# Patient Record
Sex: FEMALE | Race: ASIAN | NOT HISPANIC OR LATINO | Employment: UNEMPLOYED | URBAN - METROPOLITAN AREA
[De-identification: names, ages, dates, MRNs, and addresses within clinical notes are randomized per-mention and may not be internally consistent; named-entity substitution may affect disease eponyms.]

---

## 2017-08-01 ENCOUNTER — COMMUNICATION - HEALTHEAST (OUTPATIENT)
Dept: FAMILY MEDICINE | Facility: CLINIC | Age: 22
End: 2017-08-01

## 2017-08-03 ENCOUNTER — OFFICE VISIT - HEALTHEAST (OUTPATIENT)
Dept: FAMILY MEDICINE | Facility: CLINIC | Age: 22
End: 2017-08-03

## 2017-08-03 ENCOUNTER — COMMUNICATION - HEALTHEAST (OUTPATIENT)
Dept: FAMILY MEDICINE | Facility: CLINIC | Age: 22
End: 2017-08-03

## 2017-08-03 ENCOUNTER — HOSPITAL ENCOUNTER (OUTPATIENT)
Dept: LAB | Age: 22
Setting detail: SPECIMEN
Discharge: HOME | End: 2017-08-03

## 2017-08-03 ENCOUNTER — OFFICE VISIT - HEALTHEAST (OUTPATIENT)
Dept: NURSING | Facility: CLINIC | Age: 22
End: 2017-08-03

## 2017-08-03 DIAGNOSIS — Z79.4 TYPE 2 DIABETES MELLITUS WITHOUT COMPLICATION, WITH LONG-TERM CURRENT USE OF INSULIN (H): ICD-10-CM

## 2017-08-03 DIAGNOSIS — E11.9 TYPE 2 DIABETES MELLITUS WITHOUT COMPLICATION, WITH LONG-TERM CURRENT USE OF INSULIN (H): ICD-10-CM

## 2017-08-03 DIAGNOSIS — Z34.90 PREGNANCY, UNSPECIFIED GESTATIONAL AGE: ICD-10-CM

## 2017-08-03 DIAGNOSIS — N91.2 ABSENCE OF MENSTRUATION: ICD-10-CM

## 2017-08-03 DIAGNOSIS — O09.90 HIGH-RISK PREGNANCY: ICD-10-CM

## 2017-08-03 LAB — HBA1C MFR BLD: >14 % (ref 3.5–6)

## 2017-08-03 ASSESSMENT — MIFFLIN-ST. JEOR: SCORE: 1110

## 2017-08-08 ENCOUNTER — COMMUNICATION - HEALTHEAST (OUTPATIENT)
Dept: NURSING | Facility: CLINIC | Age: 22
End: 2017-08-08

## 2017-08-08 ENCOUNTER — COMMUNICATION - HEALTHEAST (OUTPATIENT)
Dept: CARE COORDINATION | Facility: CLINIC | Age: 22
End: 2017-08-08

## 2017-08-10 ENCOUNTER — OFFICE VISIT - HEALTHEAST (OUTPATIENT)
Dept: NURSING | Facility: CLINIC | Age: 22
End: 2017-08-10

## 2017-08-10 DIAGNOSIS — O24.414 GESTATIONAL DIABETES REQUIRING INSULIN: ICD-10-CM

## 2017-08-17 ENCOUNTER — AMBULATORY - HEALTHEAST (OUTPATIENT)
Dept: CARE COORDINATION | Facility: CLINIC | Age: 22
End: 2017-08-17

## 2017-08-17 ENCOUNTER — OFFICE VISIT - HEALTHEAST (OUTPATIENT)
Dept: NURSING | Facility: CLINIC | Age: 22
End: 2017-08-17

## 2017-08-17 DIAGNOSIS — O24.419 GESTATIONAL DIABETES MELLITUS (GDM) IN FIRST TRIMESTER, GESTATIONAL DIABETES METHOD OF CONTROL UNSPECIFIED: ICD-10-CM

## 2017-08-22 ENCOUNTER — COMMUNICATION - HEALTHEAST (OUTPATIENT)
Dept: ADMINISTRATIVE | Facility: CLINIC | Age: 22
End: 2017-08-22

## 2017-08-24 ENCOUNTER — OFFICE VISIT - HEALTHEAST (OUTPATIENT)
Dept: NURSING | Facility: CLINIC | Age: 22
End: 2017-08-24

## 2017-08-24 DIAGNOSIS — E11.9 TYPE 2 DIABETES MELLITUS WITHOUT COMPLICATION, WITH LONG-TERM CURRENT USE OF INSULIN (H): ICD-10-CM

## 2017-08-24 DIAGNOSIS — Z79.4 TYPE 2 DIABETES MELLITUS WITHOUT COMPLICATION, WITH LONG-TERM CURRENT USE OF INSULIN (H): ICD-10-CM

## 2017-08-31 ENCOUNTER — COMMUNICATION - HEALTHEAST (OUTPATIENT)
Dept: NURSING | Facility: CLINIC | Age: 22
End: 2017-08-31

## 2017-08-31 DIAGNOSIS — N18.9 CHRONIC KIDNEY DISEASE, UNSPECIFIED: ICD-10-CM

## 2017-09-07 ENCOUNTER — COMMUNICATION - HEALTHEAST (OUTPATIENT)
Dept: FAMILY MEDICINE | Facility: CLINIC | Age: 22
End: 2017-09-07

## 2017-09-07 ENCOUNTER — COMMUNICATION - HEALTHEAST (OUTPATIENT)
Dept: NURSING | Facility: CLINIC | Age: 22
End: 2017-09-07

## 2017-09-07 ENCOUNTER — OFFICE VISIT - HEALTHEAST (OUTPATIENT)
Dept: FAMILY MEDICINE | Facility: CLINIC | Age: 22
End: 2017-09-07

## 2017-09-07 DIAGNOSIS — E11.9 TYPE 2 DIABETES MELLITUS WITH INSULIN THERAPY (H): ICD-10-CM

## 2017-09-07 DIAGNOSIS — E11.9 DIABETES (H): ICD-10-CM

## 2017-09-07 DIAGNOSIS — R52 BODY ACHES: ICD-10-CM

## 2017-09-07 DIAGNOSIS — Z79.4 TYPE 2 DIABETES MELLITUS WITH INSULIN THERAPY (H): ICD-10-CM

## 2017-09-07 DIAGNOSIS — O03.9 MISCARRIAGE: ICD-10-CM

## 2017-09-07 DIAGNOSIS — E55.9 VITAMIN D DEFICIENCY: ICD-10-CM

## 2017-09-07 DIAGNOSIS — N18.9 CHRONIC KIDNEY DISEASE, UNSPECIFIED: ICD-10-CM

## 2017-09-07 ASSESSMENT — MIFFLIN-ST. JEOR: SCORE: 1094.69

## 2017-09-08 ENCOUNTER — COMMUNICATION - HEALTHEAST (OUTPATIENT)
Dept: FAMILY MEDICINE | Facility: CLINIC | Age: 22
End: 2017-09-08

## 2017-09-13 ENCOUNTER — OFFICE VISIT - HEALTHEAST (OUTPATIENT)
Dept: FAMILY MEDICINE | Facility: CLINIC | Age: 22
End: 2017-09-13

## 2017-09-13 DIAGNOSIS — E11.9 TYPE 2 DIABETES MELLITUS WITH INSULIN THERAPY (H): ICD-10-CM

## 2017-09-13 DIAGNOSIS — O03.9 MISCARRIAGE: ICD-10-CM

## 2017-09-13 DIAGNOSIS — Z79.4 TYPE 2 DIABETES MELLITUS WITH INSULIN THERAPY (H): ICD-10-CM

## 2017-09-13 ASSESSMENT — MIFFLIN-ST. JEOR: SCORE: 1082.5

## 2017-09-19 ENCOUNTER — AMBULATORY - HEALTHEAST (OUTPATIENT)
Dept: ENDOCRINOLOGY | Facility: CLINIC | Age: 22
End: 2017-09-19

## 2017-09-19 DIAGNOSIS — E11.9 TYPE 2 DIABETES MELLITUS WITH INSULIN THERAPY (H): ICD-10-CM

## 2017-09-19 DIAGNOSIS — Z79.4 TYPE 2 DIABETES MELLITUS WITH INSULIN THERAPY (H): ICD-10-CM

## 2017-09-26 ENCOUNTER — COMMUNICATION - HEALTHEAST (OUTPATIENT)
Dept: FAMILY MEDICINE | Facility: CLINIC | Age: 22
End: 2017-09-26

## 2017-10-04 ENCOUNTER — OFFICE VISIT - HEALTHEAST (OUTPATIENT)
Dept: NURSING | Facility: CLINIC | Age: 22
End: 2017-10-04

## 2017-10-04 DIAGNOSIS — Z79.4 TYPE 2 DIABETES MELLITUS WITHOUT COMPLICATION, WITH LONG-TERM CURRENT USE OF INSULIN (H): ICD-10-CM

## 2017-10-04 DIAGNOSIS — G47.9 DIFFICULTY SLEEPING: ICD-10-CM

## 2017-10-04 DIAGNOSIS — E11.9 TYPE 2 DIABETES MELLITUS WITH INSULIN THERAPY (H): ICD-10-CM

## 2017-10-04 DIAGNOSIS — E11.9 TYPE 2 DIABETES MELLITUS WITHOUT COMPLICATION, WITH LONG-TERM CURRENT USE OF INSULIN (H): ICD-10-CM

## 2017-10-04 DIAGNOSIS — R80.9 MICROALBUMINURIA: ICD-10-CM

## 2017-10-04 DIAGNOSIS — E78.5 DYSLIPIDEMIA: ICD-10-CM

## 2017-10-04 DIAGNOSIS — E11.42 DIABETIC POLYNEUROPATHY ASSOCIATED WITH TYPE 2 DIABETES MELLITUS (H): ICD-10-CM

## 2017-10-04 DIAGNOSIS — Z79.4 TYPE 2 DIABETES MELLITUS WITH INSULIN THERAPY (H): ICD-10-CM

## 2017-10-04 DIAGNOSIS — R63.0 LOSS OF APPETITE: ICD-10-CM

## 2017-10-04 LAB — LDLC SERPL CALC-MCNC: 136 MG/DL

## 2017-10-05 ENCOUNTER — COMMUNICATION - HEALTHEAST (OUTPATIENT)
Dept: NURSING | Facility: CLINIC | Age: 22
End: 2017-10-05

## 2017-10-05 DIAGNOSIS — E11.29 MICROALBUMINURIA DUE TO TYPE 2 DIABETES MELLITUS (H): ICD-10-CM

## 2017-10-05 DIAGNOSIS — E78.5 DYSLIPIDEMIA: ICD-10-CM

## 2017-10-05 DIAGNOSIS — R80.9 MICROALBUMINURIA DUE TO TYPE 2 DIABETES MELLITUS (H): ICD-10-CM

## 2017-10-09 ENCOUNTER — COMMUNICATION - HEALTHEAST (OUTPATIENT)
Dept: NURSING | Facility: CLINIC | Age: 22
End: 2017-10-09

## 2017-10-11 ENCOUNTER — AMBULATORY - HEALTHEAST (OUTPATIENT)
Dept: ENDOCRINOLOGY | Facility: CLINIC | Age: 22
End: 2017-10-11

## 2017-10-11 ENCOUNTER — COMMUNICATION - HEALTHEAST (OUTPATIENT)
Dept: ENDOCRINOLOGY | Facility: CLINIC | Age: 22
End: 2017-10-11

## 2017-10-11 DIAGNOSIS — Z79.4 TYPE 2 DIABETES MELLITUS WITH INSULIN THERAPY (H): ICD-10-CM

## 2017-10-11 DIAGNOSIS — E11.9 TYPE 2 DIABETES MELLITUS WITH INSULIN THERAPY (H): ICD-10-CM

## 2017-11-01 ENCOUNTER — RECORDS - HEALTHEAST (OUTPATIENT)
Dept: ADMINISTRATIVE | Facility: OTHER | Age: 22
End: 2017-11-01

## 2017-11-10 ENCOUNTER — AMBULATORY - HEALTHEAST (OUTPATIENT)
Dept: LAB | Facility: CLINIC | Age: 22
End: 2017-11-10

## 2017-11-10 DIAGNOSIS — Z79.4 TYPE 2 DIABETES MELLITUS WITH INSULIN THERAPY (H): ICD-10-CM

## 2017-11-10 DIAGNOSIS — E11.9 TYPE 2 DIABETES MELLITUS WITH INSULIN THERAPY (H): ICD-10-CM

## 2017-11-12 LAB — HBA1C MFR BLD: 9 % (ref 4.2–6.1)

## 2017-11-13 ENCOUNTER — COMMUNICATION - HEALTHEAST (OUTPATIENT)
Dept: FAMILY MEDICINE | Facility: CLINIC | Age: 22
End: 2017-11-13

## 2017-11-14 ENCOUNTER — OFFICE VISIT - HEALTHEAST (OUTPATIENT)
Dept: ENDOCRINOLOGY | Facility: CLINIC | Age: 22
End: 2017-11-14

## 2017-11-14 DIAGNOSIS — E11.9 TYPE 2 DIABETES MELLITUS WITH INSULIN THERAPY (H): ICD-10-CM

## 2017-11-14 DIAGNOSIS — Z79.4 TYPE 2 DIABETES MELLITUS WITH INSULIN THERAPY (H): ICD-10-CM

## 2017-11-14 ASSESSMENT — MIFFLIN-ST. JEOR: SCORE: 1092.42

## 2017-11-15 ENCOUNTER — COMMUNICATION - HEALTHEAST (OUTPATIENT)
Dept: ENDOCRINOLOGY | Facility: CLINIC | Age: 22
End: 2017-11-15

## 2017-12-07 ENCOUNTER — OFFICE VISIT - HEALTHEAST (OUTPATIENT)
Dept: FAMILY MEDICINE | Facility: CLINIC | Age: 22
End: 2017-12-07

## 2017-12-07 ENCOUNTER — RECORDS - HEALTHEAST (OUTPATIENT)
Dept: ADMINISTRATIVE | Facility: OTHER | Age: 22
End: 2017-12-07

## 2017-12-07 DIAGNOSIS — Z30.09 CONTRACEPTIVE EDUCATION: ICD-10-CM

## 2017-12-07 DIAGNOSIS — E11.9 TYPE 2 DIABETES MELLITUS WITH INSULIN THERAPY (H): ICD-10-CM

## 2017-12-07 DIAGNOSIS — Z23 NEED FOR INFLUENZA VACCINATION: ICD-10-CM

## 2017-12-07 DIAGNOSIS — E78.5 HYPERLIPIDEMIA: ICD-10-CM

## 2017-12-07 DIAGNOSIS — Z79.4 TYPE 2 DIABETES MELLITUS WITH INSULIN THERAPY (H): ICD-10-CM

## 2017-12-07 ASSESSMENT — MIFFLIN-ST. JEOR: SCORE: 1083.35

## 2017-12-13 ENCOUNTER — COMMUNICATION - HEALTHEAST (OUTPATIENT)
Dept: NURSING | Facility: CLINIC | Age: 22
End: 2017-12-13

## 2017-12-13 ENCOUNTER — RECORDS - HEALTHEAST (OUTPATIENT)
Dept: ADMINISTRATIVE | Facility: OTHER | Age: 22
End: 2017-12-13

## 2018-01-15 ENCOUNTER — COMMUNICATION - HEALTHEAST (OUTPATIENT)
Dept: NURSING | Facility: CLINIC | Age: 23
End: 2018-01-15

## 2018-02-26 ENCOUNTER — COMMUNICATION - HEALTHEAST (OUTPATIENT)
Dept: NURSING | Facility: CLINIC | Age: 23
End: 2018-02-26

## 2018-02-28 ENCOUNTER — AMBULATORY - HEALTHEAST (OUTPATIENT)
Dept: FAMILY MEDICINE | Facility: CLINIC | Age: 23
End: 2018-02-28

## 2018-02-28 ENCOUNTER — OFFICE VISIT - HEALTHEAST (OUTPATIENT)
Dept: NURSING | Facility: CLINIC | Age: 23
End: 2018-02-28

## 2018-02-28 ENCOUNTER — RECORDS - HEALTHEAST (OUTPATIENT)
Dept: ADMINISTRATIVE | Facility: OTHER | Age: 23
End: 2018-02-28

## 2018-02-28 DIAGNOSIS — E11.9 TYPE 2 DIABETES MELLITUS WITH INSULIN THERAPY (H): ICD-10-CM

## 2018-02-28 DIAGNOSIS — Z79.4 TYPE 2 DIABETES MELLITUS WITH INSULIN THERAPY (H): ICD-10-CM

## 2018-02-28 DIAGNOSIS — Z79.4 TYPE 2 DIABETES MELLITUS WITHOUT COMPLICATION, WITH LONG-TERM CURRENT USE OF INSULIN (H): ICD-10-CM

## 2018-02-28 DIAGNOSIS — Z34.90 PREGNANCY, UNSPECIFIED GESTATIONAL AGE: ICD-10-CM

## 2018-02-28 DIAGNOSIS — R80.9 MICROALBUMINURIA: ICD-10-CM

## 2018-02-28 DIAGNOSIS — E11.9 TYPE 2 DIABETES MELLITUS WITHOUT COMPLICATION, WITH LONG-TERM CURRENT USE OF INSULIN (H): ICD-10-CM

## 2018-03-09 ENCOUNTER — OFFICE VISIT - HEALTHEAST (OUTPATIENT)
Dept: FAMILY MEDICINE | Facility: CLINIC | Age: 23
End: 2018-03-09

## 2018-03-09 DIAGNOSIS — E11.9 TYPE 2 DIABETES MELLITUS WITH INSULIN THERAPY (H): ICD-10-CM

## 2018-03-09 DIAGNOSIS — E78.5 HYPERLIPIDEMIA: ICD-10-CM

## 2018-03-09 DIAGNOSIS — N91.2 ABSENCE OF MENSTRUATION: ICD-10-CM

## 2018-03-09 DIAGNOSIS — Z79.4 TYPE 2 DIABETES MELLITUS WITH INSULIN THERAPY (H): ICD-10-CM

## 2018-03-09 DIAGNOSIS — O09.91 HIGH-RISK PREGNANCY IN FIRST TRIMESTER: ICD-10-CM

## 2018-03-09 DIAGNOSIS — E55.9 VITAMIN D DEFICIENCY: ICD-10-CM

## 2018-03-09 LAB
ALBUMIN SERPL-MCNC: 3.2 G/DL (ref 3.5–5)
ALP SERPL-CCNC: 49 U/L (ref 45–120)
ALT SERPL W P-5'-P-CCNC: <9 U/L (ref 0–45)
ANION GAP SERPL CALCULATED.3IONS-SCNC: 12 MMOL/L (ref 5–18)
AST SERPL W P-5'-P-CCNC: 15 U/L (ref 0–40)
BILIRUB SERPL-MCNC: 0.3 MG/DL (ref 0–1)
BUN SERPL-MCNC: 11 MG/DL (ref 8–22)
CALCIUM SERPL-MCNC: 8.8 MG/DL (ref 8.5–10.5)
CHLORIDE BLD-SCNC: 105 MMOL/L (ref 98–107)
CHOLEST SERPL-MCNC: 140 MG/DL
CO2 SERPL-SCNC: 22 MMOL/L (ref 22–31)
CREAT SERPL-MCNC: 0.53 MG/DL (ref 0.6–1.1)
CREAT UR-MCNC: 119.6 MG/DL
FASTING STATUS PATIENT QL REPORTED: NO
GFR SERPL CREATININE-BSD FRML MDRD: >60 ML/MIN/1.73M2
GLUCOSE BLD-MCNC: 96 MG/DL (ref 70–125)
HBA1C MFR BLD: 8.7 % (ref 3.5–6)
HCG UR QL: POSITIVE
HDLC SERPL-MCNC: 41 MG/DL
LDLC SERPL CALC-MCNC: 83 MG/DL
MICROALBUMIN UR-MCNC: 11.39 MG/DL (ref 0–1.99)
MICROALBUMIN/CREAT UR: 95.2 MG/G
POTASSIUM BLD-SCNC: 3.7 MMOL/L (ref 3.5–5)
PROT SERPL-MCNC: 6.7 G/DL (ref 6–8)
SODIUM SERPL-SCNC: 139 MMOL/L (ref 136–145)
TRIGL SERPL-MCNC: 80 MG/DL

## 2018-03-09 ASSESSMENT — MIFFLIN-ST. JEOR: SCORE: 1104.33

## 2018-03-12 LAB — 25(OH)D3 SERPL-MCNC: 19.4 NG/ML (ref 30–80)

## 2018-03-14 ENCOUNTER — AMBULATORY - HEALTHEAST (OUTPATIENT)
Dept: FAMILY MEDICINE | Facility: CLINIC | Age: 23
End: 2018-03-14

## 2018-03-14 DIAGNOSIS — E55.9 VITAMIN D DEFICIENCY: ICD-10-CM

## 2018-03-20 ENCOUNTER — RECORDS - HEALTHEAST (OUTPATIENT)
Dept: ADMINISTRATIVE | Facility: OTHER | Age: 23
End: 2018-03-20

## 2018-03-27 ENCOUNTER — OFFICE VISIT - HEALTHEAST (OUTPATIENT)
Dept: ENDOCRINOLOGY | Facility: CLINIC | Age: 23
End: 2018-03-27

## 2018-03-27 ENCOUNTER — RECORDS - HEALTHEAST (OUTPATIENT)
Dept: ADMINISTRATIVE | Facility: OTHER | Age: 23
End: 2018-03-27

## 2018-03-27 DIAGNOSIS — O24.111 PRE-EXISTING TYPE 2 DIABETES MELLITUS DURING PREGNANCY IN FIRST TRIMESTER: ICD-10-CM

## 2018-03-27 ASSESSMENT — MIFFLIN-ST. JEOR: SCORE: 1115.1

## 2018-04-05 ENCOUNTER — COMMUNICATION - HEALTHEAST (OUTPATIENT)
Dept: NURSING | Facility: CLINIC | Age: 23
End: 2018-04-05

## 2018-04-05 ENCOUNTER — OFFICE VISIT - HEALTHEAST (OUTPATIENT)
Dept: EDUCATION SERVICES | Facility: CLINIC | Age: 23
End: 2018-04-05

## 2018-04-05 ENCOUNTER — OFFICE VISIT - HEALTHEAST (OUTPATIENT)
Dept: ENDOCRINOLOGY | Facility: CLINIC | Age: 23
End: 2018-04-05

## 2018-04-05 DIAGNOSIS — O24.111 PRE-EXISTING TYPE 2 DIABETES MELLITUS DURING PREGNANCY IN FIRST TRIMESTER: ICD-10-CM

## 2018-04-05 DIAGNOSIS — O24.111 PREGNANCY WITH TYPE 2 DIABETES MELLITUS IN FIRST TRIMESTER: ICD-10-CM

## 2018-04-05 ASSESSMENT — MIFFLIN-ST. JEOR: SCORE: 1135.06

## 2018-04-11 ENCOUNTER — OFFICE VISIT - HEALTHEAST (OUTPATIENT)
Dept: NURSING | Facility: CLINIC | Age: 23
End: 2018-04-11

## 2018-04-11 DIAGNOSIS — Z79.4 TYPE 2 DIABETES MELLITUS WITHOUT COMPLICATION, WITH LONG-TERM CURRENT USE OF INSULIN (H): ICD-10-CM

## 2018-04-11 DIAGNOSIS — E11.9 TYPE 2 DIABETES MELLITUS WITHOUT COMPLICATION, WITH LONG-TERM CURRENT USE OF INSULIN (H): ICD-10-CM

## 2018-04-12 ENCOUNTER — COMMUNICATION - HEALTHEAST (OUTPATIENT)
Dept: NURSING | Facility: CLINIC | Age: 23
End: 2018-04-12

## 2018-04-12 ENCOUNTER — AMBULATORY - HEALTHEAST (OUTPATIENT)
Dept: EDUCATION SERVICES | Facility: CLINIC | Age: 23
End: 2018-04-12

## 2018-04-12 DIAGNOSIS — O24.111 PRE-EXISTING TYPE 2 DIABETES MELLITUS DURING PREGNANCY IN FIRST TRIMESTER: ICD-10-CM

## 2018-04-26 ENCOUNTER — OFFICE VISIT - HEALTHEAST (OUTPATIENT)
Dept: ENDOCRINOLOGY | Facility: CLINIC | Age: 23
End: 2018-04-26

## 2018-04-26 ENCOUNTER — OFFICE VISIT - HEALTHEAST (OUTPATIENT)
Dept: EDUCATION SERVICES | Facility: CLINIC | Age: 23
End: 2018-04-26

## 2018-04-26 DIAGNOSIS — O24.111 PRE-EXISTING TYPE 2 DIABETES MELLITUS DURING PREGNANCY IN FIRST TRIMESTER: ICD-10-CM

## 2018-04-26 DIAGNOSIS — O24.112 PRE-EXISTING TYPE 2 DIABETES MELLITUS DURING PREGNANCY IN SECOND TRIMESTER: ICD-10-CM

## 2018-04-26 ASSESSMENT — MIFFLIN-ST. JEOR: SCORE: 1116.92

## 2018-05-02 ENCOUNTER — AMBULATORY - HEALTHEAST (OUTPATIENT)
Dept: PHARMACY | Facility: CLINIC | Age: 23
End: 2018-05-02

## 2018-05-02 DIAGNOSIS — O24.414 INSULIN CONTROLLED GESTATIONAL DIABETES MELLITUS (GDM) IN FIRST TRIMESTER: ICD-10-CM

## 2018-05-03 ENCOUNTER — OFFICE VISIT - HEALTHEAST (OUTPATIENT)
Dept: ENDOCRINOLOGY | Facility: CLINIC | Age: 23
End: 2018-05-03

## 2018-05-03 ENCOUNTER — OFFICE VISIT - HEALTHEAST (OUTPATIENT)
Dept: EDUCATION SERVICES | Facility: CLINIC | Age: 23
End: 2018-05-03

## 2018-05-03 DIAGNOSIS — O24.111 PRE-EXISTING TYPE 2 DIABETES MELLITUS DURING PREGNANCY IN FIRST TRIMESTER: ICD-10-CM

## 2018-05-03 DIAGNOSIS — O24.112 PRE-EXISTING TYPE 2 DIABETES MELLITUS DURING PREGNANCY IN SECOND TRIMESTER: ICD-10-CM

## 2018-05-03 ASSESSMENT — MIFFLIN-ST. JEOR: SCORE: 1117.37

## 2018-05-10 ENCOUNTER — OFFICE VISIT - HEALTHEAST (OUTPATIENT)
Dept: ENDOCRINOLOGY | Facility: CLINIC | Age: 23
End: 2018-05-10

## 2018-05-10 ENCOUNTER — OFFICE VISIT - HEALTHEAST (OUTPATIENT)
Dept: EDUCATION SERVICES | Facility: CLINIC | Age: 23
End: 2018-05-10

## 2018-05-10 DIAGNOSIS — O24.112 PRE-EXISTING TYPE 2 DIABETES MELLITUS DURING PREGNANCY IN SECOND TRIMESTER: ICD-10-CM

## 2018-05-10 ASSESSMENT — MIFFLIN-ST. JEOR: SCORE: 1138.24

## 2018-05-15 ENCOUNTER — AMBULATORY - HEALTHEAST (OUTPATIENT)
Dept: CARDIOLOGY | Facility: HOSPITAL | Age: 23
End: 2018-05-15

## 2018-05-15 ENCOUNTER — RECORDS - HEALTHEAST (OUTPATIENT)
Dept: ADMINISTRATIVE | Facility: OTHER | Age: 23
End: 2018-05-15

## 2018-05-15 DIAGNOSIS — E11.9 TYPE 2 DIABETES MELLITUS (H): ICD-10-CM

## 2018-05-16 ENCOUNTER — COMMUNICATION - HEALTHEAST (OUTPATIENT)
Dept: NURSING | Facility: CLINIC | Age: 23
End: 2018-05-16

## 2018-05-16 ENCOUNTER — HOSPITAL ENCOUNTER (OUTPATIENT)
Dept: CARDIOLOGY | Facility: HOSPITAL | Age: 23
Discharge: HOME | End: 2018-05-16
Attending: OBSTETRICS & GYNECOLOGY

## 2018-05-16 DIAGNOSIS — E11.9 TYPE 2 DIABETES MELLITUS (H): ICD-10-CM

## 2018-05-16 LAB
ATRIAL RATE - MUSE: 88 BPM
DIASTOLIC BLOOD PRESSURE - MUSE: NORMAL MMHG
INTERPRETATION ECG - MUSE: NORMAL
P AXIS - MUSE: 46 DEGREES
PR INTERVAL - MUSE: 136 MS
QRS DURATION - MUSE: 64 MS
QT - MUSE: 366 MS
QTC - MUSE: 442 MS
R AXIS - MUSE: 52 DEGREES
SYSTOLIC BLOOD PRESSURE - MUSE: NORMAL MMHG
T AXIS - MUSE: 27 DEGREES
VENTRICULAR RATE- MUSE: 88 BPM

## 2018-05-17 ENCOUNTER — COMMUNICATION - HEALTHEAST (OUTPATIENT)
Dept: EDUCATION SERVICES | Facility: CLINIC | Age: 23
End: 2018-05-17

## 2018-05-30 ENCOUNTER — COMMUNICATION - HEALTHEAST (OUTPATIENT)
Dept: NURSING | Facility: CLINIC | Age: 23
End: 2018-05-30

## 2018-05-31 ENCOUNTER — OFFICE VISIT - HEALTHEAST (OUTPATIENT)
Dept: EDUCATION SERVICES | Facility: CLINIC | Age: 23
End: 2018-05-31

## 2018-06-04 ENCOUNTER — RECORDS - HEALTHEAST (OUTPATIENT)
Dept: ADMINISTRATIVE | Facility: OTHER | Age: 23
End: 2018-06-04

## 2018-06-04 ENCOUNTER — COMMUNICATION - HEALTHEAST (OUTPATIENT)
Dept: NURSING | Facility: CLINIC | Age: 23
End: 2018-06-04

## 2018-06-11 ENCOUNTER — OFFICE VISIT - HEALTHEAST (OUTPATIENT)
Dept: PODIATRY | Facility: CLINIC | Age: 23
End: 2018-06-11

## 2018-06-11 DIAGNOSIS — M21.6X1 PRONATION DEFORMITY OF BOTH FEET: ICD-10-CM

## 2018-06-11 DIAGNOSIS — M21.6X2 PRONATION DEFORMITY OF BOTH FEET: ICD-10-CM

## 2018-06-27 ENCOUNTER — COMMUNICATION - HEALTHEAST (OUTPATIENT)
Dept: NURSING | Facility: CLINIC | Age: 23
End: 2018-06-27

## 2018-06-28 ENCOUNTER — COMMUNICATION - HEALTHEAST (OUTPATIENT)
Dept: NURSING | Facility: CLINIC | Age: 23
End: 2018-06-28

## 2018-07-12 LAB — HBA1C MFR BLD: 7 % (ref 0–5.6)

## 2018-08-14 ENCOUNTER — COMMUNICATION - HEALTHEAST (OUTPATIENT)
Dept: NURSING | Facility: CLINIC | Age: 23
End: 2018-08-14

## 2018-09-17 ENCOUNTER — COMMUNICATION - HEALTHEAST (OUTPATIENT)
Dept: NURSING | Facility: CLINIC | Age: 23
End: 2018-09-17

## 2018-09-25 ENCOUNTER — COMMUNICATION - HEALTHEAST (OUTPATIENT)
Dept: CARE COORDINATION | Facility: CLINIC | Age: 23
End: 2018-09-25

## 2018-10-03 ENCOUNTER — AMBULATORY - HEALTHEAST (OUTPATIENT)
Dept: MULTI SPECIALTY CLINIC | Facility: CLINIC | Age: 23
End: 2018-10-03

## 2018-10-03 LAB — HBA1C MFR BLD: 9.9 % (ref 0–5.6)

## 2018-10-30 ENCOUNTER — COMMUNICATION - HEALTHEAST (OUTPATIENT)
Dept: NURSING | Facility: CLINIC | Age: 23
End: 2018-10-30

## 2018-12-07 ENCOUNTER — COMMUNICATION - HEALTHEAST (OUTPATIENT)
Dept: NURSING | Facility: CLINIC | Age: 23
End: 2018-12-07

## 2018-12-07 DIAGNOSIS — E11.9 TYPE 2 DIABETES MELLITUS WITHOUT COMPLICATION, WITH LONG-TERM CURRENT USE OF INSULIN (H): ICD-10-CM

## 2018-12-07 DIAGNOSIS — Z79.4 TYPE 2 DIABETES MELLITUS WITHOUT COMPLICATION, WITH LONG-TERM CURRENT USE OF INSULIN (H): ICD-10-CM

## 2018-12-11 ENCOUNTER — COMMUNICATION - HEALTHEAST (OUTPATIENT)
Dept: NURSING | Facility: CLINIC | Age: 23
End: 2018-12-11

## 2018-12-12 ENCOUNTER — OFFICE VISIT - HEALTHEAST (OUTPATIENT)
Dept: FAMILY MEDICINE | Facility: CLINIC | Age: 23
End: 2018-12-12

## 2018-12-12 DIAGNOSIS — Z30.9 CONTRACEPTIVE MANAGEMENT: ICD-10-CM

## 2018-12-12 DIAGNOSIS — E11.9 TYPE 2 DIABETES MELLITUS WITH INSULIN THERAPY (H): ICD-10-CM

## 2018-12-12 DIAGNOSIS — Z79.4 TYPE 2 DIABETES MELLITUS WITH INSULIN THERAPY (H): ICD-10-CM

## 2018-12-12 LAB
HCG UR QL: NEGATIVE
SP GR UR STRIP: 1.01 (ref 1–1.03)

## 2018-12-12 ASSESSMENT — MIFFLIN-ST. JEOR: SCORE: 1249.48

## 2019-01-10 ENCOUNTER — COMMUNICATION - HEALTHEAST (OUTPATIENT)
Dept: CARE COORDINATION | Facility: CLINIC | Age: 24
End: 2019-01-10

## 2019-01-22 ENCOUNTER — COMMUNICATION - HEALTHEAST (OUTPATIENT)
Dept: NURSING | Facility: CLINIC | Age: 24
End: 2019-01-22

## 2019-01-30 ENCOUNTER — AMBULATORY - HEALTHEAST (OUTPATIENT)
Dept: MULTI SPECIALTY CLINIC | Facility: CLINIC | Age: 24
End: 2019-01-30

## 2019-01-30 ENCOUNTER — RECORDS - HEALTHEAST (OUTPATIENT)
Dept: ADMINISTRATIVE | Facility: OTHER | Age: 24
End: 2019-01-30

## 2019-03-06 ENCOUNTER — AMBULATORY - HEALTHEAST (OUTPATIENT)
Dept: NURSING | Facility: CLINIC | Age: 24
End: 2019-03-06

## 2019-03-11 ENCOUNTER — COMMUNICATION - HEALTHEAST (OUTPATIENT)
Dept: NURSING | Facility: CLINIC | Age: 24
End: 2019-03-11

## 2019-03-18 ENCOUNTER — COMMUNICATION - HEALTHEAST (OUTPATIENT)
Dept: FAMILY MEDICINE | Facility: CLINIC | Age: 24
End: 2019-03-18

## 2019-04-23 ENCOUNTER — AMBULATORY - HEALTHEAST (OUTPATIENT)
Dept: FAMILY MEDICINE | Facility: CLINIC | Age: 24
End: 2019-04-23

## 2019-04-23 DIAGNOSIS — Z79.4 TYPE 2 DIABETES MELLITUS WITH INSULIN THERAPY (H): ICD-10-CM

## 2019-04-23 DIAGNOSIS — E11.9 TYPE 2 DIABETES MELLITUS WITH INSULIN THERAPY (H): ICD-10-CM

## 2019-04-29 ENCOUNTER — COMMUNICATION - HEALTHEAST (OUTPATIENT)
Dept: NURSING | Facility: CLINIC | Age: 24
End: 2019-04-29

## 2019-04-29 ENCOUNTER — TELEPHONE (OUTPATIENT)
Dept: ENDOCRINOLOGY | Facility: CLINIC | Age: 24
End: 2019-04-29

## 2019-04-29 NOTE — TELEPHONE ENCOUNTER
4/29 [place] referral Type 2 diabetes mellitus with insulin therapy, ref by NANDA Rodriguez [facility]    LVM with patient, intakes can schedule

## 2019-05-07 ENCOUNTER — OFFICE VISIT - HEALTHEAST (OUTPATIENT)
Dept: FAMILY MEDICINE | Facility: CLINIC | Age: 24
End: 2019-05-07

## 2019-05-07 DIAGNOSIS — Z11.3 SCREENING FOR STD (SEXUALLY TRANSMITTED DISEASE): ICD-10-CM

## 2019-05-07 DIAGNOSIS — Z00.00 ROUTINE GENERAL MEDICAL EXAMINATION AT A HEALTH CARE FACILITY: ICD-10-CM

## 2019-05-07 DIAGNOSIS — D64.9 ANEMIA, UNSPECIFIED TYPE: ICD-10-CM

## 2019-05-07 DIAGNOSIS — Z91.148 NONCOMPLIANCE WITH MEDICATION REGIMEN: ICD-10-CM

## 2019-05-07 DIAGNOSIS — Z12.4 SCREENING FOR CERVICAL CANCER: ICD-10-CM

## 2019-05-07 DIAGNOSIS — Z79.4 TYPE 2 DIABETES MELLITUS WITH INSULIN THERAPY (H): ICD-10-CM

## 2019-05-07 DIAGNOSIS — E11.9 TYPE 2 DIABETES MELLITUS WITH INSULIN THERAPY (H): ICD-10-CM

## 2019-05-07 LAB
ALBUMIN SERPL-MCNC: 3.8 G/DL (ref 3.5–5)
ALP SERPL-CCNC: 77 U/L (ref 45–120)
ALT SERPL W P-5'-P-CCNC: <9 U/L (ref 0–45)
ANION GAP SERPL CALCULATED.3IONS-SCNC: 10 MMOL/L (ref 5–18)
AST SERPL W P-5'-P-CCNC: 10 U/L (ref 0–40)
BILIRUB SERPL-MCNC: 0.3 MG/DL (ref 0–1)
BUN SERPL-MCNC: 11 MG/DL (ref 8–22)
CALCIUM SERPL-MCNC: 9.8 MG/DL (ref 8.5–10.5)
CHLORIDE BLD-SCNC: 101 MMOL/L (ref 98–107)
CHOLEST SERPL-MCNC: 312 MG/DL
CO2 SERPL-SCNC: 23 MMOL/L (ref 22–31)
CREAT SERPL-MCNC: 0.85 MG/DL (ref 0.6–1.1)
FASTING STATUS PATIENT QL REPORTED: YES
GFR SERPL CREATININE-BSD FRML MDRD: >60 ML/MIN/1.73M2
GLUCOSE BLD-MCNC: 405 MG/DL (ref 70–125)
HBA1C MFR BLD: >14 % (ref 3.5–6)
HDLC SERPL-MCNC: 53 MG/DL
HGB BLD-MCNC: 13.6 G/DL (ref 12–16)
LDLC SERPL CALC-MCNC: 219 MG/DL
POTASSIUM BLD-SCNC: 4.5 MMOL/L (ref 3.5–5)
PROT SERPL-MCNC: 7.9 G/DL (ref 6–8)
SODIUM SERPL-SCNC: 134 MMOL/L (ref 136–145)
TRIGL SERPL-MCNC: 202 MG/DL

## 2019-05-07 ASSESSMENT — MIFFLIN-ST. JEOR: SCORE: 1235.59

## 2019-05-08 LAB
C TRACH DNA SPEC QL PROBE+SIG AMP: NEGATIVE
HPV SOURCE: NORMAL
HUMAN PAPILLOMA VIRUS 16 DNA: NEGATIVE
HUMAN PAPILLOMA VIRUS 18 DNA: NEGATIVE
HUMAN PAPILLOMA VIRUS FINAL DIAGNOSIS: NORMAL
HUMAN PAPILLOMA VIRUS OTHER HR: NEGATIVE
N GONORRHOEA DNA SPEC QL NAA+PROBE: NEGATIVE
SPECIMEN DESCRIPTION: NORMAL

## 2019-05-22 ENCOUNTER — AMBULATORY - HEALTHEAST (OUTPATIENT)
Dept: NURSING | Facility: CLINIC | Age: 24
End: 2019-05-22

## 2019-06-04 ENCOUNTER — COMMUNICATION - HEALTHEAST (OUTPATIENT)
Dept: NURSING | Facility: CLINIC | Age: 24
End: 2019-06-04

## 2019-06-07 ENCOUNTER — OFFICE VISIT - HEALTHEAST (OUTPATIENT)
Dept: PHARMACY | Facility: CLINIC | Age: 24
End: 2019-06-07

## 2019-06-26 ENCOUNTER — OFFICE VISIT - HEALTHEAST (OUTPATIENT)
Dept: PHARMACY | Facility: CLINIC | Age: 24
End: 2019-06-26

## 2019-06-26 DIAGNOSIS — E11.9 TYPE 2 DIABETES MELLITUS WITH INSULIN THERAPY (H): ICD-10-CM

## 2019-06-26 DIAGNOSIS — Z79.4 TYPE 2 DIABETES MELLITUS WITH INSULIN THERAPY (H): ICD-10-CM

## 2019-07-09 NOTE — TELEPHONE ENCOUNTER
RECORDS RECEIVED FROM: [facility]   DATE RECEIVED: 7/15/19   NOTES (FOR ALL VISITS) STATUS DETAILS   OFFICE NOTES from referring provider Care Everywhere 5/7/19 12/12/18  (additional encounters in Care Everywhere)   OFFICE NOTES from other specialist Care Everywhere Dr Jackson at [facility] End[facility]:  10/3/18  9/20/18  7/12/18  6/21/18  6/7/18    [facility] End[facility]:  5/10/18  5/3/18  4/26/18  4/5/18  3/27/18  11/14/17   ED NOTES N/A    OPERATIVE REPORT  (thyroid, pituitary, adrenal, parathyroid) N/A    MEDICATION LIST Care Everywhere    IMAGING      DEXASCAN N/A    MRI (BRAIN/ABDOMEN) N/A    XR (Chest) N/A    CT (HEAD/NECK/CHEST/ABDOMEN) N/A    NUCLEAR  N/A    ULTRASOUND (HEAD/NECK) N/A    LABS     DIABETES: HBGA1C, CREATININE, FASTING LIPIDS, MICROALBUMIN URINE, POTASSIUM, TSH, T4    THYROID: TSH, T4, CBC, THYRODLONULIN, TOTAL T3, FREE T4, CALCITONIN, CEA Care Everywhere   5/7/19

## 2019-07-11 ENCOUNTER — OFFICE VISIT - HEALTHEAST (OUTPATIENT)
Dept: PHARMACY | Facility: CLINIC | Age: 24
End: 2019-07-11

## 2019-07-11 ENCOUNTER — COMMUNICATION - HEALTHEAST (OUTPATIENT)
Dept: NURSING | Facility: CLINIC | Age: 24
End: 2019-07-11

## 2019-07-11 DIAGNOSIS — R80.9 PROTEINURIA, UNSPECIFIED TYPE: ICD-10-CM

## 2019-07-11 DIAGNOSIS — E11.9 TYPE 2 DIABETES MELLITUS WITH INSULIN THERAPY (H): ICD-10-CM

## 2019-07-11 DIAGNOSIS — Z79.4 TYPE 2 DIABETES MELLITUS WITH INSULIN THERAPY (H): ICD-10-CM

## 2019-07-11 LAB
CREAT UR-MCNC: 52.6 MG/DL
MICROALBUMIN UR-MCNC: 7.62 MG/DL (ref 0–1.99)
MICROALBUMIN/CREAT UR: 144.9 MG/G

## 2019-07-12 ENCOUNTER — COMMUNICATION - HEALTHEAST (OUTPATIENT)
Dept: FAMILY MEDICINE | Facility: CLINIC | Age: 24
End: 2019-07-12

## 2019-07-15 ENCOUNTER — RECORDS - HEALTHEAST (OUTPATIENT)
Dept: ADMINISTRATIVE | Facility: OTHER | Age: 24
End: 2019-07-15

## 2019-07-15 ENCOUNTER — OFFICE VISIT (OUTPATIENT)
Dept: ENDOCRINOLOGY | Facility: CLINIC | Age: 24
End: 2019-07-15
Payer: COMMERCIAL

## 2019-07-15 ENCOUNTER — PRE VISIT (OUTPATIENT)
Dept: ENDOCRINOLOGY | Facility: CLINIC | Age: 24
End: 2019-07-15

## 2019-07-15 VITALS
HEART RATE: 93 BPM | WEIGHT: 140.5 LBS | DIASTOLIC BLOOD PRESSURE: 82 MMHG | HEIGHT: 60 IN | SYSTOLIC BLOOD PRESSURE: 119 MMHG | BODY MASS INDEX: 27.58 KG/M2

## 2019-07-15 DIAGNOSIS — E11.65 TYPE 2 DIABETES MELLITUS WITH HYPERGLYCEMIA, UNSPECIFIED WHETHER LONG TERM INSULIN USE (H): Primary | ICD-10-CM

## 2019-07-15 RX ORDER — FLASH GLUCOSE SCANNING READER
1 EACH MISCELLANEOUS
COMMUNITY
Start: 2019-06-26 | End: 2020-04-10

## 2019-07-15 RX ORDER — ACETAMINOPHEN 500 MG
1000 TABLET ORAL
COMMUNITY
Start: 2018-10-08 | End: 2022-10-05

## 2019-07-15 RX ORDER — LISINOPRIL 2.5 MG/1
2.5 TABLET ORAL DAILY
COMMUNITY
End: 2022-04-20

## 2019-07-15 RX ORDER — METFORMIN HCL 500 MG
TABLET, EXTENDED RELEASE 24 HR ORAL
Qty: 120 TABLET | Refills: 11 | Status: SHIPPED | OUTPATIENT
Start: 2019-07-15 | End: 2019-10-16

## 2019-07-15 RX ORDER — FLASH GLUCOSE SCANNING READER
EACH MISCELLANEOUS
Refills: 0 | COMMUNITY
Start: 2019-06-27 | End: 2020-04-10

## 2019-07-15 ASSESSMENT — PAIN SCALES - GENERAL: PAINLEVEL: NO PAIN (0)

## 2019-07-15 ASSESSMENT — MIFFLIN-ST. JEOR: SCORE: 1300.86

## 2019-07-15 NOTE — LETTER
Date:July 16, 2019      Patient was self referred, no letter generated. Do not send.        [facility] Health Information

## 2019-07-15 NOTE — LETTER
"7/15/2019       RE: Tyson Skelton  [address]  [place] [address]     Dear Colleague,    Thank you for referring your patient, Tyson Skelton, to the [facility] ENDOCRINOLOGY at [facility]. Please see a copy of my visit note below.    Reason for visit/consult: Type 2 DM    Primary care provider: No Ref-Primary, Physician    HPI:  23 yo female seen for evaluation and management of T2DM. She has had DM since . This is the 1st time I am seeing her. She was seen on 17 by Linda Mensah and it was noted, \"Her C-peptide and BRYN-antibodies tests would indicate that she is indeed a Type 2 with some pancreatic function. Her current A1c is 9, which is a great improvement from 14>, which, from our records, seems to be where she was residing over the last 2 years.\" She has since been seeing Dr. Jackson ([facility]) during pregnancy, last visit was 10/6/18.    She is taking 50 units of novolin mix 70/30 via FlexPen twice daily - in am & pm before meals at about 8am & 7 or 8pm. She has has been having excessive thirst and urination.    She has seen eye MD within the past year and she was told everything was fine.    Meter download data is as follows:  Average: 291, 88% above target, .9    Past Medical/Surgical History:    Vitamin D Deficiency     Type 2 diabetes mellitus with insulin therapy     Hyperlipidemia     Proteinuria     Chronic Kidney Disease (NKF Classification)     S/p  x 1 10/6/18    Allergies:  NKDA    Current Medications   Insulin, see HPI  Lisinopril 2.5 mg daily    Family History:  DM in mother    Social History:  Non-smoker. . 1 child. 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 Occupation: PCA.    ROS:  negative except as mentioned in HPI    Exam  /82   Pulse 93   Ht 1.511 m (4' 11.5\")   Wt 63.7 kg (140 lb 8 oz)   BMI 27.90 kg/m     Gen: well appearing, nad, pleasant and conversant  HEENT: anicteric, EOMI, no proptosis or lid lag, no goiter  Neuro: A&Ox3, no " tremor    Labs/Imaging    See HE 5/7/19: creatinine 0.85, LDL calculated 219, A1c >14%    Assessment and Plan  Type 2 DM, poorly controlled and hyperlipidemia. She has not tried metformin and could benefit greatly from this. Her renal function is ok for this. Needs diabetic foot exam at f/u visit and may also need a statin added for high LDL, after retesting fasting lipids when diabetes is controlled.    Patient Instructions:    Start metformin 500 mg ER daily for 1 week, then increase by 1 tablet weekly until taking #4 tabs daily.    As the metformin takes effect on your high blood sugars, be sure to monitor your blood sugars. You may need to decrease your insulin doses and eventually you may not need any insulin if your blood sugars are in  range fasting and <180 about 2 hours after eating.    Please check your blood sugars in am when you are fasting and also 2 hours after meals. If your fasting sugars are lower or higher than the range, you will need to make an adjustment on your insulin.    Please see Diabetes Education within 2 weeks and f/u with me in about 3 months.    For questions about blood sugars, please call [phone] anytime & ask the  to page diabetes/endocrine on-call.    RTC: 2 weeks w/ DE, 3 months w/ me    Katherine Peralta MD, MPH  Attending Physician  Diabetes/Endocrinology/Metabolism    Time: 60 min spent on evaluation, management, counseling, education, & motivational interviewing with greater than 50% of the total time was spent on counseling and coordinating care. Patient was seen w/ help of .          Again, thank you for allowing me to participate in the care of your patient.      Sincerely,    Katherine Peralta MD

## 2019-07-15 NOTE — PROGRESS NOTES
"Reason for visit/consult: Type 2 DM    Primary care provider: No Ref-Primary, Physician    HPI:  23 yo female seen for evaluation and management of T2DM. She has had DM since . This is the 1st time I am seeing her. She was seen on 17 by Linda Mensah and it was noted, \"Her C-peptide and BRYN-antibodies tests would indicate that she is indeed a Type 2 with some pancreatic function. Her current A1c is 9, which is a great improvement from 14>, which, from our records, seems to be where she was residing over the last 2 years.\" She has since been seeing Dr. Jackson ([facility]) during pregnancy, last visit was 10/6/18.    She is taking 50 units of novolin mix 70/30 via FlexPen twice daily - in am & pm before meals at about 8am & 7 or 8pm. She has has been having excessive thirst and urination.    She has seen eye MD within the past year and she was told everything was fine.    Meter download data is as follows:  Average: 291, 88% above target, .9    Past Medical/Surgical History:    Vitamin D Deficiency     Type 2 diabetes mellitus with insulin therapy     Hyperlipidemia     Proteinuria     Chronic Kidney Disease (NKF Classification)     S/p  x 1 10/6/18    Allergies:  NKDA    Current Medications   Insulin, see HPI  Lisinopril 2.5 mg daily    Family History:  DM in mother    Social History:  Non-smoker. . 1 child. 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 Occupation: PCA.    ROS:  negative except as mentioned in HPI    Exam  /82   Pulse 93   Ht 1.511 m (4' 11.5\")   Wt 63.7 kg (140 lb 8 oz)   BMI 27.90 kg/m    Gen: well appearing, nad, pleasant and conversant  HEENT: anicteric, EOMI, no proptosis or lid lag, no goiter  Neuro: A&Ox3, no tremor    Labs/Imaging    See HE 19: creatinine 0.85, LDL calculated 219, A1c >14%    Assessment and Plan  Type 2 DM, poorly controlled and hyperlipidemia. She has not tried metformin and could benefit greatly from this. Her renal function is ok for this. Needs diabetic " foot exam at f/u visit and may also need a statin added for high LDL, after retesting fasting lipids when diabetes is controlled.    Patient Instructions:    Start metformin 500 mg ER daily for 1 week, then increase by 1 tablet weekly until taking #4 tabs daily.    As the metformin takes effect on your high blood sugars, be sure to monitor your blood sugars. You may need to decrease your insulin doses and eventually you may not need any insulin if your blood sugars are in  range fasting and <180 about 2 hours after eating.    Please check your blood sugars in am when you are fasting and also 2 hours after meals. If your fasting sugars are lower or higher than the range, you will need to make an adjustment on your insulin.    Please see Diabetes Education within 2 weeks and f/u with me in about 3 months.    For questions about blood sugars, please call [phone] anytime & ask the  to page diabetes/endocrine on-call.    RTC: 2 weeks w/ DE, 3 months w/ me    Katherine Peralta MD, MPH  Attending Physician  Diabetes/Endocrinology/Metabolism    Time: 60 min spent on evaluation, management, counseling, education, & motivational interviewing with greater than 50% of the total time was spent on counseling and coordinating care. Patient was seen w/ help of .

## 2019-07-15 NOTE — PATIENT INSTRUCTIONS
Start metformin 500 mg ER daily for 1 week, then increase by 1 tablet weekly until taking #4 tabs daily.    As the metformin takes effect on your high blood sugars, be sure to monitor your blood sugars. You may need to decrease your insulin doses and eventually you may not need any insulin if your blood sugars are in  range fasting and <180 about 2 hours after eating.    Please check your blood sugars in am when you are fasting and also 2 hours after meals. If your fasting sugars are lower or higher than the range, you will need to make an adjustment on your insulin.    Please see Diabetes Education within 2 weeks and f/u with me in about 3 months.    For questions about blood sugars, please call [phone] anytime & ask the  to page diabetes/endocrine on-call.

## 2019-07-16 ENCOUNTER — AMBULATORY - HEALTHEAST (OUTPATIENT)
Dept: FAMILY MEDICINE | Facility: CLINIC | Age: 24
End: 2019-07-16

## 2019-07-16 DIAGNOSIS — Z79.4 TYPE 2 DIABETES MELLITUS WITH INSULIN THERAPY (H): ICD-10-CM

## 2019-07-16 DIAGNOSIS — E11.9 TYPE 2 DIABETES MELLITUS WITH INSULIN THERAPY (H): ICD-10-CM

## 2019-07-18 ENCOUNTER — ALLIED HEALTH/NURSE VISIT (OUTPATIENT)
Dept: EDUCATION SERVICES | Facility: CLINIC | Age: 24
End: 2019-07-18
Payer: COMMERCIAL

## 2019-07-18 DIAGNOSIS — E11.9 TYPE 2 DIABETES MELLITUS (H): Primary | ICD-10-CM

## 2019-07-18 NOTE — PATIENT INSTRUCTIONS
Diabetes Instructions:    Try taking your insulin injection about 30-60 minutes before you eat twice a day.     Make sure you are carrying glucose tablets with you for treating low blood sugar.     No changes in your insulin doses today.     Next Appointment is August 12, Monday, at 10:30am.     If you need to get a replacement sensor (because it falls off before the 2 weeks is over), call the [employer] and ask them to send you a replacement.      Call if any questions or problems:    AJAY RomeoN, RN, CDE  Diabetes   [facility]  [facility] Room [address] [place] [address]  Email:  [email or web address]  Phone:  [phone]

## 2019-07-19 NOTE — PROGRESS NOTES
"Diabetes Self-Management Education & Support    Diabetes Education Self Management & Training    SUBJECTIVE/OBJECTIVE:     Cultural Influences/Ethnic Background:  Choose not to answer  Paw is originally from [place], and speaks [place].  She understands and reads quite a bit of English.  Attends today with a Reanna .     Diabetes Symptoms & Complications     Patient Problem List and Family Medical History reviewed for relevant medical history, current medical status, and diabetes risk factors.    Vitals:  There were no vitals taken for this visit.  Estimated body mass index is 27.9 kg/m  as calculated from the following:    Height as of 7/15/19: 1.511 m (4' 11.5\").    Weight as of 7/15/19: 63.7 kg (140 lb 8 oz).   Last 3 BP:   BP Readings from Last 3 Encounters:   07/15/19 119/82       History   Smoking Status     Not on file   Smokeless Tobacco     Not on file       Labs:  No results found for: A1C  No results found for: GLC  No results found for: LDL  No results found for: HDL]  No results found for: GFRESTIMATED  No results found for: GFRESTBLACK  No results found for: CR  No results found for: MICROALBUMIN    Healthy Eating:  Eats two meals per day typically.  All meals consist of some variation of rice, vegetables and some meat.  Typical rice portions are about 2 cups, she states.       Being Active:  Not assessed.      Monitoring:  Has been using the Andrews 14 day sensor for the past 2 weeks.  Current sensor she put in by herself.  See Andrews report below:            Taking Medications  Diabetes Medication(s)     Biguanides       metFORMIN (GLUCOPHAGE-XR) 500 MG 24 hr tablet    Take 1 tab daily x 1 week, then increase by 1 tab weekly until taking 4 tabs daily.    Insulin       insulin isophane & regular (NOVOLIN 70/30 FLEXPEN) susp    Inject 50 Units Subcutaneous        States that she generally takes her insulin immediately before eating her breakfast and her evening meal.   She has been on MDI in the " past, however it sounds like this was discontinued because of frequent omissions and it was difficult for her to manage.  She has not been on 70/30 Novolog Mix.  She thinks that she forgets to take her insulin about once or twice a week.     Problem Solving:  She verbalizes understanding of treatment of hypoglycemia.  States that she carries glucose tablets with her and takes 3-4 tablets in the event of low blood glucose, or 1 cup of juice.  She states that hypoglycemia tends to happen sometimes frequently in a short period of time, but then she will go for a long time and not have any.  We did not discuss treatment of hyperglycemia today.     Reducing Risks:  Not discussed today.      Healthy Coping:  Diagnosed with Type 2 diabetes when she was about 14 or 15 years old.  Verified Type 2.  Previously seen at [facility] and also [facility].  She has a 9 1/2 month old son.  She is not planning another child at this point.      ASSESSMENT:    Reviewed reader download with Paw and she was able to identify that her blood glucose levels are over target.   She acknowledges that she is sometimes forgetting to take her insulin altogether.  She likes taking only two injections per day and didn't like MDI where she was taking four injections per day.  She frequently omitted doses when doing this, as it was too much to remember.    Patient's most recent No results found for: A1C is not meeting goal of <7.0    INTERVENTION:   Diabetes knowledge and skills assessment:     Patient is knowledgeable in diabetes management concepts related to: Needs support and reinforcement for all aspects of diabetes self management.     Based on learning assessment above, most appropriate setting for further diabetes education would be: Individual setting.    Education provided today on:  AADE Self-Care Behaviors:    Focused education on her insulin, and the timing of her insulin to meals.  Explained the action of her mixed Novolin insulin  "and explained that this insulin will work more effectively for her if she takes it about 30-60 minutes prior to eating a meal, as it will give the regular insulin in the injection some time to work and will coincide better with the absorption of her carbohydrates.  She states that the reason she has always taken it immediately before eating is because this is what she was taught with MDI using rapid acting insulin.  She states that she will try to adjust this.     Reviewed recognition and treatment of hypoglycemia.  She seems to have a good grasp of this and carries a carbohydrate source with her.      She states that she has received diabetes education from CDE's at her previous clinics and declines meeting with our dietitian for now.  I think it will be important to continue to follow Paw so we can work on  better control.  It's unclear to me how much she understands about the long term implications of uncontrolled diabetes.      She likes using the Andrews Sensor.  Today we reviewed what the arrows indicate, and what the \"check blood glucose\" icon means.      Opportunities for ongoing education and support in diabetes-self management were discussed.    Pt verbalized understanding of concepts discussed and recommendations provided today.         PLAN:  See Patient Instructions for co-developed, patient-stated behavior change goals.  AVS printed and provided to patient today. See Follow-Up section for recommended follow-up.  Follow up appointment in 2 weeks.        Time Spent: 60 minutes  Encounter Type: Individual    Any diabetes medication dose changes were made via the CDE Protocol and Collaborative Practice Agreement with the patient's referring provider. A copy of this encounter was shared with the provider.    "

## 2019-07-25 ENCOUNTER — COMMUNICATION - HEALTHEAST (OUTPATIENT)
Dept: NURSING | Facility: CLINIC | Age: 24
End: 2019-07-25

## 2019-07-25 ENCOUNTER — OFFICE VISIT - HEALTHEAST (OUTPATIENT)
Dept: PHARMACY | Facility: CLINIC | Age: 24
End: 2019-07-25

## 2019-07-25 DIAGNOSIS — E11.9 TYPE 2 DIABETES MELLITUS WITH INSULIN THERAPY (H): ICD-10-CM

## 2019-07-25 DIAGNOSIS — Z79.4 TYPE 2 DIABETES MELLITUS WITH INSULIN THERAPY (H): ICD-10-CM

## 2019-07-25 DIAGNOSIS — R80.9 PROTEINURIA, UNSPECIFIED TYPE: ICD-10-CM

## 2019-07-25 DIAGNOSIS — E78.2 MIXED HYPERLIPIDEMIA: ICD-10-CM

## 2019-08-08 ENCOUNTER — OFFICE VISIT - HEALTHEAST (OUTPATIENT)
Dept: FAMILY MEDICINE | Facility: CLINIC | Age: 24
End: 2019-08-08

## 2019-08-08 DIAGNOSIS — Z79.4 TYPE 2 DIABETES MELLITUS WITH INSULIN THERAPY (H): ICD-10-CM

## 2019-08-08 DIAGNOSIS — E11.9 TYPE 2 DIABETES MELLITUS WITH INSULIN THERAPY (H): ICD-10-CM

## 2019-08-08 DIAGNOSIS — E78.2 MIXED HYPERLIPIDEMIA: ICD-10-CM

## 2019-08-08 DIAGNOSIS — Z30.42 ENCOUNTER FOR SURVEILLANCE OF INJECTABLE CONTRACEPTIVE: ICD-10-CM

## 2019-08-08 LAB
ALBUMIN SERPL-MCNC: 3.4 G/DL (ref 3.5–5)
ALP SERPL-CCNC: 77 U/L (ref 45–120)
ALT SERPL W P-5'-P-CCNC: <9 U/L (ref 0–45)
ANION GAP SERPL CALCULATED.3IONS-SCNC: 7 MMOL/L (ref 5–18)
AST SERPL W P-5'-P-CCNC: 10 U/L (ref 0–40)
BILIRUB SERPL-MCNC: 0.2 MG/DL (ref 0–1)
BUN SERPL-MCNC: 17 MG/DL (ref 8–22)
CALCIUM SERPL-MCNC: 9.3 MG/DL (ref 8.5–10.5)
CHLORIDE BLD-SCNC: 106 MMOL/L (ref 98–107)
CHOLEST SERPL-MCNC: 166 MG/DL
CO2 SERPL-SCNC: 24 MMOL/L (ref 22–31)
CREAT SERPL-MCNC: 0.77 MG/DL (ref 0.6–1.1)
FASTING STATUS PATIENT QL REPORTED: NO
GFR SERPL CREATININE-BSD FRML MDRD: >60 ML/MIN/1.73M2
GLUCOSE BLD-MCNC: 259 MG/DL (ref 70–125)
HBA1C MFR BLD: 12.3 % (ref 3.5–6)
HDLC SERPL-MCNC: 43 MG/DL
LDLC SERPL CALC-MCNC: 82 MG/DL
POTASSIUM BLD-SCNC: 4.1 MMOL/L (ref 3.5–5)
PROT SERPL-MCNC: 7.3 G/DL (ref 6–8)
SODIUM SERPL-SCNC: 137 MMOL/L (ref 136–145)
TRIGL SERPL-MCNC: 205 MG/DL

## 2019-08-08 ASSESSMENT — MIFFLIN-ST. JEOR: SCORE: 1290.3

## 2019-08-12 ENCOUNTER — COMMUNICATION - HEALTHEAST (OUTPATIENT)
Dept: NURSING | Facility: CLINIC | Age: 24
End: 2019-08-12

## 2019-08-26 ENCOUNTER — COMMUNICATION - HEALTHEAST (OUTPATIENT)
Dept: NURSING | Facility: CLINIC | Age: 24
End: 2019-08-26

## 2019-09-03 ENCOUNTER — COMMUNICATION - HEALTHEAST (OUTPATIENT)
Dept: NURSING | Facility: CLINIC | Age: 24
End: 2019-09-03

## 2019-09-13 ENCOUNTER — ALLIED HEALTH/NURSE VISIT (OUTPATIENT)
Dept: EDUCATION SERVICES | Facility: CLINIC | Age: 24
End: 2019-09-13
Payer: COMMERCIAL

## 2019-09-13 DIAGNOSIS — E11.9 TYPE 2 DIABETES, HBA1C GOAL < 7% (H): Primary | ICD-10-CM

## 2019-09-13 NOTE — PROGRESS NOTES
"Diabetes Self-Management Education & Support    Diabetes Education Self Management & Training    SUBJECTIVE/OBJECTIVE:     Cultural Influences/Ethnic Background:  Choose not to answer  Tyson was diagnosed with Type 2 diabetes at age 14-15.    Confirmed Type 2 with negative BRYN and C-Peptide 2.56  Diabetes Symptoms & Complications     Patient Problem List and Family Medical History reviewed for relevant medical history, current medical status, and diabetes risk factors.    Vitals:  There were no vitals taken for this visit.  Estimated body mass index is 27.9 kg/m  as calculated from the following:    Height as of 7/15/19: 1.511 m (4' 11.5\").    Weight as of 7/15/19: 63.7 kg (140 lb 8 oz).   Last 3 BP:   BP Readings from Last 3 Encounters:   07/15/19 119/82       History   Smoking Status     Not on file   Smokeless Tobacco     Not on file       Labs:  No results found for: A1C  No results found for: GLC  No results found for: LDL  No results found for: HDL]  No results found for: GFRESTIMATED  No results found for: GFRESTBLACK  No results found for: CR  No results found for: MICROALBUMIN    Healthy Eating:  Reviewed today:  Eats three meals daily.  3-4 cups of white rice are eaten at each meal.  Mainly drinks water, but occasionally she has a diet soda.  Drinks juice \"once in a while.\"  She eats a traditional [place] diet which includes a lot of white rice, vegetables, some meat, little fruit.   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  Does housework, etc, however has no regular exercise routine.    Monitoring:  Using Andrews Freestyle.  Scanning two to four times daily.           Taking Medications  Diabetes Medication(s)     Biguanides       metFORMIN (GLUCOPHAGE-XR) 500 MG 24 hr tablet    Take 1 tab daily x 1 week, then increase by 1 tab weekly until taking 4 tabs daily.    Insulin       insulin isophane & regular (NOVOLIN 70/30 FLEXPEN) susp    Inject 50 Units Subcutaneous        Problem Solving:  Rare " hypoglycemia but has symptoms (relative hypoglycemia) more frequently.      Reducing Risks:  No complications as yet.  Last eye exam was < 1 year ago.  Last dental exam was >8 years ago.    No problems with feet.      Healthy Coping:  I think she is still in some denial about the chronicity and significance of her condition.  She is unable to tell me what her blood glucose targets are.       Patient Activation Measure Survey Score:  No flowsheet data found.  Patient's most recent No results found for: A1C is not meeting goal of <7.0     Education provided today on:  AADE Self-Care Behaviors:  Reducing Risks: major complications of diabetes and prevention, early diagnostic measures and treatment of complications.  Discussed some of the life style interventions she should be taking right now.  She is concerned about gaining weight, however she has not done anything to reduce her intake.  Discussed the large amounts of rice she is eating, which is typical for her culture, but is contributing to both weight gain and very high blood glucoses.  Discussed at length the complications of poorly managed diabetes, and impressed on her the need for long term life style changes if she is going to prevent problems.   Encouraged her to reduce the amount of rice she is eating at meals, use brown rice instead of white rice, and add more vegetables and fruit to her diet.    Discussed exercise and encouraged her to come up with a goal for regular activity.  She agreed to start walking daily with her one year old son, Valentín for 30 minutes.     She has a follow up appointment with Dr. Peralta in one month.  I think if things haven't improved with some life style modifications by then, she may benefit from adding a GLP-1 agonist or an SGLT-2 inhibitor.  I explained that before increasing the amount of insulin she is taking, I'd like her to work on her diet and increasing her activity.  She wants to avoid doing MDI, as she states that  doing four injections per day was too much to remember, and she missed a lot of doses when she was doing this before.  She verifies that she is taking Metformin XR 2000 mg once daily and that she is taking both her Lisinopril and statin as prescribed, daily.     Opportunities for ongoing education and support in diabetes-self management were discussed.    Pt verbalized understanding of concepts discussed and recommendations provided today.       PLAN:  See Patient Instructions for co-developed, patient-stated behavior change goals.  AVS printed and provided to patient today. See Follow-Up section for recommended follow-up.  Work on life style modifications and follow up with Dr. Peralta as previously scheduled.  Would suggest further follow up with diabetes education.  No appointment made today.  She has not wanted to meet with a dietitian, which I recommended.    Time Spent: 30 minutes  Encounter Type: Individual    Any diabetes medication dose changes were made via the CDE Protocol and Collaborative Practice Agreement with the patient's referring provider. A copy of this encounter was shared with the provider.

## 2019-09-13 NOTE — PATIENT INSTRUCTIONS
Diabetes Instructions:    Keep rice at meals to no more than 2 cups.   Try brown rice.  It's better for you.   Add more protein (meat and eggs) and vegetables.   Get a couple servings of fruit in every day as well.      Your activity goal is to walk for 30 minutes every day.      Avoid any drinks that contain sugar.     Swipe your sensor a minimum of three times per day.     Call if you start having any low blood sugars (less than 70).     AJAY RomeoN, RN, CDE  Diabetes   [facility]  [facility] Room [address] [place] [address]  Email:  [email or web address]  Phone:  [phone]

## 2019-09-18 ENCOUNTER — COMMUNICATION - HEALTHEAST (OUTPATIENT)
Dept: NURSING | Facility: CLINIC | Age: 24
End: 2019-09-18

## 2019-09-18 ENCOUNTER — OFFICE VISIT - HEALTHEAST (OUTPATIENT)
Dept: PHARMACY | Facility: CLINIC | Age: 24
End: 2019-09-18

## 2019-09-18 DIAGNOSIS — E78.2 MIXED HYPERLIPIDEMIA: ICD-10-CM

## 2019-09-18 DIAGNOSIS — E11.9 TYPE 2 DIABETES MELLITUS WITH INSULIN THERAPY (H): ICD-10-CM

## 2019-09-18 DIAGNOSIS — Z79.4 TYPE 2 DIABETES MELLITUS WITH INSULIN THERAPY (H): ICD-10-CM

## 2019-09-18 DIAGNOSIS — R80.9 PROTEINURIA, UNSPECIFIED TYPE: ICD-10-CM

## 2019-10-10 ENCOUNTER — COMMUNICATION - HEALTHEAST (OUTPATIENT)
Dept: NURSING | Facility: CLINIC | Age: 24
End: 2019-10-10

## 2019-10-15 ENCOUNTER — COMMUNICATION - HEALTHEAST (OUTPATIENT)
Dept: NURSING | Facility: CLINIC | Age: 24
End: 2019-10-15

## 2019-10-15 ENCOUNTER — OFFICE VISIT - HEALTHEAST (OUTPATIENT)
Dept: PHARMACY | Facility: CLINIC | Age: 24
End: 2019-10-15

## 2019-10-15 DIAGNOSIS — Z79.4 TYPE 2 DIABETES MELLITUS WITH INSULIN THERAPY (H): ICD-10-CM

## 2019-10-15 DIAGNOSIS — E78.2 MIXED HYPERLIPIDEMIA: ICD-10-CM

## 2019-10-15 DIAGNOSIS — E11.9 TYPE 2 DIABETES MELLITUS WITH INSULIN THERAPY (H): ICD-10-CM

## 2019-10-16 ENCOUNTER — OFFICE VISIT (OUTPATIENT)
Dept: ENDOCRINOLOGY | Facility: CLINIC | Age: 24
End: 2019-10-16
Payer: COMMERCIAL

## 2019-10-16 VITALS
HEART RATE: 114 BPM | BODY MASS INDEX: 29.13 KG/M2 | SYSTOLIC BLOOD PRESSURE: 113 MMHG | WEIGHT: 148.4 LBS | DIASTOLIC BLOOD PRESSURE: 81 MMHG | HEIGHT: 60 IN

## 2019-10-16 DIAGNOSIS — E11.65 TYPE 2 DIABETES MELLITUS WITH HYPERGLYCEMIA, UNSPECIFIED WHETHER LONG TERM INSULIN USE (H): Primary | ICD-10-CM

## 2019-10-16 LAB — HBA1C MFR BLD: 10.9 % (ref 4.3–6)

## 2019-10-16 RX ORDER — METFORMIN HCL 500 MG
TABLET, EXTENDED RELEASE 24 HR ORAL
Qty: 60 TABLET | Refills: 11 | Status: SHIPPED | OUTPATIENT
Start: 2019-10-16 | End: 2021-01-11

## 2019-10-16 RX ORDER — INSULIN ASPART 100 [IU]/ML
INJECTION, SOLUTION INTRAVENOUS; SUBCUTANEOUS
Qty: 45 ML | Refills: 11 | Status: ON HOLD | OUTPATIENT
Start: 2019-10-16 | End: 2022-10-08

## 2019-10-16 ASSESSMENT — MIFFLIN-ST. JEOR: SCORE: 1336.7

## 2019-10-16 NOTE — PROGRESS NOTES
Nothing in Epic, POC A1C today is 10.9%    Labs/Imaging     See HE 5/7/19: creatinine 0.85, LDL calculated 219, A1c >14%    Assessment and Plan  Type 2 DM, poorly controlled and hyperlipidemia (defer to PMD for the latter, may need a statin added for high LDL, after retesting fasting lipids when diabetes is controlled. ). She does not tolerate full dose metformin. Diabetic foot exam today was fine.     Patient Instructions:  In general:    Fasting sugar goals:     2 hours after eating <180    Please reduce your metformin from 4 tabs to 2 tabs daily since you are having diarrhea with the 4 tabs daily    Please stop taking your 55 units of 70/30 mixed insulin today    Please start taking lantus insulin (basal 24 hour insulin) 40 units at bedtime    Please start taking novolog insulin (short acting) meal time insulin: 1 unit per 10 grams of CHO (carbohydrates) eaten. It is ok to wait up to 10 minutes after eating to see how many CHOs you actually ate before taking this insulin    In addition to the above, please start taking novolog insulin for correction of high blood sugars:  1 unit per 25 mg/dL >140    That means the following:  HIGH Insulin Resistance or Correction Factor of 1 for 25 mg/dL  Correction Scale - HIGH INSULIN RESISTANCE DOSING     Do Not give Correction Insulin if Pre-Meal BG less than 120.   For Pre-Meal  - 144 give 1 unit.   For Pre-Meal  - 169 give 2 units.   For Pre-Meal  - 219 give 4 units.   For Pre-Meal  - 244 give 5 units.   For Pre-Meal  - 269 give 6 units.   For Pre-Meal  - 294 give 7 units.   For Pre-Meal  - 319 give 8 units.   For Pre-Meal  - 344 give 9 units.  For Pre-Meal BG greater than or equal to 345 give 10 units  To be given with prandial insulin, and based on pre-meal blood glucose.   Notify provider if glucose greater than or equal to 350 mg/dL after administration of correction dose.     HIGH Insulin Resistance or Correction  "Factor of 1 for 25 mg/dL  HIGH INSULIN RESISTANCE DOSING    Do Not give Bedtime Correction Insulin if BG less than 200.   For  - 224 give 1 units.   For  - 249 give 2 units.   For  - 274 give 3 units.   For  - 299 give 4 units.   For  - 324 give 5 units.   For  - 349 give 6 units.   For BG greater than or equal to 350 give 7 units.   Notify provider if glucose greater than or equal to 350 mg/dL after administration of correction dose.    Please see ANH Ding RN, NABILE in 1 week again    Please see Diabetes Team PA in 1 month    Please see me in 3 months    Please call [phone] & ask for Diabetes on -call with serious questions about high or low blood sugars if you do not know what to do     RTC: 1 weeks w/ RN, CDE as above, 1 month with Diabetes Team PA, 3 months w/ me     Katherine Peralta MD, MPH  Attending Physician  Diabetes/Endocrinology/Metabolism     Time: 40 min spent on evaluation, management, counseling, education, & motivational interviewing with greater than 50% of the total time was spent on counseling and coordinating care. Patient was seen w/ help of  of language of Kimber[place] (sp? Kimber[place] is 1 of 3 Dialects spoken in [facility]).     Reason for visit/consult: Type 2 DM     Primary care provider: No Ref-Primary, Physician     HPI:  25 yo female seen for evaluation and management of T2DM. She has had DM since 2010. This is the 2nd time I am seeing her (see my note of 7/15/19 for details of initial consult). She was seen on 11/14/17 by Linda Mensah and it was noted, \"Her C-peptide and BRYN-antibodies tests would indicate that she is indeed a Type 2 with some pancreatic function. Her current A1c is 9, which is a great improvement from 14>, which, from our records, seems to be where she was residing over the last 2 years.\" She has since been seeing Dr. Manuel Pemberton) during pregnancy, last visit was 10/6/18.     She is taking 55 units of novolin mix 70/30 via " "FlexPen once daily - with main meal, time varies.     She has seen eye MD within the past year and she was told everything was fine.     Meter download data ([employer]) is as follows:  Average: 304, 95% above target, SD 79.3, CoV 26.1%     Past Medical/Surgical History:    Vitamin D Deficiency     Type 2 diabetes mellitus with insulin therapy     Hyperlipidemia     Proteinuria     Chronic Kidney Disease (NKF Classification)      S/p  x 1 10/6/18      Current Outpatient Medications:      acetaminophen (TYLENOL) 500 MG tablet, Take 1,000 mg by mouth, Disp: , Rfl:      Continuous Blood Gluc  (FREESTYLE DUC 14 DAY READER) GIL, 1 Units, Disp: , Rfl:      Continuous Blood Gluc  (FREESTYLE DUC 14 DAY READER) GIL, U UTD FOR GLUCOSE MONITORING, Disp: , Rfl: 0     insulin isophane & regular (NOVOLIN 70/30 FLEXPEN) susp, Inject 50 Units Subcutaneous, Disp: , Rfl:      lisinopril (PRINIVIL/ZESTRIL) 2.5 MG tablet, Take 2.5 mg by mouth daily, Disp: , Rfl:      metFORMIN (GLUCOPHAGE-XR) 500 MG 24 hr tablet, Take 1 tab daily x 1 week, then increase by 1 tab weekly until taking 4 tabs daily., Disp: 120 tablet, Rfl: 11    /81   Pulse 114   Ht 1.511 m (4' 11.5\")   Wt 67.3 kg (148 lb 6.4 oz)   BMI 29.47 kg/m      Allergies:  NKDA     Current Medications   See above     Family History:  DM in mother     Social History:  Non-smoker. . 1 child. 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 Occupation: PCA.     "

## 2019-10-16 NOTE — LETTER
10/16/2019       RE: Tyson Skelton  [address] [place] [address]     Dear Colleague,    Thank you for referring your patient, Tyson Skelton, to the [facility] ENDOCRINOLOGY at [facility]. Please see a copy of my visit note below.    Nothing in Epic, POC A1C today is 10.9%    Labs/Imaging     See HE 5/7/19: creatinine 0.85, LDL calculated 219, A1c >14%    Assessment and Plan  Type 2 DM, poorly controlled and hyperlipidemia (defer to PMD for the latter, may need a statin added for high LDL, after retesting fasting lipids when diabetes is controlled. ). She  does not tolerate full dose metformin. Diabetic foot exam today was fine.     Patient Instructions:  In general:    Fasting sugar goals:     2 hours after eating <180    Please reduce your metformin from 4 tabs to 2 tabs daily since you are having diarrhea with the 4 tabs daily    Please stop taking your 55 units of 70/30 mixed insulin today    Please start taking lantus insulin (basal 24 hour insulin) 40 units at bedtime    Please start taking novolog insulin (short acting) meal time insulin: 1 unit per 10 grams of CHO (carbohydrates) eaten. It is ok to wait up to 10 minutes after eating to see how many CHOs you actually ate before taking this insulin    In addition to the above, please start taking novolog insulin for correction of high blood sugars:  1 unit per 25 mg/dL >140    That means the following:  HIGH Insulin Resistance or Correction Factor of 1 for 25 mg/dL  Correction Scale - HIGH INSULIN RESISTANCE DOSING     Do Not give Correction Insulin if Pre-Meal BG less than 120.   For Pre-Meal  - 144 give 1 unit.   For Pre-Meal  - 169 give 2 units.   For Pre-Meal  - 219 give 4 units.   For Pre-Meal  - 244 give 5 units.   For Pre-Meal  - 269 give 6 units.   For Pre-Meal  - 294 give 7 units.   For Pre-Meal  - 319 give 8 units.   For Pre-Meal  - 344 give 9 units.  For  "Pre-Meal BG greater than or equal to 345 give 10 units  To be given with prandial insulin, and based on pre-meal blood glucose.   Notify provider if glucose greater than or equal to 350 mg/dL after administration of correction dose.     HIGH Insulin Resistance or Correction Factor of 1 for 25 mg/dL  HIGH INSULIN RESISTANCE DOSING    Do Not give Bedtime Correction Insulin if BG less than 200.   For  - 224 give 1 units.   For  - 249 give 2 units.   For  - 274 give 3 units.   For  - 299 give 4 units.   For  - 324 give 5 units.   For  - 349 give 6 units.   For BG greater than or equal to 350 give 7 units.   Notify provider if glucose greater than or equal to 350 mg/dL after administration of correction dose.    Please see ANH Ding RN, CDE in 1 week again    Please see Diabetes Team PA in 1 month    Please see me in 3 months    Please call [phone] & ask for Diabetes on -call with serious questions about high or low blood sugars if you do not know what to do     RTC:  1 weeks w/ RN, NABILE as above, 1 month with Diabetes Team PA, 3 months w/ me     Katherine Peralta MD, MPH  Attending Physician  Diabetes/Endocrinology/Metabolism     Time: 40 min spent on evaluation, management, counseling, education, & motivational interviewing with greater than 50% of the total time was spent on counseling and coordinating care. Patient was seen w/ help of  of language of Kimber[place] (sp? Kimber[place] is 1 of 3 Dialects spoken in [facility]).     Reason for visit/consult: Type 2 DM     Primary care provider: No Ref-Primary, Physician     HPI:  25 yo female seen for evaluation and management of T2DM. She has had DM since 2010. This is the 2nd time I am seeing her (see my note of 7/15/19 for details of initial consult). She was seen on 11/14/17 by Linda Mensah and it was noted, \"Her C-peptide and BRYN-antibodies tests would indicate that she is indeed a Type 2 with some pancreatic function. Her " "current A1c is 9, which is a great improvement from 14>, which, from our records, seems to be where she was residing over the last 2 years.\" She has since been seeing Dr. Manuel Pemberton) during pregnancy, last visit was 10/6/18.     She is taking 55 units of novolin mix 70/30 via FlexPen once daily - with main meal, time varies.     She has seen eye MD within the past year and she was told everything was fine.     Meter download data ([employer]) is as follows:  Average: 304, 95% above target, SD 79.3, CoV 26.1%     Past Medical/Surgical History:    Vitamin D Deficiency     Type 2 diabetes mellitus with insulin therapy     Hyperlipidemia     Proteinuria     Chronic Kidney Disease (NKF Classification)      S/p  x 1 10/6/18      Current Outpatient Medications:      acetaminophen (TYLENOL) 500 MG tablet, Take 1,000 mg by mouth, Disp: , Rfl:      Continuous Blood Gluc  (FREESTYLE DUC 14 DAY READER) GIL, 1 Units, Disp: , Rfl:      Continuous Blood Gluc  (FREESTYLE DUC 14 DAY READER) GIL, U UTD FOR GLUCOSE MONITORING, Disp: , Rfl: 0     insulin isophane & regular (NOVOLIN 70/30 FLEXPEN) susp, Inject 50 Units Subcutaneous, Disp: , Rfl:      lisinopril (PRINIVIL/ZESTRIL) 2.5 MG tablet, Take 2.5 mg by mouth daily, Disp: , Rfl:      metFORMIN (GLUCOPHAGE-XR) 500 MG 24 hr tablet, Take 1 tab daily x 1 week, then increase by 1 tab weekly until taking 4 tabs daily., Disp: 120 tablet, Rfl: 11    /81   Pulse 114   Ht 1.511 m (4' 11.5\")   Wt 67.3 kg (148 lb 6.4 oz)   BMI 29.47 kg/m       Allergies:  NKDA     Current Medications   See above     Family History:  DM in mother     Social History:  Non-smoker. . 1 child. 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 Occupation: PCA.     Again, thank you for allowing me to participate in the care of your patient.      Sincerely,    Katherine Peralta MD      "

## 2019-10-16 NOTE — PATIENT INSTRUCTIONS
In general:    Fasting sugar goals:     2 hours after eating <180    Please reduce your metformin from 4 tabs to 2 tabs daily since you are having diarrhea with the 4 tabs daily    Please stop taking your 55 units of 70/30 mixed insulin today    Please start taking lantus insulin (basal 24 hour insulin) 40 units at bedtime    Please start taking novolog insulin (short acting) meal time insulin: 1 unit per 10 grams of CHO (carbohydrates) eaten. It is ok to wait up to 10 minutes after eating to see how many CHOs you actually ate before taking this insulin    In addition to the above, please start taking novolog insulin for correction of high blood sugars:  1 unit per 25 mg/dL >140    That means the following:  HIGH Insulin Resistance or Correction Factor of 1 for 25 mg/dL  Correction Scale - HIGH INSULIN RESISTANCE DOSING     Do Not give Correction Insulin if Pre-Meal BG less than 120.   For Pre-Meal  - 144 give 1 unit.   For Pre-Meal  - 169 give 2 units.   For Pre-Meal  - 219 give 4 units.   For Pre-Meal  - 244 give 5 units.   For Pre-Meal  - 269 give 6 units.   For Pre-Meal  - 294 give 7 units.   For Pre-Meal  - 319 give 8 units.   For Pre-Meal  - 344 give 9 units.  For Pre-Meal BG greater than or equal to 345 give 10 units  To be given with prandial insulin, and based on pre-meal blood glucose.   Notify provider if glucose greater than or equal to 350 mg/dL after administration of correction dose.     HIGH Insulin Resistance or Correction Factor of 1 for 25 mg/dL  HIGH INSULIN RESISTANCE DOSING    Do Not give Bedtime Correction Insulin if BG less than 200.   For  - 224 give 1 units.   For  - 249 give 2 units.   For  - 274 give 3 units.   For  - 299 give 4 units.   For  - 324 give 5 units.   For  - 349 give 6 units.   For BG greater than or equal to 350 give 7 units.   Notify provider if glucose greater than or equal to 350  mg/dL after administration of correction dose.      Please see ANH Ding RN, CDE in 1 week again    Please see Diabetes Team PA in 1 month    Please see me in 3 months    Please call [phone] & ask for Diabetes on -call with serious questions about high or low blood sugars if you do not know what to do

## 2019-10-20 ENCOUNTER — COMMUNICATION - HEALTHEAST (OUTPATIENT)
Dept: FAMILY MEDICINE | Facility: CLINIC | Age: 24
End: 2019-10-20

## 2019-10-25 ENCOUNTER — COMMUNICATION - HEALTHEAST (OUTPATIENT)
Dept: FAMILY MEDICINE | Facility: CLINIC | Age: 24
End: 2019-10-25

## 2019-10-25 DIAGNOSIS — Z30.9 ENCOUNTER FOR CONTRACEPTIVE MANAGEMENT, UNSPECIFIED TYPE: ICD-10-CM

## 2019-10-30 ENCOUNTER — AMBULATORY - HEALTHEAST (OUTPATIENT)
Dept: NURSING | Facility: CLINIC | Age: 24
End: 2019-10-30

## 2019-11-11 NOTE — PROGRESS NOTES
[facility]  Endocrinology  Rosangela Palacios PA-C  11/12/2019      Chief Complaint:   Diabetes    History of Present Illness:   Tyson Skelton is a 24 year old femalewho presents for follow up of type 2 diabetes mellitus with high insulin resistance. She was initially diagnosed with type 2 diabetes mellitus in 2010. In 2018 she underwent lab testing for BRYN-antibodies which were negative and C-peptide which was within normal limits at 2.56 (see Care Everywhere [facility] labs). October 2016 A1c was 10.9%. She saw Magali Ding in September to work on diet and limit rice to 2 cups /meal, and check BG more frequently.  Last month Dr. Peralta advised that she transition from twice daily 70/30 injections to Lantus with Novolog 1 unit per 10 grams of CHO and 1u:25 mg /dl >140.     She is accompanied by an .     She was previously treated with Metformin 2000mg and insulin but was experiencing nausea/vomiting so she reduced her dose of Metformin to 1000mg. This was still causing issues so she has been frequently skipping Metformin. She endorses watery stools, which were not an issue when she initially started Metformin, but has been ongoing for some months now despite holding Metformin. She has yet to see a primary care provider for this.     Tyson Skelton reports that she has had difficulty checking her blood sugars as her sn was ill though he is now well.   She reports taking the Novolog 70/30 55 unit(s) BID, each pen lasts her 2-3 days together, with Lantus 40 units nightly.  She does not think the 70/30 works well and would like to resume Novolog which she took previously.  In part. She thinks this is because she is supposed to take 70/30 1.5 ours prior to eating and she often gets hungry and cannot wait that long, so sometimes she just eats.  She was off Lantus for some time when she was initially started on 70/30 but resumed after meeting with Dr. Peralta. She recalls being on Lantus up to 70 unit(s) daily in 2014. Her  supply of Novolog ran out when she started 70/30 so she needs a refill. In the past she would take Novolog with meals but does not recall her dose, which worked better for her. She denies feeling shaking, sweating, and excessive hunger on the 70/30 mix, she does recall having hypoglycemia with Novolog in the past. She denies doing carb counting in the past. She was seen at [facility] during her pregnancy for management of pregnancy with diabetes, she does not recall a log book during that time, bu did at times have low blood sugars.  She has not ad any for several months at this time      Diabetes education discussed starting Trulicity with her, however she is hesitant to begin this though she can't recall why. She denies a history of pancreatitis, review of EMR found no history of lipase testing or abdominal imaging; she states that despite diarrhea, she has no problems with abdominal pain. She denies a family history of thyroid cancer and neuroendocrine tumor.     Blood Glucose Monitoring:  We reviewed glucometer and CGM data together.  She is chekcing less frequently because she knows her blood glucose will be high.   Average of 305 (blood glucose record incomplete, only 17% wear time for CGM), CV 29.7 and SD 90.5. checking once per day on most days,one day with 2, 4 days with 0 checks.    Time in range:   <54: 0%  54-70: 0%  : 13%  >180: 87%    Diabetes monitoring and complications:  CAD: No  Microalbuminuria: 144.9 mg/g Cr  Neuropathy: No  HTN: No  On Statin: No  On Aspirin: No    Review of Systems:   Pertinent items are noted in HPI.  All other systems are negative.    Active Medications:     Current Outpatient Medications:      acetaminophen (TYLENOL) 500 MG tablet, Take 1,000 mg by mouth, Disp: , Rfl:      Continuous Blood Gluc  (FREESTYLE DUC 14 DAY READER) GIL, 1 Units, Disp: , Rfl:      Continuous Blood Gluc  (FREESTYLE DUC 14 DAY READER) GIL, U UTD FOR GLUCOSE MONITORING, Disp: ,  "Rfl: 0     insulin glargine (LANTUS SOLOSTAR) 100 UNIT/ML pen, 40 units at bedtime, Disp: 45 mL, Rfl: 11     insulin pen needle (31G X 5 MM) 31G X 5 MM miscellaneous, Use 5 pen needles daily or as directed., Disp: 200 each, Rfl: 11     lisinopril (PRINIVIL/ZESTRIL) 2.5 MG tablet, Take 2.5 mg by mouth daily, Disp: , Rfl:      metFORMIN (GLUCOPHAGE-XR) 500 MG 24 hr tablet, Reduce to 2 tabs daily since you did not tolerate 4 tabs daily, Disp: 60 tablet, Rfl: 11     NOVOLOG FLEXPEN 100 UNIT/ML soln, 1 unit per 10 grams of carbs + 1u per 25>140, Disp: 45 mL, Rfl: 11      Allergies:   Patient has no known allergies.      Past Medical History:  Type 2 diabetes mellitus     Social History:   She has one 1 year old son.  She speaks Re[place], but is able to read and understand some verbal English.       Physical Exam:   /84   Pulse 82   Wt 67.1 kg (148 lb)   BMI 29.39 kg/m       Wt Readings from Last 10 Encounters:   11/12/19 67.1 kg (148 lb)   10/16/19 67.3 kg (148 lb 6.4 oz)   07/15/19 63.7 kg (140 lb 8 oz)        General: Pleasant, well nourished and hydrated female in NAD.   Psych:  Mood is \"good,\" affect is appropriate.  Thought form and content are fluid and coherent.    HEENT: Eyes and sclera are clear. Extraocular movements are grossly intact without proptosis.  Nares are patent, mucous membranes moist.  Neck: No masses or JVD are noted.    Resp: Easy and unlabored breathing.   Neuro: Alert and oriented, communicating clearly.   Ext: no swelling or edema       Data:  Lab Results   Component Value Date    HEMOGLOBINA1 10.9 (A) 10/16/2019     Assessment and Plan:  Type 2 diabetes mellitus with hyperglycemia, unspecified whether long term insulin use (H)    Tyson Skelton is a 24 year old with uncontrolled type 2 diabetes mellitus with apparent high insulin requirement, who reports currently taking  Lantus 40 unit(s) and 110 unit(s) of 70/30 with very little amount of Metformin. Her blood glucose checks are sporadic and " higher than previous, on average >300.    We had a long discussion about what she thinks might work best for her. We talked about starting Trulicity to help increase her insulin sensitivity. We reviewed the benefits including reduced insulin requirement with meals, cardiac protection and weight loss, as well as potential risks of taking Trulicity. After this discussion she was willing to try the a GLP-1 receptor agonists. I do not recommend anything more than low dose GLP-1 until her gastrointestinal issues are addressed. Encouraged her to monitor her blood glucose more frequently so we can have a more complete glucose record at her next appointment. Her daily dose of Lantus was increased to 70 unit(s) daily, Novolog 70/30 was discontinued, Novolog 10 unit(s) with meals plus a correction scale of 1 per 25 > 120. Education provided on how different insulin types active times. She agreed to record a log book with insulin dosing. We discussed restarting a small amount of Metformin which she has been off due to gastrointestinal upset though this does not appear to be related to Metformin as symptoms do not correlate to dose or whether or not she is taking Metformin as her watery stools are a more recent development, despite mostly holding this drug for the last several months.  Encouraged her to see PCP for workup of her diarrhea and restart Metformin at a low dose (1/2 tablet with larger meal)  if tolerated.     For now, she agrees to resume checking her blood sugar and giving insulin doses regularly.  We will increase Lantus to 70 units and resume Novolog, initially at just 10 units + sliding scale 10 minutes prior to each meal. She will record the doses that she is giving. We initiateBydureon weekly, though if causes increased GIdistress she will need hold it.      HIGH Insulin Resistance or Correction Factor of 1 for 25 mg/dL  Do Not give Correction Insulin if Pre-Meal BG less than 120.   For Pre-Meal  - 144  give 1 unit.   For Pre-Meal  - 169 give 2 units.   For Pre-Meal  - 219 give 4 units.   For Pre-Meal  - 244 give 5 units.   For Pre-Meal  - 269 give 6 units.   For Pre-Meal  - 294 give 7 units.   For Pre-Meal  - 319 give 8 units.   For Pre-Meal  - 344 give 9 units.  For Pre-Meal BG greater than or equal to 345 give 10 units    - dulaglutide (TRULICITY) 0.75 MG/0.5ML pen  Dispense: 2 mL; Refill: 1     Follow-up: Return in about 2 weeks (around 11/26/2019).       >50% of 45 minute visit spent in face to face counseling, education and coordination of care related to options for better glycemic control as well as preventing, detecting, and treating hypoglycemia.      It is my privilege to be involved in the care of the above patient.     Rosangela Palacios PA-C, [facility]  [facility]  Diabetes, Endocrinology, and Metabolism  [phone] Appointments/Nurse  [phone] Fax  [phone] pager  [phone] nurse line               Scribe Disclosure:  I, Jose Armando Marquez, am serving as a scribe to document services personally performed by Rosangela Palacios PA-C at this visit, based upon the provider's statements to me. All documentation has been reviewed by the aforementioned provider prior to being entered into the official medical record.     Portions of this medical record were completed by a scribe. UPON MY REVIEW AND AUTHENTICATION BY ELECTRONIC SIGNATURE, this confirms (a) I performed the applicable clinical services, and (b) the record is accurate.

## 2019-11-12 ENCOUNTER — OFFICE VISIT (OUTPATIENT)
Dept: ENDOCRINOLOGY | Facility: CLINIC | Age: 24
End: 2019-11-12
Payer: COMMERCIAL

## 2019-11-12 VITALS
HEART RATE: 82 BPM | DIASTOLIC BLOOD PRESSURE: 84 MMHG | BODY MASS INDEX: 29.39 KG/M2 | SYSTOLIC BLOOD PRESSURE: 129 MMHG | WEIGHT: 148 LBS

## 2019-11-12 DIAGNOSIS — E11.65 TYPE 2 DIABETES MELLITUS WITH HYPERGLYCEMIA, UNSPECIFIED WHETHER LONG TERM INSULIN USE (H): Primary | ICD-10-CM

## 2019-11-12 RX ORDER — INSULIN ASPART 100 [IU]/ML
10-20 INJECTION, SOLUTION INTRAVENOUS; SUBCUTANEOUS
Qty: 15 ML | Refills: 1 | Status: SHIPPED | OUTPATIENT
Start: 2019-11-12 | End: 2020-01-13

## 2019-11-12 ASSESSMENT — PAIN SCALES - GENERAL: PAINLEVEL: NO PAIN (0)

## 2019-11-12 NOTE — PATIENT INSTRUCTIONS
It is good to meet you!      We will start Trulicity injection once weekly.  You will give this every Tuesday.  Please do continue check your blood sugar at least once daily, but ideally twice daily.      Please give 10 units Novolog (orange) 5- 10 minutes before every meal, plus extra sliding scale if your blood sugar is high.    If this makes your blood sugar too low (<70)  Then just give the sliding scale added insulin before your meal.      Please give 70 units of Lantus every night.    It is my privilege to be involved in the care of the above patient.     Rosangela Palacios PA-C, MPAS  [facility]  Diabetes, Endocrinology, and Metabolism  [phone] Appointments/Nurse  [phone] Fax  [phone] pager  [phone] nurse line

## 2019-11-12 NOTE — LETTER
11/12/2019       RE: Tyson Skelton  [address]  [place] [address]     Dear Colleague,    Thank you for referring your patient, Tyson Skelton, to the [facility] ENDOCRINOLOGY at [facility]. Please see a copy of my visit note below.    [facility]  Endocrinology  Rosangela Palacios PA-C  11/12/2019      Chief Complaint:   Diabetes    History of Present Illness:   Tyson Skelton is a 24 year old femalewho presents for follow up of type 2 diabetes mellitus with high insulin resistance. She was initially diagnosed with type 2 diabetes mellitus in 2010. In 2018 she underwent lab testing for BRYN-antibodies which were negative and C-peptide which was within normal limits at 2.56 (see Care Everywhere [facility] labs). October 2016 A1c was 10.9%. She saw Magali Ding in September to work on diet and limit rice to 2 cups /meal, and check BG more frequently.  Last month Dr. Peralta advised that she transition from twice daily 70/30 injections to Lantus with Novolog 1 unit per 10 grams of CHO and 1u:25 mg /dl >140.     She is accompanied by an .     She was previously treated with Metformin 2000mg and insulin but was experiencing nausea/vomiting so she reduced her dose of Metformin to 1000mg. This was still causing issues so she has been frequently skipping Metformin. She endorses watery stools, which were not an issue when she initially started Metformin, but has been ongoing for some months now despite holding Metformin. She has yet to see a primary care provider for this.     Tyson Skelton reports that she has had difficulty checking her blood sugars as her sn was ill though he is now well.   She reports taking the Novolog 70/30 55 unit(s) BID, each pen lasts her 2-3 days together, with Lantus 40 units nightly.  She does not think the 70/30 works well and would like to resume Novolog which she took previously.  In part. She thinks this is because she is supposed to take 70/30 1.5 ours prior to  eating and she often gets hungry and cannot wait that long, so sometimes she just eats.  She was off Lantus for some time when she was initially started on 70/30 but resumed after meeting with Dr. Peralta. She recalls being on Lantus up to 70 unit(s) daily in 2014. Her supply of Novolog ran out when she started 70/30 so she needs a refill. In the past she would take Novolog with meals but does not recall her dose, which worked better for her. She denies feeling shaking, sweating, and excessive hunger on the 70/30 mix, she does recall having hypoglycemia with Novolog in the past. She denies doing carb counting in the past. She was [facility] during her pregnancy for management of pregnancy with diabetes, she does not recall a log book during that time, bu did at times have low blood sugars.  She has not ad any for several months at this time      Diabetes education discussed starting Trulicity with her, however she is hesitant to begin this though she can't recall why. She denies a history of pancreatitis, review of EMR found no history of lipase testing or abdominal imaging; she states that despite diarrhea, she has no problems with abdominal pain. She denies a family history of thyroid cancer and neuroendocrine tumor.     Blood Glucose Monitoring:  We reviewed glucometer and CGM data together.  She is chekcing less frequently because she knows her blood glucose will be high.   Average of 305 (blood glucose record incomplete, only 17% wear time for CGM), CV 29.7 and SD 90.5. checking once per day on most days,one day with 2, 4 days with 0 checks.    Time in range:   <54: 0%  54-70: 0%  : 13%  >180: 87%    Diabetes monitoring and complications:  CAD: No  Microalbuminuria: 144.9 mg/g Cr  Neuropathy: No  HTN: No  On Statin: No  On Aspirin: No    Review of Systems:   Pertinent items are noted in HPI.  All other systems are negative.    Active Medications:     Current Outpatient Medications:      acetaminophen  "(TYLENOL) 500 MG tablet, Take 1,000 mg by mouth, Disp: , Rfl:      Continuous Blood Gluc  (FREESTYLE DUC 14 DAY READER) GIL, 1 Units, Disp: , Rfl:      Continuous Blood Gluc  (FREESTYLE DUC 14 DAY READER) GIL, U UTD FOR GLUCOSE MONITORING, Disp: , Rfl: 0     insulin glargine (LANTUS SOLOSTAR) 100 UNIT/ML pen, 40 units at bedtime, Disp: 45 mL, Rfl: 11     insulin pen needle (31G X 5 MM) 31G X 5 MM miscellaneous, Use 5 pen needles daily or as directed., Disp: 200 each, Rfl: 11     lisinopril (PRINIVIL/ZESTRIL) 2.5 MG tablet, Take 2.5 mg by mouth daily, Disp: , Rfl:      metFORMIN (GLUCOPHAGE-XR) 500 MG 24 hr tablet, Reduce to 2 tabs daily since you did not tolerate 4 tabs daily, Disp: 60 tablet, Rfl: 11     NOVOLOG FLEXPEN 100 UNIT/ML soln, 1 unit per 10 grams of carbs + 1u per 25>140, Disp: 45 mL, Rfl: 11      Allergies:   Patient has no known allergies.      Past Medical History:  Type 2 diabetes mellitus     Social History:   She has one 1 year old son.  She speaks Reanna, but is able to read and understand some verbal English.       Physical Exam:   /84   Pulse 82   Wt 67.1 kg (148 lb)   BMI 29.39 kg/m        Wt Readings from Last 10 Encounters:   11/12/19 67.1 kg (148 lb)   10/16/19 67.3 kg (148 lb 6.4 oz)   07/15/19 63.7 kg (140 lb 8 oz)        General: Pleasant, well nourished and hydrated female in NAD.   Psych:  Mood is \"good,\" affect is appropriate.  Thought form and content are fluid and coherent.    HEENT: Eyes and sclera are clear. Extraocular movements are grossly intact without proptosis.  Nares are patent, mucous membranes moist.  Neck: No masses or JVD are noted.    Resp: Easy and unlabored breathing.   Neuro: Alert and oriented, communicating clearly.   Ext: no swelling or edema       Data:  Lab Results   Component Value Date    HEMOGLOBINA1 10.9 (A) 10/16/2019     Assessment and Plan:  Type 2 diabetes mellitus with hyperglycemia, unspecified whether long term insulin " use (H)    Tyson Skelton is a 24 year old with uncontrolled type 2 diabetes mellitus with apparent high insulin requirement, who reports currently taking  Lantus 40 unit(s) and 110 unit(s) of 70/30 with very little amount of Metformin. Her blood glucose checks are sporadic and higher than previous, on average >300.    We had a long discussion about what she thinks might work best for her. We talked about starting Trulicity to help increase her insulin sensitivity. We reviewed the benefits including reduced insulin requirement with meals, cardiac protection and weight loss, as well as potential risks of taking Trulicity. After this discussion she was willing to try the a GLP-1 receptor agonists. I do not recommend anything more than low dose GLP-1 until her gastrointestinal issues are addressed. Encouraged her to monitor her blood glucose more frequently so we can have a more complete glucose record at her next appointment. Her daily dose of Lantus was increased to 70 unit(s) daily, Novolog 70/30 was discontinued, Novolog 10 unit(s) with meals plus a correction scale of 1 per 25 > 120. Education provided on how different insulin types active times. She agreed to record a log book with insulin dosing. We discussed restarting a small amount of Metformin which she has been off due to gastrointestinal upset though this does not appear to be related to Metformin as symptoms do not correlate to dose or whether or not she is taking Metformin as her watery stools are a more recent development, despite mostly holding this drug for the last several months.  Encouraged her to see PCP for workup of her diarrhea and restart Metformin at a low dose (1/2 tablet with larger meal)  if tolerated.     For now, she agrees to resume checking her blood sugar and giving insulin doses regularly.  We will increase Lantus to 70 units and resume Novolog, initially at just 10 units + sliding scale 10 minutes prior to each meal. She will record the  doses that she is giving. We initiateBydureon weekly, though if causes increased GIdistress she will need hold it.      HIGH Insulin Resistance or Correction Factor of 1 for 25 mg/dL  Do Not give Correction Insulin if Pre-Meal BG less than 120.   For Pre-Meal  - 144 give 1 unit.   For Pre-Meal  - 169 give 2 units.   For Pre-Meal  - 219 give 4 units.   For Pre-Meal  - 244 give 5 units.   For Pre-Meal  - 269 give 6 units.   For Pre-Meal  - 294 give 7 units.   For Pre-Meal  - 319 give 8 units.   For Pre-Meal  - 344 give 9 units.  For Pre-Meal BG greater than or equal to 345 give 10 units    - dulaglutide (TRULICITY) 0.75 MG/0.5ML pen  Dispense: 2 mL; Refill: 1     Follow-up: Return in about 2 weeks (around 11/26/2019).       >50% of 30 minute visit spent in face to face counseling, education and coordination of care related to options for better glycemic control as well as preventing, detecting, and treating hypoglycemia.      It is my privilege to be involved in the care of the above patient.     Rosangela Palacios PA-C, [facility]  [facility]  Diabetes, Endocrinology, and Metabolism  [phone] Appointments/Nurse  [phone] Fax  [phone] pager  [phone] nurse line    Scribe Disclosure:  I, Jose Armando Marquez, am serving as a scribe to document services personally performed by Rosangela Palacios PA-C at this visit, based upon the provider's statements to me. All documentation has been reviewed by the aforementioned provider prior to being entered into the official medical record.     Portions of this medical record were completed by a scribe. UPON MY REVIEW AND AUTHENTICATION BY ELECTRONIC SIGNATURE, this confirms (a) I performed the applicable clinical services, and (b) the record is accurate.

## 2019-11-12 NOTE — NURSING NOTE
Tyson Skelton comes into clinic today at the request of rosangela Palacios  Ordering Provider for Diabetes  This  service provided today was under the supervising provider of the day Rosangela Culp, who was available if needed.    Lisa Bueno, RN   Nurse teaching given on Trulicity  with  in the room  and the patient expresses understanding and acceptance of instructions. Lisa Bueno RN 11/12/2019 12:28 PM

## 2019-11-15 ENCOUNTER — OFFICE VISIT - HEALTHEAST (OUTPATIENT)
Dept: PHARMACY | Facility: CLINIC | Age: 24
End: 2019-11-15

## 2019-11-15 ENCOUNTER — COMMUNICATION - HEALTHEAST (OUTPATIENT)
Dept: NURSING | Facility: CLINIC | Age: 24
End: 2019-11-15

## 2019-11-19 ENCOUNTER — OFFICE VISIT (OUTPATIENT)
Dept: ENDOCRINOLOGY | Facility: CLINIC | Age: 24
End: 2019-11-19
Payer: COMMERCIAL

## 2019-11-19 VITALS
HEART RATE: 80 BPM | DIASTOLIC BLOOD PRESSURE: 85 MMHG | WEIGHT: 147.8 LBS | SYSTOLIC BLOOD PRESSURE: 115 MMHG | BODY MASS INDEX: 29.02 KG/M2 | HEIGHT: 60 IN

## 2019-11-19 DIAGNOSIS — E11.65 TYPE 2 DIABETES MELLITUS WITH HYPERGLYCEMIA, WITH LONG-TERM CURRENT USE OF INSULIN (H): Primary | ICD-10-CM

## 2019-11-19 DIAGNOSIS — Z79.4 TYPE 2 DIABETES MELLITUS WITH HYPERGLYCEMIA, WITH LONG-TERM CURRENT USE OF INSULIN (H): Primary | ICD-10-CM

## 2019-11-19 ASSESSMENT — PAIN SCALES - GENERAL: PAINLEVEL: NO PAIN (0)

## 2019-11-19 ASSESSMENT — MIFFLIN-ST. JEOR: SCORE: 1333.98

## 2019-11-19 NOTE — PATIENT INSTRUCTIONS
It is good to see you again!    Please increase Lantus to 84 units daily.  Please give 42 units twice daily.  Please add one unit every other day until most fasting blood sugars are <150 as long as this does not lead to low blood sugar (<70) over night or early in the morning.    For Novolog please give:   Correction Factor of 1 for 20 mg/dL  Do Not give Correction Insulin if Pre-Meal BG less than 120.   For Pre-Meal  - 140 give 1 unit.   For Pre-Meal  - 160 give 2 units.   For Pre-Meal  - 180 give 4 units.   For Pre-Meal  - 200 give 5 units.   For Pre-Meal  - 220 give 6 units.   For Pre-Meal  - 240 give 7 units.   For Pre-Meal  - 260 give 8 units.   For Pre-Meal  - 280 give 9 units.  For Pre-Meal  - 300 give 10 units.   For Pre-Meal  - 320 give 11 units.   For Pre-Meal  - 340 give 12 units.  For Pre-Meal  - 360 give 13 units.   For Pre-Meal BG greater than or equal to 360 give 14 units and be sure to drink plenty of fluids and recheck blood sugar in 3 - 4 hours.  If not <300, please call clinic.      Please keep giving Bydureon each Saturday.     My best wishes,    Rosangela Palacios PA-C, MPAS  [facility]  Diabetes, Endocrinology, and Metabolism  [phone] Appointments/Nurse  [phone] Fax  [phone] nurse line  [phone] URGENTafter hours/weekend Endocrinologist on call

## 2019-11-19 NOTE — LETTER
11/19/2019       RE: Tyson Skelton  [address]  [place] [address]     Dear Colleague,    Thank you for referring your patient, Tyson Skelton, to the [facility] ENDOCRINOLOGY at [facility]. Please see a copy of my visit note below.        [facility]  Endocrinology  Rosangela Palacios PA-C  11/19/2019      Chief Complaint:   Diabetes    History of Present Illness:   Tyson Skelton is a 24 year old female who presents for follow up of type 2 diabetes mellitus with high insulin resistance. She was initially diagnosed with type 2 diabetes mellitus in 2010. In 2018 she underwent lab testing for BRYN-antibodies which were negative and C-peptide which was within normal limits at 2.56 (see Care Everywhere [facility] labs). October 2016 A1c was 10.9%. She saw Magali Ding in September to work on diet and limit rice to 2 cups /meal, and check BG more frequently.  Last month Dr. Peralta advised that she transition from twice daily 70/30 injections to Lantus with Novolog 1 unit per 10 grams of CHO and 1u:25 mg /dl >140.      She is accompanied by an .     Last week she it was recommended she resume checking her blood glucose more routinely, start a GLP-1 agonist, and restarting Metformin. She had been holding Metformin due to GI concerns, however they seemed to be unrelated. She was on Novolog 70/30 in the past, last week per discussion with her, we planned to increase Lantus 70 unit(s) daily and Novolog 10 unit(s) with meals in addition to a correction scale of 1 per 25 above 120 with meals.     She gave a Bydureon injection on Saturday. She denies side effects from the medication and has not noticed an improvement of her blood glucose. She is taking Lantus 70 unit(s) daily and Novolog with meals 10 unit(s) with meals in addition to her sliding scale. From her glucose/insulin log she had a BG of 395 before a lunch of rice/soup which she gave 20 unit(s) for. She is having highs with meals, her  diet is mostly the same. The max dose of her Lantus pen is 100 unit(s), she is open to injecting BID.     She has yet to see a primary care physician or GI to discuss chronic diarrhea, but plans to.     Blood Glucose Monitoring:  We reviewed glucometer and CGM data together. Typically checking 1-2 times per day. Due to her infrequent checking she is only capturing 27% of the sensor data.   Average 319    Fasting BG are steadily decreasing down from 395, 414 to 195 this morning.  BG increase and remain high post food.  All >250.  She is recording doses and BG in book given last week and she appreciates seeing numbers in this form.     Time in range:   <54: 0%  54-70: 0%  : 8%  180-250: 8%  >250: 84%     Diabetes monitoring and complications:  CAD: No  Microalbuminuria: 144.9 mg/g Cr  Neuropathy: No  HTN: No  On Statin: No  On Aspirin: No    Patient Supplied Answers To Diabetic Questionnaire    including 11/19/2019   1. Since your last visit to our clinic (or if this your first visit, since you last saw your primary care provider), have you experienced any of the following symptoms that may be related to low blood sugars? No, I have not experienced any of these symptoms   2. Since your last visit to our clinic (or if this your first visit, since you last saw your primary care provider), have you experienced any of the following symptoms that may be related to prolonged high blood sugars? No, I have not experienced any of these symptoms   4. Do you have any female family members who have had heart attacks or strokes before age 60 or male relatives who have had heart attacks or strokes before age 50? No   5. Do you have any family members who have had complications from diabetes such as kidney disease, heart disease or strokes, retinopathy (a vision problem), or amputations? No   10.  Are you considering a pregnancy or interested in discussing pregnancy prevention today? No          Review of Systems:   Pertinent  "items are noted in HPI.  All other systems are negative.    Active Medications:     Current Outpatient Medications:      acetaminophen (TYLENOL) 500 MG tablet, Take 1,000 mg by mouth, Disp: , Rfl:      Continuous Blood Gluc  (FREESTYLE DUC 14 DAY READER) GIL, 1 Units, Disp: , Rfl:      Continuous Blood Gluc  (FREESTYLE DUC 14 DAY READER) GIL, U UTD FOR GLUCOSE MONITORING, Disp: , Rfl: 0     exenatide ER (BYDUREON) 2 MG pen, Inject 2 mg Subcutaneous every 7 days, Disp: 3 mL, Rfl: 1     insulin glargine (LANTUS SOLOSTAR) 100 UNIT/ML pen, 70 units at bedtime, Disp: 45 mL, Rfl: 11     insulin pen needle (31G X 5 MM) 31G X 5 MM miscellaneous, Use 5 pen needles daily or as directed., Disp: 200 each, Rfl: 11     lisinopril (PRINIVIL/ZESTRIL) 2.5 MG tablet, Take 2.5 mg by mouth daily, Disp: , Rfl:      metFORMIN (GLUCOPHAGE-XR) 500 MG 24 hr tablet, Reduce to 2 tabs daily since you did not tolerate 4 tabs daily, Disp: 60 tablet, Rfl: 11     NOVOLOG FLEXPEN 100 UNIT/ML soln, Inject 10-20 Units Subcutaneous 4 times daily (with meals and nightly) 10 units plus sliding scale 10 minutes before each meal., Disp: 15 mL, Rfl: 1     NOVOLOG FLEXPEN 100 UNIT/ML soln, 1 unit per 10 grams of carbs + 1u per 25>140, Disp: 45 mL, Rfl: 11      Allergies:   Patient has no known allergies.         Past Medical History:  Type 2 diabetes mellitus     Social History:   She has one 1 year old son.  She speaks Reanna, but is able to read and understand some verbal English.       Physical Exam:   /85   Pulse 80   Ht 1.511 m (4' 11.5\")   Wt 67 kg (147 lb 12.8 oz)   BMI 29.35 kg/m        Wt Readings from Last 10 Encounters:   11/19/19 67 kg (147 lb 12.8 oz)   11/12/19 67.1 kg (148 lb)   10/16/19 67.3 kg (148 lb 6.4 oz)   07/15/19 63.7 kg (140 lb 8 oz)        General: Pleasant, well nourished and hydrated female in NAD.   Psych:  Mood is \"good,\" affect is appropriate.  Thought form and content are fluid and coherent.  "   HEENT: Eyes and sclera are clear. Extraocular movements are grossly intact without proptosis.  Nares are patent, mucous membranes moist.  Neck: No masses or JVD are noted.    Resp: Easy and unlabored breathing.   Neuro: Alert and oriented, communicating clearly.   Ext: no swelling or edema       Data:  Lab Results   Component Value Date    HEMOGLOBINA1 10.9 (A) 10/16/2019     Assessment and Plan:  Type 2 diabetes mellitus   Tyson Skelton is a 24 year old female with poorly controlled type 2 diabetes mellitus with blood glucose above goal.. She has had some improvement since her visit last week with a simplified regimen, however her insulin needs are still not being met. She will continue on Bydureon as she is tolerating this well so far and BG are coming down - I believe she needs some sensitizer on board. Her Lantus was increased to 84 unit(s) daily with instructions to increase by 1 unit(s) until most fasting sugars are less than 150. Her correction scale (below) was made more aggressive.      Correction Factor of 1 for 20 mg/dL  Do Not give Correction Insulin if Pre-Meal BG less than 120.   For Pre-Meal  - 140 give 1 unit.   For Pre-Meal  - 160 give 2 units.   For Pre-Meal  - 180 give 4 units.   For Pre-Meal  - 200 give 5 units.   For Pre-Meal  - 220 give 6 units.   For Pre-Meal  - 240 give 7 units.   For Pre-Meal  - 260 give 8 units.   For Pre-Meal  - 280 give 9 units.  For Pre-Meal  - 300 give 7 units.   For Pre-Meal  - 320 give 8 units.   For Pre-Meal  - 340 give 9 units.  For Pre-Meal BG greater than or equal to 341 give 10 units     Contact any BG <70 or recurrent >350.      Follow-up: Return in about 4 weeks (around 12/17/2019).     >50% of 40 minute visit spent in face to face counseling, education and coordination of care related to options for better glycemic control as well as preventing, detecting, and treating hypoglycemia.      It is my  privilege to be involved in the care of the above patient.     Rosangela Palacios PA-C, [facility]  [facility]  Diabetes, Endocrinology, and Metabolism  [phone] Appointments/Nurse  [phone] Fax  [phone] pager  [phone] nurse line               Scribe Disclosure:  I, Jose Armando Marquez, am serving as a scribe to document services personally performed by Rosangela Palacios PA-C at this visit, based upon the provider's statements to me. All documentation has been reviewed by the aforementioned provider prior to being entered into the official medical record.     Portions of this medical record were completed by a scribe. UPON MY REVIEW AND AUTHENTICATION BY ELECTRONIC SIGNATURE, this confirms (a) I performed the applicable clinical services, and (b) the record is accurate.

## 2019-11-19 NOTE — PROGRESS NOTES
[facility]  Endocrinology  Rosangela Palacios PA-C  11/19/2019      Chief Complaint:   Diabetes    History of Present Illness:   Tyson Skelton is a 24 year old female who presents for follow up of type 2 diabetes mellitus with high insulin resistance. She was initially diagnosed with type 2 diabetes mellitus in 2010. In 2018 she underwent lab testing for BRYN-antibodies which were negative and C-peptide which was within normal limits at 2.56 (see Care Everywhere [facility] labs). October 2016 A1c was 10.9%. She saw Magali Ding in September to work on diet and limit rice to 2 cups /meal, and check BG more frequently.  Last month Dr. Peralta advised that she transition from twice daily 70/30 injections to Lantus with Novolog 1 unit per 10 grams of CHO and 1u:25 mg /dl >140.      She is accompanied by an .     Last week she it was recommended she resume checking her blood glucose more routinely, start a GLP-1 agonist, and restarting Metformin. She had been holding Metformin due to GI concerns, however they seemed to be unrelated. She was on Novolog 70/30 in the past, last week per discussion with her, we planned to increase Lantus 70 unit(s) daily and Novolog 10 unit(s) with meals in addition to a correction scale of 1 per 25 above 120 with meals.     She gave a Bydureon injection on Saturday. She denies side effects from the medication and has not noticed an improvement of her blood glucose. She is taking Lantus 70 unit(s) daily and Novolog with meals 10 unit(s) with meals in addition to her sliding scale. From her glucose/insulin log she had a BG of 395 before a lunch of rice/soup which she gave 20 unit(s) for. She is having highs with meals, her diet is mostly the same. The max dose of her Lantus pen is 100 unit(s), she is open to injecting BID.     She has yet to see a primary care physician or GI to discuss chronic diarrhea, but plans to.     Blood Glucose Monitoring:  We reviewed glucometer and CGM data  together. Typically checking 1-2 times per day. Due to her infrequent checking she is only capturing 27% of the sensor data.   Average 319    Fasting BG are steadily decreasing down from 395, 414 to 195 this morning.  BG increase and remain high post food.  All >250.  She is recording doses and BG in book given last week and she appreciates seeing numbers in this form.     Time in range:   <54: 0%  54-70: 0%  : 8%  180-250: 8%  >250: 84%     Diabetes monitoring and complications:  CAD: No  Microalbuminuria: 144.9 mg/g Cr  Neuropathy: No  HTN: No  On Statin: No  On Aspirin: No    Patient Supplied Answers To Diabetic Questionnaire    including 11/19/2019   1. Since your last visit to our clinic (or if this your first visit, since you last saw your primary care provider), have you experienced any of the following symptoms that may be related to low blood sugars? No, I have not experienced any of these symptoms   2. Since your last visit to our clinic (or if this your first visit, since you last saw your primary care provider), have you experienced any of the following symptoms that may be related to prolonged high blood sugars? No, I have not experienced any of these symptoms   4. Do you have any female family members who have had heart attacks or strokes before age 60 or male relatives who have had heart attacks or strokes before age 50? No   5. Do you have any family members who have had complications from diabetes such as kidney disease, heart disease or strokes, retinopathy (a vision problem), or amputations? No   10.  Are you considering a pregnancy or interested in discussing pregnancy prevention today? No          Review of Systems:   Pertinent items are noted in HPI.  All other systems are negative.    Active Medications:     Current Outpatient Medications:      acetaminophen (TYLENOL) 500 MG tablet, Take 1,000 mg by mouth, Disp: , Rfl:      Continuous Blood Gluc  (FREESTYLE DUC 14 DAY READER)  "GIL, 1 Units, Disp: , Rfl:      Continuous Blood Gluc  (FREESTYLE DUC 14 DAY READER) JUSTINA CORDERO UTD FOR GLUCOSE MONITORING, Disp: , Rfl: 0     exenatide ER (BYDUREON) 2 MG pen, Inject 2 mg Subcutaneous every 7 days, Disp: 3 mL, Rfl: 1     insulin glargine (LANTUS SOLOSTAR) 100 UNIT/ML pen, 70 units at bedtime, Disp: 45 mL, Rfl: 11     insulin pen needle (31G X 5 MM) 31G X 5 MM miscellaneous, Use 5 pen needles daily or as directed., Disp: 200 each, Rfl: 11     lisinopril (PRINIVIL/ZESTRIL) 2.5 MG tablet, Take 2.5 mg by mouth daily, Disp: , Rfl:      metFORMIN (GLUCOPHAGE-XR) 500 MG 24 hr tablet, Reduce to 2 tabs daily since you did not tolerate 4 tabs daily, Disp: 60 tablet, Rfl: 11     NOVOLOG FLEXPEN 100 UNIT/ML soln, Inject 10-20 Units Subcutaneous 4 times daily (with meals and nightly) 10 units plus sliding scale 10 minutes before each meal., Disp: 15 mL, Rfl: 1     NOVOLOG FLEXPEN 100 UNIT/ML soln, 1 unit per 10 grams of carbs + 1u per 25>140, Disp: 45 mL, Rfl: 11      Allergies:   Patient has no known allergies.         Past Medical History:  Type 2 diabetes mellitus     Social History:   She has one 1 year old son.  She speaks Reanna, but is able to read and understand some verbal English.       Physical Exam:   /85   Pulse 80   Ht 1.511 m (4' 11.5\")   Wt 67 kg (147 lb 12.8 oz)   BMI 29.35 kg/m       Wt Readings from Last 10 Encounters:   11/19/19 67 kg (147 lb 12.8 oz)   11/12/19 67.1 kg (148 lb)   10/16/19 67.3 kg (148 lb 6.4 oz)   07/15/19 63.7 kg (140 lb 8 oz)        General: Pleasant, well nourished and hydrated female in NAD.   Psych:  Mood is \"good,\" affect is appropriate.  Thought form and content are fluid and coherent.    HEENT: Eyes and sclera are clear. Extraocular movements are grossly intact without proptosis.  Nares are patent, mucous membranes moist.  Neck: No masses or JVD are noted.    Resp: Easy and unlabored breathing.   Neuro: Alert and oriented, communicating clearly. "   Ext: no swelling or edema       Data:  Lab Results   Component Value Date    HEMOGLOBINA1 10.9 (A) 10/16/2019     Assessment and Plan:  Type 2 diabetes mellitus   Tyson Skelton is a 24 year old female with poorly controlled type 2 diabetes mellitus with blood glucose above goal.. She has had some improvement since her visit last week with a simplified regimen, however her insulin needs are still not being met. She will continue on Bydureon as she is tolerating this well so far and BG are coming down - I believe she needs some sensitizer on board. Her Lantus was increased to 84 unit(s) daily with instructions to increase by 1 unit(s) until most fasting sugars are less than 150. Her correction scale (below) was made more aggressive.      Correction Factor of 1 for 20 mg/dL  Do Not give Correction Insulin if Pre-Meal BG less than 120.   For Pre-Meal  - 140 give 1 unit.   For Pre-Meal  - 160 give 2 units.   For Pre-Meal  - 180 give 4 units.   For Pre-Meal  - 200 give 5 units.   For Pre-Meal  - 220 give 6 units.   For Pre-Meal  - 240 give 7 units.   For Pre-Meal  - 260 give 8 units.   For Pre-Meal  - 280 give 9 units.  For Pre-Meal  - 300 give 7 units.   For Pre-Meal  - 320 give 8 units.   For Pre-Meal  - 340 give 9 units.  For Pre-Meal BG greater than or equal to 341 give 10 units     Contact any BG <70 or recurrent >350.      Follow-up: Return in about 4 weeks (around 12/17/2019).     >50% of 40 minute visit spent in face to face counseling, education and coordination of care related to options for better glycemic control as well as preventing, detecting, and treating hypoglycemia.      It is my privilege to be involved in the care of the above patient.     Rosangela Palacios PA-C, MPAS  [facility]  Diabetes, Endocrinology, and Metabolism  [phone] Appointments/Nurse  [phone] Fax  [phone] pager  [phone] nurse line                Scribe Disclosure:  I, Jose Armando Marquez, am serving as a scribe to document services personally performed by Rosangela Palacios PA-C at this visit, based upon the provider's statements to me. All documentation has been reviewed by the aforementioned provider prior to being entered into the official medical record.     Portions of this medical record were completed by a scribe. UPON MY REVIEW AND AUTHENTICATION BY ELECTRONIC SIGNATURE, this confirms (a) I performed the applicable clinical services, and (b) the record is accurate.

## 2019-11-20 ENCOUNTER — COMMUNICATION - HEALTHEAST (OUTPATIENT)
Dept: NURSING | Facility: CLINIC | Age: 24
End: 2019-11-20

## 2019-11-21 ENCOUNTER — OFFICE VISIT - HEALTHEAST (OUTPATIENT)
Dept: FAMILY MEDICINE | Facility: CLINIC | Age: 24
End: 2019-11-21

## 2019-11-21 DIAGNOSIS — E78.2 MIXED HYPERLIPIDEMIA: ICD-10-CM

## 2019-11-21 DIAGNOSIS — Z23 IMMUNIZATION DUE: ICD-10-CM

## 2019-11-21 DIAGNOSIS — E11.9 TYPE 2 DIABETES MELLITUS TREATED WITH INSULIN (H): ICD-10-CM

## 2019-11-21 DIAGNOSIS — Z79.4 TYPE 2 DIABETES MELLITUS TREATED WITH INSULIN (H): ICD-10-CM

## 2019-11-21 DIAGNOSIS — Z23 NEED FOR IMMUNIZATION AGAINST INFLUENZA: ICD-10-CM

## 2019-11-21 ASSESSMENT — MIFFLIN-ST. JEOR: SCORE: 1330.11

## 2019-12-02 ENCOUNTER — DOCUMENTATION ONLY (OUTPATIENT)
Dept: CARE COORDINATION | Facility: CLINIC | Age: 24
End: 2019-12-02

## 2020-01-03 ENCOUNTER — COMMUNICATION - HEALTHEAST (OUTPATIENT)
Dept: NURSING | Facility: CLINIC | Age: 25
End: 2020-01-03

## 2020-01-13 ENCOUNTER — OFFICE VISIT (OUTPATIENT)
Dept: ENDOCRINOLOGY | Facility: CLINIC | Age: 25
End: 2020-01-13
Payer: COMMERCIAL

## 2020-01-13 VITALS
DIASTOLIC BLOOD PRESSURE: 79 MMHG | SYSTOLIC BLOOD PRESSURE: 110 MMHG | HEIGHT: 60 IN | WEIGHT: 141.6 LBS | HEART RATE: 85 BPM | BODY MASS INDEX: 27.8 KG/M2

## 2020-01-13 DIAGNOSIS — E11.65 TYPE 2 DIABETES MELLITUS WITH HYPERGLYCEMIA, UNSPECIFIED WHETHER LONG TERM INSULIN USE (H): ICD-10-CM

## 2020-01-13 DIAGNOSIS — E11.9 TYPE 2 DIABETES, HBA1C GOAL < 7% (H): Primary | Chronic | ICD-10-CM

## 2020-01-13 LAB — HBA1C MFR BLD: 13.1 % (ref 4.3–6)

## 2020-01-13 RX ORDER — INSULIN ASPART 100 [IU]/ML
INJECTION, SOLUTION INTRAVENOUS; SUBCUTANEOUS
Qty: 45 ML | Refills: 11 | Status: SHIPPED | OUTPATIENT
Start: 2020-01-13 | End: 2020-04-01

## 2020-01-13 ASSESSMENT — PAIN SCALES - GENERAL: PAINLEVEL: NO PAIN (0)

## 2020-01-13 ASSESSMENT — MIFFLIN-ST. JEOR: SCORE: 1305.85

## 2020-01-13 NOTE — PROGRESS NOTES
Assessment and Plan  -Type 2 DM poorly controlled and hyperlipidemia (defer to PMD for the latter, may need a statin added for high LDL, after retesting fasting lipids when diabetes is controlled). She does not tolerate full dose metformin. Diabetic foot exam done 10/16/19 was ok.    -Urine microalbumin elevated: this should improve with better glucose control. Recheck after A1c<8% and start ACE inhibitor then if persistent elevation.     Patient Instructions:  Fasting sugar goals:      2 hours after eating <180    Please start taking lantus insulin regularly (basal 24 hour insulin) 84 units at bedtime     Please continue taking Metformin 2 tabs daily, Exenatide weekly, Novolog 10 units w/ meals, Novolog 1 unit per 25>140 (see correction scale below):     In addition to the above, please start taking novolog insulin for correction of high blood sugars:  1 unit per 25 mg/dL >140. This means:    HIGH Insulin Resistance or Correction Factor of 1 for 25 mg/dL  Correction Scale - HIGH INSULIN RESISTANCE DOSING     Do Not give Correction Insulin if Pre-Meal BG less than 140.   For Pre-Meal  - 164 give 1 unit.   For Pre-Meal  - 189 give 2 units.   For Pre-Meal  - 214 give 3 units.   For Pre-Meal  - 239 give 4 units.   For Pre-Meal  - 264 give 5 units.   For Pre-Meal  - 289 give 6 units.   For Pre-Meal  - 314 give 7 units.   For Pre-Meal  - 339 give 8 units.   For Pre-Meal  - 364 give 9 units.  For Pre-Meal BG greater than or equal to 365 give 10 units  To be given with prandial insulin, and based on pre-meal blood glucose.   Notify provider if glucose greater than or equal to 350 mg/dL after administration of correction dose.     HIGH Insulin Resistance or Correction Factor of 1 for 25 mg/dL  HIGH INSULIN RESISTANCE DOSING    Do Not give Bedtime Correction Insulin if BG less than 200.   For  - 224 give 1 units.   For  - 249 give 2 units.   For BG  "250 - 274 give 3 units.   For  - 299 give 4 units.   For  - 324 give 5 units.   For  - 349 give 6 units.   For BG greater than or equal to 350 give 7 units.   Notify provider if glucose greater than or equal to 350 mg/dL after administration of co     Please see Diabetes Education in 1-2 weeks     Please see Diabetes Team PA in 1 month     Please see me in 3 months     Please call [phone] & ask for Diabetes on -call with serious questions about high or low blood sugars if you do not know what to do     RTC: 1-2 weeks w/ RN, CDE as above, 1 month with Diabetes Team PA, 3 months w/ me     Katherine Peralta MD, MPH  Attending Physician  Diabetes/Endocrinology/Metabolism        Reason for visit/consult: Type 2 DM     Primary care provider: No Ref-Primary, Physician     HPI:  A1c today is 13.1%  25 yo female seen for evaluation and management of T2DM. She has had DM since 2010. This is the 3rd time I am seeing her (see my note of 7/15/19 for details of initial consult). I last saw her on 10/16/19 & A1c was 10.9%. She saw Rosangela Palacios PA-C, on 11/19/19.    She was seen on 11/14/17 by Linda Mensah and it was noted, \"Her C-peptide and BRYN-antibodies tests would indicate that she is indeed a Type 2 with some pancreatic function. Her current A1c is 9, which is a great improvement from 14>, which, from our records, seems to be where she ws residing over the last 2 years.\" She has since been seeing Dr. Jackson ([facility]) during pregnancy, last visit was 10/6/18.     She is taking Metformin #2 tabs daily of 500 mg XR (she gets diarrhea if she takes more), 84 units of Lantus daily (she misses this 3-4 times weekly because she is busy with her kid and she does not always eat on time), Novolog 20 units w/ meals, Exenatide weekly, and the following Novolog correction scale:   Correction Factor of 1 for 20 mg/dL  Do Not give Correction Insulin if Pre-Meal BG less than 120.   For Pre-Meal  - 140 give " 1 unit.   For Pre-Meal  - 160 give 2 units.   For Pre-Meal  - 180 give 4 units.   For Pre-Meal  - 200 give 5 units.   For Pre-Meal  - 220 give 6 units.   For Pre-Meal  - 240 give 7 units.   For Pre-Meal  - 260 give 8 units.   For Pre-Meal  - 280 give 9 units.  For Pre-Meal  - 300 give 7 units.   For Pre-Meal  - 320 give 8 units.   For Pre-Meal  - 340 give 9 units.  For Pre-Meal BG greater than or equal to 341 give 10 units    She has seen eye MD within the past year and she was told everything was fine.     Meter download data ([employer]) is as follows:  Average: 286, 88% above target, SD 84.9, CoV 29.7%    Diasend meter:  Av, SD 56, # values 11, values>180 10, values within range 1, values <70 0, highest 342, lowest 173     Past Medical/Surgical History:    Vitamin D Deficiency     Type 2 diabetes mellitus with insulin therapy     Hyperlipidemia     Proteinuria     Chronic Kidney Disease (NKF Classification)      S/p  x 1 10/6/18    Allergies:  No Known Allergies    Current Medications     Current Outpatient Medications:      acetaminophen (TYLENOL) 500 MG tablet, Take 1,000 mg by mouth, Disp: , Rfl:      Continuous Blood Gluc  (FREESTYLE DUC 14 DAY READER) GIL, 1 Units, Disp: , Rfl:      Continuous Blood Gluc  (FREESTYLE DUC 14 DAY READER) GIL, U UTD FOR GLUCOSE MONITORING, Disp: , Rfl: 0     exenatide ER (BYDUREON) 2 MG pen, Inject 2 mg Subcutaneous every 7 days, Disp: 3 mL, Rfl: 1     insulin glargine (LANTUS SOLOSTAR) 100 UNIT/ML pen, 70 units at bedtime, Disp: 45 mL, Rfl: 11     insulin pen needle (31G X 5 MM) 31G X 5 MM miscellaneous, Use 5 pen needles daily or as directed., Disp: 200 each, Rfl: 11     lisinopril (PRINIVIL/ZESTRIL) 2.5 MG tablet, Take 2.5 mg by mouth daily, Disp: , Rfl:      metFORMIN (GLUCOPHAGE-XR) 500 MG 24 hr tablet, Reduce to 2 tabs daily since you did not tolerate 4 tabs daily, Disp: 60  "tablet, Rfl: 11     NOVOLOG FLEXPEN 100 UNIT/ML soln, Inject 10-20 Units Subcutaneous 4 times daily (with meals and nightly) 10 units plus sliding scale 10 minutes before each meal., Disp: 15 mL, Rfl: 1     NOVOLOG FLEXPEN 100 UNIT/ML soln, 1 unit per 10 grams of carbs + 1u per 25>140, Disp: 45 mL, Rfl: 11    Family History:  DM in mother     Social History:  Non-smoker. . 1 child. 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 Occupation: PCA.    ROS:  negative    Exam  /79   Pulse 85   Ht 1.511 m (4' 11.5\")   Wt 64.2 kg (141 lb 9.6 oz)   BMI 28.12 kg/m    Gen: well appearing, nad, pleasant and conversant  HEENT: anicteric, EOMI, no proptosis or lid lag  Neuro: A&Ox3, no tremor    Labs/Imaging  Nothing in Epic, A1c was 10.9% on 10/16/19  See [facility] HE 8/8/19: creatinine 0.77, A1c 12.3%  7/11/19 urine microalbumin: 144.9 (<19.9 mg/g)  10/4/17:       Time: 30 min spent on evaluation, management, counseling, education, & motivational interviewing with greater than 50% of the total time was spent on counseling and coordinating care. Patient was seen w/ help of  of language of [place] = 1 of 3 Dialects spoken in [facility].  "

## 2020-01-13 NOTE — LETTER
1/13/2020       RE: Tyson Skelton  [address] [place] [address]     Dear Colleague,    Thank you for referring your patient, Tyson Skelton, to the [facility] ENDOCRINOLOGY at [facility]. Please see a copy of my visit note below.       Assessment and Plan  -Type 2 DM poorly controlled and hyperlipidemia (defer to PMD for the latter, may need a statin added for high LDL, after retesting fasting lipids when diabetes is controlled). She does not tolerate full dose metformin. Diabetic foot exam done 10/16/19 was ok.    -Urine microalbumin elevated: this should improve with better glucose control. Recheck after A1c<8% and start ACE inhibitor then if persistent elevation.     Patient Instructions:  Fasting sugar goals:      2 hours after eating <180    Please start taking lantus insulin regularly (basal 24 hour insulin) 84 units at bedtime     Please continue taking Metformin 2 tabs daily, Exenatide weekly, Novolog 10 units w/ meals, Novolog 1 unit per 25>140 (see correction scale below):     In addition to the above, please start taking novolog insulin for correction of high blood sugars:  1 unit per 25 mg/dL >140. This means:    HIGH Insulin Resistance or Correction Factor of 1 for 25 mg/dL  Correction Scale - HIGH INSULIN RESISTANCE DOSING     Do Not give Correction Insulin if Pre-Meal BG less than 140.   For Pre-Meal  - 164 give 1 unit.   For Pre-Meal  - 189 give 2 units.   For Pre-Meal  - 214 give 3 units.   For Pre-Meal  - 239 give 4 units.   For Pre-Meal  - 264 give 5 units.   For Pre-Meal  - 289 give 6 units.   For Pre-Meal  - 314 give 7 units.   For Pre-Meal  - 339 give 8 units.   For Pre-Meal  - 364 give 9 units.  For Pre-Meal BG greater than or equal to 365 give 10 units  To be given with prandial insulin, and based on pre-meal blood glucose.   Notify provider if glucose greater than or equal to 350 mg/dL after  "administration of correction dose.     HIGH Insulin Resistance or Correction Factor of 1 for 25 mg/dL  HIGH INSULIN RESISTANCE DOSING    Do Not give Bedtime Correction Insulin if BG less than 200.   For  - 224 give 1 units.   For  - 249 give 2 units.   For  - 274 give 3 units.   For  - 299 give 4 units.   For  - 324 give 5 units.   For  - 349 give 6 units.   For BG greater than or equal to 350 give 7 units.   Notify provider if glucose greater than or equal to 350 mg/dL after administration of co     Please see Diabetes Education in 1-2 weeks     Please see Diabetes Team PA in 1 month     Please see me in 3 months     Please call [phone] & ask for Diabetes on -call with serious questions about high or low blood sugars if you do not know what to do     RTC: 1-2 weeks w/ RN, CDE as above, 1 month with Diabetes Team PA, 3 months w/ me     Katherine Peralta MD, MPH  Attending Physician  Diabetes/Endocrinology/Metabolism        Reason for visit/consult: Type 2 DM     Primary care provider: No Ref-Primary, Physician     HPI:  A1c today is 13.1%  23 yo female seen for evaluation and management of T2DM. She has had DM since 2010. This is the 3rd time I am seeing her (see my note of 7/15/19 for details of initial consult).  I last saw her on 10/16/19 & A1c was 10.9%. She saw Rosangela Palacios PA-C, on 11/19/19.    She was seen on 11/14/17 by Linda Mensah and it was noted, \"Her C-peptide and BRYN-antibodies tests would indicate that she is indeed a Type 2 with some pancreatic function. Her current A1c is 9, which is a great improvement from 14>, which, from our records, seems to be where she ws residing over the last 2 years.\" [place], last visit was 10/6/18.     She is taking Metformin #2 tabs daily of 500 mg XR (she gets diarrhea if she takes more), 84 units of Lantus daily (she misses this 3-4 times weekly because she is busy " with her kid and she does not always eat on time), Novolog 20 units w/ meals, Exenatide weekly, and the following Novolog correction scale:   Correction Factor of 1 for 20 mg/dL  Do Not give Correction Insulin if Pre-Meal BG less than 120.   For Pre-Meal  - 140 give 1 unit.   For Pre-Meal  - 160 give 2 units.   For Pre-Meal  - 180 give 4 units.   For Pre-Meal  - 200 give 5 units.   For Pre-Meal  - 220 give 6 units.   For Pre-Meal  - 240 give 7 units.   For Pre-Meal  - 260 give 8 units.   For Pre-Meal  - 280 give 9 units.  For Pre-Meal  - 300 give 7 units.   For Pre-Meal  - 320 give 8 units.   For Pre-Meal  - 340 give 9 units.  For Pre-Meal BG greater than or equal to 341 give 10 units    She has seen eye MD within the past year and she was told everything was fine.     Meter download data ([employer]) is as follows:  Average:  286,  88% above target, SD  84.9, CoV 29.7%    Diasend meter:  Av, SD 56, # values 11, values>180 10, values within range 1, values <70 0, highest 342, lowest 173     Past Medical/Surgical History:    Vitamin D Deficiency     Type 2 diabetes mellitus with insulin therapy     Hyperlipidemia     Proteinuria     Chronic Kidney Disease (NKF Classification)      S/p  x 1 10/6/18    Allergies:  No Known Allergies    Current Medications     Current Outpatient Medications:      acetaminophen (TYLENOL) 500 MG tablet, Take 1,000 mg by mouth, Disp: , Rfl:      Continuous Blood Gluc  (FREESTYLE DUC 14 DAY READER) GIL, 1 Units, Disp: , Rfl:      Continuous Blood Gluc  (FREESTYLE DUC 14 DAY READER) GIL, U UTD FOR GLUCOSE MONITORING, Disp: , Rfl: 0     exenatide ER (BYDUREON) 2 MG pen, Inject 2 mg Subcutaneous every 7 days, Disp: 3 mL, Rfl: 1     insulin glargine (LANTUS SOLOSTAR) 100 UNIT/ML pen, 70 units at bedtime, Disp: 45 mL, Rfl: 11     insulin pen needle (31G X 5 MM) 31G X 5 MM miscellaneous, Use 5  "pen needles daily or as directed., Disp: 200 each, Rfl: 11     lisinopril (PRINIVIL/ZESTRIL) 2.5 MG tablet, Take 2.5 mg by mouth daily, Disp: , Rfl:      metFORMIN (GLUCOPHAGE-XR) 500 MG 24 hr tablet, Reduce to 2 tabs daily since you did not tolerate 4 tabs daily, Disp: 60 tablet, Rfl: 11     NOVOLOG FLEXPEN 100 UNIT/ML soln, Inject 10-20 Units Subcutaneous 4 times daily (with meals and nightly) 10 units plus sliding scale 10 minutes before each meal., Disp: 15 mL, Rfl: 1     NOVOLOG FLEXPEN 100 UNIT/ML soln, 1 unit per 10 grams of carbs + 1u per 25>140, Disp: 45 mL, Rfl: 11    Family History:  DM in mother     Social History:  Non-smoker. . 1 child. 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 Occupation: PCA.    ROS:  negative    Exam  /79   Pulse 85   Ht 1.511 m (4' 11.5\")   Wt 64.2 kg (141 lb 9.6 oz)   BMI 28.12 kg/m     Gen: well appearing, nad, pleasant and conversant  HEENT: anicteric, EOMI, no proptosis or lid lag  Neuro: A&Ox3, no tremor    Labs/Imaging  Nothing in Epic, A1c was 10.9% on 10/16/19  See CE HE 8/8/19: creatinine 0.77, A1c 12.3%  7/11/19 urine microalbumin: 144.9 (<19.9 mg/g)  10/4/17:       Time:  30 min spent on evaluation, management, counseling, education, & motivational interviewing with greater than 50% of the total time was spent on counseling and coordinating care. Patient was seen w/ help of  of language of [place] = 1 of 3 Dialects spoken in [facility].    Again, thank you for allowing me to participate in the care of your patient.      Sincerely,    Katherine Peralta MD      "

## 2020-01-13 NOTE — PATIENT INSTRUCTIONS
Fasting sugar goals:      2 hours after eating <180    Please start taking lantus insulin regularly (basal 24 hour insulin) 84 units at bedtime     Please continue taking Metformin 2 tabs daily, Exenatide weekly, Novolog 10 units w/ meals, Novolog 1 unit per 25>140 (see correction scale below):     In addition to the above, please start taking novolog insulin for correction of high blood sugars:  1 unit per 25 mg/dL >140. This means:    HIGH Insulin Resistance or Correction Factor of 1 for 25 mg/dL  Correction Scale - HIGH INSULIN RESISTANCE DOSING     Do Not give Correction Insulin if Pre-Meal BG less than 140.   For Pre-Meal  - 164 give 1 unit.   For Pre-Meal  - 189 give 2 units.   For Pre-Meal  - 214 give 3 units.   For Pre-Meal  - 239 give 4 units.   For Pre-Meal  - 264 give 5 units.   For Pre-Meal  - 289 give 6 units.   For Pre-Meal  - 314 give 7 units.   For Pre-Meal  - 339 give 8 units.   For Pre-Meal  - 364 give 9 units.  For Pre-Meal BG greater than or equal to 365 give 10 units  To be given with prandial insulin, and based on pre-meal blood glucose.   Notify provider if glucose greater than or equal to 350 mg/dL after administration of correction dose.     HIGH Insulin Resistance or Correction Factor of 1 for 25 mg/dL  HIGH INSULIN RESISTANCE DOSING    Do Not give Bedtime Correction Insulin if BG less than 200.   For  - 224 give 1 units.   For  - 249 give 2 units.   For  - 274 give 3 units.   For  - 299 give 4 units.   For  - 324 give 5 units.   For  - 349 give 6 units.   For BG greater than or equal to 350 give 7 units.   Notify provider if glucose greater than or equal to 350 mg/dL after administration of co     Please see Diabetes Education in 1-2 weeks     Please see Diabetes Team PA in 1 month     Please see me in 3 months     Please call [phone] & ask for Diabetes on -call with serious questions  about high or low blood sugars if you do not know what to do

## 2020-01-16 ENCOUNTER — COMMUNICATION - HEALTHEAST (OUTPATIENT)
Dept: NURSING | Facility: CLINIC | Age: 25
End: 2020-01-16

## 2020-01-22 ENCOUNTER — AMBULATORY - HEALTHEAST (OUTPATIENT)
Dept: NURSING | Facility: CLINIC | Age: 25
End: 2020-01-22

## 2020-02-03 ENCOUNTER — COMMUNICATION - HEALTHEAST (OUTPATIENT)
Dept: NURSING | Facility: CLINIC | Age: 25
End: 2020-02-03

## 2020-02-27 ENCOUNTER — COMMUNICATION - HEALTHEAST (OUTPATIENT)
Dept: NURSING | Facility: CLINIC | Age: 25
End: 2020-02-27

## 2020-03-02 ENCOUNTER — COMMUNICATION - HEALTHEAST (OUTPATIENT)
Dept: NURSING | Facility: CLINIC | Age: 25
End: 2020-03-02

## 2020-03-16 ENCOUNTER — COMMUNICATION - HEALTHEAST (OUTPATIENT)
Dept: CARE COORDINATION | Facility: CLINIC | Age: 25
End: 2020-03-16

## 2020-04-01 ENCOUNTER — TELEPHONE (OUTPATIENT)
Dept: ENDOCRINOLOGY | Facility: CLINIC | Age: 25
End: 2020-04-01

## 2020-04-01 ENCOUNTER — COMMUNICATION - HEALTHEAST (OUTPATIENT)
Dept: NURSING | Facility: CLINIC | Age: 25
End: 2020-04-01

## 2020-04-01 DIAGNOSIS — E11.65 TYPE 2 DIABETES MELLITUS WITH HYPERGLYCEMIA, UNSPECIFIED WHETHER LONG TERM INSULIN USE (H): ICD-10-CM

## 2020-04-01 RX ORDER — INSULIN ASPART 100 [IU]/ML
INJECTION, SOLUTION INTRAVENOUS; SUBCUTANEOUS
Qty: 33 ML | Refills: 11 | Status: SHIPPED | OUTPATIENT
Start: 2020-04-01 | End: 2020-04-13

## 2020-04-08 ENCOUNTER — COMMUNICATION - HEALTHEAST (OUTPATIENT)
Dept: FAMILY MEDICINE | Facility: CLINIC | Age: 25
End: 2020-04-08

## 2020-04-08 ENCOUNTER — COMMUNICATION - HEALTHEAST (OUTPATIENT)
Dept: CARE COORDINATION | Facility: CLINIC | Age: 25
End: 2020-04-08

## 2020-04-08 DIAGNOSIS — O24.414 INSULIN CONTROLLED GESTATIONAL DIABETES MELLITUS (GDM) IN FIRST TRIMESTER: ICD-10-CM

## 2020-04-09 ENCOUNTER — AMBULATORY - HEALTHEAST (OUTPATIENT)
Dept: FAMILY MEDICINE | Facility: CLINIC | Age: 25
End: 2020-04-09

## 2020-04-09 ENCOUNTER — DOCUMENTATION ONLY (OUTPATIENT)
Dept: CARE COORDINATION | Facility: CLINIC | Age: 25
End: 2020-04-09

## 2020-04-09 DIAGNOSIS — O24.414 INSULIN CONTROLLED GESTATIONAL DIABETES MELLITUS (GDM) IN FIRST TRIMESTER: ICD-10-CM

## 2020-04-09 DIAGNOSIS — E11.9 DIABETES (H): ICD-10-CM

## 2020-04-10 NOTE — PROGRESS NOTES
[facility] Endocrinology- Outpatient Diabetes Follow up  DOS 20    Tyson is a 24 year old female with TIIDM and significant insulin resistance who presents for outpatient diabetes follow up today.     She is being evaluated via a billable telephone visit, with the assistance of a telephone Reanna .      Last follow up with Dr. Peralta 20; added Novolog sliding scale insulin before meals and at bedtime. She was also instructed to take her Lantus regularly (had been missing 3-4 doses per week). She was instructed to follow up with CDE, but has not yet.     Tyson reports that she has been taking Lantus 84 units daily more regularly, but still missing 1-2 doses per week. She did not implement the Novolog sliding scale (unable to determine why). She also stopped taking the Exenatide because it made her feel full and not eat as much, and this was undesirable to her (discussed this was generally considered a good side effect, as long as not causing N/V, due to weight loss, but she continued to decline resuming). She is still taking Metformin and Novolog 10 units with meals.     Data provided by patient:   FB>140>200>349>342  PM B>303>180.    Pt denies headaches, visual changes, n/v, SOB at rest, chest pain, abd pain, nausea/vomiting, diarrhea, dysuria, hematuria or foot ulcers.      Current Diabetes Regimen:   Metformin XR 1000mg daily (not tolerating increase due to diarrhea)  Lantus 84 units daily HS  Novolog 10 units TID with meals  Exenatide ER 2mg once weekly (self-discontinued with reduced appetite, not willing to resume)    Diabetes Care  Retinopathy: no last eye exam within the past year    Nephropathy: BP previously controlled. + Hx microalbuminuria. Taking ACEi/ARB: yes    Neuropathy: no    Lipids: Taking ASA: no, statin: no  No results found for: LDL       ROS: 10 point review of systems completed; pertinent positives and negatives documented in HPI      Allergies  No Known  Allergies    Medications  Current Outpatient Medications   Medication Sig Dispense Refill     acetaminophen (TYLENOL) 500 MG tablet Take 1,000 mg by mouth       insulin glargine (LANTUS SOLOSTAR) 100 UNIT/ML pen Inject 84 units at bedtime 30 mL 11     insulin pen needle (31G X 5 MM) 31G X 5 MM miscellaneous Use 5 pen needles daily or as directed. 200 each 11     lisinopril (PRINIVIL/ZESTRIL) 2.5 MG tablet Take 2.5 mg by mouth daily       metFORMIN (GLUCOPHAGE-XR) 500 MG 24 hr tablet Reduce to 2 tabs daily since you did not tolerate 4 tabs daily 60 tablet 11     NOVOLOG FLEXPEN 100 UNIT/ML soln Inject 20 unit(s) w/ meals + 1 unit per 25>140 Approx 100 unit dale max 66 mL 11     NOVOLOG FLEXPEN 100 UNIT/ML soln 1 unit per 10 grams of carbs + 1u per 25>140 45 mL 11       Family History  family history is not on file.    Social History   reports that she has never smoked. She has never used smokeless tobacco.     Past Medical History  No past medical history on file.    No past surgical history on file.    Physical Exam  GENERAL : In no apparent distress over the phone. Lots of background noise (child crying in the background)  RESP: normal respiratory effort. No cough  NEURO: awake, alert, responds appropriately to questions.        RESULTS    No results found for: A1C    No results found for: CR, GFRESTIMATED, [id number], MICROALB, A1C, POTASSIUM, ALT, AST, CKTOTAL, TSH, T4    No results found for: TSH, T4    No results found for: CR    No results for input(s): CHOL, HDL, LDL, TRIG, CHOLHDLRATIO in the last 44871 hours.    No results found for: [id number], [id number], [id number]      ASSESSMENT/PLAN:    1. Diabetes type II, uncontrolled, with microalbuminuria. Some of her numbers look excellent, thus I am concerned about titrating doses; seems as though when she is compliant, her BG are in target, and many of her high numbers are likely related to noncompliance with insulin regimen/diet modification.    -most recent A1c  "13.1% on 1/13/20.   -Metformin XR 1000mg daily (didn't tolerate increase 2/2 diarrhea)   -Lantus 84 units daily HS- reaffirmed she needs to take every day   -she unfortunately is unwilling to resume Exenatide ER, due to lessening of appetite   -increase Novolog from 10>20 units TID with meals   -she has not implemented Novolog sliding scale.     -She would benefit from continued CDE/RD follow up.   -She may benefit from simplification of her regimen to once daily combination degludec-liraglutide (Xultophy); admittedly the liraglutide may cause similar symptoms as her exenatide ER, however may be less significant, and she would greatly benefit from the continued use of a GLP1, if she can tolerate it. Will continue to assess medication regimen at follow up.       Follow up:  1. With [facility] endocrinology in 2-3 months.   2. Diabetes Educator follow up: recommend in 1-2 months.    Follow up was done today via phone encounter, due to high risk patients implementing social distancing due to COVID 19. No updated labs were drawn for the same reason on this date.    I spent a total of 20 minutes via telephone with patient, and  and 20 minutes chart review/coordination with bedside nurse and primary team managing glycemic care. Over 50% of my time was spent counseling the patient and/or coordinating care regarding hyperglycemia management.  See note for details.      LETICIA Greco, PA-C  [facility]   Pager [phone]    __________________________    The patient has been notified of following:     \"This telephone visit will be conducted via a call between you and your physician/provider. We have found that certain health care needs can be provided without the need for a physical exam.  This service lets us provide the care you need with a short phone conversation.  If a prescription is necessary we can send it directly to your pharmacy.  If lab work is needed we can place an order for that and you can " "then stop by our lab to have the test done at a later time.    Telephone visits are billed at different rates depending on your insurance coverage. During this emergency period, for some insurers they may be billed the same as an in-person visit.  Please reach out to your insurance provider with any questions.    If during the course of the call the physician/provider feels a telephone visit is not appropriate, you will not be charged for this service.\"    Patient has given verbal consent for Telephone visit?  Yes    How would you like to obtain your AVS? Mail a copy      Week of__/__/__ Date  4__/_10  Date  4__/9__ Date  _4_/_7_ Date  4__/6__ Date  _4_/_5_ Date  _4_/4__ Date  __/__    Time BG Time BG Time BG Time BG Time BG Time BG Time BG    7:49AM 243 7:36AM 140 7:51AM 200 8:35AM 349 8:40AM 342 7:47AM 210        7:31PM 213 4:09PM 303 8:08PM 180                                             Week of__/__/__ Date  __/__  Date  __/__ Date  __/__ Date  __/__ Date  __/__ Date  __/__ Date  __/__    Time BG Time BG Time BG Time BG Time BG Time BG Time BG                                                                             "

## 2020-04-13 ENCOUNTER — VIRTUAL VISIT (OUTPATIENT)
Dept: ENDOCRINOLOGY | Facility: CLINIC | Age: 25
End: 2020-04-13
Payer: COMMERCIAL

## 2020-04-13 DIAGNOSIS — E11.65 TYPE 2 DIABETES MELLITUS WITH HYPERGLYCEMIA, UNSPECIFIED WHETHER LONG TERM INSULIN USE (H): ICD-10-CM

## 2020-04-13 RX ORDER — INSULIN ASPART 100 [IU]/ML
INJECTION, SOLUTION INTRAVENOUS; SUBCUTANEOUS
Qty: 66 ML | Refills: 11 | Status: ON HOLD | OUTPATIENT
Start: 2020-04-13 | End: 2022-10-08

## 2020-04-14 ENCOUNTER — COMMUNICATION - HEALTHEAST (OUTPATIENT)
Dept: NURSING | Facility: CLINIC | Age: 25
End: 2020-04-14

## 2020-04-14 ENCOUNTER — COMMUNICATION - HEALTHEAST (OUTPATIENT)
Dept: FAMILY MEDICINE | Facility: CLINIC | Age: 25
End: 2020-04-14

## 2020-04-14 ENCOUNTER — TELEPHONE (OUTPATIENT)
Dept: ENDOCRINOLOGY | Facility: CLINIC | Age: 25
End: 2020-04-14

## 2020-04-14 NOTE — TELEPHONE ENCOUNTER
Please call Paw to schedule outpatient diabetes educator follow up in 1-2 months, and diabetes ZEUS/MD follow up in 2-3 months.     Thanks  Sommer Logan PA-C

## 2020-04-15 ENCOUNTER — AMBULATORY - HEALTHEAST (OUTPATIENT)
Dept: NURSING | Facility: CLINIC | Age: 25
End: 2020-04-15

## 2020-04-21 ENCOUNTER — COMMUNICATION - HEALTHEAST (OUTPATIENT)
Dept: NURSING | Facility: CLINIC | Age: 25
End: 2020-04-21

## 2020-05-18 ENCOUNTER — COMMUNICATION - HEALTHEAST (OUTPATIENT)
Dept: FAMILY MEDICINE | Facility: CLINIC | Age: 25
End: 2020-05-18

## 2020-05-21 ENCOUNTER — OFFICE VISIT - HEALTHEAST (OUTPATIENT)
Dept: FAMILY MEDICINE | Facility: CLINIC | Age: 25
End: 2020-05-21

## 2020-05-21 DIAGNOSIS — E78.2 MIXED HYPERLIPIDEMIA: ICD-10-CM

## 2020-05-21 DIAGNOSIS — Z79.4 TYPE 2 DIABETES MELLITUS TREATED WITH INSULIN (H): ICD-10-CM

## 2020-05-21 DIAGNOSIS — N18.9 CHRONIC KIDNEY DISEASE, UNSPECIFIED CKD STAGE: ICD-10-CM

## 2020-05-21 DIAGNOSIS — R80.9 PROTEINURIA, UNSPECIFIED TYPE: ICD-10-CM

## 2020-05-21 DIAGNOSIS — E11.9 TYPE 2 DIABETES MELLITUS WITH INSULIN THERAPY (H): ICD-10-CM

## 2020-05-21 DIAGNOSIS — E11.9 TYPE 2 DIABETES MELLITUS TREATED WITH INSULIN (H): ICD-10-CM

## 2020-05-21 DIAGNOSIS — Z79.4 TYPE 2 DIABETES MELLITUS WITH INSULIN THERAPY (H): ICD-10-CM

## 2020-05-26 ENCOUNTER — COMMUNICATION - HEALTHEAST (OUTPATIENT)
Dept: NURSING | Facility: CLINIC | Age: 25
End: 2020-05-26

## 2020-06-01 ENCOUNTER — VIRTUAL VISIT (OUTPATIENT)
Dept: EDUCATION SERVICES | Facility: CLINIC | Age: 25
End: 2020-06-01
Payer: COMMERCIAL

## 2020-06-01 DIAGNOSIS — E11.9 TYPE 2 DIABETES, HBA1C GOAL < 7% (H): ICD-10-CM

## 2020-06-02 NOTE — PROGRESS NOTES
"Diabetes Self-Management Education & Support  Tyson Skelton is a 25 year old female who is being evaluated via a billable telephone visit.      The patient has been notified of following:     \"This telephone visit will be conducted via a call between you and your physician/provider. We have found that certain health care needs can be provided without the need for a physical exam.  This service lets us provide the care you need with a short phone conversation.  If a prescription is necessary we can send it directly to your pharmacy.  If lab work is needed we can place an order for that and you can then stop by our lab to have the test done at a later time.    Telephone visits are billed at different rates depending on your insurance coverage. During this emergency period, for some insurers they may be billed the same as an in-person visit.  Please reach out to your insurance provider with any questions.    If during the course of the call the physician/provider feels a telephone visit is not appropriate, you will not be charged for this service.\"    Patient has given verbal consent for Telephone visit?  Yes    What phone number would you like to be contacted at?     How would you like to obtain your AVS? email    Phone call duration: 45  minutes    Magali Ding RN    Presents for:      SUBJECTIVE/OBJECTIVE:     Cultural Influences/Ethnic Background:  Choose not to answer      Diabetes Symptoms & Complications:          Patient Problem List and Family Medical History reviewed for relevant medical history, current medical status, and diabetes risk factors.    Vitals:  There were no vitals taken for this visit.  Estimated body mass index is 28.12 kg/m  as calculated from the following:    Height as of 1/13/20: 1.511 m (4' 11.5\").    Weight as of 1/13/20: 64.2 kg (141 lb 9.6 oz).   Last 3 BP:   BP Readings from Last 3 Encounters:   01/13/20 110/79   11/19/19 115/85   11/12/19 129/84       History   Smoking Status     " Never Smoker   Smokeless Tobacco     Never Used       Labs:  No results found for: A1C  No results found for: GLC  No results found for: LDL  No results found for: HDL]  No results found for: GFRESTIMATED  No results found for: GFRESTBLACK  No results found for: CR  No results found for: MICROALBUMIN    Healthy Eating:  Eats two meals daily and some snacks.  She does not count carbohydrates.  Most meals there is about a cup of rice, and she has snacks that she does not cover with insulin.     Being Active: Not discussed today.      Monitoring:  She monitors twice a day.  States that her fasting blood glucoses are between 122 and 360.  Her blood glucoses before dinner are between 242 and 300.    She states that she takes 20 units of novolog at the two meals that she eats daily, however she is not currently using her sliding scale as she states that she lost it.      Taking Medications:  Diabetes Medication(s)     Biguanides       metFORMIN (GLUCOPHAGE-XR) 500 MG 24 hr tablet    Reduce to 2 tabs daily since you did not tolerate 4 tabs daily    Insulin       insulin glargine (LANTUS SOLOSTAR) 100 UNIT/ML pen    Inject 84 units at bedtime     NOVOLOG FLEXPEN 100 UNIT/ML soln    Inject 20 unit(s) w/ meals + 1 unit per 25>140 Approx 100 unit dale max     NOVOLOG FLEXPEN 100 UNIT/ML soln    1 unit per 10 grams of carbs + 1u per 25>140      Patient Activation Measure Survey Score:  No flowsheet data found.    Diabetes knowledge and skills assessment:   Patient is knowledgeable in diabetes management concepts related to: Monitoring    Patient needs further education on the following diabetes management concepts: Healthy Eating and Taking Medication    Based on learning assessment above, most appropriate setting for further diabetes education would be: Individual setting.      INTERVENTIONS:    Education provided today on:  AADE Self-Care Behaviors:  Monitoring: Discussed how frequently to check her blood glucose.  She has  used the Andrews sensor in the past and would like to use it again.  Discussed that in order to get coverage she will need to check blood glucose four times daily and administering insulin at least three times daily.      Taking Medication: Taking Lantus insulin 84 units daily.  She states she is taking Novolog 20 units for each meal, and is not using a correction scale.     Opportunities for ongoing education and support in diabetes-self management were discussed.    Pt verbalized understanding of concepts discussed and recommendations provided today.       Patient's most recent A1C was 13.1% done on 1/13/20 is not meeting goal of <7.0    PLAN  See Patient Instructions for co-developed, patient-stated behavior change goals.  AVS printed and provided to patient today. See Follow-Up section for recommended follow-up.    Sent a copy of both of her correction scales by email.    Reminded her to check blood glucose four times daily.    Will plan a follow up visit in 2-3 weeks.     She needs follow up in a couple of weeks and frequently so we can continue to monitor her progress.      Encounter Type: Individual    Any diabetes medication dose changes were made via the CDE Protocol and Collaborative Practice Agreement with the patient's referring provider. A copy of this encounter was shared with the provider.

## 2020-06-29 ENCOUNTER — COMMUNICATION - HEALTHEAST (OUTPATIENT)
Dept: NURSING | Facility: CLINIC | Age: 25
End: 2020-06-29

## 2020-07-07 ENCOUNTER — COMMUNICATION - HEALTHEAST (OUTPATIENT)
Dept: NURSING | Facility: CLINIC | Age: 25
End: 2020-07-07

## 2020-07-07 ENCOUNTER — COMMUNICATION - HEALTHEAST (OUTPATIENT)
Dept: FAMILY MEDICINE | Facility: CLINIC | Age: 25
End: 2020-07-07

## 2020-07-08 ENCOUNTER — AMBULATORY - HEALTHEAST (OUTPATIENT)
Dept: NURSING | Facility: CLINIC | Age: 25
End: 2020-07-08

## 2020-07-30 NOTE — PROGRESS NOTES
"Week of__/__/__ Date  _7_/_30_  Date  _7_/_29_ Date  __/__ Date  __/__ Date  __/__ Date  __/__ Date  __/__    Time BG Time BG Time BG Time BG Time BG Time BG Time BG    FASTING 223 pm 164                                                                  Week of__/__/__ Date  __/__  Date  __/__ Date  __/__ Date  __/__ Date  __/__ Date  __/__ Date  __/__    Time BG Time BG Time BG Time BG Time BG Time BG Time BG                                                                       Spoke to pt with  7/30 10:34am pt can't turn meter on-jm    CHART WAS REVIEWED IN ANTICIPATION OF APPOINTMENT AND PATIENT DID NOT SHOW UP    Paw YFN Skelton is a 25 year old female who is being evaluated via a billable telephone visit.      The patient has been notified of following:     \"This telephone visit will be conducted via a call between you and your physician/provider. We have found that certain health care needs can be provided without the need for a physical exam.  This service lets us provide the care you need with a short phone conversation.  If a prescription is necessary we can send it directly to your pharmacy.  If lab work is needed we can place an order for that and you can then stop by our lab to have the test done at a later time.    Telephone visits are billed at different rates depending on your insurance coverage. During this emergency period, for some insurers they may be billed the same as an in-person visit.  Please reach out to your insurance provider with any questions.    If during the course of the call the physician/provider feels a telephone visit is not appropriate, you will not be charged for this service.\"    Patient has given verbal consent for Telephone visit?  Yes    What phone number would you like to be contacted at? [phone]    How would you like to obtain your AVS? Mail a copy    Phone call duration: ??? minutes    Reason for visit/consult: Type 2 DM    Primary care provider: No Ref-Primary, " "Physician    HPI:  ???  A1c was 13.1% when I last saw her on 1/13/20.  26 yo female seen for follow-up evaluation and management of T2DM. She has had DM since 2010. This is the 4th time I am seeing her (see my note of 7/15/19 for details of initial consult). A1c was 10.9% on 10/16/19. She last saw Rosangela Palacios PA-C, on 4/13/20.    She was seen on 11/14/17 by Linda Mensah and it was noted, \"Her C-peptide and BRYN-antibodies tests would indicate that she is indeed a Type 2 with some pancreatic function. Her current A1c is 9, which is a great improvement from 14>, which, from our records, seems to be where she ws residing over the last 2 years.\" She has since been seeing Dr. Jackson ([facility]) during pregnancy, last visit was 10/6/18.     Current Diabetes Regimen: ???  Metformin XR 1000mg daily (??? not tolerating increase due to diarrhea)  Lantus 84 units daily HS  Novolog 10 units TID with meals  Exenatide ER 2mg once weekly (self-discontinued with reduced appetite, not willing to resume)    She is taking ??? Metformin #2 tabs daily of 500 mg XR (she gets diarrhea if she takes more), ??? 84 units of Lantus daily (she misses this 3-4 times weekly because she is busy with her kid and she does not always eat on time), Novolog ??? 20 units w/ meals, Exenatide weekly, and the following Novolog correction scale:   Correction Factor of 1 for ??? 20 mg/dL  Do Not give Correction Insulin if Pre-Meal BG less than 120.   For Pre-Meal  - 140 give 1 unit.   For Pre-Meal  - 160 give 2 units.   For Pre-Meal  - 180 give 4 units.   For Pre-Meal  - 200 give 5 units.   For Pre-Meal  - 220 give 6 units.   For Pre-Meal  - 240 give 7 units.   For Pre-Meal  - 260 give 8 units.   For Pre-Meal  - 280 give 9 units.  For Pre-Meal  - 300 give 7 units.   For Pre-Meal  - 320 give 8 units.   For Pre-Meal  - 340 give 9 units.  For Pre-Meal BG greater than or equal to 341 give 10 " units     She has seen eye MD within the past year and she was told everything was fine.     Meter download data (Pairin) is as follows: ???  Average: 286, 88% above target, SD 84.9, CoV 29.7%     Diasend meter: ???  Av, SD 56, # values 11, values>180 10, values within range 1, values <70 0, highest 342, lowest 173     Past Medical/Surgical History:    Vitamin D Deficiency     Type 2 diabetes mellitus with insulin therapy     Hyperlipidemia     Proteinuria     Chronic Kidney Disease (NKF Classification)      S/p  x 1 10/6/18     Allergies:  No Known Allergies    Current Medications   Current Outpatient Medications   Medication     acetaminophen (TYLENOL) 500 MG tablet     insulin glargine (LANTUS SOLOSTAR) 100 UNIT/ML pen     insulin pen needle (31G X 5 MM) 31G X 5 MM miscellaneous     lisinopril (PRINIVIL/ZESTRIL) 2.5 MG tablet     metFORMIN (GLUCOPHAGE-XR) 500 MG 24 hr tablet     NOVOLOG FLEXPEN 100 UNIT/ML soln     NOVOLOG FLEXPEN 100 UNIT/ML soln     No current facility-administered medications for this visit.        Family History:  No family history on file.    Social History:  Social History     Tobacco Use     Smoking status: Never Smoker     Smokeless tobacco: Never Used   Substance Use Topics     Alcohol use: Not on file       ROS:      Exam      Labs/Imaging  ???  ENDO DIABETES Latest Ref Rng & Units 10/16/2019 2020   HEMOGLOBIN A1C, POC 4.3 - 6 % 10.9 (A) 13.1 (A)     Creatinine 0.77, LDL 82 on 19  Urine microalbumin/creatinine 144.9 mg/g cr (<19.9) on 19    Assessment and Plan  -Type 2 DM poorly controlled w/ albuminuria and hyperlipidemia (defer to PMD for the latter, may need a statin added for high LDL, after retesting fasting lipids when diabetes is controlled). She does not tolerate full dose metformin. Diabetic foot exam done 10/16/19 was ok.     -Urine microalbumin elevated: this should improve with better glucose control. Recheck after A1c<8% and start ACE  inhibitor then if persistent elevation.     Patient Instructions: ???  Fasting sugar goals:      2 hours after eating <180     Please start taking lantus insulin regularly (basal 24 hour insulin) 84 units at bedtime     Please continue taking Metformin 2 tabs daily, Exenatide weekly, Novolog 10 units w/ meals, Novolog 1 unit per 25>140 (see correction scale below):     In addition to the above, please start taking novolog insulin for correction of high blood sugars:  1 unit per 25 mg/dL >140. This means:     HIGH Insulin Resistance or Correction Factor of 1 for 25 mg/dL  Correction Scale - HIGH INSULIN RESISTANCE DOSING     Do Not give Correction Insulin if Pre-Meal BG less than 140.   For Pre-Meal  - 164 give 1 unit.   For Pre-Meal  - 189 give 2 units.   For Pre-Meal  - 214 give 3 units.   For Pre-Meal  - 239 give 4 units.   For Pre-Meal  - 264 give 5 units.   For Pre-Meal  - 289 give 6 units.   For Pre-Meal  - 314 give 7 units.   For Pre-Meal  - 339 give 8 units.   For Pre-Meal  - 364 give 9 units.  For Pre-Meal BG greater than or equal to 365 give 10 units  To be given with prandial insulin, and based on pre-meal blood glucose.   Notify provider if glucose greater than or equal to 350 mg/dL after administration of correction dose.     HIGH Insulin Resistance or Correction Factor of 1 for 25 mg/dL  HIGH INSULIN RESISTANCE DOSING    Do Not give Bedtime Correction Insulin if BG less than 200.   For  - 224 give 1 units.   For  - 249 give 2 units.   For  - 274 give 3 units.   For  - 299 give 4 units.   For  - 324 give 5 units.   For  - 349 give 6 units.   For BG greater than or equal to 350 give 7 units.   Notify provider if glucose greater than or equal to 350 mg/dL after administration of co     Please see Diabetes Education in 1-2 weeks     Please see Diabetes Team PA in 1 month     Please see me in 3 months     Please  call [phone] & ask for Diabetes on -call with serious questions about high or low blood sugars if you do not know what to do     RTC: 1-2 weeks w/ RN, CDE as above, 1 month with Diabetes Team PA, 3 months w/ me      Katherine Peralta MD, MPH  Attending Physician  Diabetes/Endocrinology/Metabolism    Time: ???  min spent on evaluation, management, counseling, education, & motivational interviewing with greater than 50% of the total time was spent on counseling and coordinating care. Patient seen w/ help of Reanna .    CHART WAS REVIEWED IN ANTICIPATION OF APPOINTMENT AND PATIENT DID NOT SHOW UP

## 2020-08-03 ENCOUNTER — COMMUNICATION - HEALTHEAST (OUTPATIENT)
Dept: SCHEDULING | Facility: CLINIC | Age: 25
End: 2020-08-03

## 2020-08-03 ENCOUNTER — VIRTUAL VISIT (OUTPATIENT)
Dept: ENDOCRINOLOGY | Facility: CLINIC | Age: 25
End: 2020-08-03
Payer: COMMERCIAL

## 2020-08-03 DIAGNOSIS — E11.65 TYPE 2 DIABETES MELLITUS WITH HYPERGLYCEMIA, UNSPECIFIED WHETHER LONG TERM INSULIN USE (H): Primary | ICD-10-CM

## 2020-08-03 NOTE — PATIENT INSTRUCTIONS
Sorry we missed you today. Please call [phone] to reschedule or do it via One[employer].    Best Wishes,  Dr. Katherine Peralta MD, MPH  Endocrinologist

## 2020-08-03 NOTE — LETTER
"8/3/2020       RE: Tyson Skelton  [address]  [place] [address]     Dear Colleague,    Thank you for referring your patient, Tyson Skelton, to the [facility] ENDOCRINOLOGY at [facility]. Please see a copy of my visit note below.    Week of__/__/__ Date  _7_/_30_  Date  _7_/_29_ Date  __/__ Date  __/__ Date  __/__ Date  __/__ Date  __/__    Time BG Time BG Time BG Time BG Time BG Time BG Time BG    FASTING 223 pm 164                                                                  Week of__/__/__ Date  __/__  Date  __/__ Date  __/__ Date  __/__ Date  __/__ Date  __/__ Date  __/__    Time BG Time BG Time BG Time BG Time BG Time BG Time BG                                                                       Spoke to pt with  7/30 10:34am pt can't turn meter on-jm    CHART WAS REVIEWED IN ANTICIPATION OF APPOINTMENT AND PATIENT DID NOT SHOW UP    Tyson Skelton is a 25 year old female who is being evaluated via a billable telephone visit.      The patient has been notified of following:     \"This telephone visit will be conducted via a call between you and your physician/provider. We have found that certain health care needs can be provided without the need for a physical exam.  This service lets us provide the care you need with a short phone conversation.  If a prescription is necessary we can send it directly to your pharmacy.  If lab work is needed we can place an order for that and you can then stop by our lab to have the test done at a later time.    Telephone visits are billed at different rates depending on your insurance coverage. During this emergency period, for some insurers they may be billed the same as an in-person visit.  Please reach out to your insurance provider with any questions.    If during the course of the call the physician/provider feels a telephone visit is not appropriate, you will not be charged for this service.\"    Patient has given verbal " "consent for Telephone visit?  Yes    What phone number would you like to be contacted at? [phone]    How would you like to obtain your AVS? Mail a copy    Phone call duration: ??? minutes    Reason for visit/consult: Type 2 DM    Primary care provider: No Ref-Primary, Physician    HPI:  ???  A1c was 13.1% when I last saw her on 1/13/20.  24 yo female seen for follow-up evaluation and management of T2DM. She has had DM since 2010. This is the 4th time I am seeing her (see my note of 7/15/19 for details of initial consult). A1c was 10.9% on 10/16/19. She last saw Rosangela Palacios PA-C, on 4/13/20.    She was seen on 11/14/17 by Linda Mensah and it was noted, \"Her C-peptide and BRYN-antibodies tests would indicate that she is indeed a Type 2 with some pancreatic function. Her current A1c is 9, which is a great improvement from 14>, which, from our records, seems to be where she ws residing over the last 2 years.\" She has since been seeing Dr. Jackson ([facility]) during pregnancy, last visit was 10/6/18.     Current Diabetes Regimen: ???  Metformin XR 1000mg daily (??? not tolerating increase due to diarrhea)  Lantus 84 units daily HS  Novolog 10 units TID with meals  Exenatide ER 2mg once weekly (self-discontinued with reduced appetite, not willing to resume)    She is taking ??? Metformin #2 tabs daily of 500 mg XR (she gets diarrhea if she takes more), ??? 84 units of Lantus daily (she misses this 3-4 times weekly because she is busy with her kid and she does not always eat on time), Novolog ??? 20 units w/ meals, Exenatide weekly, and the following Novolog correction scale:   Correction Factor of 1 for ??? 20 mg/dL  Do Not give Correction Insulin if Pre-Meal BG less than 120.   For Pre-Meal  - 140 give 1 unit.   For Pre-Meal  - 160 give 2 units.   For Pre-Meal  - 180 give 4 units.   For Pre-Meal  - 200 give 5 units.   For Pre-Meal  - 220 give 6 units.   For Pre-Meal  - 240 give 7 " units.   For Pre-Meal  - 260 give 8 units.   For Pre-Meal  - 280 give 9 units.  For Pre-Meal  - 300 give 7 units.   For Pre-Meal  - 320 give 8 units.   For Pre-Meal  - 340 give 9 units.  For Pre-Meal BG greater than or equal to 341 give 10 units     She has seen eye MD within the past year and she was told everything was fine.     Meter download data ([employer]) is as follows: ???  Average: 286, 88% above target, SD 84.9, CoV 29.7%     Diasend meter: ???  Av, SD 56, # values 11, values>180 10, values within range 1, values <70 0, highest 342, lowest 173     Past Medical/Surgical History:    Vitamin D Deficiency     Type 2 diabetes mellitus with insulin therapy     Hyperlipidemia     Proteinuria     Chronic Kidney Disease (NKF Classification)      S/p  x 1 10/6/18     Allergies:  No Known Allergies    Current Medications   Current Outpatient Medications   Medication     acetaminophen (TYLENOL) 500 MG tablet     insulin glargine (LANTUS SOLOSTAR) 100 UNIT/ML pen     insulin pen needle (31G X 5 MM) 31G X 5 MM miscellaneous     lisinopril (PRINIVIL/ZESTRIL) 2.5 MG tablet     metFORMIN (GLUCOPHAGE-XR) 500 MG 24 hr tablet     NOVOLOG FLEXPEN 100 UNIT/ML soln     NOVOLOG FLEXPEN 100 UNIT/ML soln     No current facility-administered medications for this visit.        Family History:  No family history on file.    Social History:  Social History     Tobacco Use     Smoking status: Never Smoker     Smokeless tobacco: Never Used   Substance Use Topics     Alcohol use: Not on file       ROS:      Exam      Labs/Imaging  ???  ENDO DIABETES Latest Ref Rng & Units 10/16/2019 2020   HEMOGLOBIN A1C, POC 4.3 - 6 % 10.9 (A) 13.1 (A)     Creatinine 0.77, LDL 82 on 19  Urine microalbumin/creatinine 144.9 mg/g cr (<19.9) on 19    Assessment and Plan  -Type 2 DM poorly controlled w/ albuminuria and hyperlipidemia (defer to PMD for the latter, may need a statin added for high LDL,  after retesting fasting lipids when diabetes is controlled). She does not tolerate full dose metformin. Diabetic foot exam done 10/16/19 was ok.     -Urine microalbumin elevated: this should improve with better glucose control. Recheck after A1c<8% and start ACE inhibitor then if persistent elevation.     Patient Instructions: ???  Fasting sugar goals:      2 hours after eating <180     Please start taking lantus insulin regularly (basal 24 hour insulin) 84 units at bedtime     Please continue taking Metformin 2 tabs daily, Exenatide weekly, Novolog 10 units w/ meals, Novolog 1 unit per 25>140 (see correction scale below):     In addition to the above, please start taking novolog insulin for correction of high blood sugars:  1 unit per 25 mg/dL >140. This means:     HIGH Insulin Resistance or Correction Factor of 1 for 25 mg/dL  Correction Scale - HIGH INSULIN RESISTANCE DOSING     Do Not give Correction Insulin if Pre-Meal BG less than 140.   For Pre-Meal  - 164 give 1 unit.   For Pre-Meal  - 189 give 2 units.   For Pre-Meal  - 214 give 3 units.   For Pre-Meal  - 239 give 4 units.   For Pre-Meal  - 264 give 5 units.   For Pre-Meal  - 289 give 6 units.   For Pre-Meal  - 314 give 7 units.   For Pre-Meal  - 339 give 8 units.   For Pre-Meal  - 364 give 9 units.  For Pre-Meal BG greater than or equal to 365 give 10 units  To be given with prandial insulin, and based on pre-meal blood glucose.   Notify provider if glucose greater than or equal to 350 mg/dL after administration of correction dose.     HIGH Insulin Resistance or Correction Factor of 1 for 25 mg/dL  HIGH INSULIN RESISTANCE DOSING    Do Not give Bedtime Correction Insulin if BG less than 200.   For  - 224 give 1 units.   For  - 249 give 2 units.   For  - 274 give 3 units.   For  - 299 give 4 units.   For  - 324 give 5 units.   For  - 349 give 6 units.   For BG  greater than or equal to 350 give 7 units.   Notify provider if glucose greater than or equal to 350 mg/dL after administration of co     Please see Diabetes Education in 1-2 weeks     Please see Diabetes Team PA in 1 month     Please see me in 3 months     Please call [phone] & ask for Diabetes on -call with serious questions about high or low blood sugars if you do not know what to do     RTC: 1-2 weeks w/ RN, CDE as above, 1 month with Diabetes Team PA, 3 months w/ me      Katherine Peralta MD, MPH  Attending Physician  Diabetes/Endocrinology/Metabolism    Time: ???  min spent on evaluation, management, counseling, education, & motivational interviewing with greater than 50% of the total time was spent on counseling and coordinating care. Patient seen w/ help of Reanna mccrary.    CHART WAS REVIEWED IN ANTICIPATION OF APPOINTMENT AND PATIENT DID NOT SHOW UP            Again, thank you for allowing me to participate in the care of your patient.      Sincerely,    Katherine Peralta MD

## 2020-08-12 ENCOUNTER — COMMUNICATION - HEALTHEAST (OUTPATIENT)
Dept: NURSING | Facility: CLINIC | Age: 25
End: 2020-08-12

## 2020-08-13 ENCOUNTER — OFFICE VISIT - HEALTHEAST (OUTPATIENT)
Dept: FAMILY MEDICINE | Facility: CLINIC | Age: 25
End: 2020-08-13

## 2020-08-13 DIAGNOSIS — E11.9 TYPE 2 DIABETES MELLITUS TREATED WITH INSULIN (H): ICD-10-CM

## 2020-08-13 DIAGNOSIS — Z09 HOSPITAL DISCHARGE FOLLOW-UP: ICD-10-CM

## 2020-08-13 DIAGNOSIS — Z79.4 TYPE 2 DIABETES MELLITUS TREATED WITH INSULIN (H): ICD-10-CM

## 2020-08-13 DIAGNOSIS — A41.51 SEPSIS DUE TO ESCHERICHIA COLI, UNSPECIFIED WHETHER ACUTE ORGAN DYSFUNCTION PRESENT (H): ICD-10-CM

## 2020-08-18 ENCOUNTER — COMMUNICATION - HEALTHEAST (OUTPATIENT)
Dept: NURSING | Facility: CLINIC | Age: 25
End: 2020-08-18

## 2020-08-18 ASSESSMENT — ACTIVITIES OF DAILY LIVING (ADL): DEPENDENT_IADLS:: INDEPENDENT

## 2020-08-30 ENCOUNTER — COMMUNICATION - HEALTHEAST (OUTPATIENT)
Dept: FAMILY MEDICINE | Facility: CLINIC | Age: 25
End: 2020-08-30

## 2020-08-30 DIAGNOSIS — E11.9 TYPE 2 DIABETES MELLITUS TREATED WITH INSULIN (H): ICD-10-CM

## 2020-08-30 DIAGNOSIS — Z79.4 TYPE 2 DIABETES MELLITUS TREATED WITH INSULIN (H): ICD-10-CM

## 2020-08-31 ENCOUNTER — VIRTUAL VISIT (OUTPATIENT)
Dept: ENDOCRINOLOGY | Facility: CLINIC | Age: 25
End: 2020-08-31
Payer: COMMERCIAL

## 2020-08-31 DIAGNOSIS — E11.65 TYPE 2 DIABETES MELLITUS WITH HYPERGLYCEMIA, UNSPECIFIED WHETHER LONG TERM INSULIN USE (H): Primary | ICD-10-CM

## 2020-08-31 NOTE — PROGRESS NOTES
CHART WAS REVIEWED IN ANTICIPATION OF APPOINTMENT AND PATIENT DID NOT SHOW UP    Message left by Reanna  on her phone to call [phone] to reschedule.

## 2020-08-31 NOTE — LETTER
Date:September 2, 2020      Provider requested that no letter be sent. Do not send.       [facility] Health Information

## 2020-08-31 NOTE — LETTER
8/31/2020       RE: Tyson Skelton  [address]  [place] [address]     Dear Colleague,    Thank you for referring your patient, Tyson Skelton, to the [facility] ENDOCRINOLOGY at [facility]. Please see a copy of my visit note below.    CHART WAS REVIEWED IN ANTICIPATION OF APPOINTMENT AND PATIENT DID NOT SHOW UP    Message left by Reanna  on her phone to call [phone] to reschedule.          Again, thank you for allowing me to participate in the care of your patient.      Sincerely,    Katherine Peralta MD

## 2020-09-21 ENCOUNTER — COMMUNICATION - HEALTHEAST (OUTPATIENT)
Dept: FAMILY MEDICINE | Facility: CLINIC | Age: 25
End: 2020-09-21

## 2020-09-22 ENCOUNTER — COMMUNICATION - HEALTHEAST (OUTPATIENT)
Dept: NURSING | Facility: CLINIC | Age: 25
End: 2020-09-22

## 2020-09-22 ENCOUNTER — COMMUNICATION - HEALTHEAST (OUTPATIENT)
Dept: FAMILY MEDICINE | Facility: CLINIC | Age: 25
End: 2020-09-22

## 2020-09-22 ENCOUNTER — AMBULATORY - HEALTHEAST (OUTPATIENT)
Dept: FAMILY MEDICINE | Facility: CLINIC | Age: 25
End: 2020-09-22

## 2020-09-22 DIAGNOSIS — E11.9 TYPE 2 DIABETES MELLITUS TREATED WITH INSULIN (H): ICD-10-CM

## 2020-09-22 DIAGNOSIS — Z79.4 TYPE 2 DIABETES MELLITUS TREATED WITH INSULIN (H): ICD-10-CM

## 2020-09-22 DIAGNOSIS — Z30.42 ENCOUNTER FOR SURVEILLANCE OF INJECTABLE CONTRACEPTIVE: ICD-10-CM

## 2020-09-30 ENCOUNTER — AMBULATORY - HEALTHEAST (OUTPATIENT)
Dept: NURSING | Facility: CLINIC | Age: 25
End: 2020-09-30

## 2020-09-30 DIAGNOSIS — Z23 NEED FOR IMMUNIZATION AGAINST INFLUENZA: ICD-10-CM

## 2020-10-01 ENCOUNTER — COMMUNICATION - HEALTHEAST (OUTPATIENT)
Dept: CARE COORDINATION | Facility: CLINIC | Age: 25
End: 2020-10-01

## 2020-10-13 ENCOUNTER — COMMUNICATION - HEALTHEAST (OUTPATIENT)
Dept: FAMILY MEDICINE | Facility: CLINIC | Age: 25
End: 2020-10-13

## 2020-10-13 DIAGNOSIS — E11.9 TYPE 2 DIABETES MELLITUS TREATED WITH INSULIN (H): ICD-10-CM

## 2020-10-13 DIAGNOSIS — Z79.4 TYPE 2 DIABETES MELLITUS TREATED WITH INSULIN (H): ICD-10-CM

## 2020-10-16 NOTE — PROGRESS NOTES
"CHART WAS REVIEWED IN ANTICIPATION OF APPOINTMENT AND PATIENT DID NOT SHOW UP      SPOKE TO PT 10/16@ 11:22AM PT HAS NOT BEEN CHECKING GLUCOSE IN THE PAST 2 WEEKS.-TATI    Tyson Skelton is a 25 year old female who is being evaluated via a billable telephone visit.      The patient has been notified of following:     \"This telephone visit will be conducted via a call between you and your physician/provider. We have found that certain health care needs can be provided without the need for a physical exam.  This service lets us provide the care you need with a short phone conversation.  If a prescription is necessary we can send it directly to your pharmacy.  If lab work is needed we can place an order for that and you can then stop by our lab to have the test done at a later time.    Telephone visits are billed at different rates depending on your insurance coverage. During this emergency period, for some insurers they may be billed the same as an in-person visit.  Please reach out to your insurance provider with any questions.    If during the course of the call the physician/provider feels a telephone visit is not appropriate, you will not be charged for this service.\"    Patient has given verbal consent for Telephone visit?  Yes    What phone number would you like to be contacted at? [phone]    How would you like to obtain your AVS? Mail a copy    Phone call duration: 5 minutes w/ . Reanna  said patient answered and loud music was playing so she and MD could not hear patient talking. Patient will need to reschedule and not have loud music playing in background during visit w/ MD and  in future.    CHART WAS REVIEWED IN ANTICIPATION OF APPOINTMENT AND PATIENT DID NOT SHOW UP    Dr. Katherine Peralta MD, MPH  Endocrinologist      "

## 2020-10-19 ENCOUNTER — VIRTUAL VISIT (OUTPATIENT)
Dept: ENDOCRINOLOGY | Facility: CLINIC | Age: 25
End: 2020-10-19
Payer: COMMERCIAL

## 2020-10-19 DIAGNOSIS — E11.65 TYPE 2 DIABETES MELLITUS WITH HYPERGLYCEMIA, UNSPECIFIED WHETHER LONG TERM INSULIN USE (H): Primary | ICD-10-CM

## 2020-10-19 NOTE — LETTER
"10/19/2020       RE: Tyson Skelton  [address]  [place] [address]     Dear Colleague,    Thank you for referring your patient, Tsyon Skelton, to the [facility] ENDOCRINOLOGY CLINIC [place] at [facility]. Please see a copy of my visit note below.    CHART WAS REVIEWED IN ANTICIPATION OF APPOINTMENT AND PATIENT DID NOT SHOW UP      SPOKE TO PT 10/16@ 11:22AM PT HAS NOT BEEN CHECKING GLUCOSE IN THE PAST 2 WEEKS.-    Tyson Skelton is a 25 year old female who is being evaluated via a billable telephone visit.      The patient has been notified of following:     \"This telephone visit will be conducted via a call between you and your physician/provider. We have found that certain health care needs can be provided without the need for a physical exam.  This service lets us provide the care you need with a short phone conversation.  If a prescription is necessary we can send it directly to your pharmacy.  If lab work is needed we can place an order for that and you can then stop by our lab to have the test done at a later time.    Telephone visits are billed at different rates depending on your insurance coverage. During this emergency period, for some insurers they may be billed the same as an in-person visit.  Please reach out to your insurance provider with any questions.    If during the course of the call the physician/provider feels a telephone visit is not appropriate, you will not be charged for this service.\"    Patient has given verbal consent for Telephone visit?  Yes    What phone number would you like to be contacted at? [phone]    How would you like to obtain your AVS? Mail a copy    Phone call duration: 5 minutes w/ . Reanna  said patient answered and loud music was playing so she and MD could not hear patient talking. Patient will need to reschedule and not have loud music playing in background during visit w/ MD and  " in future.    CHART WAS REVIEWED IN ANTICIPATION OF APPOINTMENT AND PATIENT DID NOT SHOW UP    Dr. Katherine Peralta MD, MPH  Endocrinologist

## 2020-10-19 NOTE — PATIENT INSTRUCTIONS
Sorry we missed you today. I left you a voicemail to please call [phone] to reschedule or do it via [employer].    Best Wishes,  Dr. Katherine Peralta MD, MPH  Endocrinologist

## 2020-10-20 ENCOUNTER — COMMUNICATION - HEALTHEAST (OUTPATIENT)
Dept: FAMILY MEDICINE | Facility: CLINIC | Age: 25
End: 2020-10-20

## 2020-10-20 DIAGNOSIS — Z79.4 TYPE 2 DIABETES MELLITUS TREATED WITH INSULIN (H): ICD-10-CM

## 2020-10-20 DIAGNOSIS — E11.9 TYPE 2 DIABETES MELLITUS TREATED WITH INSULIN (H): ICD-10-CM

## 2020-11-02 ENCOUNTER — AMBULATORY - HEALTHEAST (OUTPATIENT)
Dept: FAMILY MEDICINE | Facility: CLINIC | Age: 25
End: 2020-11-02

## 2020-11-02 ENCOUNTER — COMMUNICATION - HEALTHEAST (OUTPATIENT)
Dept: NURSING | Facility: CLINIC | Age: 25
End: 2020-11-02

## 2020-11-02 ENCOUNTER — COMMUNICATION - HEALTHEAST (OUTPATIENT)
Dept: FAMILY MEDICINE | Facility: CLINIC | Age: 25
End: 2020-11-02

## 2020-11-02 DIAGNOSIS — Z79.4 TYPE 2 DIABETES MELLITUS TREATED WITH INSULIN (H): ICD-10-CM

## 2020-11-02 DIAGNOSIS — E11.9 TYPE 2 DIABETES MELLITUS TREATED WITH INSULIN (H): ICD-10-CM

## 2020-11-12 ENCOUNTER — COMMUNICATION - HEALTHEAST (OUTPATIENT)
Dept: NURSING | Facility: CLINIC | Age: 25
End: 2020-11-12

## 2020-11-24 ENCOUNTER — COMMUNICATION - HEALTHEAST (OUTPATIENT)
Dept: SCHEDULING | Facility: CLINIC | Age: 25
End: 2020-11-24

## 2020-11-24 ENCOUNTER — OFFICE VISIT - HEALTHEAST (OUTPATIENT)
Dept: FAMILY MEDICINE | Facility: CLINIC | Age: 25
End: 2020-11-24

## 2020-11-24 DIAGNOSIS — E11.9 TYPE 2 DIABETES MELLITUS TREATED WITH INSULIN (H): ICD-10-CM

## 2020-11-24 DIAGNOSIS — E78.2 MIXED HYPERLIPIDEMIA: ICD-10-CM

## 2020-11-24 DIAGNOSIS — Z28.39 IMMUNIZATION DEFICIENCY: ICD-10-CM

## 2020-11-24 DIAGNOSIS — Z11.59 SCREENING FOR VIRAL DISEASE: ICD-10-CM

## 2020-11-24 DIAGNOSIS — N18.9 CHRONIC KIDNEY DISEASE, UNSPECIFIED CKD STAGE: ICD-10-CM

## 2020-11-24 DIAGNOSIS — Z79.4 TYPE 2 DIABETES MELLITUS TREATED WITH INSULIN (H): ICD-10-CM

## 2020-11-24 LAB
ALBUMIN SERPL-MCNC: 3.6 G/DL (ref 3.5–5)
ALP SERPL-CCNC: 71 U/L (ref 45–120)
ALT SERPL W P-5'-P-CCNC: <9 U/L (ref 0–45)
ANION GAP SERPL CALCULATED.3IONS-SCNC: 12 MMOL/L (ref 5–18)
AST SERPL W P-5'-P-CCNC: 12 U/L (ref 0–40)
BILIRUB SERPL-MCNC: 0.4 MG/DL (ref 0–1)
BUN SERPL-MCNC: 15 MG/DL (ref 8–22)
CALCIUM SERPL-MCNC: 9 MG/DL (ref 8.5–10.5)
CHLORIDE BLD-SCNC: 100 MMOL/L (ref 98–107)
CHOLEST SERPL-MCNC: 225 MG/DL
CO2 SERPL-SCNC: 22 MMOL/L (ref 22–31)
CREAT SERPL-MCNC: 1.07 MG/DL (ref 0.6–1.1)
CREAT UR-MCNC: 21.1 MG/DL
FASTING STATUS PATIENT QL REPORTED: ABNORMAL
GFR SERPL CREATININE-BSD FRML MDRD: >60 ML/MIN/1.73M2
GLUCOSE BLD-MCNC: 639 MG/DL (ref 70–125)
HBA1C MFR BLD: 12.9 %
HDLC SERPL-MCNC: 36 MG/DL
LDLC SERPL CALC-MCNC: 139 MG/DL
LDLC SERPL CALC-MCNC: ABNORMAL MG/DL
MICROALBUMIN UR-MCNC: 2.81 MG/DL (ref 0–1.99)
MICROALBUMIN/CREAT UR: 133.2 MG/G
POTASSIUM BLD-SCNC: 4.3 MMOL/L (ref 3.5–5)
PROT SERPL-MCNC: 7 G/DL (ref 6–8)
SODIUM SERPL-SCNC: 134 MMOL/L (ref 136–145)
TRIGL SERPL-MCNC: 402 MG/DL

## 2020-11-24 ASSESSMENT — MIFFLIN-ST. JEOR: SCORE: 1332.83

## 2020-11-24 NOTE — PROGRESS NOTES
Sorry we missed you today. I left you a voicemail to please call [phone] to reschedule or do it via [employer].    Best Wishes,  Dr. Katherine Peralta MD, MPH  Endocrinologist    We appreciate your assistance in coordinating your healthcare.     Please upload your insulin pump, blood sugar meter and/or continuous glucose monitor at home 1-2 days before your next diabetes-related appointment.   This will allow your provider to review your  data before your scheduled virtual visit.    To ask a question to your Endocrine care team, please send them a [employer] message, or reach them by phone at [phone]     To expedite your medication refill(s), please contact your pharmacy and have them   fax a refill request to: [phone].  *Please allow 3 business days for routine medication refills.  *Please allow 5 business days for controlled substance medication refills.    For after-hours urgent Endocrine issues, that do not require 911, please dial [phone], and ask to speak with the Endocrinologist On-Call

## 2020-11-25 LAB — HBV SURFACE AB SERPL IA-ACNC: NEGATIVE M[IU]/ML

## 2020-11-25 NOTE — PATIENT INSTRUCTIONS
Sorry we missed you today. I left you a voicemail to please call [phone] to reschedule or do it via [employer].    Best Wishes,  Dr. Katherine Peralta MD, MPH  Endocrinologist    We appreciate your assistance in coordinating your healthcare.     Please upload your insulin pump, blood sugar meter and/or continuous glucose monitor at home 1-2 days before your next diabetes-related appointment.   This will allow your provider to review your  data before your scheduled virtual visit.    To ask a question to your Endocrine care team, please send them a [employer] message, or reach them by phone at [phone]     To expedite your medication refill(s), please contact your pharmacy and have them   fax a refill request to: [phone].  *Please allow 3 business days for routine medication refills.  *Please allow 5 business days for controlled substance medication refills.    For after-hours urgent Endocrine issues, that do not require 911, please dial [phone], and ask to speak with the Endocrinologist On-Call

## 2020-11-30 ENCOUNTER — VIRTUAL VISIT (OUTPATIENT)
Dept: ENDOCRINOLOGY | Facility: CLINIC | Age: 25
End: 2020-11-30
Payer: COMMERCIAL

## 2020-11-30 DIAGNOSIS — E11.9 TYPE 2 DIABETES, HBA1C GOAL < 7% (H): Primary | ICD-10-CM

## 2020-11-30 PROCEDURE — 99207 PR NO SHOW FOR SCHEDULED APPT: CPT | Performed by: INTERNAL MEDICINE

## 2020-11-30 NOTE — LETTER
Date:December 6, 2020      Provider requested that no letter be sent. Do not send.       [facility] Health Information

## 2020-11-30 NOTE — LETTER
11/30/2020       RE: Tyson Skelton  [address]  [place] [address]     Dear Colleague,    Thank you for referring your patient, Tyson Skelton, to the [facility] ENDOCRINOLOGY CLINIC [place] at [facility]. Please see a copy of my visit note below.    Sorry we missed you today. I left you a voicemail to please call [phone] to reschedule or do it via [employer].    Best Wishes,  Dr. Katherine Peralta MD, MPH  Endocrinologist    We appreciate your assistance in coordinating your healthcare.     Please upload your insulin pump, blood sugar meter and/or continuous glucose monitor at home 1-2 days before your next diabetes-related appointment.   This will allow your provider to review your  data before your scheduled virtual visit.    To ask a question to your Endocrine care team, please send them a [employer] message, or reach them by phone at [phone]     To expedite your medication refill(s), please contact your pharmacy and have them   fax a refill request to: [phone].  *Please allow 3 business days for routine medication refills.  *Please allow 5 business days for controlled substance medication refills.    For after-hours urgent Endocrine issues, that do not require 641, please dial [phone], and ask to speak with the Endocrinologist On-Call              Again, thank you for allowing me to participate in the care of your patient.      Sincerely,    Katherine Peralta MD

## 2020-11-30 NOTE — LETTER
Patient:  Tyson Skelton  :   1995  MRN:     [id number]        Ms.Paw YFN Skelton  [address]  [place] [address]        2020    Dear MsCarlos SkeltonIke we missed you today. Please call [phone] to reschedule or do it via [employer].    Best Wishes,  Dr. Katherine Peralta MD, MPH  Endocrinologist    We appreciate your assistance in coordinating your healthcare.     Please upload your insulin pump, blood sugar meter and/or continuous glucose monitor at home 1-2 days before your next diabetes-related appointment.   This will allow your provider to review your  data before your scheduled virtual visit.    To ask a question to your Endocrine care team, please send them a [employer] message, or reach them by phone at [phone]     To expedite your medication refill(s), please contact your pharmacy and have them   fax a refill request to: [phone].  *Please allow 3 business days for routine medication refills.  *Please allow 5 business days for controlled substance medication refills.    For after-hours urgent Endocrine issues, that do not require 911, please dial [phone], and ask to speak with the Endocrinologist On-Call

## 2020-12-10 ENCOUNTER — COMMUNICATION - HEALTHEAST (OUTPATIENT)
Dept: NURSING | Facility: CLINIC | Age: 25
End: 2020-12-10

## 2020-12-22 ENCOUNTER — COMMUNICATION - HEALTHEAST (OUTPATIENT)
Dept: NURSING | Facility: CLINIC | Age: 25
End: 2020-12-22

## 2020-12-23 ENCOUNTER — AMBULATORY - HEALTHEAST (OUTPATIENT)
Dept: NURSING | Facility: CLINIC | Age: 25
End: 2020-12-23

## 2020-12-28 ENCOUNTER — RECORDS - HEALTHEAST (OUTPATIENT)
Dept: ADMINISTRATIVE | Facility: OTHER | Age: 25
End: 2020-12-28

## 2020-12-28 LAB — RETINOPATHY: NORMAL

## 2021-01-07 ENCOUNTER — RECORDS - HEALTHEAST (OUTPATIENT)
Dept: HEALTH INFORMATION MANAGEMENT | Facility: CLINIC | Age: 26
End: 2021-01-07

## 2021-01-07 NOTE — PROGRESS NOTES
"Via [phone]-   Outcome for 01/07/21 11:36 AM :Glucose data attachment located on [email or web address] message      12/24 am 280 pm 319  12/25 am 250 pm 355  12/27 am 228 lunch 107 pm 85  12/29 am 192 pm 268  12/31 am 236 lunch 132   1/1 am 168 pm 236  1/2 am 132 pm 229  1/3 am 205 pm 198  1/4 am 149 lunch 122 pm 171  1/5 am 123 pm 118   1/6 am 187 lunch  279 pm 148    Paw E Nafisa  is being evaluated via a billable video visit.  PATIENT WILL NEED A LI K SENT TO HER CELL PHONE AND WILL NEED AN INTERPERTER    How would you like to obtain your AVS? Mail a copy  If the video visit is dropped, the invitation should be resent by: Text to cell phone: [phone]  Will anyone else be joining your video visit? No    Reason for visit/consult: T2DM, uncontrolled    Primary care provider: Parish Daniels MD ([place])    HPI:  A1c was 13.1% when I last saw her on 1/13/20.  26 yo female seen for follow-up evaluation and management of T2DM. She has had DM since 2010. This is the 4th time I am seeing her (see my note of 7/15/19 for details of initial consult). A1c was 10.9% on 10/16/19. She last saw Rosangela Palacios PA-C, on 4/13/20.    She has had some blood sugars>250 a couple of times over past week. Denies any lows. Is willing to increase her metformin.    She was seen on 11/14/17 by Linda Mensah and it was noted, \"Her C-peptide and BRYN-antibodies tests would indicate that she is indeed a Type 2 with some pancreatic function. Her current A1c is 9, which is a great improvement from 14>, which, from our records, seems to be where she ws residing over the last 2 years.\" [facility], last visit was 10/6/18.     Current Diabetes Regimen:  Metformin  mg daily, #2 tabs daily  Lantus 84 units daily HS  Novolog 20 units TID with meals (not using correction)  Exenatide ER 2mg once weekly (self-discontinued with reduced appetite, not willing to resume)     Saw eye MD a few " weeks ago and reports everything is ok.     Past Medical/Surgical History:    Vitamin D Deficiency     Type 2 diabetes mellitus with insulin therapy, Hyperglycemia without ketosis 2010   hospitalized at [facility]     Hyperlipidemia     Proteinuria     Chronic Kidney Disease (NKF Classification)     Sepsis due to Escherichia coli (H)     S/p  x 1 10/6/18    Allergies:  No Known Allergies    Current Medications   Current Outpatient Medications   Medication     acetaminophen (TYLENOL) 500 MG tablet     insulin glargine (LANTUS SOLOSTAR) 100 UNIT/ML pen     insulin pen needle (31G X 5 MM) 31G X 5 MM miscellaneous     metFORMIN (GLUCOPHAGE-XR) 500 MG 24 hr tablet     lisinopril (PRINIVIL/ZESTRIL) 2.5 MG tablet     NOVOLOG FLEXPEN 100 UNIT/ML soln     NOVOLOG FLEXPEN 100 UNIT/ML soln     No current facility-administered medications for this visit.        Family History:  No family history on file.    Social History:  Social History     Tobacco Use     Smoking status: Never Smoker     Smokeless tobacco: Never Used   Substance Use Topics     Alcohol use: Not on file       ROS:  See HPI    Exam  There were no vitals taken for this virtual visit.  Gen: well appearing, nad, pleasant and conversant  HEENT: anicteric, EOMI, no proptosis or lid lag, no goiter  Neuro: A&O    Labs/Imaging    No results found for: A1C    ENDO DIABETES Latest Ref Rng & Units 10/16/2019 2020   HEMOGLOBIN A1C, POC 4.3 - 6 % 10.9 (A) 13.1 (A)      Creatinine 0.77, LDL 82 on 19, cr 1.07, LDL 139on 20 (see HE CE)  Urine microalbumin/creatinine 144.9 mg/g cr (<19.9) on 19, 133.2 mg/g cr (<19.9) on 20 (see HE CE)  Hemoglobin 11.1 on 20  Glucose 639 on 20    Assessment and Plan    -Type 2 DM poorly controlled w/ albuminuria and hyperlipidemia (defer to PMD for the latter, may need a statin added for high LDL, after retesting fasting lipids when diabetes is controlled). She does not tolerate full  dose metformin but is willing to increase to 3 tabs daily (of metformin XR). Diabetic foot exam done 10/16/19 was ok. Due annually as per PMD     -Urine microalbumin elevated: this should improve with better glucose control. Recheck after A1c<8%, is taking ACE inhibitor.     Patient Instructions:    Nice to talk with you today in virtual clinic.    Fasting sugar goals:      2 hours after eating <180    Please increase metformin from 2 to 3 tabs daily.    Please continue taking lantus 84 units at bedtime and novolog 20 units with meals.    Diabetes Education in 1-4 weeks, Diabetes Team PA in 1-3 months, me in 6 months    Please use [employer] to schedule your appointment(s) or call [phone] to schedule in Endocrinology and Diabetes Clinic    Please call [phone] & ask for Diabetes on -call with serious questions about high or low blood sugars if you do not know what to do    Best Wishes,  Dr. Katherine Peralta MD, MPH  Endocrinologist      We appreciate your assistance in coordinating your healthcare.     Please upload your insulin pump, blood sugar meter and/or continuous glucose monitor at home 1-2 days before your next diabetes-related appointment.   This will allow your provider to review your  data before your scheduled virtual visit.    To ask a question to your Endocrine care team, please send them a [employer] message, or reach them by phone at [phone]     To expedite your medication refill(s), please contact your pharmacy and have them   fax a refill request to: [phone].  *Please allow 3 business days for routine medication refills.  *Please allow 5 business days for controlled substance medication refills.    For after-hours urgent Endocrine issues, that do not require 431, please dial [phone], and ask to speak with the Endocrinologist On-Call      Time: 30 min spent on chart review, evaluation, management, counseling, education, & motivational interviewing with greater than 50%  of the total time was spent on counseling and coordinating care and documentation    VISIT HAD TO BE CONVERTED TO PHONE VISIT DUE TO TECHNICAL ISSUES ON PATIENT'S END. SHE DID NOT RESPOND TO VIDEO VISIT INVITE, SO PHYSICIAN CALLED PATIENT. PATIENT WAS EVALUATED WITH A JUSTYN .    Start: 01/11/2021 12:35 pm   Stop: 01/11/2021 12:51 pm

## 2021-01-07 NOTE — PATIENT INSTRUCTIONS
Nice to talk with you today in virtual clinic.    Fasting sugar goals:      2 hours after eating <180    Please increase metformin from 2 to 3 tabs daily.    Please continue taking lantus 84 units at bedtime and novolog 20 units with meals.    Diabetes Education in 1-4 weeks, Diabetes Team PA in 1-3 months, me in 6 months    Please use [employer] to schedule your appointment(s) or call [phone] to schedule in Endocrinology and Diabetes Clinic    Please call [phone] & ask for Diabetes on -call with serious questions about high or low blood sugars if you do not know what to do    Best Wishes,  Dr. Katherine Peralta MD, MPH  Endocrinologist      We appreciate your assistance in coordinating your healthcare.     Please upload your insulin pump, blood sugar meter and/or continuous glucose monitor at home 1-2 days before your next diabetes-related appointment.   This will allow your provider to review your  data before your scheduled virtual visit.    To ask a question to your Endocrine care team, please send them a [employer] message, or reach them by phone at [phone]     To expedite your medication refill(s), please contact your pharmacy and have them   fax a refill request to: [phone].  *Please allow 3 business days for routine medication refills.  *Please allow 5 business days for controlled substance medication refills.    For after-hours urgent Endocrine issues, that do not require 911, please dial [phone], and ask to speak with the Endocrinologist On-Call

## 2021-01-11 ENCOUNTER — VIRTUAL VISIT (OUTPATIENT)
Dept: ENDOCRINOLOGY | Facility: CLINIC | Age: 26
End: 2021-01-11
Payer: COMMERCIAL

## 2021-01-11 DIAGNOSIS — E11.65 TYPE 2 DIABETES MELLITUS WITH HYPERGLYCEMIA, UNSPECIFIED WHETHER LONG TERM INSULIN USE (H): ICD-10-CM

## 2021-01-11 PROCEDURE — 99214 OFFICE O/P EST MOD 30 MIN: CPT | Mod: 95 | Performed by: INTERNAL MEDICINE

## 2021-01-11 RX ORDER — METFORMIN HCL 500 MG
TABLET, EXTENDED RELEASE 24 HR ORAL
Qty: 90 TABLET | Refills: 11 | Status: SHIPPED | OUTPATIENT
Start: 2021-01-11 | End: 2021-12-14

## 2021-01-11 NOTE — LETTER
"1/11/2021       RE: Tyson Skelton  [address]  [place] [address]     Dear Colleague,    Thank you for referring your patient, Tyson Skelton, to the [facility] ENDOCRIN[facility] [place] [facility]. Please see a copy of my visit note below.    Via [phone]-   Outcome for 01/07/21 11:36 AM :Glucose data attachment located on [employer] message      12/24 am 280 pm 319  12/25 am 250 pm 355  12/27 am 228 lunch 107 pm 85  12/29 am 192 pm 268  12/31 am 236 lunch 132   1/1 am 168 pm 236  1/2 am 132 pm 229  1/3 am 205 pm 198  1/4 am 149 lunch 122 pm 171  1/5 am 123 pm 118   1/6 am 187 lunch  279 pm 148    Tyson Skelton  is being evaluated via a billable video visit.  PATIENT WILL NEED A LI K SENT TO HER CELL PHONE AND WILL NEED AN INTERPERTER    How would you like to obtain your AVS? Mail a copy  If the video visit is dropped, the invitation should be resent by: Text to cell phone: [phone]  Will anyone else be joining your video visit? No    Reason for visit/consult: T2DM, uncontrolled    Primary care provider: Parish Daniels MD ([facility])    HPI:  A1c was 13.1% when I last saw her on 1/13/20.  26 yo female seen for follow-up evaluation and management of T2DM. She has had DM since 2010. This is the 4th time I am seeing her (see my note of 7/15/19 for details of initial consult). A1c was 10.9% on 10/16/19. She last saw Rosangela Palacios PA-C, on 4/13/20.    She has had some blood sugars>250 a couple of times over past week. Denies any lows. Is willing to increase her metformin.    She was seen on 11/14/17 by Linda Mensah and it was noted, \"Her C-peptide and BRYN-antibodies tests would indicate that she is indeed a Type 2 with some pancreatic function. Her current A1c is 9, which is a great improvement from 14>, which, from our records, seems to be where she ws residing over the last 2 years.\" She has since been seeing Dr. Jackson ([facility]) during " pregnancy, last visit was 10/6/18.     Current Diabetes Regimen:  Metformin  mg daily, #2 tabs daily  Lantus 84 units daily HS  Novolog 20 units TID with meals (not using correction)  Exenatide ER 2mg once weekly (self-discontinued with reduced appetite, not willing to resume)     Saw eye MD a few weeks ago and reports everything is ok.     Past Medical/Surgical History:    Vitamin D Deficiency     Type 2 diabetes mellitus with insulin therapy, Hyperglycemia without ketosis 2010   hospitalized at [facility]     Hyperlipidemia     Proteinuria     Chronic Kidney Disease (NKF Classification)     Sepsis due to Escherichia coli (H)     S/p  x 1 10/6/18    Allergies:  No Known Allergies    Current Medications   Current Outpatient Medications   Medication     acetaminophen (TYLENOL) 500 MG tablet     insulin glargine (LANTUS SOLOSTAR) 100 UNIT/ML pen     insulin pen needle (31G X 5 MM) 31G X 5 MM miscellaneous     metFORMIN (GLUCOPHAGE-XR) 500 MG 24 hr tablet     lisinopril (PRINIVIL/ZESTRIL) 2.5 MG tablet     NOVOLOG FLEXPEN 100 UNIT/ML soln     NOVOLOG FLEXPEN 100 UNIT/ML soln     No current facility-administered medications for this visit.        Family History:  No family history on file.    Social History:  Social History     Tobacco Use     Smoking status: Never Smoker     Smokeless tobacco: Never Used   Substance Use Topics     Alcohol use: Not on file       ROS:  See HPI    Exam  There were no vitals taken for this virtual visit.  Gen: well appearing, nad, pleasant and conversant  HEENT: anicteric, EOMI, no proptosis or lid lag, no goiter  Neuro: A&O    Labs/Imaging    No results found for: A1C    ENDO DIABETES Latest Ref Rng & Units 10/16/2019 2020   HEMOGLOBIN A1C, POC 4.3 - 6 % 10.9 (A) 13.1 (A)      Creatinine 0.77, LDL 82 on 19, cr 1.07, LDL 139on 20 (see HE CE)  Urine microalbumin/creatinine 144.9 mg/g cr (<19.9) on 19, 133.2 mg/g cr (<19.9) on 20 (see HE  CE)  Hemoglobin 11.1 on 8/4/20  Glucose 639 on 11/24/20    Assessment and Plan    -Type 2 DM poorly controlled w/ albuminuria and hyperlipidemia (defer to PMD for the latter, may need a statin added for high LDL, after retesting fasting lipids when diabetes is controlled). She does not tolerate full dose metformin but is willing to increase to 3 tabs daily (of metformin XR). Diabetic foot exam done 10/16/19 was ok. Due annually as per PMD     -Urine microalbumin elevated: this should improve with better glucose control. Recheck after A1c<8%, is taking ACE inhibitor.     Patient Instructions:    Nice to talk with you today in virtual clinic.    Fasting sugar goals:      2 hours after eating <180    Please increase metformin from 2 to 3 tabs daily.    Please continue taking lantus 84 units at bedtime and novolog 20 units with meals.    Diabetes Education in 1-4 weeks, Diabetes Team PA in 1-3 months, me in 6 months    Please use [employer] to schedule your appointment(s) or call [phone] to schedule in Endocrinology and Diabetes Clinic    Please call [phone] & ask for Diabetes on -call with serious questions about high or low blood sugars if you do not know what to do    Best Wishes,  Dr. Katherine Peralta MD, MPH  Endocrinologist      We appreciate your assistance in coordinating your healthcare.     Please upload your insulin pump, blood sugar meter and/or continuous glucose monitor at home 1-2 days before your next diabetes-related appointment.   This will allow your provider to review your  data before your scheduled virtual visit.    To ask a question to your Endocrine care team, please send them a [employer] message, or reach them by phone at [phone]     To expedite your medication refill(s), please contact your pharmacy and have them   fax a refill request to: [phone].  *Please allow 3 business days for routine medication refills.  *Please allow 5 business days for controlled substance  medication refills.    For after-hours urgent Endocrine issues, that do not require 911, please dial [phone], and ask to speak with the Endocrinologist On-Call      Time: 30 min spent on chart review, evaluation, management, counseling, education, & motivational interviewing with greater than 50% of the total time was spent on counseling and coordinating care and documentation    VISIT HAD TO BE CONVERTED TO PHONE VISIT DUE TO TECHNICAL ISSUES ON PATIENT'S END. SHE DID NOT RESPOND TO VIDEO VISIT INVITE, SO PHYSICIAN CALLED PATIENT. PATIENT WAS EVALUATED WITH A JUSTYN .    Start: 01/11/2021 12:35 pm   Stop: 01/11/2021 12:51 pm

## 2021-01-11 NOTE — LETTER
Patient:  Tyson Skelton  :   1995  MRN:     [id number]        Ms.Paw YFN Skelton  [address]  [place] [address]        2021    Dear Ms. Skelton,    Nice to talk with you today in virtual clinic.    Fasting sugar goals:      2 hours after eating <180    Please increase metformin from 2 to 3 tabs daily.    Please continue taking lantus 84 units at bedtime and novolog 20 units with meals.    Diabetes Education in 1-4 weeks, Diabetes Team PA in 1-3 months, me in 6 months    Please use [employer] to schedule your appointment(s) or call [phone] to schedule in Endocrinology and Diabetes Clinic    Please call [phone] & ask for Diabetes on -call with serious questions about high or low blood sugars if you do not know what to do    Best Wishes,  Dr. Katherine Peralta MD, MPH  Endocrinologist      We appreciate your assistance in coordinating your healthcare.     Please upload your insulin pump, blood sugar meter and/or continuous glucose monitor at home 1-2 days before your next diabetes-related appointment.   This will allow your provider to review your  data before your scheduled virtual visit.    To ask a question to your Endocrine care team, please send them a [employer] message, or reach them by phone at [phone]     To expedite your medication refill(s), please contact your pharmacy and have them   fax a refill request to: [phone].  *Please allow 3 business days for routine medication refills.  *Please allow 5 business days for controlled substance medication refills.    For after-hours urgent Endocrine issues, that do not require 911, please dial [phone], and ask to speak with the Endocrinologist On-Call

## 2021-01-12 ENCOUNTER — COMMUNICATION - HEALTHEAST (OUTPATIENT)
Dept: FAMILY MEDICINE | Facility: CLINIC | Age: 26
End: 2021-01-12

## 2021-01-12 DIAGNOSIS — Z79.4 TYPE 2 DIABETES MELLITUS TREATED WITH INSULIN (H): ICD-10-CM

## 2021-01-12 DIAGNOSIS — E11.9 TYPE 2 DIABETES MELLITUS TREATED WITH INSULIN (H): ICD-10-CM

## 2021-01-21 ENCOUNTER — COMMUNICATION - HEALTHEAST (OUTPATIENT)
Dept: NURSING | Facility: CLINIC | Age: 26
End: 2021-01-21

## 2021-02-02 ENCOUNTER — COMMUNICATION - HEALTHEAST (OUTPATIENT)
Dept: NURSING | Facility: CLINIC | Age: 26
End: 2021-02-02

## 2021-03-02 ENCOUNTER — COMMUNICATION - HEALTHEAST (OUTPATIENT)
Dept: NURSING | Facility: CLINIC | Age: 26
End: 2021-03-02

## 2021-03-03 ENCOUNTER — COMMUNICATION - HEALTHEAST (OUTPATIENT)
Dept: NURSING | Facility: CLINIC | Age: 26
End: 2021-03-03

## 2021-03-16 ENCOUNTER — COMMUNICATION - HEALTHEAST (OUTPATIENT)
Dept: NURSING | Facility: CLINIC | Age: 26
End: 2021-03-16

## 2021-03-17 ENCOUNTER — AMBULATORY - HEALTHEAST (OUTPATIENT)
Dept: NURSING | Facility: CLINIC | Age: 26
End: 2021-03-17

## 2021-04-13 ENCOUNTER — COMMUNICATION - HEALTHEAST (OUTPATIENT)
Dept: NURSING | Facility: CLINIC | Age: 26
End: 2021-04-13

## 2021-04-26 DIAGNOSIS — E11.65 TYPE 2 DIABETES MELLITUS WITH HYPERGLYCEMIA, UNSPECIFIED WHETHER LONG TERM INSULIN USE (H): ICD-10-CM

## 2021-04-27 ENCOUNTER — COMMUNICATION - HEALTHEAST (OUTPATIENT)
Dept: NURSING | Facility: CLINIC | Age: 26
End: 2021-04-27

## 2021-04-27 NOTE — TELEPHONE ENCOUNTER
insulin glargine (LANTUS SOLOSTAR) 100 UNIT/ML pen  Last Written Prescription Date: 4/13/20  Last Fill Quantity: 30 ml,   # refills: 11  Last Office Visit : 1/11/21  Future Office visit:  None    Routing refill request to provider for review/approval because: endo triage to fill

## 2021-04-29 ENCOUNTER — OFFICE VISIT - HEALTHEAST (OUTPATIENT)
Dept: FAMILY MEDICINE | Facility: CLINIC | Age: 26
End: 2021-04-29

## 2021-04-29 ENCOUNTER — COMMUNICATION - HEALTHEAST (OUTPATIENT)
Dept: NURSING | Facility: CLINIC | Age: 26
End: 2021-04-29

## 2021-04-29 DIAGNOSIS — E11.9 TYPE 2 DIABETES MELLITUS TREATED WITH INSULIN (H): ICD-10-CM

## 2021-04-29 DIAGNOSIS — Z00.00 ROUTINE GENERAL MEDICAL EXAMINATION AT A HEALTH CARE FACILITY: ICD-10-CM

## 2021-04-29 DIAGNOSIS — N18.9 CHRONIC KIDNEY DISEASE, UNSPECIFIED CKD STAGE: ICD-10-CM

## 2021-04-29 DIAGNOSIS — Z23 IMMUNIZATION DUE: ICD-10-CM

## 2021-04-29 DIAGNOSIS — E78.2 MIXED HYPERLIPIDEMIA: ICD-10-CM

## 2021-04-29 DIAGNOSIS — Z79.4 TYPE 2 DIABETES MELLITUS TREATED WITH INSULIN (H): ICD-10-CM

## 2021-04-29 LAB
ANION GAP SERPL CALCULATED.3IONS-SCNC: 10 MMOL/L (ref 5–18)
BUN SERPL-MCNC: 19 MG/DL (ref 8–22)
CALCIUM SERPL-MCNC: 9.2 MG/DL (ref 8.5–10.5)
CHLORIDE BLD-SCNC: 105 MMOL/L (ref 98–107)
CHOLEST SERPL-MCNC: 196 MG/DL
CO2 SERPL-SCNC: 27 MMOL/L (ref 22–31)
CREAT SERPL-MCNC: 0.73 MG/DL (ref 0.6–1.1)
FASTING STATUS PATIENT QL REPORTED: NO
GFR SERPL CREATININE-BSD FRML MDRD: >60 ML/MIN/1.73M2
GLUCOSE BLD-MCNC: 73 MG/DL (ref 70–125)
HDLC SERPL-MCNC: 39 MG/DL
LDLC SERPL CALC-MCNC: 100 MG/DL
POTASSIUM BLD-SCNC: 3.8 MMOL/L (ref 3.5–5)
SODIUM SERPL-SCNC: 142 MMOL/L (ref 136–145)
TRIGL SERPL-MCNC: 283 MG/DL

## 2021-04-29 ASSESSMENT — MIFFLIN-ST. JEOR: SCORE: 1353.24

## 2021-05-25 ENCOUNTER — COMMUNICATION - HEALTHEAST (OUTPATIENT)
Dept: FAMILY MEDICINE | Facility: CLINIC | Age: 26
End: 2021-05-25

## 2021-05-25 DIAGNOSIS — E78.2 MIXED HYPERLIPIDEMIA: ICD-10-CM

## 2021-05-25 DIAGNOSIS — N18.9 CHRONIC KIDNEY DISEASE, UNSPECIFIED CKD STAGE: ICD-10-CM

## 2021-05-28 NOTE — PROGRESS NOTES
We can discuss this for case review as well. New order was placed for endocrinology in [place] and patient will get a call to see pharmacist at the clinic. Endocrinologist's next opening is in September 2019.

## 2021-05-28 NOTE — PROGRESS NOTES
Assessment:Plan     1. Routine general medical examination at a health care facility      2. Screening for cervical cancer  - Gynecologic Cytology (PAP Smear)    3. Type 2 diabetes mellitus with insulin therapy (H)  - Glycosylated Hemoglobin A1c  - Comprehensive Metabolic Panel  - Lipid Cascade  - insulin aspart U-100 (NOVOLOG FLEXPEN U-100 INSULIN) 100 unit/mL injection pen; Inject 12 Units under the skin 3 (three) times a day before meals.  Dispense: 15 mL; Refill: 11  - insulin glargine (LANTUS; BASAGLAR) 100 unit/mL (3 mL) pen; Inject 44 Units under the skin at bedtime. Increase as directed to up to 50 units  Dispense: 5 adj dose pen; Refill: 11  A1c > 14.0  Refilled all meds.  States she has a working meter. Instructed to check fasting blood sugar at least once a day.  - Ambulatory referral to Medication Management    4. Noncompliance with medication regimen  Instructed to use insulin as RXed. Refill sent.  Referred to [facility] Pharmacist.  Will try to arrange frequent follow up visit if possible.   - Ambulatory referral to Medication Management    5. Anemia, unspecified type  Normal hemoglobin today  - Hemoglobin    6. Screening for STD (sexually transmitted disease)  - Chlamydia trachomatis & Neisseria gonorrhoeae, Amplified Detection    Subjective:      Tyson Skelton is a 24 y.o. female who presents for an annual exam. The patient is sexually active. The patient participates in regular exercise: no. The patient reports that there is not domestic violence in her life.     On Depo for contraception. Next dose due on 5/22/19. No menstrual period while on depo.  Has 5 month old son.    H/O type 2 diabetes ( labs confirmed ). On insulin but long h/o noncompliance.   States she uses lantus 44 U once daily and Novolog 22 U once daily ( Novolog was Rxed to use 12-16 U three times a day before meals.   She is not checking blood sugar at all. Did not bring meter or blood sugar log today  Was last seen by Horacio Smith in 10/18.  No follow up apt scheduled.  Was managed by al Smith during pregnancy,  recommended to follow up with NANDA smith. Delivered son 5 months ago.     Healthy Habits:   Regular Exercise: No  Sunscreen Use: No  Healthy Diet: Yes  Dental Visits Regularly: No  Seat Belt: Yes  Sexually active: Yes  Self Breast Exam Monthly:No  Hemoccults: N/A  Flex Sig: N/A  Colonoscopy: N/A  Lipid Profile: No  Fasting   Glucose Screen: N/A and is diabetic  Prevention of Osteoporosis: N/A  Last Dexa: No  Guns at Home:  No      Immunization History   Administered Date(s) Administered     Influenza, seasonal,quad inj 6-35 mos 2012, 10/09/2013     Influenza,seasonal quad, PF, 36+MOS 11/10/2015, 2017     Pneumo Polysac 23-V 2012     Tdap 2012     Immunization status: Refuses Immunization HPV     No exam data present    Gynecologic History  No LMP recorded. Patient has had an injection.  Contraception: Depo  Last Pap: Results were: NA ( unsure if had one )  Last mammogram: N/A. Results were: N/A      OB History    Para Term  AB Living   1             SAB TAB Ectopic Multiple Live Births                  # Outcome Date GA Lbr Liban/2nd Weight Sex Delivery Anes PTL Lv   1                 Current Outpatient Medications   Medication Sig Dispense Refill     alcohol swabs (BD ALCOHOL SWABS) PadM Use for checking blood sugar and injecting insulin, up to 7 times daily. 500 each 3     blood glucose test (CONTOUR NEXT TEST STRIPS) strips Use to check blood sugar four times daily.  One Touch Ultra test strips. 400 strip 11     generic lancets Use to check blood sugar two times daily. 200 each 3     insulin aspart U-100 (NOVOLOG FLEXPEN U-100 INSULIN) 100 unit/mL injection pen Inject 12-16 Units under the skin 3 (three) times a day before meals. 15 mL 11     insulin glargine (LANTUS; BASAGLAR) 100 unit/mL (3 mL) pen Inject 44-50 Units under the skin at bedtime. Increase as directed to up to 50 units 5 adj dose pen  "11     pen needle, diabetic (PEN NEEDLE) 31 gauge x 3/16\" Ndle USE TO INJECT INSULIN THREE TIMES DAILY.. 300 each 0     pen needle, diabetic 31 gauge x 5/16\" Ndle Use to inject insulin four times daily. 400 each 3     Current Facility-Administered Medications   Medication Dose Route Frequency Provider Last Rate Last Dose     medroxyPROGESTERone injection 150 mg (DEPO-PROVERA)  150 mg Intramuscular Q3 Months Parish Daniels MD   150 mg at 03/06/19 0939     Past Medical History:   Diagnosis Date     Chronic kidney disease      Diabetes mellitus, type II (H)      Hyperglycemia without ketosis 2010    hospitalized at [facility]      No past surgical history on file.  Patient has no known allergies.  Family History   Problem Relation Age of Onset     Diabetes Mother      Social History     Socioeconomic History     Marital status: Single     Spouse name: Not on file     Number of children: Not on file     Years of education: Not on file     Highest education level: Not on file   Occupational History     Not on file   Social Needs     Financial resource strain: Not on file     Food insecurity:     Worry: Not on file     Inability: Not on file     Transportation needs:     Medical: Not on file     Non-medical: Not on file   Tobacco Use     Smoking status: Passive Smoke Exposure - Never Smoker     Smokeless tobacco: Never Used   Substance and Sexual Activity     Alcohol use: No     Drug use: No     Sexual activity: Not on file   Lifestyle     Physical activity:     Days per week: Not on file     Minutes per session: Not on file     Stress: Not on file   Relationships     Social connections:     Talks on phone: Not on file     Gets together: Not on file     Attends Orthodoxy service: Not on file     Active member of club or organization: Not on file     Attends meetings of clubs or organizations: Not on file     Relationship status: Not on file     Intimate partner violence:     Fear of current or ex " "partner: Not on file     Emotionally abused: Not on file     Physically abused: Not on file     Forced sexual activity: Not on file   Other Topics Concern     Not on file   Social History Narrative     Not on file       Review of Systems  Review of Systems      No vaginal discharge or irritation.  No abnormal vaginal bleeding.  Denied depression symptoms.  No fever or URI symptoms.     Objective:         Vitals:    05/07/19 1020   BP: 106/72   Pulse: 100   Temp: 98.1  F (36.7  C)   TempSrc: Oral   SpO2: 97%   Height: 4' 11\" (1.499 m)     Body mass index is 26.89 kg/m .    Physical  Physical Exam     Gen - alert, orientated, NAD  Eyes - fundascopic exam limited by the undialated pupil but looks symmetric  ENT - oropharynx clear, TMs clear  Neck - supple, no palpable mass or lymphadenopathy  CV - RRR, no murmur  Resp - lungs CTA  Ab - soft, nontender, no palpable mass or organomegaly   - normal appearance to the external genitalia, normal testicular exam bilaterally, no hernia  Extrem - warm, no edema  Neuro - CN II-XII intact, strength, sensation, reflexes intact and symmetric  Skin - no rash, no atypical appearing lesions seen.   "

## 2021-05-29 NOTE — PROGRESS NOTES
Patient has no new concern regarding health, able to get all medications refilled and health insurance is active. CCC RN to update current medical goal and actions.          Next outreach: 07/25/2019.

## 2021-05-30 NOTE — TELEPHONE ENCOUNTER
Alternative NovoLog mix pen to use 50 units twice a day sent to the patient's pharmacy.  Please notify the patient.  Thank you,    Dr. Parish Daniels  7/16/2019 1:35 PM

## 2021-05-30 NOTE — TELEPHONE ENCOUNTER
Fax received from [facility] requesting Prior Authorization    Medication Name:   insulin NPH and regular human (NOVOLIN 70-30 FLEXPEN U-100) 100 unit/mL (70-30) pen 15 mL 3 7/11/2019     Sig - Route: Inject 50 Units under the skin 2 (two) times a day before meals. - Subcutaneous    Sent to pharmacy as: insulin NPH and regular human (NOVOLIN 70-30 FLEXPEN U-100) 100 unit/mL (70-30) pen    Notes to Pharmacy: Patient requests that this please be delivered. Thanks!    E-Prescribing Status: Receipt confirmed by pharmacy (7/11/2019 10:19 AM CDT)          Insurance Plan: JACKIE SPICER   PBM: [employer]   Patient ID Number: [id number]    Please start PA process

## 2021-05-30 NOTE — PROGRESS NOTES
MTM Follow Up Encounter  Assessment & Plan                                                     1. Type 2 diabetes mellitus with insulin therapy (H)  Needs improvement.  Patient's A1c not yet at goal of less than 7%.  Patient continues to take Novolin 70/30 mix insulin 50 units twice daily as prescribed and now metformin per endo, though states that she has been missing insulin doses every once in a while.  Additionally, patient is now using freestyle ronnie CGM for monitoring blood sugars which has been helpful for her.  The main issue, is adherence to her insulin twice daily as prescribed.  Pharmacist talked about placing insulin in a location that will help remind her to use twice daily as prescribed.  Additionally, pharmacist added twice daily alarms to her glucometer to help remind her to check her blood sugars and administer insulin twice daily, patient agreeable. No changes in medication doses at this time.  - Patient to use twice daily alarms to assist with insulin adherence    2. Mixed hyperlipidemia  Needs improvement.  Patient not currently taking cholesterol medication, cholesterol elevated at last check in May.  Patient previously was taking atorvastatin 20 mg daily for cholesterol, then was discontinued due to pregnancy.  Patient currently receiving Depo-Provera every 3 months and not breast-feeding -we will restart atorvastatin today, patient agreeable.  - atorvastatin (LIPITOR) 20 MG tablet; Take 1 tablet (20 mg total) by mouth daily.  Dispense: 30 tablet; Refill: 11    3. Proteinuria, unspecified type  Stable.  Patient currently taking lisinopril 2.5 mg daily as prescribed without medication side effects.  Blood pressure at goal of less than 130/80.  Recommend continuation of current therapy.  Lisinopril added since last BMP checked, would recommend reassessment at follow-up visit for kidney function and potassium.    Future assessment: BMP    Follow Up  Return in about 8 weeks (around 9/18/2019) for  Medication Review.    Subjective & Objective                                                     Tyson Skelton is a 24 y.o. female coming in for an initial visit for Medication Therapy Management. She was referred to me from Parish Daniels MD.  Patient presents with  today.     Chief Complaint: Follow up from MTM visit on 7/11/19    Medication Adherence/Access: Self management.  Adherence is improving.  Patient brings in freestyle ronnie glucometer today, without medications.    Diabetes/proteinuria:  Pt currently taking metformin  mg two times a day (recently prescribed by endocrinology), Novolin 70/30 mix insulin 50 units twice daily, admits to missing some doses of insulin. Has been missing sometimes in the morning and sometimes in the evening, missed doses 1-2 times this week. Denies any medication side effects.   Patient was previously taking atorvastatin 20 mg daily in 2015. No longer taking this due to previous pregnancy.  SMBG: Freestyle ronnie CGM shows blood sugars better controlled in the morning and less controlled in the evening, unable to download data today due to time constraint but reviewed with patient while looking at her meter.     Patient  Is not experiencing hypoglycemia  Recent symptoms of high blood sugar? These are getting better, sometimes fatigued  ACEi/ARB:  Lisinopril 2.5 mg daily  Aspirin: Not taking due to not yet recommended per age   Diet/Exercise: Eating 2-3 meals a day.  No changes in diet, no exericse. Eating rice, noodles, vegetables and meat. Large bowl of rice or noodles with each meal.  Lab Results   Component Value Date    HGBA1C >14.0 (H) 05/07/2019    HGBA1C 9.9 (H) 10/03/2018    HGBA1C 7.0 (H) 07/12/2018     Lab Results   Component Value Date    GLUF 160 (H) 12/24/2012    MICROALBUR 7.62 (H) 07/11/2019    LDLCALC 219 (H) 05/07/2019    CREATININE 0.85 05/07/2019     Proteinuria: lisinopril 2.5 mg daily, started 7/11/2019. Denies dry cough or dizziness, or other side  effects.   BP Readings from Last 3 Encounters:   07/25/19 100/62   07/11/19 108/74   06/26/19 100/70     Lab Results   Component Value Date    CREATININE 0.85 05/07/2019     Lab Results   Component Value Date    K 4.5 05/07/2019     Hyperlipidemia: Not currently taking a statin. On depo-provera for contraception prevention Q3 months. Previously was taking atorvastatin 20 mg daily before pregnancy.   Lab Results   Component Value Date    CHOL 312 (H) 05/07/2019    CHOL 140 03/09/2018    CHOL 412 (H) 02/20/2015     Lab Results   Component Value Date    HDL 53 05/07/2019    HDL 41 (L) 03/09/2018    HDL 46 02/20/2015     Lab Results   Component Value Date    LDLCALC 219 (H) 05/07/2019    LDLCALC 83 03/09/2018    LDLCALC 316 (H) 02/20/2015     Lab Results   Component Value Date    TRIG 202 (H) 05/07/2019    TRIG 80 03/09/2018    TRIG 252 (H) 02/20/2015     PMH: reviewed in EPIC   Allergies/ADRs: reviewed in EPIC   Alcohol: reviewed in EPIC   Tobacco:   Social History     Tobacco Use   Smoking Status Passive Smoke Exposure - Never Smoker   Smokeless Tobacco Never Used     Today's Vitals:   Vitals:    07/25/19 1020   BP: 100/62   Pulse: (!) 103   SpO2: 99%   Weight: 139 lb (63 kg)     ----------------  The patient was given a summary of these recommendations as an after visit summary    I spent 30 minutes with this patient today.   All changes were made via collaborative practice agreement with Parish Daniels MD. A copy of the visit note was provided to the patient's provider.     Cary Leo, PharmD  Medication Therapy Management Pharmacist  [facility]       Current Outpatient Medications   Medication Sig Dispense Refill     metFORMIN (GLUCOPHAGE-XR) 500 MG 24 hr tablet Take 500 mg by mouth 2 (two) times a day with meals. Working on increasing to 2 tablets two times a day Per endocrinology       alcohol swabs (BD ALCOHOL SWABS) PadM Use for checking blood sugar and injecting insulin, up to 7 times daily.  "500 each 3     atorvastatin (LIPITOR) 20 MG tablet Take 1 tablet (20 mg total) by mouth daily. 30 tablet 11     blood glucose test (CONTOUR NEXT TEST STRIPS) strips Use to check blood sugar four times daily.  One Touch Ultra test strips. 400 strip 11     flash glucose scanning reader (FREESTYLE DUC 14 DAY READER) Misc Use 1 Units As Directed every 8 (eight) hours. 1 each 0     flash glucose sensor (FREESTYLE DUC 14 DAY SENSOR) Kit Use 1 Units As Directed every 14 (fourteen) days. 6 kit 3     generic lancets Use to check blood sugar two times daily. 200 each 3     insulin aspart protamine-insulin aspart (NOVOLOG MIX 70-30FLEXPEN U-100) 100 unit/mL (70-30) pen Inject 50 Units under the skin 2 (two) times a day before meals. 11.65 Type 2 with hyperglycemia 5 adj dose pen 11     insulin NPH and regular human (NOVOLIN 70-30 FLEXPEN U-100) 100 unit/mL (70-30) pen Inject 50 Units under the skin 2 (two) times a day before meals. 15 mL 3     lisinopril (PRINIVIL,ZESTRIL) 2.5 MG tablet Take 1 tablet (2.5 mg total) by mouth daily. 30 tablet 3     pen needle, diabetic (PEN NEEDLE) 31 gauge x 3/16\" Ndle USE TO INJECT INSULIN THREE TIMES DAILY.. 300 each 0     Current Facility-Administered Medications   Medication Dose Route Frequency Provider Last Rate Last Dose     medroxyPROGESTERone injection 150 mg (DEPO-PROVERA)  150 mg Intramuscular Q3 Months Parish Daniels MD   150 mg at 05/22/19 1311           "

## 2021-05-30 NOTE — PROGRESS NOTES
MTM Follow Up Encounter  Assessment & Plan                                                     1. Type 2 diabetes mellitus with insulin therapy (H)  Needs improvement.  Patient's A1c not yet at goal of less than 7%.  Patient recently switched from basal/bolus insulin to Novolin 70/30 mix insulin twice daily.  Additionally, patient is not using freestyle ronnie CGM for monitoring blood sugar.  Patient's adherence to medications appears much improved from previous, likely due to these changes.  Pharmacist requested patient check blood sugars more than twice daily, preferably 3-5 times daily, patient agreeable.  Pharmacist discussed diet patient's current intake of carbohydrates, recommended decreasing carbohydrate intake, patient agreeable.  Will increase insulin today.  Despite young age, patients lipid panel indicates statin therapy, would recommend moderate intensity statin if patient is not intending to become pregnant. Unable to discuss today, will address at follow up visit.   - Patient to decrease carbohydrate intake  - Patient to test blood sugars 3-5 times daily  - insulin NPH and regular human (NOVOLIN 70-30 FLEXPEN U-100) 100 unit/mL (70-30) pen; Inject 50 Units under the skin 2 (two) times a day before meals.  Dispense: 15 mL; Refill: 3    Future assessment: Discuss statin and avoidance of pregnancy needed with this medication    2. Proteinuria  Needs improvement. Lab indicated microalbuminuria today, recommend daily lisinopril, will send rx today. Patient is receiving depo-provera, denies pregnancy.    - Microalbumin, Random Urine  - lisinopril (PRINIVIL,ZESTRIL) 2.5 MG tablet; Take 1 tablet (2.5 mg total) by mouth daily.  Dispense: 30 tablet; Refill: 3    Follow Up  Return in about 2 weeks (around 7/25/2019) for Medication Review.    Subjective & Objective                                                     Tyson Skelton is a 24 y.o. female coming in for an initial visit for Medication Therapy Management.  She was referred to me from Parish Daniels MD.  Patient presents with  and  today.     Chief Complaint: MTM initial visit    Medication Adherence/Access: Self management. States that she sometimes doesn't want to dose insulin. More frequently not giving insulin.     Diabetes:  Pt currently taking Novolin 70/30 mix insulin 40 units twice daily. On Sunday she missed her insulin afternoon dose, otherwise has not forgotten any doses. States that it has been much easier for her to use insulin only twice daily since switching insulin. Additionally, she has been using her ronnie CGM continuously - only checking BG twice daily though. Patient was previously taking atorvastatin 20 mg daily in 2015. No longer taking this.   SMBG: Freestyle ronnie CGM shows blood sugars consistently above target range, typically 200 and above.  Patient  Is not experiencing hypoglycemia  Recent symptoms of high blood sugar? polyuria, polydipsia, and fatigue  ACEi/ARB:  None  Aspirin: Not taking due to not yet recommended per age   Diet/Exercise: Eating 2-3 meals a day.  Eating rice, noodles, vegetables and meat. Large bowl of rice or noodles with each meal.  Lab Results   Component Value Date    HGBA1C >14.0 (H) 05/07/2019    HGBA1C 9.9 (H) 10/03/2018    HGBA1C 7.0 (H) 07/12/2018     Lab Results   Component Value Date    GLUF 160 (H) 12/24/2012    MICROALBUR 11.39 (H) 03/09/2018    LDLCALC 219 (H) 05/07/2019    CREATININE 0.85 05/07/2019     PMH: reviewed in EPIC   Allergies/ADRs: reviewed in EPIC   Alcohol: reviewed in EPIC   Tobacco:   Social History     Tobacco Use   Smoking Status Passive Smoke Exposure - Never Smoker   Smokeless Tobacco Never Used     Today's Vitals:   Vitals:    07/11/19 1022   BP: 108/74   Pulse: 85   SpO2: 96%   Weight: 137 lb (62.1 kg)     ----------------  The patient was given a summary of these recommendations as an after visit summary    I spent 30 minutes with this patient today.   All changes were  "made via collaborative practice agreement with Parish Daniels MD. A copy of the visit note was provided to the patient's provider.     Cary Leo, PharmD  Medication Therapy Management Pharmacist  [facility]       Current Outpatient Medications   Medication Sig Dispense Refill     alcohol swabs (BD ALCOHOL SWABS) PadM Use for checking blood sugar and injecting insulin, up to 7 times daily. 500 each 3     blood glucose test (CONTOUR NEXT TEST STRIPS) strips Use to check blood sugar four times daily.  One Touch Ultra test strips. 400 strip 11     flash glucose scanning reader (FREESTYLE DUC 14 DAY READER) Misc Use 1 Units As Directed every 8 (eight) hours. 1 each 0     flash glucose sensor (FREESTYLE DUC 14 DAY SENSOR) Kit Use 1 Units As Directed every 14 (fourteen) days. 6 kit 3     generic lancets Use to check blood sugar two times daily. 200 each 3     insulin NPH and regular human (NOVOLIN 70-30 FLEXPEN U-100) 100 unit/mL (70-30) pen Inject 50 Units under the skin 2 (two) times a day before meals. 15 mL 3     pen needle, diabetic (PEN NEEDLE) 31 gauge x 3/16\" Ndle USE TO INJECT INSULIN THREE TIMES DAILY.. 300 each 0     Current Facility-Administered Medications   Medication Dose Route Frequency Provider Last Rate Last Dose     medroxyPROGESTERone injection 150 mg (DEPO-PROVERA)  150 mg Intramuscular Q3 Months Parish Daniels MD   150 mg at 05/22/19 1311             "

## 2021-05-30 NOTE — PROGRESS NOTES
MTM Initial Encounter  Assessment & Plan                                                     1. Type 2 diabetes mellitus with insulin therapy (H)  Needs improvement. Patients A1c recently vastly elevated to goal of less than 7%. Patient admits to lacking adherence of medications. Per discussion today, will simplify SMBG and insulin with CGM and twice daily insulin instead of four times a day insulin. Patient agreeable that this will motivate her to check her blood sugars more often and use her insulin as prescribed. Pharmacies will deliver these prescriptions to the patient. Kindly requested that patient bring in all medication to follow up visit for education on [place] and monitoring of current control. Will start with 40 units two times a day and increase in the future as needed. Cholesterol not currently well managed, will reassess after better glucose control to see if statin therapy is necessary.   - flash glucose scanning reader (FREESTYLE ANDREWS 14 DAY READER) Misc; Use 1 Units As Directed every 8 (eight) hours.  Dispense: 1 each; Refill: 0  - flash glucose sensor (FREESTYLE ANDREWS 14 DAY SENSOR) Kit; Use 1 Units As Directed every 14 (fourteen) days.  Dispense: 6 kit; Refill: 3  - insulin NPH and regular human (NOVOLIN 70-30 FLEXPEN U-100) 100 unit/mL (70-30) pen; Inject 40 Units under the skin 2 (two) times a day before meals.  Dispense: 15 mL; Refill: 3    Follow Up  Return in about 15 days (around 7/11/2019) for Medication Review.    Subjective & Objective                                                     Tyson Skelton is a 24 y.o. female coming in for an initial visit for Medication Therapy Management. She was referred to me from Parish Daniels MD.  Patient presents with  and  today.     Chief Complaint: MTM initial visit    Medication Adherence/Access: Self management. States that she sometimes doesn't want to dose insulin. More frequently not giving insulin.     Diabetes:  Pt currently taking  glargine insulin 50 units (44 units daily), aspart insulin 45 units before meals (12 units 3 times a day before meals.  In the last week, she admits that she is not giving insulin. Sometimes will use both insulins once daily, but this is not every day. Does not bring with insulin or glucometer today. Diagnosis of diabetes in 2011, states she is overwhelmed. When asked about CGM, she states that she is unaware. Injections don't hurt her, she just doesn't want to inject. Is not forgetting, just doesn't want to. Patient admits that her diabetes was better controlled when she was pregnant.   SMBG: Never   - has a glucometer at home.   Patient  Is not experiencing hypoglycemia  Recent symptoms of high blood sugar? polyuria, polydipsia, and fatigue  ACEi/ARB:  None  Aspirin: Not taking due to not yet recommended per age   Diet/Exercise: Eating 2-3 meals a day. Using novolog only if she eats.   Lab Results   Component Value Date    HGBA1C >14.0 (H) 05/07/2019    HGBA1C 9.9 (H) 10/03/2018    HGBA1C 7.0 (H) 07/12/2018     Lab Results   Component Value Date    GLUF 160 (H) 12/24/2012    MICROALBUR 11.39 (H) 03/09/2018    LDLCALC 219 (H) 05/07/2019    CREATININE 0.85 05/07/2019     PMH: reviewed in EPIC   Allergies/ADRs: reviewed in EPIC   Alcohol: reviewed in EPIC   Tobacco:   Social History     Tobacco Use   Smoking Status Passive Smoke Exposure - Never Smoker   Smokeless Tobacco Never Used     Today's Vitals:   Vitals:    06/26/19 1510   BP: 100/70   Pulse: 94   SpO2: 95%   Weight: 137 lb 14.4 oz (62.6 kg)     ----------------  The patient was given a summary of these recommendations as an after visit summary    I spent 60 minutes with this patient today.   All changes were made via collaborative practice agreement with Parish Daniels MD. A copy of the visit note was provided to the patient's provider.     Cary Leo, PharmD  Medication Therapy Management Pharmacist  [facility]       Current Outpatient  "Medications   Medication Sig Dispense Refill     alcohol swabs (BD ALCOHOL SWABS) PadM Use for checking blood sugar and injecting insulin, up to 7 times daily. 500 each 3     blood glucose test (CONTOUR NEXT TEST STRIPS) strips Use to check blood sugar four times daily.  One Touch Ultra test strips. 400 strip 11     flash glucose scanning reader (FREESTYLE DUC 14 DAY READER) Misc Use 1 Units As Directed every 8 (eight) hours. 1 each 0     flash glucose sensor (FREESTYLE DUC 14 DAY SENSOR) Kit Use 1 Units As Directed every 14 (fourteen) days. 6 kit 3     generic lancets Use to check blood sugar two times daily. 200 each 3     insulin NPH and regular human (NOVOLIN 70-30 FLEXPEN U-100) 100 unit/mL (70-30) pen Inject 40 Units under the skin 2 (two) times a day before meals. 15 mL 3     pen needle, diabetic (PEN NEEDLE) 31 gauge x 3/16\" Ndle USE TO INJECT INSULIN THREE TIMES DAILY.. 300 each 0     Current Facility-Administered Medications   Medication Dose Route Frequency Provider Last Rate Last Dose     medroxyPROGESTERone injection 150 mg (DEPO-PROVERA)  150 mg Intramuscular Q3 Months Parish Daniels MD   150 mg at 05/22/19 1311               "

## 2021-05-30 NOTE — PROGRESS NOTES
Patient came to follow up with MTM, pharmacist and chart review sent to [facility] RN to review patient's chart.      Next outreach: 08/26/2019.

## 2021-05-31 VITALS — HEIGHT: 59 IN | WEIGHT: 96.31 LBS | BODY MASS INDEX: 19.42 KG/M2

## 2021-05-31 VITALS — WEIGHT: 96.5 LBS | BODY MASS INDEX: 19.45 KG/M2 | HEIGHT: 59 IN

## 2021-05-31 VITALS — BODY MASS INDEX: 19.96 KG/M2 | HEIGHT: 59 IN | WEIGHT: 99 LBS

## 2021-05-31 VITALS — WEIGHT: 98.5 LBS | HEIGHT: 59 IN | BODY MASS INDEX: 19.86 KG/M2

## 2021-05-31 VITALS — BODY MASS INDEX: 20.64 KG/M2 | HEIGHT: 59 IN | WEIGHT: 102.38 LBS

## 2021-05-31 NOTE — PROGRESS NOTES
Clinic Care Coordination Contact    Follow Up Progress Note      Assessment: Monthly f/u on diabetes   Chart review completed.  Had A1c on 8/8/19 - 12.3 slightly improved from >14 on 5/7/19. Need improvement. Working with MTM and PCP to address DM II.   Last seen by MTM on 7/25/19 and f/u appt on 9/18/19. Next f/u appt today 8/12/19. Spouse states he doesn't think patient will go to her appt today because she's still sleeping. Appears spouse doesn't want to disturb patient when she sleeps.  Was seen by diabetic educator on 7/18/19 at [facility] by Magali Ding RN.   Writer spoke with spouse via phone.   Spouse states patient is sleeping and she won't wake up until 1-2pm.   Writer instructed spouse to ask patient to call writer back once she's up.    Goals addressed this encounter:   Goals Addressed     None        Goals        Patient Stated      RN goal: I would like to get my diabetes A1C <7. (pt-stated)      Action steps to achieve this goal    1. I will follow up with endocrinologist again once scheduled in September 2019.  2. I will also come see pharmacist on 9/13/19.   3. I will come see diabetic educator as scheduled at [facility]. Missed appt today. Patient will call and reschedule.   4. I will follow up with my doctor as scheduled.      Goal update: 8/12/19  Goal update: 04/29/2019               Intervention/Education provided during outreach:  Patient returned writer call and states she missed her appt with diabetic educator today because she overslept.   States she will call and reschedule appt - don't need CHW assist.   Educated patient on the important of going to her medical appts as scheduled.   To check BG as directed and take all her meds as prescribed.   Patient verbalized understanding.           Plan:   1) [facility] RN will do monthly chart review     Care Coordinator will follow up in 9/18/19

## 2021-05-31 NOTE — PROGRESS NOTES
ASSESMENT AND PLAN:  Diagnoses and all orders for this visit:    Type 2 diabetes mellitus with insulin therapy (H)  -     Comprehensive Metabolic Panel  -     Glycosylated Hemoglobin A1c  A1c 12.3 today, was > 14 in 5/19.  She has appointment with basic education at [facility] next week on 8/12/2019, instructed to keep that appointment.  She has appointment with endocrinology at [facility] in 10/19.  Instructed to keep that appointment.  No changes were made today since patient sees endocrinology.    Mixed hyperlipidemia  -     Lipid Cascade    Encounter for surveillance of injectable contraceptive  Depo injection today.     This transcription uses voice recognition software, which may contain typographical errors.      SUBJECTIVE: Tyson Skelton is a 24-year-old female with type 2 diabetes, hyperlipidemia here for double injection and follow-up on diabetes.  She sees [facility] endocrinology, last seen on 7/15/2019.  She states she takes all medications as prescribed.  She forgot to bring her pill bottles.  She forgot to bring her blood sugar log.  She is currently taking metformin  mg 4 tablets daily, NovoLog 70/30 50 units twice a day before meals, lisinopril 2.5 mg daily and Lipitor 20 mg daily.  She states her blood fasting blood sugar are in 200s and 300s.  She denied abdominal pain, nausea or vomiting.  No fatigue.  States she is feeling well.  She does not have any questions or concerns today.    Past Medical History:   Diagnosis Date     Chronic kidney disease      Diabetes mellitus, type II (H)      Hyperglycemia without ketosis 2010    hospitalized at [facility]     Hyperlipidemia      Patient Active Problem List   Diagnosis     Vitamin D Deficiency     Type 2 diabetes mellitus treated with insulin (H)     Hyperlipidemia     Chronic Kidney Disease (NKF Classification)       Allergies:  No Known Allergies    Social History     Tobacco Use   Smoking Status Passive Smoke Exposure - Never Smoker  "  Smokeless Tobacco Never Used       Review of systems otherwise negative except as listed in HPI.   Social History     Tobacco Use   Smoking Status Passive Smoke Exposure - Never Smoker   Smokeless Tobacco Never Used       OBJECTICE: BP 96/54 (Patient Site: Left Arm, Patient Position: Sitting, Cuff Size: Adult Regular)   Pulse 80   Temp 97.3  F (36.3  C) (Oral)   Resp 16   Ht 4' 11\" (1.499 m)   Wt 142 lb 2 oz (64.5 kg)   BMI 28.71 kg/m      DATA REVIEWED:    Labs Reviewed or Ordered (1):      GEN-alert,  in no apparent distress.  HEENT-mucous membranes are moist, neck is supple.  CV-regular rate and rhythm with no murmur.   RESP-lungs clear to auscultation .  ABDOMEN- Soft , not tender.  EXTREM- No open lesions or ulcers.  Sensation intact.  SKIN-normal        Parish Daniels   8/8/2019   "

## 2021-05-31 NOTE — PROGRESS NOTES
Scheduled Follow Up Call: Attempt 1 Community Health Worker called and left a message for the patient. If the patient is returning my call, please transfer the patient to RACHELWArise at ext. [phone].         Next outreach: 09/03/2019

## 2021-05-31 NOTE — PROGRESS NOTES
Situation:  CHW follow up call.    Background:  Patient has diabetes goal, A1C is at 12.3 and currently working with [facility] RN.    Assessment:   CHW called and spoke with patient that all medications are current and health insurance.     Recommendations:   Patient was informed to call CHW if unable to get medications refill or having issues with health insurance.        Next outreach: 10/10/2019.

## 2021-06-01 VITALS — WEIGHT: 103.9 LBS | HEIGHT: 59 IN | BODY MASS INDEX: 20.95 KG/M2

## 2021-06-01 VITALS — BODY MASS INDEX: 21.75 KG/M2 | HEIGHT: 59 IN | WEIGHT: 107.9 LBS

## 2021-06-01 VITALS — WEIGHT: 103.5 LBS | HEIGHT: 59 IN | BODY MASS INDEX: 20.87 KG/M2

## 2021-06-01 VITALS — HEIGHT: 59 IN | BODY MASS INDEX: 20.96 KG/M2 | WEIGHT: 104 LBS

## 2021-06-01 VITALS — HEIGHT: 59 IN | WEIGHT: 108.6 LBS | BODY MASS INDEX: 21.89 KG/M2

## 2021-06-01 VITALS — HEIGHT: 59 IN | WEIGHT: 101.13 LBS | BODY MASS INDEX: 20.39 KG/M2

## 2021-06-01 NOTE — PROGRESS NOTES
Clinic Care Coordination Contact    Follow Up Progress Note      Assessment: Monthly chart review to f/u on diabetes mgmt.   Chart review completed.   Patient met with MTM earlier today. See recommendation below from MTM today.   - Patient to take metformin ER 1000 mg daily for 2 weeks then increase to 1500 mg daily for 2 weeks, then increase to 2000 ng daily  - insulin aspart protamine-insulin aspart (NOVOLOG MIX 70-30FLEXPEN U-100) 100 unit/mL (70-30) pen; Inject 55 Units under the skin 2 (two) times a day before meals.  Dispense: 5 adj dose pen; Refill: PRN  Patient verbalized understanding of the changes above.     A1c - 12.3 on 8/8/19. Has f/u appt with MTM on 10/15/19.   Patient also sees diabetic educator Magali Ding RN - last seen on 9/13/19. Reviewed diabetic educator diet recommendation with patient today.     F/u with PCP in 6 months per PCP's note dated on 8/8/19 ( around 2/8/2020)       Goals addressed this encounter:   Goals        Patient Stated      RN goal: I would like to get my diabetes A1C <7. (pt-stated)      Action steps to achieve this goal    1. I will follow up with endocrinologist again once scheduled in September 2019.  2. I will also come see pharmacist on 9/13/19.   3. I will come see diabetic educator as scheduled at [facility]. Missed appt today. Patient will call and reschedule.   4. I will follow up with my doctor as scheduled.      Goal update: 8/12/19  Goal update: 04/29/2019                  Plan:   1)Patient will attend her appt with MTM on 10/15/19  2) Pt plans to attend her appt with PCP - 6 months f/u around 2/8/2020- appt hasn't schedule yet - will assist patient by November, 2019.   3) CCC RN will do monthly chart review - scheduled on 10/15/19 @ 11am.

## 2021-06-01 NOTE — PROGRESS NOTES
MTM Follow Up Encounter  Assessment & Plan                                                     1. Type 2 diabetes mellitus with insulin therapy (H)/Proteinuria  Needs improvement.  Patient's A1c not yet at goal of less than 7%, though it is lower than previously.  Patient states that she continues to take insulin and metformin per Endo.  Although, patient has been taking metformin 2000 mg daily and noticing diarrhea with administration, despite taking it with food.  Difficult to discern if patient gradually increased her metformin dose to reduce the risk of metformin induced diarrhea.  Recommended this today, patient agreeable. Additionally, patient agreeable to increase insulin with recently elevated BG. BMP stable at last check, continue lisinopril, could consider increase in dose in the future if tolerating.   - Patient to take metformin ER 1000 mg daily for 2 weeks then increase to 1500 mg daily for 2 weeks, then increase to 2000 ng daily  - insulin aspart protamine-insulin aspart (NOVOLOG MIX 70-30FLEXPEN U-100) 100 unit/mL (70-30) pen; Inject 55 Units under the skin 2 (two) times a day before meals.  Dispense: 5 adj dose pen; Refill: PRN    2. Mixed hyperlipidemia  Needs further assessment. Patient states she is taking atorvastatin 20 mg daily, though questioning adherence to medication based on fill history, will verify at next visit.     Follow Up  Return in about 27 days (around 10/15/2019) for Medication Review.    Subjective & Objective                                                     Tyson Skelton is a 24 y.o. female coming in for an initial visit for Medication Therapy Management. She was referred to me from Parish Daniels MD.  Patient presents with professional  today.     Chief Complaint: Follow up from MTM visit on 7/25/19    Medication Adherence/Access: Self management.  Adherence is improving.  Patient brings in freestyle ronnie glucometer today, without medications.    Diabetes/proteinuria:   Pt currently taking metformin  mg four tablets once daily (not taking every day as prescribed), Novolin 70/30 mix insulin 50 units twice daily, states she is no longer missing any medication doses as before.  Patient admits that she has been having diarrhea since taking metformin 4 tablets daily, because of this she is not taking metformin every day as prescribed, instead she is taking once every 2-3 days. She is taking metformin with food. States that she did not taper up on the dosing of this medication.   SMBG - Andrews: Freestyle andrews CGM shows blood sugars less controlled than previously. Unable to download data today due to time constraint but reviewed with patient while looking at her meter.     Patient  Is not experiencing hypoglycemia  ACEi/ARB:  Lisinopril 2.5 mg daily, started 7/11/2019. Denies dry cough or dizziness, or other side effects.   Aspirin: Not taking due to not yet recommended per age   Diet/Exercise: Eating 2-3 meals a day.  No changes in diet, no exericse. Eating rice, noodles, vegetables and meat. Large bowl of rice or noodles with each meal.  Lab Results   Component Value Date    HGBA1C 12.3 (H) 08/08/2019    HGBA1C >14.0 (H) 05/07/2019    HGBA1C 9.9 (H) 10/03/2018     Lab Results   Component Value Date    GLUF 160 (H) 12/24/2012    MICROALBUR 7.62 (H) 07/11/2019    LDLCALC 82 08/08/2019    CREATININE 0.77 08/08/2019     BP Readings from Last 3 Encounters:   09/18/19 100/76   08/08/19 96/54   07/25/19 100/62     Lab Results   Component Value Date    CREATININE 0.77 08/08/2019     Lab Results   Component Value Date    K 4.1 08/08/2019     Hyperlipidemia: Atorvastatin 20 mg daily. States that she is using this every day, of note this was last filled on 7/25 #30. Denies any issues of muscle aching or pain. On depo-provera for contraception prevention Q3 months. Previously was taking atorvastatin 20 mg daily before pregnancy, now no longer pregnant.   Lab Results   Component Value Date     CHOL 166 08/08/2019    CHOL 312 (H) 05/07/2019    CHOL 140 03/09/2018     Lab Results   Component Value Date    HDL 43 (L) 08/08/2019    HDL 53 05/07/2019    HDL 41 (L) 03/09/2018     Lab Results   Component Value Date    LDLCALC 82 08/08/2019    LDLCALC 219 (H) 05/07/2019    LDLCALC 83 03/09/2018     Lab Results   Component Value Date    TRIG 205 (H) 08/08/2019    TRIG 202 (H) 05/07/2019    TRIG 80 03/09/2018     PMH: reviewed in EPIC   Allergies/ADRs: reviewed in EPIC   Alcohol: reviewed in EPIC   Tobacco:   Social History     Tobacco Use   Smoking Status Passive Smoke Exposure - Never Smoker   Smokeless Tobacco Never Used     Today's Vitals:   Vitals:    09/18/19 1023   BP: 100/76   Pulse: (!) 102   SpO2: 98%   Weight: 144 lb 3.2 oz (65.4 kg)     ----------------  The patient was given a summary of these recommendations as an after visit summary    I spent 30 minutes with this patient today.   All changes were made via collaborative practice agreement with Parish Daniels MD. A copy of the visit note was provided to the patient's provider.     Cary Leo, PharmD  Medication Therapy Management Pharmacist  [facility]       Current Outpatient Medications   Medication Sig Dispense Refill     alcohol swabs (BD ALCOHOL SWABS) PadM Use for checking blood sugar and injecting insulin, up to 7 times daily. 500 each 3     atorvastatin (LIPITOR) 20 MG tablet Take 1 tablet (20 mg total) by mouth daily. 30 tablet 11     blood glucose test (CONTOUR NEXT TEST STRIPS) strips Use to check blood sugar four times daily.  One Touch Ultra test strips. 400 strip 11     flash glucose scanning reader (FREESTYLE DUC 14 DAY READER) Misc Use 1 Units As Directed every 8 (eight) hours. 1 each 0     flash glucose sensor (FREESTYLE DUC 14 DAY SENSOR) Kit Use 1 Units As Directed every 14 (fourteen) days. 6 kit 3     generic lancets Use to check blood sugar two times daily. 200 each 3     insulin aspart protamine-insulin  "aspart (NOVOLOG MIX 70-30FLEXPEN U-100) 100 unit/mL (70-30) pen Inject 55 Units under the skin 2 (two) times a day before meals. 5 adj dose pen PRN     lisinopril (PRINIVIL,ZESTRIL) 2.5 MG tablet Take 1 tablet (2.5 mg total) by mouth daily. 30 tablet 3     metFORMIN (GLUCOPHAGE-XR) 500 MG 24 hr tablet Take 500 mg by mouth 2 (two) times a day with meals. Working on increasing to 2 tablets two times a day Per endocrinology       pen needle, diabetic (PEN NEEDLE) 31 gauge x 3/16\" Ndle USE TO INJECT INSULIN THREE TIMES DAILY.. 300 each 0     No current facility-administered medications for this visit.          "

## 2021-06-02 VITALS — BODY MASS INDEX: 26.84 KG/M2 | WEIGHT: 133.13 LBS | HEIGHT: 59 IN

## 2021-06-02 NOTE — TELEPHONE ENCOUNTER
Depo Provera order ended at last dose 08/08/19  Patient has 10/30/19 [facility] appointment for Depo.    Will Provider please place order if indicated?  Thank you.

## 2021-06-02 NOTE — PROGRESS NOTES
Clinic Care Coordination Contact    Follow Up Progress Note      Assessment: f/u on A1c result and DM mgmt    Unable to reach patient via phone. Spoke with patient's sister via phone. Sister states patient is not living with her and Pt is living with her in laws.    1) A1c 12.3 on 8/8/19 - Meformin 500mg two times a day and Novolog 55 units two times a day but per MTM's note dated on 9/18/19, patient should be taking 2000mg dialy.     Patient has appt with MTM today at 2pm.   MTM Follow Up Encounter  Assessment & Plan                                                     1. Type 2 diabetes mellitus with insulin therapy (H)  Needs improvement. Patient's A1c not yet at goal of less than 7% and SMBG appears vastly elevated per CGM today.  Patient continues metformin at desired dose and Novolog 70/30 mix insulin 55 units daily, though her blood sugars are not reflecting this. Patient has upcoming Endocrinology appointment tomorrow, will follow up with their assessment and ajustments. Opted for diet and exercise regimen adjustment today. Could consider GLP-1 agonist or increased insulin dose. Recommended patient remove faulty sensor if no data is beginning to generate in 1 hour, get refill from pharmacy.  Future consideration: GLP-1 agonist or increased insulin if BG not well managed at follow up    Goals addressed this encounter:   Goals Addressed    None               Plan:   1) Pt will attend appt with MTM on 11/15/19  2) CCC RN will do monthly chart review. Scheduled on 11/15/19

## 2021-06-02 NOTE — PROGRESS NOTES
Situation(s):  CHW monthly outreach and follow up with patient.    Background:  Patient who has chronic health issues and diabetes A1C uncontrolled is enrolled with [facility] for coordination assistance and to be connected with community resources.    Assessment:  Patient reported that she's doing well, continues checking blood sugar 3x daily, all medications are current, health insurance is active with no ending indicated in MNITS and patient does not resources at this time.    Recommendation(s):  Patient was reminded for upcoming appointments with PCP and pharmacist reminded to bring all medications for providers to review the. Patient was advised to call CHW with questions or concerns regarding coordination assistance and if additional resources needed.      Next outreach: 11/20/2019

## 2021-06-02 NOTE — TELEPHONE ENCOUNTER
Prior Authorization Request  Who s requesting:  Pharmacy  [facility]  Medication Name:   flash glucose sensor (FREESTYLE UDC 14 DAY SENSOR) Kit 6 kit 3 6/26/2019  --   Sig - Route: Use 1 Units As Directed every 14 (fourteen) days. - Miscellaneous       Insurance Plan: [employer]   Insurance Member ID Number:  [id number]  Informed patient that prior authorizations can take up to 10 business days for response:   No  Okay to leave a detailed message: Yes  Key [id number]

## 2021-06-02 NOTE — PROGRESS NOTES
MTM Follow Up Encounter  Assessment & Plan                                                     1. Type 2 diabetes mellitus with insulin therapy (H)  Needs improvement. Patient's A1c not yet at goal of less than 7% and SMBG appears vastly elevated per CGM today.  Patient continues metformin at desired dose and Novolog 70/30 mix insulin 55 units daily, though her blood sugars are not reflecting this. Patient has upcoming Endocrinology appointment tomorrow, will follow up with their assessment and ajustments. Opted for diet and exercise regimen adjustment today. Could consider GLP-1 agonist or increased insulin dose. Recommended patient remove faulty sensor if no data is beginning to generate in 1 hour, get refill from pharmacy.     Future consideration: GLP-1 agonist or increased insulin if BG not well managed at follow up    2. Mixed hyperlipidemia  Stable. Taking and tolerating atorvastatin 20 mg daily. Annual cholesterol last checked August 2019, recommend continuation.     Follow Up  Return in about 1 month (around 11/15/2019) for Medication Review.    Subjective & Objective                                                     Tyson Skelton is a 24 y.o. female coming in for an initial visit for Medication Therapy Management. She was referred to me from Parish Daniels MD.  Patient presents with professional  today.     Chief Complaint: Follow up from MTM visit on 9/18/19    Medication Adherence/Access: Self management.  Adherence is improving.  Patient brings in freestyle andrews glucometer today, without medications.    Diabetes/proteinuria:  Pt currently taking metformin  mg two tablets twice daily, Novolin 70/30 mix insulin 55 units twice daily, states that she never misses insulin dosing. Patient presents with Andrews but states that she put on a new sensor 3-4 days ago and it has not started working for unknown reason. This is her 6th sensor and all of the other ones have worked so far.   SMBG - Andrews:  Freestyle ronnie CGM shows blood sugars vastly elevated. 9/27/19-10/10/19 shows above 180 93% and within goal BG 7%.   Patient  Is not experiencing hypoglycemia  ACEi/ARB:  Lisinopril 2.5 mg daily. Denies dry cough or dizziness, or other side effects.   Aspirin: Not taking due to not yet recommended per age   Diet/Exercise: Eating 2-3 meals a day.  No changes in diet, no exericse. Eating rice, noodles, vegetables and meat. Large bowl of rice or noodles with each meal.  Lab Results   Component Value Date    HGBA1C 12.3 (H) 08/08/2019    HGBA1C >14.0 (H) 05/07/2019    HGBA1C 9.9 (H) 10/03/2018     Lab Results   Component Value Date    GLUF 160 (H) 12/24/2012    MICROALBUR 7.62 (H) 07/11/2019    LDLCALC 82 08/08/2019    CREATININE 0.77 08/08/2019     BP Readings from Last 3 Encounters:   10/15/19 100/70   09/18/19 100/76   08/08/19 96/54     Hyperlipidemia: Atorvastatin 20 mg daily. Denies any muscle pain or aching.   Lab Results   Component Value Date    LDLCALC 82 08/08/2019     PMH: reviewed in EPIC   Allergies/ADRs: reviewed in EPIC   Alcohol: reviewed in EPIC   Tobacco:   Social History     Tobacco Use   Smoking Status Passive Smoke Exposure - Never Smoker   Smokeless Tobacco Never Used     Today's Vitals:   Vitals:    10/15/19 1420   BP: 100/70   Pulse: (!) 106   SpO2: 98%   Weight: 146 lb (66.2 kg)     ----------------  The patient was given a summary of these recommendations as an after visit summary    I spent 30 minutes with this patient today.   All changes were made via collaborative practice agreement with Parish Daniels MD. A copy of the visit note was provided to the patient's provider.     Cary Leo, PharmD  Medication Therapy Management Pharmacist  [facility]       Current Outpatient Medications   Medication Sig Dispense Refill     alcohol swabs (BD ALCOHOL SWABS) PadM Use for checking blood sugar and injecting insulin, up to 7 times daily. 500 each 3     atorvastatin (LIPITOR) 20 MG  "tablet Take 1 tablet (20 mg total) by mouth daily. 30 tablet 11     blood glucose test (CONTOUR NEXT TEST STRIPS) strips Use to check blood sugar four times daily.  One Touch Ultra test strips. 400 strip 11     flash glucose scanning reader (FREESTYLE DUC 14 DAY READER) Misc Use 1 Units As Directed every 8 (eight) hours. 1 each 0     flash glucose sensor (FREESTYLE DUC 14 DAY SENSOR) Kit Use 1 Units As Directed every 14 (fourteen) days. 6 kit 3     generic lancets Use to check blood sugar two times daily. 200 each 3     insulin aspart protamine-insulin aspart (NOVOLOG MIX 70-30FLEXPEN U-100) 100 unit/mL (70-30) pen Inject 55 Units under the skin 2 (two) times a day before meals. 5 adj dose pen PRN     lisinopril (PRINIVIL,ZESTRIL) 2.5 MG tablet Take 1 tablet (2.5 mg total) by mouth daily. 30 tablet 3     metFORMIN (GLUCOPHAGE-XR) 500 MG 24 hr tablet Take 500 mg by mouth 2 (two) times a day with meals. Working on increasing to 2 tablets two times a day Per endocrinology       pen needle, diabetic (PEN NEEDLE) 31 gauge x 3/16\" Ndle USE TO INJECT INSULIN THREE TIMES DAILY.. 300 each 0     No current facility-administered medications for this visit.        "

## 2021-06-02 NOTE — TELEPHONE ENCOUNTER
Central PA team  [phone]  Pool: HE PA MED ([id number])          PA has been initiated.       PA form completed and faxed insurance via Cover My Meds     Key:   [id number] - PA Case ID: [id number]     Medication:  Freestyle Andrews     Insurance:  [employer]        Response will be received via fax and may take up to 5-10 business days depending on plan

## 2021-06-03 VITALS — WEIGHT: 142.13 LBS | BODY MASS INDEX: 28.65 KG/M2 | HEIGHT: 59 IN

## 2021-06-03 VITALS — BODY MASS INDEX: 26.22 KG/M2 | WEIGHT: 130.06 LBS | HEIGHT: 59 IN

## 2021-06-03 VITALS
BODY MASS INDEX: 29.12 KG/M2 | SYSTOLIC BLOOD PRESSURE: 100 MMHG | WEIGHT: 144.2 LBS | DIASTOLIC BLOOD PRESSURE: 76 MMHG | HEART RATE: 102 BPM | OXYGEN SATURATION: 98 %

## 2021-06-03 VITALS — BODY MASS INDEX: 27.85 KG/M2 | WEIGHT: 137.9 LBS

## 2021-06-03 VITALS — WEIGHT: 139 LBS | BODY MASS INDEX: 27.6 KG/M2

## 2021-06-03 VITALS
WEIGHT: 146 LBS | DIASTOLIC BLOOD PRESSURE: 70 MMHG | OXYGEN SATURATION: 98 % | BODY MASS INDEX: 29.49 KG/M2 | HEART RATE: 106 BPM | SYSTOLIC BLOOD PRESSURE: 100 MMHG

## 2021-06-03 VITALS — WEIGHT: 137 LBS | BODY MASS INDEX: 27.67 KG/M2

## 2021-06-03 NOTE — PROGRESS NOTES
Clinic Care Coordination Contact    Follow Up Progress Note      Assessment: f/u on DM II mgmt, A1c results and med compliance  Unable to reach patient via phone today.   Patient also missed her appt with MTM today.   Patient was seen by an endocrinologist on 11/12/19 with changes made.   ~~~~~~~~~~~~~~~~~~~~~~~~~~~~~~  Patient Instructions  Rosangela Palacios PA-C - 11/12/2019 10:45 AM CST     We will start Trulicity injection once weekly. You will give this every Tuesday. Please do continue check your blood sugar at least once daily, but ideally twice daily.     Please give 10 units Novolog (orange) 5- 10 minutes before every meal, plus extra sliding scale if your blood sugar is high.    If this makes your blood sugar too low (<70) Then just give the sliding scale added insulin before your meal.     Please give 70 units of Lantus every night.    Rosangela Palacios PA-C, [facility]  [facility]  Diabetes, Endocrinology, and Metabolism  [phone] Appointments/Nurse  [phone] Fax  [phone] nurse line  ~~~~~~~~~~~~~~~~~~~~~~~~~~~~~~~~~~  Per chart review patient was started on Trulicity weekly on Tuesday. Was seen by RN at Endocrinology clinic for [facility] teaching.    A1c - 12.3 on 8/8/19  meds for DM II:  1) Trulicity inj weekly on Tuesday   2) Novolog 10 units with meals, plus extra sliding scale if BG is high  3) Lantus 70 units HS      Has f/u appt with endocrinologist on 11/19/19 and 1/13/2020           Goals addressed this encounter:   Goals        Patient Stated      RN goal: I would like to get my diabetes A1C <8 in the next 6 months.  (pt-stated)      Action steps to achieve this goal    1. I will follow up with endocrinologist again once scheduled in September 2019.  2. I will also come see pharmacist on 9/13/19.   3. I will come see diabetic educator as scheduled at [facility]. Missed appt today. Patient will call and reschedule.   4. I will follow up with my doctor as scheduled.    Goal  update: 11/15/19  Goal update: 9/18/19  Goal update: 8/12/19  Goal update: 04/29/2019                     Plan:   1) CCC RN will continue to do monthly chart review and support

## 2021-06-03 NOTE — PROGRESS NOTES
ASSESMENT AND PLAN:  Diagnoses and all orders for this visit:    Mixed hyperlipidemia  Continue Lipitor 20 mg.  To recheck at next visit.  Follow-up in 6 months.    Type 2 diabetes mellitus treated with insulin (H)   Managed by endocrinology.  Appreciate input.    Contraception  On depo. Next dose due in 1/20.    Need for immunization against influenza  -     Influenza,Seasonal,Quad,INJ =/>6months    Immunization due  -     HPV vaccine 9 valent 3 dose IM    This transcription uses voice recognition software, which may contain typographical errors.        SUBJECTIVE: Tyson Skelton is a 24-year-old female with confirmed type 2 diabetes and hyperlipidemia here for follow-up.  Diabetes is managed by endocrinology, last seen on 11/19/2019.  Note reviewed.  Insulin adjusted, see below.  She takes Lipitor 20 mg daily.  Last lipid was in 8/19, LDL was 82.  States she is doing well.  No new concerns or complaints since last visit.       ( Endo note from 11/19/19:  Please increase Lantus to 84 units daily. Please give 42 units twice daily. Please add one unit every other day until most fasting blood sugars are <150 as long as this does not lead to low blood sugar (<70) over night or early in the morning.    For Novolog please give:  Correction Factor of 1 for 20 mg/dL  Do Not give Correction Insulin if Pre-Meal BG less than 120.   For Pre-Meal  - 140 give 1 unit.   For Pre-Meal  - 160 give 2 units.   For Pre-Meal  - 180 give 4 units.   For Pre-Meal  - 200 give 5 units.   For Pre-Meal  - 220 give 6 units.   For Pre-Meal  - 240 give 7 units.   For Pre-Meal  - 260 give 8 units.   For Pre-Meal  - 280 give 9 units.  For Pre-Meal  - 300 give 10 units.   For Pre-Meal  - 320 give 11 units.   For Pre-Meal  - 340 give 12 units.  For Pre-Meal  - 360 give 13 units.   For Pre-Meal BG greater than or equal to 360 give 14 units and be sure to drink plenty of fluids and  "recheck blood sugar in 3 - 4 hours. If not <300, please call clinic. )        Past Medical History:   Diagnosis Date     Chronic kidney disease      Diabetes mellitus, type II (H)      Hyperglycemia without ketosis 2010    hospitalized at [facility]     Hyperlipidemia      Patient Active Problem List   Diagnosis     Vitamin D Deficiency     Type 2 diabetes mellitus treated with insulin (H)     Hyperlipidemia     Chronic Kidney Disease (NKF Classification)       Allergies:  No Known Allergies    Social History     Tobacco Use   Smoking Status Passive Smoke Exposure - Never Smoker   Smokeless Tobacco Never Used       Review of systems otherwise negative except as listed in HPI.   Social History     Tobacco Use   Smoking Status Passive Smoke Exposure - Never Smoker   Smokeless Tobacco Never Used       OBJECTICE: BP 98/64   Pulse (!) 108   Temp 98.7  F (37.1  C) (Oral)   Resp 20   Ht 4' 11\" (1.499 m)   Wt 150 lb 14.4 oz (68.4 kg)   LMP  (LMP Unknown)   BMI 30.48 kg/m      DATA REVIEWED:  Additional History from Old Records Summarized (2):   Labs Reviewed or Ordered (1):      GEN-alert,  in no apparent distress.  HEENT-mucous membranes are moist, neck is supple.  CV-regular rate and rhythm with no murmur.   RESP-lungs clear to auscultation .  ABDOMEN- Soft , not tender.  EXTREM- No open lesions or ulcers. Sensation intact.   SKIN-normal        Parish Daniels   11/21/2019   "

## 2021-06-03 NOTE — PROGRESS NOTES
Situation(s):  CHW monthly outreach and follow up with patient.     Background:  Patient who has chronic health issues and diabetes A1C uncontrolled is enrolled with [facility] for coordination assistance and to be connected with community resources.     Assessment:  Patient continues taking medications daily and able to get the refills when needed. Patient stated that she continues checking blood sugar 3x daily as advised. Patient's health insurance is active and no additional resource needed at this time.     Recommendation(s):  Patient was reminded for upcoming appointments with PCP and pharmacist reminded to bring all medications for providers to review the. Patient was advised to call CHW with questions or concerns regarding coordination assistance and if additional resources needed.        Next outreach: 01/16/2020

## 2021-06-03 NOTE — PROGRESS NOTES
MTM Follow Up Encounter  Assessment & Plan                                                     1. Type 2 diabetes mellitus with insulin therapy (H)  Needs improvement. Patient's A1c not yet at goal of less than 7% and SMBG appears vastly elevated per CGM today.  Patient continues metformin at desired dose and Novolog 70/30 mix insulin 55 units daily, though her blood sugars are not reflecting this. Patient has upcoming Endocrinology appointment tomorrow, will follow up with their assessment and ajustments. Opted for diet and exercise regimen adjustment today. Could consider GLP-1 agonist or increased insulin dose. Recommended patient remove faulty sensor if no data is beginning to generate in 1 hour, get refill from pharmacy.     Future consideration: GLP-1 agonist or increased insulin if BG not well managed at follow up    2. Mixed hyperlipidemia  Stable. Taking and tolerating atorvastatin 20 mg daily. Annual cholesterol last checked August 2019, recommend continuation.     Follow Up  No follow-ups on file.    Subjective & Objective                                                     Tyson Skelton is a 24 y.o. female coming in for an initial visit for Medication Therapy Management. She was referred to me from Parish Daniels MD.  Patient presents with professional  today.     Chief Complaint: Follow up from MTM visit on 10/15/19    Medication Adherence/Access: Self management.  Adherence is improving.  Patient brings in freestyle andrews glucometer today, without medications.    Diabetes/proteinuria:    Saw endocrinology on 11/12/19 - DEVON Tomlin.   ANDREWS approved - using?  Metformin?   lantus 70 and novolog 10 + sliding scale  BYDUREON?  Pt currently taking metformin  mg two tablets twice daily, Novolin 70/30 mix insulin 55 units twice daily, states that she never misses insulin dosing. Patient presents with Andrews but states that she put on a new sensor 3-4 days ago and it has not started  working for unknown reason. This is her 6th sensor and all of the other ones have worked so far.   SMBG - Andrews: Freestyle andrews CGM shows blood sugars vastly elevated. 9/27/19-10/10/19 shows above 180 93% and within goal BG 7%.   Patient  Is not experiencing hypoglycemia  ACEi/ARB:  Lisinopril 2.5 mg daily. Denies dry cough or dizziness, or other side effects.   Aspirin: Not taking due to not yet recommended per age   Diet/Exercise: Eating 2-3 meals a day.  No changes in diet, no exericse. Eating rice, noodles, vegetables and meat. Large bowl of rice or noodles with each meal.  Per Endocrinology clinic - A1c 10.9% (10/16/19)  Lab Results   Component Value Date    HGBA1C 12.3 (H) 08/08/2019    HGBA1C >14.0 (H) 05/07/2019    HGBA1C 9.9 (H) 10/03/2018     Lab Results   Component Value Date    GLUF 160 (H) 12/24/2012    MICROALBUR 7.62 (H) 07/11/2019    LDLCALC 82 08/08/2019    CREATININE 0.77 08/08/2019     BP Readings from Last 3 Encounters:   10/15/19 100/70   09/18/19 100/76   08/08/19 96/54     Hyperlipidemia: Atorvastatin 20 mg daily. Denies any muscle pain or aching.   Lab Results   Component Value Date    LDLCALC 82 08/08/2019     PMH: reviewed in EPIC   Allergies/ADRs: reviewed in EPIC   Alcohol: reviewed in EPIC   Tobacco:   Social History     Tobacco Use   Smoking Status Passive Smoke Exposure - Never Smoker   Smokeless Tobacco Never Used     Today's Vitals:   There were no vitals filed for this visit.  ----------------  The patient was given a summary of these recommendations as an after visit summary    I spent 30 minutes with this patient today.   All changes were made via collaborative practice agreement with Parish Daniels MD. A copy of the visit note was provided to the patient's provider.     Cary Leo, PharmD  Medication Therapy Management Pharmacist  [facility]       Current Outpatient Medications   Medication Sig Dispense Refill     alcohol swabs (BD ALCOHOL SWABS) PadM Use for  "checking blood sugar and injecting insulin, up to 7 times daily. 500 each 3     atorvastatin (LIPITOR) 20 MG tablet Take 1 tablet (20 mg total) by mouth daily. 30 tablet 11     blood glucose test (CONTOUR NEXT TEST STRIPS) strips Use to check blood sugar four times daily.  One Touch Ultra test strips. 400 strip 11     flash glucose scanning reader (FREESTYLE DUC 14 DAY READER) Misc Use 1 Units As Directed every 8 (eight) hours. 1 each 0     flash glucose sensor (FREESTYLE DUC 14 DAY SENSOR) Kit Use 1 Units As Directed every 14 (fourteen) days. 6 kit 3     generic lancets Use to check blood sugar two times daily. 200 each 3     insulin aspart protamine-insulin aspart (NOVOLOG MIX 70-30FLEXPEN U-100) 100 unit/mL (70-30) pen Inject 55 Units under the skin 2 (two) times a day before meals. 5 adj dose pen PRN     lisinopril (PRINIVIL,ZESTRIL) 2.5 MG tablet Take 1 tablet (2.5 mg total) by mouth daily. 30 tablet 3     metFORMIN (GLUCOPHAGE-XR) 500 MG 24 hr tablet Take 500 mg by mouth 2 (two) times a day with meals. Working on increasing to 2 tablets two times a day Per endocrinology       pen needle, diabetic (PEN NEEDLE) 31 gauge x 3/16\" Ndle USE TO INJECT INSULIN THREE TIMES DAILY.. 300 each 0     Current Facility-Administered Medications   Medication Dose Route Frequency Provider Last Rate Last Dose     medroxyPROGESTERone injection 150 mg (DEPO-PROVERA)  150 mg Intramuscular Q3 Months Parish Daniels MD   150 mg at 10/30/19 1153     "

## 2021-06-04 VITALS
WEIGHT: 150.9 LBS | HEIGHT: 59 IN | HEART RATE: 108 BPM | TEMPERATURE: 98.7 F | DIASTOLIC BLOOD PRESSURE: 64 MMHG | SYSTOLIC BLOOD PRESSURE: 98 MMHG | BODY MASS INDEX: 30.42 KG/M2 | RESPIRATION RATE: 20 BRPM

## 2021-06-04 NOTE — PROGRESS NOTES
Clinic Care Coordination Contact    Follow Up Progress Note      Assessment: f/u on DM II mgmt and A1c result    Chart review completed today.   Spoke with patient via phone today.     A1c results:  10.9 on 10/16/2019 ( Endocrinology clinic)   Ref Range & Units 8/8/19 1204 5/7/19 1012 3/9/18 1418 11/10/17 1322 8/3/17 1029 4/1/15 1100     Hemoglobin A1c 3.5 - 6.0 % 12.3High   >14.0High   8.7High   9.0High  R >14.0High   >14.0High       A1c results trending the right directing but not yet at goal <8.    Patient states she manages own meds and has no concerns or questions. States doesn't need additional medication teaching.   Patient sees endocrinologist - last seen by endocrinologist on 11/12/19 - Started on Trulicity inj weekly - Was instructed to check BG 1-2 times per day.   Lantus 70 units HS per endocrinologist   Was seen PCP on 11/21/19 - PCP increased Lantus to 84 untis - confirmed by patient today - states she's adm. 42 units two times a day instead of 84 units daily   Reports she's checking BG at least once daily and no hypoglycemic episodes the past 2 weeks.   Patient is also on sliding scale Novolog - states she doesn't have any concerns regarding her insulins.   Educated patient on the important of checking BG at least two times a day and + PRN for s/sx of hypo/hyperglycemia. Patient verbalized understanding.     Has f./u appt with Endocrinologist on 1/13/2020 - Reminded patient of her appt today    Goals addressed this encounter:   Goals        Patient Stated      RN goal: I would like to get my diabetes A1C <8 in the next 6 months.  (pt-stated)      Action steps to achieve this goal    1. I will follow up with endocrinologist again once scheduled in September 2019.  2. I will also come see pharmacist on 9/13/19.   3. I will come see diabetic educator as scheduled at [facility]. Missed appt today. Patient will call and reschedule.   4. I will follow up with my doctor as scheduled.    Goal update:  11/15/19  Goal update: 9/18/19  Goal update: 8/12/19  Goal update: 04/29/2019                    Plan:   1) CCC RN will do monthly chart review till A1c <8  2) Patient will attend her appt with endocrinologist on 1/13/2020   3) CHW to make sure patient has transportation set up for appt on 1/13/2020

## 2021-06-05 VITALS
WEIGHT: 151.5 LBS | TEMPERATURE: 98.2 F | HEIGHT: 59 IN | BODY MASS INDEX: 30.54 KG/M2 | SYSTOLIC BLOOD PRESSURE: 112 MMHG | DIASTOLIC BLOOD PRESSURE: 86 MMHG | HEART RATE: 104 BPM | OXYGEN SATURATION: 96 %

## 2021-06-05 VITALS
HEART RATE: 104 BPM | OXYGEN SATURATION: 97 % | SYSTOLIC BLOOD PRESSURE: 96 MMHG | BODY MASS INDEX: 31.45 KG/M2 | DIASTOLIC BLOOD PRESSURE: 70 MMHG | RESPIRATION RATE: 16 BRPM | TEMPERATURE: 97.9 F | WEIGHT: 156 LBS | HEIGHT: 59 IN

## 2021-06-05 NOTE — PROGRESS NOTES
Situation(s):  CHW monthly outreach and follow up with patient.     Background:  Patient who has chronic health issues, diabetes A1C uncontrolled and language barrier is enrolled with CCC for coordination assistance and to be connected with community resources.     Assessment:  Patient reported that she went to see endocrinologist at [facility] and recommended to follow up again in 3 months. Patient was advised to see diabetic educator from endocrinologist she recently saw recently, CHW offered for coordination however patient declined. Patient reported that she is checking blood sugar 2 times daily before meals and taking medications for diabetes both injection and oral.      Recommendation(s):  Patient was reminded for upcoming appointments with PCP and nurse visit only and  reminded to bring all medications for providers to review them. Patient was advised to call CHW with questions or concerns regarding coordination assistance and if additional resources needed.           Next outreach: 02/27/2020

## 2021-06-05 NOTE — PROGRESS NOTES
"Clinic Care Coordination Contact    Follow Up Progress Note      Assessment: f/u on DM II mgmt/teaching, insulin compliance   Chart review completed today  Spoke with patient via phone today.  Last seen by endocrinologist on 1/13/2020   Exenatide ER 2mg every 7 days - Saturdays   Lantus 84 units at bedtime  Metformin 500mg XR (2) tabs daily ( unable to tolerate 4 tabs daily)  Novolog 10 units with meals and sliding scale   HIGH Insulin Resistance or Correction Factor of 1 for 25 mg/dL  Correction Scale - HIGH INSULIN RESISTANCE DOSING   Do Not give Correction Insulin if Pre-Meal BG less than 140.   For Pre-Meal  - 164 give 1 unit.   For Pre-Meal  - 189 give 2 units.   For Pre-Meal  - 214 give 3 units.   For Pre-Meal  - 239 give 4 units.   For Pre-Meal  - 264 give 5 units.   For Pre-Meal  - 289 give 6 units.   For Pre-Meal  - 314 give 7 units.   For Pre-Meal  - 339 give 8 units.   For Pre-Meal  - 364 give 9 units.  For Pre-Meal BG greater than or equal to 365 give 10 units  To be given with prandial insulin, and based on pre-meal blood glucose.   Notify provider if glucose greater than or equal to 350 mg/dL after administration of correction dose.    Please see Diabetes Education in 1-2 weeks - Patient declined to scheduled f/u appt stating that \" I don't want to see them anymore\"     Please see Diabetes Team PA in 1 month - Patient declined to scheduled f/u appt stating that \" I don't want to see them anymore\"        Please see me in 3 months - Dr. Katherine Peralta - scheduled f/u appt on 4/13/2020      Please call [phone] & ask for Diabetes on -call with serious questions about high or low blood sugars if you do not know what to do      A1c - 13.1 on 1/13/2020 - worsening A1c - 10.9% on 10/16/19    Reports eating most of the time 2 meals per day.   Reports BG resutls <200s in the morning and  >200s before dinner. Denies any BG reading <70s.     Patient " reports she stopped using lantus or taking her meds for 3-4 weeks when her child was sick the past 60 days. Educated patient on the important of taking her insulin and oral meds as prescribed and not to stop using it without consulting with providers first. Patient verbalized understanding.     Reviewed meds with patient via phone. Patient was able to verbalized how, when and what medications she's taking correctly.     Long hx of non compliance.Educted patient on consequences of high A1c and non-compliance with meds as prescribed. Patient verbalized understanding.       Goals addressed this encounter:   Goals        Patient Stated      RN goal: I would like to get my diabetes A1C <8 in the next 6 months.  (pt-stated)      Action steps to achieve this goal    1. I will follow up with endocrinologist again once scheduled in September 2019.  2. I will taking my diabetic oral meds and insulins as prescribed.   3. I will come see diabetic educator as scheduled at [facility]. Declined to see diabetic.   4. I will attend my appt with endocrinologist on 4/14/2020.       Goal discussed: 2/3/2020  Goal update: 11/15/19; 2/3/2020  Goal update: 9/18/19  Goal update: 8/12/19  Goal update: 04/29/2019                    Plan:   1) Patient will attend her appt with endocrinologist on 4/14/2020   2) Pt will attend her appt with PCP on 5/21/2020 for physical  3) CCC RN will continue to do monthly chart review and outreach calls scheduled on 3/2/2020

## 2021-06-05 NOTE — PROGRESS NOTES
Transportation arranged with [employer]: [phone] for appointment on Monday, 01/13/2020 at 2:45PM to see specialist, endocrinologist at [address] [place] [address] [phone].

## 2021-06-06 NOTE — PROGRESS NOTES
Clinic Care Coordination Contact    Follow Up Progress Note      Goals addressed this encounter:   Goals Addressed    None          Intervention/Education provided during outreach: Patient has RN goal only and CCC RN recently spoke with patient on the phone and followed up on goal. Patient's health insurance is active, able to get all medications refill when needed and taking them daily as prescribed. Patient's health insurance is active with [facility] and has no ending on [place]. Patient continues to follow up with specialist, endocrinologist at [facility] in [place] every three months, last follow up was in January 2020 and next follow up is in April 2020.        Plan:   Patient to continue taking current medications for diabetes, patient will follow up with endocrinologist in April 2020 and patient to call CHW with questions or concerns regarding coordination assistance and if additional resources needed.    Care Coordinator will follow up in one month.

## 2021-06-06 NOTE — PROGRESS NOTES
Clinic Care Coordination Contact    Follow Up Progress Note      Assessment: f/u on DM II mgmt/teaching  Chart review completed today  Spoke with patient via phone today.     A1c - 13.1 with last visit with endocrinologist dated on 1/13/2020     Patient states she's taking her medications and insulin as directed but per [facility], patient filled her lisinopril in July, 2019, Lipitor in September, 2019 and metformin in October, 2019. Patient confirmed that she only fills her meds at [facility].     Patient is non-compliance with taking her meds or possible insulins.   Patient agrees to come to see CCC RN in person due to non-compliance with meds and any barriers to prevent her from being non-compliance taking her meds.     States she has f/u appt with endocrinologist in April, 2020. States she doesn't want to f/u with diabetic anymore. Didn't give a reason. Just don't want to.       Goals addressed this encounter:   Goals        Patient Stated      RN goal: I would like to get my diabetes A1C <8 in the next 6 months.  (pt-stated)      Action steps to achieve this goal    1. I will follow up with endocrinologist again once scheduled in September 2019.  2. I will taking my diabetic oral meds and insulins as prescribed.   3. I will come see diabetic educator as scheduled at [facility]. Declined to see diabetic.   4. I will attend my appt with endocrinologist on 4/14/2020.       Goal discussed: 2/3/2020; 3/2/2020  Goal update: 11/15/19; 2/3/2020  Goal update: 9/18/19  Goal update: 8/12/19  Goal update: 04/29/2019                    Plan:   1) CCC RN mailed out future appts to patient today.   2) Patient will attend her appt with CCC RN on 3/16/2020

## 2021-06-06 NOTE — PROGRESS NOTES
Clinic Care Coordination Contact  Community Health Worker Delegation:  Due Date: Within 2 weeks from today's date 3/16/2020  Delegation: No Show for RN Appointment. Please Follow unsuccessful outreach, no show standard work.  Rescheduled it to 4/1/2020

## 2021-06-07 NOTE — PROGRESS NOTES
Clinic Care Coordination Contact    Follow Up Progress Note      Assessment:   Writer received a call from patient stating that [facility] didn't deliver her test strips and lancets.   Need new script per pharmacy.     Message sent to PCP to send new scripts to [facility] for lancets and test strips.

## 2021-06-07 NOTE — PROGRESS NOTES
Clinic Care Coordination Contact    Follow Up Progress Note      Assessment: f/u on med refill issues, diabetic supplies refills, post appt with endocrinologist on 4/13/2020    Chart review completed today.   Spoke with patient via phone.     Per chart review, endocrinologist did virtual visit with patient on 4/13/2020.   ASSESSMENT/PLAN:    1. Diabetes type II, uncontrolled, with microalbuminuria. Some of her numbers look excellent, thus I am concerned about titrating doses; seems as though when she is compliant, her BG are in target, and many of her high numbers are likely related to noncompliance with insulin regimen/diet modification.   -most recent A1c 13.1% on 1/13/20.  -Metformin XR 1000mg daily (didn't tolerate increase 2/2 diarrhea)  -Lantus 84 units daily HS- reaffirmed she needs to take every day  -she unfortunately is unwilling to resume Exenatide ER, due to lessening of appetite  -increase Novolog from 10>20 units TID with meals  -she has not implemented Novolog sliding scale.     -She would benefit from continued CDE/RD follow up.   -She may benefit from simplification of her regimen to once daily combination degludec-liraglutide (Xultophy); admittedly the liraglutide may cause similar symptoms as her exenatide ER, however may be less significant, and she would greatly benefit from the continued use of a GLP1, if she can tolerate it. Will continue to assess medication regimen at follow up.     Follow up:  1. With [facility] endocrinology in 2-3 months.   2. Diabetes Educator follow up: recommend in 1-2 months.       Goals addressed this encounter:   Goals        Patient Stated      Improve chronic symptoms (pt-stated)      Goal Statement: I will attend my appt with endocrinologist in the next 3 months.   Date Goal set: 4/21/2020  Barriers: language barrier, non-compliance, lack of understanding  Strengths: good family support. Engaging with plan of care  Date to Achieve By: 7/21/2020  Patient expressed  understanding of goal: Yes    Action steps to achieve this goal:  1. I will attend my appt with endocrinologist as scheduled.   2. I will call CHW with any concerns or questions.           Monitoring (pt-stated)      Goal Statement: I will attend my appt with a diabetic educator the next 3 months.   Date Goal set: 4/21/2020  Barriers: language barrier  Strengths: motivate to learn  Date to Achieve By: 7/21/2020  Patient expressed understanding of goal: Yes    Action steps to achieve this goal:  1. I will attend my appt with diabetic educator as scheduled.  2. I will call CHW with any concerns or questions.                      Plan:   1) New goals created for CHW to f/u on   2) CHW to assist schedule f/u appt in 2-3 months ( around 613/2020-7/13/2020)    [facility]    [address] [address] [phone]      3) CHW to assist schedule f/u appt with diabetic ED in 1-2 months    Magali Ding, RN    [address] [address]    [phone] (Fax)    4) [facility] RN will f/u on 5/26/2020    5) Added diabetic Ed and endocrinologist names to care team today

## 2021-06-07 NOTE — PROGRESS NOTES
Clinic Care Coordination Contact    Follow Up Progress Note      Assessment: f/u on med compliance  Chart review completed today.   Spoke with patient via phone.   Patient states she's taking her meds as directed but at time she forgets.   Educated patient on the important of taking her meds and insulin as prescribed.   Assisted patient today with med refills and issues with med/insulin refills. See note below.       Refills needed today:  1) test strips  2) Lancets  3) pen needles    4) Lantus - reports taking 84 units but [facility] has 70 units daily - Need new script - patient states she's having a hard time refilling this. Was told too soon for refill. Left a message for Dr. Peralta's team to send in her script to [facility].      5) Novolog  - confirmed with patient today she's taking 10 units with meals (2-3) times per day. Has issues with refill. Was told too soon. [facility] needs to need max Novolog dose. Dr. Peralta's office notified.     Katherine Peralta MD - 01/13/2020 2:45 PM CST    Fasting sugar goals:      2 hours after eating <180    Please start taking lantus insulin regularly (basal 24 hour insulin) 84 units at bedtime     Please continue taking Metformin 2 tabs daily, Exenatide weekly, Novolog 10 units w/ meals, Novolog 1 unit per 25>140 (see correction scale below):    In addition to the above, please start taking novolog insulin for correction of high blood sugars:  1 unit per 25 mg/dL >140. This means:      6) Lisinopril - patient states she's not sure why she has to take lisinopril because she's doesn't have HTN. Educated patient that PCP prescribed Lisinopril because she's diabetic and lisinopril helps prevent kidney disease and preserves renal functions. It's it a low dose. Verbalized understanding.     7) Atorvastatin     8) Metformin - states still has enough.     Reports BG results ranging between 100s-200s    Goals addressed this encounter:   Goals         Patient Stated      RN goal: I would like to get my diabetes A1C <8 in the next 6 months.  (pt-stated)      Action steps to achieve this goal    1. I will follow up with endocrinologist again once scheduled in September 2019.  2. I will taking my diabetic oral meds and insulins as prescribed.   3. I will come see diabetic educator as scheduled at [facility]. Declined to see diabetic.   4. I will attend my appt with endocrinologist on 4/14/2020.       Goal discussed: 2/3/2020; 3/2/2020; 4/1/2020  Goal update: 11/15/19; 2/3/2020  Goal update: 9/18/19  Goal update: 8/12/19  Goal update: 04/29/2019                    Plan:   1)  CHW to assist with transportation for her endocrinology appt on 4/13/2020 @ 3pm. Please confirm with clinic not more than 7 days before setting up transportation. CCC RN called the clinic today and they said someone will call the patient a week before if she doesn't have to go.     Katherine Peralta MD    [address] [place] [id number]    [phone] (Fax)      2) CHW to f/u with patient by 4/4/2020 to make sure her meds were delivered including diabetic supplies.     3) CCC RN will f/u on 4/21/2020.

## 2021-06-07 NOTE — PROGRESS NOTES
Transportation request sent to N     Appt on Monday, 4/13/2020 at 2:45 PM arrival  [address] [place] [address]  PH. [phone]

## 2021-06-07 NOTE — PROGRESS NOTES
Clinic Care Coordination Contact    Follow Up Progress Note        Goals addressed this encounter:   Goals Addressed    None          Intervention/Education provided during outreach: Patient continues taking medications for diabetes and checking blood sugar reading daily. Patient reported able to get medications refill when needed and has no new concern at this time. CCC RN continues to follow up with patient on RN goal(s) monthly and send delegation(s) if any.          Plan:   Patient was informed to call CHW for coordination assistance and patient to continue follow up with specialist(s) and PCP as advised.    Care Coordinator will follow up in one month.

## 2021-06-07 NOTE — TELEPHONE ENCOUNTER
Called via  ID [id number].  Confirmed  and appointment date/time.   History of Present Illness:   89 year old female w/ pmhx HTN presents to ED c/o left hip and left arm pain s/p fall. Patient is resident at the [facility] states was walking w/ walker when she attempted to turn, her walker got stuck, fell causing patient to fall. She states that she landed on her left hip and arm. Was unable to get up on her own and nurses helped lift her to a couch until EMS was called. Patient has not ambulated since fall, denies head injury, loc, neck pain, chest pain, sob, abdominal pain, n/v/d, urinary symptoms. Only anti-coag, asa. Patient brought to [facility] where she was found to have a left hip fx.       PAST MEDICAL & SURGICAL HISTORY: Hypertension    Allergies  No Known Allergies    11/15/18 - Patient presented to the emergency department after a fall at her residence. Was evaluated and found to have a left femoral neck fracture.   11/16/18- Patient underwent a Left hip hemiarthroplasty with Dr. Lopes. Patient tolerated procedure well. Patient found to have a urinary tract infection, started on Ciprofloxacin.  11/17/18- Patient was evaluated by physical therapy and occupational therapy whom recommended discharge to subacute rehab.   11/19/18- Patient stable and ready for discharge when bed available. History of Present Illness:   89 year old female w/ pmhx HTN presents to ED c/o left hip and left arm pain s/p fall. Patient is resident at the [place] states was walking w/ walker when she attempted to turn, her walker got stuck, fell causing patient to fall. She states that she landed on her left hip and arm. Was unable to get up on her own and nurses helped lift her to a couch until EMS was called. Patient has not ambulated since fall, denies head injury, loc, neck pain, chest pain, sob, abdominal pain, n/v/d, urinary symptoms. Only anti-coag, asa. Patient brought to [facility] where she was found to have a left hip fx.       PAST MEDICAL & SURGICAL HISTORY: Hypertension    Allergies  No Known Allergies    11/15/18 - Patient presented to the emergency department after a fall at her residence. Was evaluated and found to have a left femoral neck fracture.   11/16/18- Patient underwent a Left hip hemiarthroplasty with Dr. Lopes. Patient tolerated procedure well. Patient found to have a urinary tract infection, started on Ciprofloxacin twice daily. Antibiotic to be completed on 11/19/18.  11/17/18- Patient was evaluated by physical therapy and occupational therapy whom recommended discharge to subacute rehab.   11/19/18- Patient stable and ready for discharge when bed available.

## 2021-06-08 NOTE — PROGRESS NOTES
Clinic Care Coordination Contact    Community Health Worker Follow Up    Goals:   Goals Addressed                 This Visit's Progress      Improve chronic symptoms (pt-stated)        Goal Statement: I will attend my appt with endocrinologist in the next 3 months.   Date Goal set: 4/21/2020  Barriers: language barrier, non-compliance, lack of understanding  Strengths: good family support. Engaging with plan of care  Date to Achieve By: 7/21/2020  Patient expressed understanding of goal: Yes    Action steps to achieve this goal:  1. I will answer my phone when endocrinologist calls me for follow up on 06/01/2020 at 10:15 AM.    2. I will also answer my phone when CCC RN calls me for follow up on 6/29/2020.  3. I will call CHW with any questions or concerns regarding coordination assistance.      Goal update: 05/26/2020.            Intervention/Education provided during outreach: Patient will have virtual follow up visit with endocrinologist with [facility] on 06/01/2020 at 10:15 AM and patient is aware. Patient was informed to call CHW for coordination assistance when needed. Patient reported all medications are current, knows how to call for refills when needed and health insurance is active with no ending date indicated in [place].      CHW next outreach: In one month.

## 2021-06-08 NOTE — TELEPHONE ENCOUNTER
Called patient left message to call clinic back. Patient has appointment on Thursday  May 21, 2020 for physical.  will only be doing video and telephone calls due to Covid -19 to prevent the spread. Please notified patient if she would like to change appointment to diabetes/ telephone visit.

## 2021-06-08 NOTE — PROGRESS NOTES
"Tyson Skelton is a 25 y.o. female who is being evaluated via a billable telephone visit.      The patient has been notified of following:     \"This telephone visit will be conducted via a call between you and your physician/provider. We have found that certain health care needs can be provided without the need for a physical exam.  This service lets us provide the care you need with a short phone conversation.  If a prescription is necessary we can send it directly to your pharmacy.  If lab work is needed we can place an order for that and you can then stop by our lab to have the test done at a later time.    Telephone visits are billed at different rates depending on your insurance coverage. During this emergency period, for some insurers they may be billed the same as an in-person visit.  Please reach out to your insurance provider with any questions.    If during the course of the call the physician/provider feels a telephone visit is not appropriate, you will not be charged for this service.\"    Patient has given verbal consent to a Telephone visit? Yes    What phone number would you like to be contacted at? [phone]        Additional provider notes: Patient is a 25-year-old female with type 2 diabetes and significant insulin resistant, hyperlipidemia and vitamin D deficiency.  Diabetes is managed by endocrinology.  She was last seen by endocrinology on 4/13/2020, note reviewed.  She is currently on metformin  mg daily, Lantus 84 units daily NovoLog was increased to 20 units 3 times daily with meals.  She states her blood sugar are still in the high 100s and 200 range.  She takes lisinopril 2.5 mg and Lipitor 20 mg daily.    Assessment/Plan:  1. Mixed hyperlipidemia  - atorvastatin (LIPITOR) 20 MG tablet; Take 1 tablet (20 mg total) by mouth daily.  Dispense: 30 tablet; Refill: 11    2. Chronic kidney disease, unspecified CKD stage  - lisinopriL (PRINIVIL,ZESTRIL) 2.5 MG tablet; Take 1 tablet (2.5 mg " total) by mouth daily.  Dispense: 30 tablet; Refill: 11  Normal creatinine in 8/19.  Labs when she can come into the clinic.    3. Type 2 diabetes mellitus treated with insulin (H)  Managed by endocrinology.      Phone call duration:  6  minutes  Dr. Parish Daniels  5/21/2020 1:03 PM    This transcription uses voice recognition software, which may contain typographical errors.

## 2021-06-09 ENCOUNTER — AMBULATORY - HEALTHEAST (OUTPATIENT)
Dept: NURSING | Facility: CLINIC | Age: 26
End: 2021-06-09

## 2021-06-09 NOTE — PROGRESS NOTES
Clinic Care Coordination Contact     Community Health Worker Follow Up    Goals:   Goals Addressed                 This Visit's Progress      Improve chronic symptoms (pt-stated)   On track     Goal Statement: I will attend my appt with endocrinologist in the next 3 months.   Date Goal set: 4/21/2020  Barriers: language barrier, non-compliance, lack of understanding  Strengths: good family support. Engaging with plan of care  Date to Achieve By: 7/21/2020  Patient expressed understanding of goal: Yes    Action steps to achieve this goal:  1. I spoke with endocrinologist on the phone and will answer my phone when endocrinologist calls me again at the next follow up.   2. I will also answer my phone when CCC RN calls me for follow up on 8/05/2020.  3. I will call CHW with any questions or concerns regarding coordination assistance.      Goal update: 05/26/2020; 07/07/2020.          Intervention/Education provided during outreach: Patient reported that all medications are current and knows how to take them. Patient stated that she spoke with endocrinologist on the phone for follow up and will answer phone when endocrinologist calls again. Patient reminded to call for coordination assistance and if additional resources needed.       CHW Next Follow-up: In one month.

## 2021-06-09 NOTE — TELEPHONE ENCOUNTER
Left voice message requesting call back to clinic at [phone] to do travel screen via phone.     Please confirm  and appointment time when documenting travel screen responses.

## 2021-06-09 NOTE — PROGRESS NOTES
Clinic Care Coordination Contact    Follow Up Progress Note      Assessment: f/u on DM II mgmt and teaching.   Chart review completed today.   Unable to reach patient via phone x2. Went straight to .   Per chart reviewed, patient had E-visit with a diabetic educator on 6/1/2020.   PLAN  See Patient Instructions for co-developed, patient-stated behavior change goals.  AVS printed and provided to patient today. See Follow-Up section for recommended follow-up.    Sent a copy of both of her correction scales by email.   Reminded her to check blood glucose four times daily.   Will plan a follow up visit in 2-3 weeks.     She needs follow up in a couple of weeks and frequently so we can continue to monitor her progress.      A1c overdue.     Had virtual visit with PCP on 5/21/2020.   Additional provider notes: Patient is a 25-year-old female with type 2 diabetes and significant insulin resistant, hyperlipidemia and vitamin D deficiency.  Diabetes is managed by endocrinology.  She was last seen by endocrinology on 4/13/2020, note reviewed.  She is currently on metformin  mg daily, Lantus 84 units daily NovoLog was increased to 20 units 3 times daily with meals.  She states her blood sugar are still in the high 100s and 200 range.  She takes lisinopril 2.5 mg and Lipitor 20 mg daily.    Goals addressed this encounter:   Goals Addressed                 This Visit's Progress       Patient Stated      Improve chronic symptoms (pt-stated)   On track     Goal Statement: I will attend my appt with endocrinologist in the next 3 months.   Date Goal set: 4/21/2020  Barriers: language barrier, non-compliance, lack of understanding  Strengths: good family support. Engaging with plan of care  Date to Achieve By: 7/21/2020  Patient expressed understanding of goal: Yes    Action steps to achieve this goal:  1. I will answer my phone when endocrinologist calls me for follow up on 06/01/2020 at 10:15 AM.    2. I will also answer my  phone when CCC RN calls me for follow up on 6/29/2020.  3. I will call CHW with any questions or concerns regarding coordination assistance.      Goal update: 05/26/2020.        Monitoring (pt-stated)   On track     Goal Statement: I will attend my appt with a diabetic educator the next 3 months.   Date Goal set: 4/21/2020  Barriers: language barrier  Strengths: motivate to learn  Date to Achieve By: 7/21/2020  Patient expressed understanding of goal: Yes    Action steps to achieve this goal:  1. I will attend my appt with diabetic educator as scheduled.  2. I will call CHW with any concerns or questions.                 Plan:   1) CCC RN will f/u on 8/5/2020

## 2021-06-10 NOTE — PROGRESS NOTES
"Tyson Skelton is a 25 y.o. female who is being evaluated via a billable telephone visit.      The patient has been notified of following:     \"This telephone visit will be conducted via a call between you and your physician/provider. We have found that certain health care needs can be provided without the need for a physical exam.  This service lets us provide the care you need with a short phone conversation.  If a prescription is necessary we can send it directly to your pharmacy.  If lab work is needed we can place an order for that and you can then stop by our lab to have the test done at a later time.    Telephone visits are billed at different rates depending on your insurance coverage. During this emergency period, for some insurers they may be billed the same as an in-person visit.  Please reach out to your insurance provider with any questions.    If during the course of the call the physician/provider feels a telephone visit is not appropriate, you will not be charged for this service.\"    Patient has given verbal consent to a Telephone visit? Yes    What phone number would you like to be contacted at? transferred    Patient would like to receive their AVS by declined      Additional provider notes: see below    Hospital Follow-up Visit:    Hospital/Nursing Home/IP Rehab Facility: [facility]  Date of Admission: 8/2/20  Date of Discharge: 8/5/20  Reason(s) for Admission: ecoli sepsis- urine    Was your hospitalization related to COVID-19? No  Problems taking medications regularly:  None  Medication changes since discharge: She took course of cefdinir for 8 days on discharge- completed course yesterday  Problems adhering to non-medication therapy:  None    Summary of hospitalization:  [facility] hospital discharge summary reviewed  Diagnostic Tests/Treatments reviewed.  Follow up needed: follow-up with endocrinology- was recommended to return in 3 months  Other Healthcare Providers " Involved in Patient s Care:         endocrinology  Update since discharge: improved.  Still with chronic dizziness- unchanged. She forgot to check her sugars yesterday. Busy with her children. This morning- 236 fasting. She reports she takes her insulin 84 units at bedtime and novolog 20 units with meals. Eating 3 times per day. She uses depo and has follow-up on 9/30/20.  Post Discharge Medication Reconciliation: discharge medications reconciled, continue medications without change.  Plan of care communicated with patient              Assessment/Plan:    Tyson was seen today for follow-up.    Diagnoses and all orders for this visit:    Hospital discharge follow-up    Sepsis due to Escherichia coli, unspecified whether acute organ dysfunction present (H): Hospitalized and found to have ecoli sepsis- from urine. She was treated with ceftriaxone and then transitioned to oral cefdinir for total of 10 days- she has completed her course of antibiotics. Denies any fevers or urinary symptoms. Reviewed importance of glycemic control to prevent future episodes of urosepsis.    Type 2 diabetes mellitus treated with insulin (H): Last A1C improved at 10.9- down from >14. She follows with endocrinology- is on metformin, insulin lantus 84 units, and novolog 20 units with meals- per previous notes in chart, she admitted to missing doses of insulin but she denies this today. Continue current regimen wihtout changes- she will have follow-up in 2-3 months per endocrinology recommendations from last note. I encouraged carb portion control and regular walking, especially after meals.     Contraception: On depo- has follow-up scheduled 9/30/20.    Phone call duration:  13 minutes    Felipa Gold MD

## 2021-06-10 NOTE — PROGRESS NOTES
Patient is rescheduled with endocrinologist on Monday, 8/31/2020 at 4:30 PM and reminded patient.

## 2021-06-10 NOTE — PROGRESS NOTES
Patient had virtual follow up visit with endocrinologist on 7/30/2020 and recommended for another follow up in 3 months, please see chart.

## 2021-06-10 NOTE — PROGRESS NOTES
Clinic Care Coordination Contact    Community Health Worker Follow Up    Goals:   Goals Addressed                 This Visit's Progress      Improve chronic symptoms (pt-stated)   On track     Goal Statement: I will attend my appt with endocrinologist in the next 3 months.   Date Goal set: 4/21/2020  Barriers: language barrier, non-compliance, lack of understanding  Strengths: good family support. Engaging with plan of care  Date to Achieve By: 7/21/2020  Patient expressed understanding of goal: Yes    Action steps to achieve this goal:  1. I spoke with endocrinologist on the phone for follow up on 7/30/2020 and will follow up again in 3 months.    2. I will also answer my phone when CCC RN calls me for follow up on 8/18/2020.  3. I will call CHW with any questions or concerns regarding coordination assistance.      Goal update: 05/26/2020; 07/07/2020; 8/12/2020          Intervention/Education provided during outreach: CHW assisted patient for ED follow up with a provider at [facility] clinic. Patient had virtual follow up with endocrinologist on 7/30/2020 and 3 months follow up was recommended. CHW informed patient to call with questions or concerns regarding coordination assistance and if additional resources needed.       CHW Next Follow-up: In one month.

## 2021-06-11 NOTE — PROGRESS NOTES
Clinic Care Coordination Contact    Community Health Worker Follow Up    Goals:   Goals Addressed                 This Visit's Progress      Other (pt-stated)   60%     Goal Statement: I will attend my appt with endocrinologist in the next 6 months.   Date Goal set: 4/21/2020  Barriers: language barrier, non-compliance, lack of understanding  Strengths: good family support. Engaging with plan of care  Date to Achieve By: 10/21/2020  Patient expressed understanding of goal: Yes    Action steps to achieve this goal:  1. I missed my follow up appointment by phone with endocrinologist on 8/31/2020 because sometimes my phone doesn't work.    2. I will answer my phone when endocrinologist calls for follow up on 10/14/2020 at 1:30 PM.  3. I will also answer my phone when CCC RN calls me for follow up as scheduled 9/23/2020.  4. I will call CHW with any questions or concerns regarding coordination assistance.    [facility]  Katherine Peralta MD    [facility]    [address]    [address] [place] [address]    Phone: [phone]        Goal update: 9/22/2020          Intervention/Education provided during outreach: CHW called and spoke with patient who expressed no new concern at this time and able to get all medications refill that needed. Patient stated that she missed phone follow up appointment with endocrinologist on 8/31/2020 because she did not receive call and sometimes her phone does not work. CHW called [facility] clinic and rescheduled phone follow up appointment with endocrinologist for patient which rescheduled on 10/14/2020 at 1:30 PM and CHW will reminded patient a day prior.       CHW Next Follow-up: In one month.     CHW Plan: CHW will call patient and remind of upcoming appointment by phone with endocrinologist on 10/14/2020 at 1:30 PM.

## 2021-06-11 NOTE — TELEPHONE ENCOUNTER
Patient to receive Depo at [facility] appt.    Order  after last dose.  Will Provider place order if indicated?  Thank you.

## 2021-06-11 NOTE — TELEPHONE ENCOUNTER
RN cannot approve Refill Request    RN can NOT refill this medication PCP messaged that patient is overdue for Labs. Last office visit: Visit date not found Last Physical: Visit date not found Last MTM visit: Visit date not found Last visit same specialty: 11/21/2019 Parish Daniels MD.  Next visit within 3 mo: Visit date not found  Next physical within 3 mo: Visit date not found      Ange Marrero, [facility] Triage/Med Refill 9/22/2020    Requested Prescriptions   Pending Prescriptions Disp Refills     NOVOLOG FLEXPEN U-100 INSULIN 100 unit/mL (3 mL) injection pen [Pharmacy Med Name: NOVOLOG FLEXPEN SYRINGE 100 SOPN] 15 mL 0     Sig: INJECT 20 UNITS UNDER THE SKIN 3 (THREE) TIMES A DAY BEFORE MEALS.       Insulin/GLP-1 Refill Protocol Failed - 9/22/2020 11:29 AM        Failed - Microalbumin in last year     Microalbumin, Random Urine   Date Value Ref Range Status   07/11/2019 7.62 (H) 0.00 - 1.99 mg/dL Final                  Passed - Visit with PCP or prescribing provider visit in last 6 months     Last office visit with prescriber/PCP: Visit date not found OR same dept: 11/21/2019 Parish Daniels MD OR same specialty: 11/21/2019 Parish Daniels MD Last physical: Visit date not found Last MTM visit: Visit date not found     Next appt within 3 mo: Visit date not found  Next physical within 3 mo: Visit date not found  Prescriber OR PCP: Cass Whitaker MD  Last diagnosis associated with med order: 1. Type 2 diabetes mellitus treated with insulin (H)  - NOVOLOG FLEXPEN U-100 INSULIN 100 unit/mL (3 mL) injection pen [Pharmacy Med Name: NOVOLOG FLEXPEN SYRINGE 100 SOPN]; Inject 20 Units under the skin 3 (three) times a day before meals.  Dispense: 15 mL; Refill: 0    If protocol passes may refill for 6 months if within 3 months of last provider visit (or a total of 9 months).              Passed - A1C in last 6 months     Hemoglobin A1c   Date Value Ref Range Status   08/03/2020 10.9 (H) <=5.6 % Final     Comment:      Prediabetes:   HBA1c       5.7 to 6.4%        Diabetes:        HBA1c        >=6.5%   Patients with Hgb F >5%, total bilirubin >10.0 mg/dL, abnormal red cell turnover, severe renal or hepatic disease or malignancy should not have this A1C method used to diagnose or monitor diabetes.                   Passed - Blood pressure in last year     BP Readings from Last 1 Encounters:   08/05/20 99/60             Passed - Creatinine done in last year     Creatinine   Date Value Ref Range Status   08/05/2020 0.75 0.60 - 1.10 mg/dL Final

## 2021-06-11 NOTE — TELEPHONE ENCOUNTER
RN cannot approve Refill Request    RN can NOT refill this medication PCP messaged that patient is overdue for Labs. Last office visit: 11/21/2019 Parish Daniels MD Last Physical: 5/7/2019 Last MTM visit: Visit date not found Last visit same specialty: 11/21/2019 Parish Daniels MD.  Next visit within 3 mo: Visit date not found  Next physical within 3 mo: Visit date not found      Ange Marrero, [facility] Triage/Med Refill 8/31/2020    Requested Prescriptions   Pending Prescriptions Disp Refills     NOVOLOG FLEXPEN U-100 INSULIN 100 unit/mL (3 mL) injection pen 15 mL 0     Sig: Inject 20 Units under the skin 3 (three) times a day before meals.       Insulin/GLP-1 Refill Protocol Failed - 8/30/2020  1:59 PM        Failed - Microalbumin in last year     Microalbumin, Random Urine   Date Value Ref Range Status   07/11/2019 7.62 (H) 0.00 - 1.99 mg/dL Final                  Passed - Visit with PCP or prescribing provider visit in last 6 months     Last office visit with prescriber/PCP: Visit date not found OR same dept: 11/21/2019 Parish Daniels MD OR same specialty: 11/21/2019 Parish Daniels MD Last physical: Visit date not found Last MTM visit: Visit date not found     Next appt within 3 mo: Visit date not found  Next physical within 3 mo: Visit date not found  Prescriber OR PCP: Parish Daniels MD  Last diagnosis associated with med order: 1. Type 2 diabetes mellitus treated with insulin (H)  - NOVOLOG FLEXPEN U-100 INSULIN 100 unit/mL (3 mL) injection pen; Inject 20 Units under the skin 3 (three) times a day before meals.  Dispense: 15 mL; Refill: 0    If protocol passes may refill for 6 months if within 3 months of last provider visit (or a total of 9 months).              Passed - A1C in last 6 months     Hemoglobin A1c   Date Value Ref Range Status   08/03/2020 10.9 (H) <=5.6 % Final     Comment:     Prediabetes:   HBA1c       5.7 to 6.4%        Diabetes:        HBA1c        >=6.5%   Patients with Hgb F >5%, total bilirubin  >10.0 mg/dL, abnormal red cell turnover, severe renal or hepatic disease or malignancy should not have this A1C method used to diagnose or monitor diabetes.                   Passed - Blood pressure in last year     BP Readings from Last 1 Encounters:   08/05/20 99/60             Passed - Creatinine done in last year     Creatinine   Date Value Ref Range Status   08/05/2020 0.75 0.60 - 1.10 mg/dL Final

## 2021-06-11 NOTE — TELEPHONE ENCOUNTER
Message below relayed to pharmacy.     Dr. Katherine Peralta  [facility]End[facility]  [address] [place] [address] [phone]

## 2021-06-11 NOTE — TELEPHONE ENCOUNTER
DM managed by Endo. Please send this request to prescribing provider.    Dr. Parish Daniels  9/24/2020 7:54 AM

## 2021-06-11 NOTE — PROGRESS NOTES
Clinic Care Coordination Contact  Due Date: Within 2 weeks from today's date, 10/1/2020  Delegation: No Show for RN appointment. Please follow unsuccessful outreach, no show standard work.    Unable to reach patient x2 today. Went straight to  both times.  Per chart review, patient came to the clinic for flu shot.      Patient was no show with endocrinology clinic on 8/27/2020. CHW to assist to reschedule.

## 2021-06-12 NOTE — PROGRESS NOTES
"Clinic Care Coordination RN Assessed Needs  Patient and  present for RN Assessment. She was last seen at [facility] in 2015 and disenrolled from [facility].  She thinks she is about 9 weeks pregnant. Patient spoke some English and able to answer some questions in English. She was working recently stopped due to causing LBP and feeling tired. She knows she will not be coming to [facility] for prenatal care, but cannot provide the name of clinic \" a [place] clinic\". She has a green card and came to the [place] with her parents, grand parents and siblings in 2008. She met her significant other on  Facebook about five months ago and they are planning marriage. She states was told she had DM back in 2010. Complaint of constipation \" pushing hard\" with BM. She denies difficulties with checking BG and self insulin injections, and states have enough supplies, and taking prenatal vitamin daily. Her mom or other family member are providing transportation. She does not want to apply for [employer] She is here to enroll in Clinic Care Coordination for help with applying for affordable health insurance, and other public assistance program or services appropriate to help her with managing DM and take care of her baby ( WIC, cash assistance, medical transportation). She is interested in finding out about prenatal, breast feeding and parental classes for her and her significant other to attend.     Diabetic population of intervention patient  A1C:8/3/17 > 14.   Last Diabetic Eye Exam: Not sure.   Last Diabetic Foot Exam: Not sure    1. What have blood sugars been running? States high  2. How often does the patient check blood sugars? uncertain  3. Has the patient felt symptomatic with low readings?  uncertain  4. What does the patient do to feel better or raise blood sugar? N/A- denies.     Patient Centered Assessment Method-PCAM TOTAL SCORE: 23 (8/17/2017  1:58 PM)  Level 1:  A score of 12-24 indicates that the patient has very little to " no initial need for RN or SW intervention.  Standard care guide outreach/support should be appropriate at the discretion of the .  The care guide can reach out to the RN/SW as needed for support or with new concerns.    Goals  Goals        Patient Stated      I would like to discuss about my health in 6 months.  (pt-stated)            Action steps to achieve this goal  1.  I will call my Care Guide if I am not feeling well.   2.  I will await a phone call in 6 months to discuss if I am ready to talk about my health.      Date goal set:  7/21/2015        I would like to get the diabetes health form filled  by my PCP and sent to [employer].  (pt-stated)            Action steps to achieve this goal  1.  I will schedule an appointment to see my Care Guide to discuss the paperwork.  2.  I will have labs drawn to prep for my physical and DM appointment with my PCP.   3.  I will schedule an appointment to see my PCP.     Date goal set:  2/20/2015        I would like to start my career at [employer].  (pt-stated)            Action steps to achieve this goal  1.  I will return all health paperwork into my Counselor Roselia Merino.  2.  I will go to my appointment time on 2/20/2015 to discuss my future plans with Roselia.   3.  I will follow with paperwork with my Care Guide if needed by my counselor.     Date goal set:  2/20/2015         Other      Medications:            Take my insulin as prescribed, Lantus 15 units at 9pm and 5 units Novolog with all my meals.        Medications:            Increase Novolog to 7 units with all meals.  If having a snack, take 4 units Novolog.  Increase Lantus to 20 units at 9pm.        Monitoring:            Test blood sugars fasting, before meals and two hours after at least one meal/day.        Monitoring:            Test fasting, before lunch, before dinner and 2 hrs after one meal/day.        Patient will have improved understanding with her pregnancy status and OB cares.              Action steps to achieve this goal  1.  Patient will attend her first appointment with [facility] in [place] scheduled 8/21/17.   2.  Patient will have a business card or know her OB provider & clinic name and number.   3. Patient Plan of Care updated to show of providers she has and services.     Date goal set:  08/17/17        Patient will have imrpoved understanding with DM disease process, self care, and  S&S  of hypoglycemia and hyperglycemia and treatment.             Action steps to achieve this goal  1.  Patient will have a CDE appointment scheduled.   2.  Patient will know of insulin dose and A1C result as evidence by verbalizing it at Outreach call with care guide or at appointment visit.   3.  Patient will continue with MTM appointments as directed.     Date goal set:  08/17/17          RN Recommendations and Referrals  CDE Referral: Care Guide to schedule  Pharm-D consult/follow up: Continue to see MTM- appt scheduled.   Financial resource guide consult/follow-up: Care Guide to check with patient of financial and insurance status.  CCC : Prenatal & parental classes and breastfeeding resources.   [facility] RN: Schedule to see within 2-3 months after enrollment.   Eye exam: Care Guide assist to schedule ,    Action Plan    RN Will  Refer to CDE  Refer to   Will add the patient to [facility] RN tracking list  Be available to the patient as nursing needs arise    Care Guide Will    Due Date Delegation   Within 2 weeks from the RN assessment visit, by 08/31/17 Help the patient to coordinate goal setting visit if not already coordinated   At goal setting visit Review goals set at PCAM visit and create or add to action plan   By 9/21/17 Assist with scheduling appointments as mentioned in RN Recommendations and Referrals.        PCAM (Patient Centered Assessment Method) HEALTH AND WELL-BEING  Physical Health Concerns: Diabetes (A1c is not at goal. )  Other Physical Health Concerns::  (9 weeks  "pregnant. Patient denies of mornign sickness.)  RN Assessment: Physical Health Needs: Mild vague physical symptoms or problems- but do not impact on daily life or are not of concern to client  RN Assessment: Physical Health Problems: Mild impact on mental well-being e.g. \"feeling fed-up\", \"reduced enjoyment\"  Mental Health Concerns: Denies concerns that require further investigation  Other Mental Health Concerns::  (Cheerful and copperative. )  RN Assessment:Other Mental Well-Being Concern: No identified areas of concern  Lifestyle/Habit Concerns: Denies concerns that require further investigation (Denies N/V, or changes in appetite. )  RN Assessment: Lifestyle Behaviors: No identified areas of concern  SOCIAL ENVIRONMENT  Home Environment Concerns: Denies concerns that require further investigation (States lives in a house with parents, grand parents and siblings. )  RN Assessment: Home Environment: Consistently safe, supportive, stable, no identified problems  Daily Activities Concerns: Denies concerns that require further investigation  Other Daily Activities Concerns::  (Does not work. Outings and naps when tired. )  RN Assessment: Daily Activites: No identified problems or perceived positive benefits  Social Network Concerns: Denies concerns that require further investigation (Staes have good supportive family and relationship with significant other )  RN Assessment: Social Network: Good participation with social networks  Financial Status and Service Concerns: Unemployed, Uninsured  Other Financial Status and Service Concerns::  (Need help with applying for Medical Assistance and have no county financial assistance at this time. )  RN Assessment: Financial Resources: Financially insecure, some resource challenges (Living with her parents. Not working- no income. )  HEALTH LITERACY AND COMMUNICATION  Understanding of Health and Wellbeing Concerns: Little understanding which impacts on their ability to undertake " better management  Other Undertanding of Health and Wellbeing Concerns:: Have not been seen in clinic over two years.  (Able to speak and understand simple English. )  RN Assessment: Health Literacy: Little understanding which impacts on their ability to undertake better management  Engagement Concerns: Adequate communication, with or without minor barriers  Other Engagement Concerns::  (Patient have not been in clinic since 2015. )  RN Assessment: Engagement: Clear and open communication, no identified barriers  Barriers to Compliance with Medical Recommendations: Literacy, Financial, Language (Requires . Dependent on family for transportation. )  Other Barriers to Compliance with Medical Recommendations Concerns:: Need assistance with applying for affordable insurance.   SERVICE COORDINATION  Other Services: Other care/services missing and/or fragmented  Coordination of Services: Required care/services in place with some coordination barriers  PCAM TOTAL SCORE: 23    Emergency Plan  Diabetes: Sick-Day Plan  Infections, the flu, and even a cold, can cause your blood sugar to rise. And, eating less, nausea, and vomiting may cause your blood glucose to fall (hypoglycemia). Ask your health care provider to help you develop a sick-day plan. The following information can help.    Call your health care provider if:    You vomit or have diarrhea for more than 6 hours.    Your blood glucose level is higher than usual or over 250 mg/dL after you have taken extra insulin (if recommended in your sick-day plan).    You take oral medicine for diabetes, and your blood sugar is higher than usual or over 250 mg/dL, before a meal and stays that high for more than 24 hours.    Your blood glucose is lower than usual or less than 70 mg/dL    You have moderate to large amounts of ketones in your blood or urine.    You aren t better after 2 days.

## 2021-06-12 NOTE — PROGRESS NOTES
MTM Encounter    Assessment/Plan  Gestational Diabetes Type 2: Uncontrolled to goal of 7%.  Chart review indicates that she was previously on 30 units of Lantus and 15 units of NovoLog with meals, however her adherence was called to question.  I am starting her doses more conservatively today and we can titrate them quickly over weekly follow-up visits.  I also discussed with her that we could do adjustments over the phone in the future if insulin titrations go smoothly.  - Novolog 10 units with meals  - Lantus 20 units each morning  - glucometer with test strips and lancets, check BID    Pregnancy:   - prenatal vitamin daily    Follow Up  One week      Subjective/Objective  Tyson Skelton is a 22 y.o. female here for a medication therapy management (MTM) appointment; her chief concern today is gestational diabetes type 2.    Diabetes: Tyson was diagnosed with diabetes 3 or 4 years ago.  Review of her chart states that she is previously had testing done at [place] and in [place] both of which confirmed type 2 diabetes versus type 1.  She has been off of all medications for about 2 years.  Her A1c has consistently been greater than 14% and glucose readings from lab work in the past 2 years have ranged from 520-720 mg/dL.  Last A1c: >14% on 4/1/15  Glucose readings: Tyson is not checking her blood sugar and she does not have a glucometer at this time.  Last microalbumin/creatinine: 340.7 Feb '15  Last diabetic eye exam: did not discuss  Last diabetic foot exam: did not discuss  On aspirin: No  On statin: no  ----------------    Tyson's medication list was reviewed with them, discussing reason for use, directions for use, and potential side effects of each medication as needed. Indication, safety, efficacy, and convenience was assessed for all medications addressed above.  No environmental factors were noted currently affecting patient.  This care plan was communicated via EMR with her primary care provider, Parish Daniels MD, who is  the authorizing prescriber for this visit.  Direct supervision was available by either the patient's PCP or other available provider.  Time and complexity billing metrics are included in the docflowsheet linked to this visit.  Time spent: 20 minutes      Hair Rivas PharmD  [facility] Pharmacist  [place] [facility]  [phone]

## 2021-06-12 NOTE — PROGRESS NOTES
My Clinic Care Coordination Care Plan    [facility]  [address] [place]  [address]  [phone]        My Preferred Method of Contact:  Phone: [phone]    My Primary/Preferred Language:  Reanna    Preferred Learning Style:  Reading information, Face to face discussion, Pictures/Diagrams and Hands on teaching    Emergency Contact: Marixa Littlejohn  Phone: [phone]  [address]  [facility] [address]     PCP:  Parish Daniels MD  Specialists:    OBGYN    Hospitalizations and/or ED Visits  Most Recent: 8/20/2017     Previous:  None  Reason:  Miscarriages    Concerns regarding my health; diabetes, miscarriages and chronic kidney disease.    Advanced Directive/Living Will: The patient was given information regarding Adanced Directives/Living Will      Paw elected to enroll in the [facility] Care Coordination.  Paw was given a copy of the Clinic Care Coordination brochure and full description of how to access their care team both during clinic hours and after hours.   My Care Guide's Name and Phone Number:  Aries Win: [phone]   The Care Guide and myself agreed to be in contact monthly.      Medication Update  The patient's medication list is up to date per CSS    Health Maintenance and Immunization Update  The patient's preventive health screenings and immunizations are up to date per PCP.    My Emergency Plan  Emergency Plan  Diabetes: Sick-Day Plan  Infections, the flu, and even a cold, can cause your blood sugar to rise. And, eating less, nausea, and vomiting may cause your blood glucose to fall (hypoglycemia). Ask your health care provider to help you develop a sick-day plan. The following information can help.     Call your health care provider if:    You vomit or have diarrhea for more than 6 hours.    Your blood glucose level is higher than usual or over 250 mg/dL after you have taken extra insulin (if recommended in your sick-day plan).    You take oral medicine for diabetes,  and your blood sugar is higher than usual or over 250 mg/dL, before a meal and stays that high for more than 24 hours.    Your blood glucose is lower than usual or less than 70 mg/dL    You have moderate to large amounts of ketones in your blood or urine.    You aren t better after 2 days.  All [facility] clinic patients have access to a Nurse 24 hours a day, 7 days a week.  If you have questions or want advice from a Nurse, please know [facility] is here for you.  You can call your clinic and they will connect you or you can call Care Connection at [phone].  [facility] also has Walk In Care clinics in multiple locations.  Call the number listed above for more information about our Walk In Care clinics or visit the [facility] website at [email or web address].    Patient Support  I will ask Eron Chapa for help in supporting me in these goals  Relationship to patient: Mother  Cell # : [phone] , best time to call anytime

## 2021-06-12 NOTE — PROGRESS NOTES
8-8-2017:  PCAM appointment scheduled with CCC RN on 8/17/2017    8/10/2017: Left message x1    Patient is currently at the [facility].     8/22/2017: Left message x2    Referral:    Hi,           I'd like to refer this patient for help with health insurance. This patient who is current pregnant and recently quit her job because her work insurance covers 60/100 so she'd like to apply for [facility] or [facility] program. This also has complicated diabetes and A1C is grater than 14, please outreach patient at your convenience.               Thanks,     -Aries.

## 2021-06-12 NOTE — PROGRESS NOTES
"ASSESMENT AND PLAN:  Diagnoses and all orders for this visit:    Absence of menstruation  -     Pregnancy (Beta-hCG, Qual), Urine, positive    Uncontrolled diabetes mellitus  -     Glycosylated Hemoglobin A1c  -     Comprehensive Metabolic Panel  Seen by our in-house pharmacist today, appreciate help.    Started on Lantus 20 units and NovoLog 10 units with meals.  Follow up with Pharm.D. in 1 week.  She will be enrolled to healthcare home, discussed with care guides.    High-risk pregnancy  Stressed the importance of good blood sugar control.  Potential pregnancy complications from uncontrolled diabetes discussed.  Referred to OB/GYN for prenatal care.  Instructed to keep appointments.   stressed the importance of taking medications as prescribed.  Started on prenatal vitamin.      Spent 25 min face to face with patient with more the 50% spent in counseling and coordination of care and discussing problems listed above.      SUBJECTIVE: Tyson Skelton is here for pregnancy confirmation.  .  LMP 17 ( unsure , Nora first week ).  Planned pregnancy, both partners excited.   Tired but no N/V/crampng or bleeding.   Not on any meds for more than 2 years.    Has type 2 diabetes, confirmed with lab evaluation done in [facility] for previous notes.  She has significant history of  noncompliance.  Last A1c was more than 14,2 years ago.  Was on Lantus 30 units and NovoLog 14 units 3 times a day.  Was on lisinopril and pravastatin also.  She took herself off of  all medications, no meds for 2+ years now.  States\" she feels better without medications\".   She was referred to children's endocrinology as well as adult endocrinology repeatedly in the past.  She had extensive diabetes education in the past .  Her mother also has diabetes.    Past Medical History:   Diagnosis Date     Hyperglycemia without ketosis     hospitalized at [facility]     Patient Active Problem List   Diagnosis     " "Vitamin D Deficiency     Type 2 Diabetes With Renal Manifestations - Uncontrolled     Hyperlipidemia     Proteinuria     Chronic Kidney Disease (NKF Classification)       Allergies:  No Known Allergies    History   Smoking Status     Passive Smoke Exposure - Never Smoker   Smokeless Tobacco     Not on file       Review of systems otherwise negative except as listed in HPI.   History   Smoking Status     Passive Smoke Exposure - Never Smoker   Smokeless Tobacco     Not on file       OBJECTICE: /64 (Patient Site: Right Arm, Patient Position: Sitting, Cuff Size: Adult Regular)  Pulse 88  Temp 97.9  F (36.6  C) (Oral)   Resp 12  Ht 4' 11\" (1.499 m)  Wt 102 lb 6 oz (46.4 kg)  LMP 06/06/2017 (Within Days)  BMI 20.68 kg/m2    DATA REVIEWED:    Labs Reviewed or Ordered (1):       GEN-alert,  in no apparent distress.  HEENT-neck is supple.  CV-regular rate and rhythm with no murmur.   RESP-lungs clear to auscultation .  SKIN-normal        Parish Daniels   8/3/2017     "

## 2021-06-12 NOTE — PROGRESS NOTES
MTM Encounter    Assessment/Plan  Diabetes: Uncontrolled to goal of less than 8%.  We are starting to see a significant improvement in her glucose readings.  We will make another large increase to her insulin today with expected glucose range of 100-150 mg/dL.  I told Tyson that if she feels hypoglycemic at this level because her blood sugars have been so high for so long, she can reduce that Toujeo to 35 units for a few weeks before increasing it back to 40 units.  I have also given her a self titration schedule which she expresses good understanding of.   Tyson was provided with insulin samples for Lantus and Humalog vials.  She has previously used vials and syringes and is familiar with administration of this.  Hopefully her insurance is active within the next month so that we can stop switching her insulin around based on the availability of samples in the clinic.  - Increase Toujeo 40 units  -Increase Toujeo by 2 units every 3 days that no glucose readings are less than 100 mg/dL  - Increase Humalog 15 units    Follow Up  1 month      Subjective/Objective  Tyson Skelton is a 22 y.o. female here for a medication therapy management (MTM) appointment; her chief concern today is diabetes follow-up.    Miscarriage: Tyson was seen at the emergency department 4 days ago for vaginal bleeding and it was determined that her pregnancy had ended.  She feels okay about this and declines additional support at this time.    Diabetes: Tyson is taking 30 units of Toujeo and 13 units of Humalog TID.  600 units of Toujeo.  360 units of Humalog  AM fastin-375  Afternoon fastin-494  Evening fastin-339  ----------------    Tyson's medication list was reviewed with them, discussing reason for use, directions for use, and potential side effects of each medication as needed. Indication, safety, efficacy, and convenience was assessed for all medications addressed above.  No environmental factors were noted currently affecting  patient.  This care plan was communicated via EMR with her primary care provider, Parish Daniels MD, who is the authorizing prescriber for this visit.  Direct supervision was available by either the patient's PCP or other available provider.  Time and complexity billing metrics are included in the docflowsheet linked to this visit.  Time spent: 20 minutes      Hair Rivas PharmD  [facility] Pharmacist  [facility]  [phone]

## 2021-06-12 NOTE — PROGRESS NOTES
"ASSESMENT AND PLAN:  Diagnoses and all orders for this visit:    Miscarriage  -     Beta-hCG, Quantitative  HCG quant 4 today , was 12 last week.  No additional testing needed.  F/U as needed.    Type 2 diabetes mellitus with insulin therapy  No changes made on her current insulin regimen.  She will make appointment to see endocrinologist.  Advised to keep follow appointment with our Pharm.D.      SUBJECTIVE: Tyson Skelton is here to follow-up on her demise.  She was seen in the ER on 8/20/2017 with vaginal bleeding at about 10+4 weeks gestational age.  Ultrasound showed no cardiac activities.  She states she passed clots with some tissues after she was discharged from the ER.  No more vaginal bleeding or cramping for the past 2 weeks.  No fever.  No dysuria.  No back pain.  HCG quant was 12 on 9/7/17, one  week ago.    Past Medical History:   Diagnosis Date     Chronic kidney disease      Diabetes mellitus, type II      Hyperglycemia without ketosis 2010    hospitalized at [facility]     Hyperlipidemia      Patient Active Problem List   Diagnosis     Vitamin D Deficiency     Type 2 diabetes mellitus with insulin therapy     Hyperlipidemia     Proteinuria     Chronic Kidney Disease (NKF Classification)       Allergies:  No Known Allergies    History   Smoking Status     Passive Smoke Exposure - Never Smoker   Smokeless Tobacco     Not on file       Review of systems otherwise negative except as listed in HPI.   History   Smoking Status     Passive Smoke Exposure - Never Smoker   Smokeless Tobacco     Not on file       OBJECTICE: /80 (Patient Site: Left Arm, Patient Position: Sitting, Cuff Size: Adult Regular)  Pulse 92  Temp 98  F (36.7  C) (Oral)   Resp 16  Ht 4' 11\" (1.499 m)  Wt (!) 96 lb 5 oz (43.7 kg)  LMP 06/06/2017 (Within Days)  BMI 19.45 kg/m2    DATA REVIEWED:    Labs Reviewed or Ordered (1):      GEN-alert,  in no apparent distress.  HEENT-mucous membranes are moist, neck is " supple.  CV-regular rate and rhythm with no murmur.   RESP-lungs clear to auscultation .  ABDOMEN- Soft , no suprapubic tenderness.   SKIN-normal        [facility]   9/13/2017

## 2021-06-12 NOTE — PROGRESS NOTES
Clinic Care Coordination Contact    Community Health Worker Follow Up    Goals:   Goals Addressed                 This Visit's Progress      Other (pt-stated)   80%     Goal Statement: I will attend my appt with endocrinologist in the next 6 months.   Date Goal set: 4/21/2020  Barriers: language barrier, non-compliance, lack of understanding  Strengths: good family support. Engaging with plan of care  Date to Achieve By: 10/21/2020  Patient expressed understanding of goal: Yes    Action steps to achieve this goal:  1. I missed my follow up appointment by phone with endocrinologist on 10/15/2020 because I ran out of minutes on my phone.    2. I will answer my phone when endocrinologist calls for follow up on 11/30/2020 at 11:00 PM and I understand this will be video visit.  3. I will also answer my phone when CCC RN calls me for follow up as scheduled 11/05/2020.  4. I will call CHW with any questions or concerns regarding coordination assistance.    [facility]  Katherine Peralta MD    [facility] End[facility]    [address] [place] [id number]    Phone: [phone]        Goal update: 9/22/2020; 11/02/2020.          Intervention/Education provided during outreach: CHW was able to reach and spoke with patient who expressed no new concern at this time however patient has not been checking blood sugar because she has not been taking novolog and she knows her blood sugar will be high. Patient stated that pharmacy has not contacted her on novolog status, CHW contacted pharmacy it stated that the doctor denied rx refills request because patient has not been seen. CHW send urgent request to PCP for more refills on novolog and informed PCP that patient has not been taking novloog for few weeks. CHW informed patient to call with questions or concerns regarding coordination assistance and if additional resources needed. CHW assisted patient rescheduled follow up  appointment with endocrinologist on 11/30/2020 at 12:00 PM that patient missed on 10/15/2020 and patient understands that this follow up will be via video.       CHW Next Follow-up: In one month.

## 2021-06-12 NOTE — TELEPHONE ENCOUNTER
RN cannot approve Refill Request    RN can NOT refill this medication PCP messaged that patient is overdue for Labs. Last office visit: Visit date not found Last Physical: Visit date not found Last MTM visit: Visit date not found Last visit same specialty: 11/21/2019 Parish Daniels MD.  Next visit within 3 mo: Visit date not found  Next physical within 3 mo: Visit date not found      Laurita Quinteros, Care Connection Triage/Med Refill 10/16/2020    Requested Prescriptions   Pending Prescriptions Disp Refills     NOVOLOG FLEXPEN U-100 INSULIN 100 unit/mL (3 mL) injection pen [Pharmacy Med Name: NOVOLOG FLEXPEN SYRINGE 100 Solution Pen-injector] 15 mL 0     Sig: INJECT 20 UNITS UNDER THE SKIN 3 (THREE) TIMES A DAY BEFORE MEALS.       Insulin/GLP-1 Refill Protocol Failed - 10/13/2020 12:37 PM        Failed - Microalbumin in last year     Microalbumin, Random Urine   Date Value Ref Range Status   07/11/2019 7.62 (H) 0.00 - 1.99 mg/dL Final                  Passed - Visit with PCP or prescribing provider visit in last 6 months     Last office visit with prescriber/PCP: Visit date not found OR same dept: 11/21/2019 Parish Daniels MD OR same specialty: 11/21/2019 Parish Daniels MD Last physical: Visit date not found Last MTM visit: Visit date not found     Next appt within 3 mo: Visit date not found  Next physical within 3 mo: Visit date not found  Prescriber OR PCP: Cass Whitaker MD  Last diagnosis associated with med order: 1. Type 2 diabetes mellitus treated with insulin (H)  - NOVOLOG FLEXPEN U-100 INSULIN 100 unit/mL (3 mL) injection pen [Pharmacy Med Name: NOVOLOG FLEXPEN SYRINGE 100 Solution Pen-injector]; Inject 20 Units under the skin 3 (three) times a day before meals.  Dispense: 15 mL; Refill: 0    If protocol passes may refill for 6 months if within 3 months of last provider visit (or a total of 9 months).              Passed - A1C in last 6 months     Hemoglobin A1c   Date Value Ref Range Status   08/03/2020 10.9 (H)  <=5.6 % Final     Comment:     Prediabetes:   HBA1c       5.7 to 6.4%        Diabetes:        HBA1c        >=6.5%   Patients with Hgb F >5%, total bilirubin >10.0 mg/dL, abnormal red cell turnover, severe renal or hepatic disease or malignancy should not have this A1C method used to diagnose or monitor diabetes.                   Passed - Blood pressure in last year     BP Readings from Last 1 Encounters:   08/05/20 99/60             Passed - Creatinine done in last year     Creatinine   Date Value Ref Range Status   08/05/2020 0.75 0.60 - 1.10 mg/dL Final

## 2021-06-12 NOTE — PROGRESS NOTES
MTM Encounter    Assessment/Plan  Gestational Diabetes: I instructed Tyson to continue her insulin even if she is not checking her blood sugar because her sugars are so high.  I assured her that her fatigue is due to pregnancy and is not from taking her insulin.  I explained that she feels hungry because her body thinks it needs more sugar because she does not produce enough insulin to use the sugar she has.  Based on todays readings, we will increase her insulin dose to near what she was previously on.  I instructed her to  a battery for her blood sugar meter and resume testing TID.  - Increase Humalog to 13 units TID  - Increase Toujeo to 30 units QAM    Follow Up  One week      Subjective/Objective  Tyson Skelton is a 22 y.o. female here for a medication therapy management (MTM) appointment; her chief concern today is gestational diabetes follow up.    GDM: Tyson has been checking her blood sugar three times daily.  She ran out of test strips on Monday so she stopped taking her insulin as well.  She notes she feels fatigued and hungry.  She believes she has an appointment at [facility] on Monday.  AM fastin, 302, 346, 239  Afternoon: 319  Before dinner: 487, 295, 417, 504,  ----------------    Tyson's medication list was reviewed with them, discussing reason for use, directions for use, and potential side effects of each medication as needed. Indication, safety, efficacy, and convenience was assessed for all medications addressed above.  No environmental factors were noted currently affecting patient.  This care plan was communicated via EMR with her primary care provider, Parish Daniels MD, who is the authorizing prescriber for this visit.  Direct supervision was available by either the patient's PCP or other available provider.  Time and complexity billing metrics are included in the docflowsheet linked to this visit.  Time spent: 15 minutes      Hair Rivas, [place]  [facility] MTM Pharmacist  [place] and  [facility]  [phone]

## 2021-06-12 NOTE — PROGRESS NOTES
The Clinic Care Guide met with the patient in clinic today at the request of the PCP to discuss possible clinic care coordination enrollment. Clinic care coordination was described to the patient and immediate needs were discussed. The patient agreed to enrollment in clinic care coordination and future appointments were scheduled for an RN care coordination assessment and an enrollment visit with the primary care physician and care guide. The patient provided with an informational packet describing clinic care coordination and given contact information for the clinic care guide.       PCAM appointment scheduled with CCC RN on 8/17/2017.

## 2021-06-12 NOTE — PROGRESS NOTES
Programs applying for: Insurance  MNsure #: [id number] Tyson Skelton  Financial worker:     08/25/17 Called patient to screen and set up a time to complete medical insurance application. Will meet with 08/31 at the [facility].     8/31/2017: Patient into clinic and applied for MNsure coverage w nikkie, called [employer] due to application failure, [employer] reports application is processed and approved - case # [id number]. Active as of 07/01/17 with no proof needed.     Medical Assistance   hide details   Paw appears to qualify for Medical Assistance. This is an initial eligibility result based on the information you entered on your application. You will get a health care notice showing your final eligibility results. We may need proof to verify your answers to some of the application questions before your coverage can begin. The notice will tell you if you need to send in proof.    09/07/17 checked [place] appears patient is eligible for pregnancy medical coverage. Will call [facility] next week to see if after pregnancy coverage ends (10/31/17) if she will need to reapply or still be covered for medical.   Major Programs   This subscriber has eligibility for MA: Medical Assistance.  Elig Type PX: Pregnant woman  Eligibility Begin Date: 07/01/2017  Eligibility End Date: 10/31/2017  This subscriber is eligible for the following service types: Medical Care , Chiropractic , Dental Care , Hospital , Hospital - Inpatient , Hospital - Outpatient , Emergency Services , Pharmacy , Professional (Physician) Visit - Office , Vision (Optometry) , Mental Health , Urgent Care     09/13/2017 Called [facility] to see if medical insurance will continue after pregnancy Medical Assistance ends, on hold for 10+ minutes will try back next week.     09/28/2017 Called [facility] and asked about patient's coverage after 10/31/17, [employer] rep reports patient is set up to switch to regular MA coverage on 11/01/17, [facility] will check on this after this date to make sure it is still  ongoing.     11/03/17 Checked [place]its and shows patient has ongoing MA Early Expansion not listed as Pregnant woman MA anymore.   [id number] PARIS SHERIDAN   This subscriber has eligibility for MA: Medical Assistance.  Elig Type AX: MA Early Expansion  Eligibility Begin Date: 11/01/2017  Eligibility End Date: --/--/----  This subscriber is eligible for the following service types: Medical Care , Chiropractic , Dental Care , Hospital , Hospital - Inpatient , Hospital - Outpatient , Emergency Services , Pharmacy , Professional (Physician) Visit - Office , Vision (Optometry) , Mental Health , Urgent Care   Closing encounter at this time please refer the patient to [facility] if patient has a new need prior to medical renewal.   Yolanda Medel, Phone: [phone], Fax: [phone]

## 2021-06-12 NOTE — PROGRESS NOTES
ASSESMENT AND PLAN:  Diagnoses and all orders for this visit:    1. Diabetes  No med changes today.    - C-Peptide  - Glutamic Acid Decarboxylase  - blood-glucose meter Misc; Test blood glucose twice a day  Dispense: 1 each; Refill: 0  - generic lancets; Use to check blood sugar two times daily.  Dispense: 200 each; Refill: 3  - blood glucose test (GLUCOSE BLOOD) strips; Use to check blood sugar two times daily.  Dispense: 200 strip; Refill: 3  - Ambulatory referral to Endocrinology    2. Miscarriage  - Beta-hCG, Quantitative  F/U next week.  ??repeat US. referral for d & C    3. Body aches  - acetaminophen (TYLENOL) 325 MG tablet; Take 1 tablet (325 mg total) by mouth every 4 (four) hours as needed for pain.  Dispense: 90 tablet; Refill: 0        SUBJECTIVE: Tyson YFN Skelton is here for [facility] goal setting.  Has uncontrolled diabetes with A1c >14, currently on insulin.  Had missed AB 3 weeks ago at 10 + weeks GA. Was seen in the ER.  She was seen by OB/GYN after she was discharged from the ER.  She missed her follow-up appointment with OB/GYN.  She states she is no longer bleeding.  Unclear if she passed tissue.  No cramping.  No fever.  No dysuria.    She is on Toujeo 40 units, Humalog 15 units.  She states she is using meds as directed.  She has not been able to check her blood sugar since her meter broke a few weeks ago.    She is complaining of mild localized body ache.  She denied fever or chills.  No nausea or vomiting.  No cough or headaches.    Was [facility] patient in the past. Some compliance concerns with meds and follow in the past.    Past Medical History:   Diagnosis Date     Chronic kidney disease      Diabetes mellitus, type II      Hyperglycemia without ketosis 2010    hospitalized at [facility]     Hyperlipidemia      Patient Active Problem List   Diagnosis     Vitamin D Deficiency     Type 2 diabetes mellitus with insulin therapy     Hyperlipidemia     Proteinuria     Chronic Kidney Disease (NKF  "Classification)       Allergies:  No Known Allergies    History   Smoking Status     Passive Smoke Exposure - Never Smoker   Smokeless Tobacco     Not on file       Review of systems otherwise negative except as listed in HPI.   History   Smoking Status     Passive Smoke Exposure - Never Smoker   Smokeless Tobacco     Not on file       OBJECTICE: /76 (Patient Site: Left Arm, Patient Position: Sitting, Cuff Size: Adult Regular)  Pulse 92  Temp 97.3  F (36.3  C) (Oral)   Resp 16  Ht 4' 11\" (1.499 m)  Wt (!) 99 lb (44.9 kg)  LMP 06/06/2017 (Within Days)  BMI 20 kg/m2    DATA REVIEWED:    Labs Reviewed or Ordered (1):       GEN-alert,  in no apparent distress.  HEENT-mucous membranes are moist, neck is supple.  CV-regular rate and rhythm with no murmur.   RESP-lungs clear to auscultation .  ABDOMEN- Soft , not tender.  EXTREM- No edema.Sensation intact.   SKIN-normal        [facility]   9/7/2017   This transcription uses voice recognition software, which may contain typographical errors.      "

## 2021-06-12 NOTE — PROGRESS NOTES
MTM Encounter    Assessment/Plan  Gestational Diabetes Type 2: Uncontrolled to goal of 7%.  Tyson was provided with samples of Humalog and Toujeo today with enough pen needles for one month.  She was also provided with an Accucheck Octavia glucometer and enough test strips and lancets for one month.  I also gave her a diabetes log book and asked her to check her blood sugar three times per day before meals.  I have asked her to start consistently start eating three meals per day and to cut back on rice and eat more vegetables and lean protein.    She is currently seeking state insurance, which will take at least 2-3 weeks assuming that we are able to expedite her application.  - Humalog 10 units with meals   - Toujeo 20 units QAM  - blood sugar checks TID    Follow Up  One week      Subjective/Objective  Tyson Skelton is a 22 y.o. female here for a medication therapy management (MTM) appointment; her chief concern today is gestational diabetes.    Diabetes: Tyson is not currently taking anything because she has insurance.  Last A1c: >14% on 8/3/17  Glucose readings: Tyson checks her glucose 0 time(s) per day.  We checked her blood sugar in clinic today and it was 440 mg/dL.  She had eaten rice right before she came into the clinic.  Last microalbumin/creatinine: 340.7 Feb '15  Pregnant, so no ACEI and no statin.      ----------------    Tyson's medication list was reviewed with them, discussing reason for use, directions for use, and potential side effects of each medication as needed. Indication, safety, efficacy, and convenience was assessed for all medications addressed above.  No environmental factors were noted currently affecting patient.  This care plan was communicated via EMR with her primary care provider, Parish Daniels MD, who is the authorizing prescriber for this visit.  Direct supervision was available by either the patient's PCP or other available provider.  Time and complexity billing metrics are included in the  [facility] linked to this visit.  Time spent: 30 minutes      Hair Rivas, PharmD  [facility] Pharmacist  [place], [facility]  [phone]

## 2021-06-12 NOTE — PROGRESS NOTES
Patient has been rescheduled with endocrinologist for phone follow up on 10/14/2020 at 1:30 PM and patient is aware.

## 2021-06-12 NOTE — TELEPHONE ENCOUNTER
RN cannot approve Refill Request    RN can NOT refill this medication PCP messaged that patient is overdue for Labs. Last office visit: 11/21/2019 Parish Daniels MD Last Physical: 5/7/2019 Last MTM visit: Visit date not found Last visit same specialty: 11/21/2019 Parish Daniels MD.  Next visit within 3 mo: Visit date not found  Next physical within 3 mo: Visit date not found      Ange Marrero, Care Connection Triage/Med Refill 10/20/2020    Requested Prescriptions   Pending Prescriptions Disp Refills     NOVOLOG FLEXPEN U-100 INSULIN 100 unit/mL (3 mL) injection pen [Pharmacy Med Name: NOVOLOG FLEXPEN SYRINGE 100 Solution Pen-injector] 15 mL 0     Sig: INJECT 20 UNITS UNDER THE SKIN 3 (THREE) TIMES A DAY BEFORE MEALS.       Insulin/GLP-1 Refill Protocol Failed - 10/20/2020 10:12 AM        Failed - Microalbumin in last year     Microalbumin, Random Urine   Date Value Ref Range Status   07/11/2019 7.62 (H) 0.00 - 1.99 mg/dL Final                  Passed - Visit with PCP or prescribing provider visit in last 6 months     Last office visit with prescriber/PCP: Visit date not found OR same dept: 11/21/2019 Parish Daniels MD OR same specialty: 11/21/2019 Parish Daniels MD Last physical: Visit date not found Last MTM visit: Visit date not found     Next appt within 3 mo: Visit date not found  Next physical within 3 mo: Visit date not found  Prescriber OR PCP: Parish Daniels MD  Last diagnosis associated with med order: 1. Type 2 diabetes mellitus treated with insulin (H)  - NOVOLOG FLEXPEN U-100 INSULIN 100 unit/mL (3 mL) injection pen [Pharmacy Med Name: NOVOLOG FLEXPEN SYRINGE 100 Solution Pen-injector]; Inject 20 Units under the skin 3 (three) times a day before meals.  Dispense: 15 mL; Refill: 0    If protocol passes may refill for 6 months if within 3 months of last provider visit (or a total of 9 months).              Passed - A1C in last 6 months     Hemoglobin A1c   Date Value Ref Range Status   08/03/2020 10.9 (H) <=5.6 %  Final     Comment:     Prediabetes:   HBA1c       5.7 to 6.4%        Diabetes:        HBA1c        >=6.5%   Patients with Hgb F >5%, total bilirubin >10.0 mg/dL, abnormal red cell turnover, severe renal or hepatic disease or malignancy should not have this A1C method used to diagnose or monitor diabetes.                   Passed - Blood pressure in last year     BP Readings from Last 1 Encounters:   08/05/20 99/60             Passed - Creatinine done in last year     Creatinine   Date Value Ref Range Status   08/05/2020 0.75 0.60 - 1.10 mg/dL Final

## 2021-06-13 NOTE — PROGRESS NOTES
MTM Encounter    Assessment/Plan  Diabetes: Uncontrolled to goal of <8%, improving.  With her appetite so diminished, I am hesitant to increase her insulin much today. She had previously followed a self titration schedule, but misunderstood that she was supposed to stay at the higher dose and reduced her dose after a few days.  She will do this again, now staying at the higher dose, which will allow her to increase her insulin as her appetite returns.  - Increase Lantus to 44 units, increase 2 units every 3 days that no glucose readings are <100, up to 50 units    Microalbuminuria: Likely needs to be on an ACEI for kidney protection.  Rechecking today now that acute issues are resolving.  - microalbumin    Dyslipidemia: History of very high LDL, >300 on one check.  Invalid LDLs previously due to elevated TG.  Repeating today, advised her we are likely to start her on a medication for this.  If her LDL is truly >300, we should titrate her towards rosuvastatin 40mg, which is likely to result in a final LDL of ~120 mg/dL.  - lipid cascade    Difficulty Sleeping/Loss of Appetite: Discussed with PCP.  No action at this time, will continue to follow symptoms for improvement or worsening.    Financial: Insurance active as of yesterday.  Will go to pharmacy to  insulin.  We discussed that her vial of humalog is the same as the blue pens of Novolog she will be receiving.  She also is meeting with our clinic care coordinator today to discuss retroactive application of her insurance to her previous hospitalization.    Follow Up  Endo in one month  PCP in two months  MTM in three months    Subjective/Objective  Tyson Skelton is a 22 y.o. female here for a medication therapy management (MTM) appointment; her chief concern today is diabetes.    Diabetes: Tyson is taking Lantus 40 units daily and Novolog 15 units daily.  She went to her endocrinology appointment, but the  was late so she had to reschedule for next  month.  Her blood sugars have been more inconsistent and lower.  She notes that her appetite has been lower and she is not sleeping well.  Last A1c: >14% on 8/3/17  Glucose readings: Tyson checks her glucose 0-1 time(s) per day.  AM fasting range:  over the past week, 150-210 prior to this  Last microalbumin/creatinine: 300 one year ago  On aspirin: No  On statin: no    Loss of appetite:  Has not been as good since she miscarried.    Difficulty sleeping: When she is trying to sleep or is cold, she feels achy from the knees down.  This is making it difficult for her to sleep.  She also feels a little tingling in her feet.  This has all started since her miscarriage.  No difference in the achiness in her legs is not worse when exercising.  She has been taking tylenol for this which has been modestly helpful.  She does not feel sad, and is full of hope - she does not think she is depressed.    Dyslipidemia: Both parents still living - mom is 38 and dad is a little over 50.  No CV problems at this point.  Lab Results   Component Value Date    CHOL 412 (H) 02/20/2015    CHOL 235 (H) 06/03/2013    CHOL 237 (H) 12/24/2012     Lab Results   Component Value Date    HDL 46 02/20/2015    HDL 35 (L) 06/03/2013    HDL 43 12/24/2012     Lab Results   Component Value Date    LDLCALC 316 (H) 02/20/2015    LDLCALC   Invalid, Triglyceride >300 06/03/2013    LDLCALC   Invalid, Triglyceride >300 12/24/2012     Lab Results   Component Value Date    TRIG 252 (H) 02/20/2015    TRIG 389 (H) 06/03/2013    TRIG 393 (H) 12/24/2012     No components found for: CHOLHDL    ----------------    Tyson's medication list was reviewed with them, discussing reason for use, directions for use, and potential side effects of each medication as needed. Indication, safety, efficacy, and convenience was assessed for all medications addressed above.  No environmental factors were noted currently affecting patient.  This care plan was communicated via EMR  with her primary care provider, Parish Daniels MD, who is the authorizing prescriber for this visit.  Direct supervision was available by either the patient's PCP or other available provider.  Time and complexity billing metrics are included in the docflowsheet linked to this visit.  Time spent: 30 minutes      Hair Rivas PharmD  [facility] Pharmacist  [place] and [facility]  [phone]

## 2021-06-13 NOTE — TELEPHONE ENCOUNTER
CMT attempted mobile phone number listed but this is the number of the patient's sister. Tried home phone and had to leave message.

## 2021-06-13 NOTE — TELEPHONE ENCOUNTER
Please call patient and advise to follow up with Endocrinologist as scheduled.     Dr. Praish Daniels  11/30/2020 1:09 PM

## 2021-06-13 NOTE — PROGRESS NOTES
ASSESMENT AND PLAN:  Diagnoses and all orders for this visit:    Type 2 diabetes mellitus treated with insulin (H)  -     Glycosylated Hemoglobin A1C  -     Microalbumin, Random Urine  -     Lipid Cascade RANDOM  -     Comprehensive Metabolic Panel  -     insulin aspart U-100 (NOVOLOG FLEXPEN U-100 INSULIN) 100 unit/mL (3 mL) injection pen; Inject 20 Units under the skin 3 (three) times a day before meals.  Dispense: 30 mL; Refill: 0  -     metFORMIN (GLUCOPHAGE-XR) 500 MG 24 hr tablet; Take 1 tablet (500 mg total) by mouth 2 (two) times a day with meals.  Dispense: 60 tablet; Refill: 0  Refilled NovoLog and Metformin to last until her appointment with endocrinology.She has appointment scheduled on 11/30/2020, instructed to keep that appointment.    Immunization deficiency  -     Hepatitis B Surface Antibody (Anti-HBs) Vaccine Check  -     HPV vaccine 9 valent 3 dose IM    Mixed hyperlipidemia  Recheck lipid today.  Continue Lipitor 20 mg daily, no refill needed per patient.    Chronic kidney disease, unspecified CKD stage  Recheck today.    Stressed the importance of keeping her future appointment with endocrinology who is managing her diabetes.  Patient voiced understanding.     This transcription uses voice recognition software, which may contain typographical errors.      SUBJECTIVE: Tyson Skelton is a 25-year-old female with history of type 2 diabetes on insulin here for medication refill.  She missed several appointments with her endocrinologist and ran out NovoLog.  I refilled her NovoLog as few weeks ago, she is out again 2 days ago.  She still has Lantus and is using 84 units daily.  She is also out of her Metformin.  She is on Metformin  mg 2 times a day.  States her blood sugar in the 200s since she ran out of her insulin.  But states she is feeling fine.  She has a 2-year-old son.  No known sick family member.  No known exposure to COVID-19, no symptoms.    Past Medical History:   Diagnosis Date      "Chronic kidney disease      Diabetes mellitus, type II (H)      Hyperglycemia without ketosis 2010    hospitalized at [facility]     Hyperlipidemia      Patient Active Problem List   Diagnosis     Vitamin D Deficiency     Type 2 diabetes mellitus treated with insulin (H)     Hyperlipidemia     Chronic Kidney Disease (NKF Classification)     Hyperglycemia     Sepsis due to Escherichia coli (H)       Allergies:  No Known Allergies    Social History     Tobacco Use   Smoking Status Passive Smoke Exposure - Never Smoker   Smokeless Tobacco Never Used       Review of systems otherwise negative except as listed in HPI.   Social History     Tobacco Use   Smoking Status Passive Smoke Exposure - Never Smoker   Smokeless Tobacco Never Used       OBJECTICE: /86   Pulse (!) 104   Temp 98.2  F (36.8  C) (Oral)   Ht 4' 11\" (1.499 m)   Wt 151 lb 8 oz (68.7 kg)   SpO2 96%   BMI 30.60 kg/m      DATA REVIEWED:    Labs Reviewed or Ordered (1):       GEN-alert,  in no apparent distress.  HEENT- neck is supple.  CV-regular rate and rhythm with no murmur.   RESP-lungs clear to auscultation .  ABDOMEN- Soft , not tender.  EXTREM- No open lesions or ulcers.  Sensation intact.  SKIN-normal        [place] [facility]   11/24/2020   "

## 2021-06-13 NOTE — PROGRESS NOTES
Clinic Care Coordination Contact    Follow Up Progress Note      Assessment: follow up on med/insulin compliance  - Chart review completed today.   - Spoke with patient via phone. Took 4 attempts to be able to reach patient.     1) med compliance.   - reviewed meds with patient today.   - States she's taking her meds as prescribed.   - No refill concerns.       1) Tylenol PRN    2) Atorvastatin 20mg daily     3) Novolog 20 units three times a day    4) Lantus 84 units daily HS    5) Lisinopril 2.5mg daily     6) Metformin 500mg two times a day     2) follow up appt with endocrinologist  - Had 2-3 no shows with endocrinologist.   - States she ran out of minutes on her phone.   - Appt rescheduled with endocrinologist on 11/30/2020.       Goals addressed this encounter:   Goals        Patient Stated      Other (pt-stated)      Goal Statement: I will attend my appt with endocrinologist in the next 6 months.   Date Goal set: 4/21/2020  Barriers: language barrier, non-compliance, lack of understanding  Strengths: good family support. Engaging with plan of care  Date to Achieve By: 10/21/2020  Patient expressed understanding of goal: Yes    Action steps to achieve this goal:  1. I missed my follow up appointment by phone with endocrinologist on 10/15/2020 because I ran out of minutes on my phone.    2. I will answer my phone when endocrinologist calls for follow up on 11/30/2020 at 11:00 PM and I understand this will be video visit.  3. I will also answer my phone when CCC RN calls me for follow up as scheduled 11/05/2020.  4. I will call CHW with any questions or concerns regarding coordination assistance.    [facility]  Katherine Peralta MD    [facility]    [address] [address]    Phone: [phone]        Goal update: 9/22/2020; 11/02/2020.            Plan:   1) Patient will attend her appt with endocrinologist on 11/30/2020  2) CCC RN will  follow up on 12/15/2020  3) Patient will attend her appt with PCP on 11/24/2020

## 2021-06-13 NOTE — PROGRESS NOTES
Clinic Care Coordination Contact    Community Health Worker Follow Up    Goals:   Goals Addressed                 This Visit's Progress      Other (pt-stated)   90%     Goal Statement: I will attend my appt with endocrinologist in the next 6 months.   Date Goal set: 4/21/2020  Barriers: language barrier, non-compliance, lack of understanding  Strengths: good family support. Engaging with plan of care  Date to Achieve By: 10/21/2020  Patient expressed understanding of goal: Yes    Action steps to achieve this goal:  1. I did not receive or missed call from endocrinology clinic because we only have one phone and I share it with my .  2. I am not sure yet the best way for endocrinologist to call me for follow up by phone.  3. I will try to have my phone on 12/16/2020 when CCC RN calls.    [facility]  Katherine Peralta MD    [facility]    [address] [place] [address]    Phone: [phone]        Goal update: 9/22/2020; 11/02/2020; 12/10/2020.          Intervention/Education provided during outreach: CHW spoke with patient who stated she missed phone visit follow up with endocrinologist on11/30/2020 because she shares phone with her . Patient stated that she's not sure what is the best way for endocrinologist to get a hold of her and sometimes she forgets. Patient does not want to schedule for follow up yet at this time however she's taking medications daily for her diabetes. Patient stated that she does not need additional coordination assistance and patient will call when needed.      CHW Next Follow-up: In one month.     CHW Plan: CHW plans to follow up with specialist, endocrinology clinic for coordination.

## 2021-06-13 NOTE — PROGRESS NOTES
Clinic Care Coordination Contact    Follow Up Progress Note      Assessment: follow up on med compliance, med teaching, and goals progression.   - Chart review completed today.   - Spoke with patient via phone today.     1) Med compliance and med teaching.     1) Tylenol - PRN    2) Atorvastatin 20mg (1) tab HS    3) Novolog 20 units 2-3 times per day and also on sliding scale - she was able to tell me how and when to administer insulin.     4) Lantus 84 units HS    5) Lisinopril 2.5mg (1) tab daily    6) Metformin 500mg (2) tabs daily AM with breakfast instead of (1) tab two times a day     - reports checking BG 2-3 times per day.   - reports BG ranging from 90s-300s, but mostly in the 200s and 300s.     - appt rescheduled on 1/11/2021 @ 12:00pm tele visit. Katherine Peralta MD    [facility] [address] [address] [phone]      - Eye appt scheduled on 12/28/2020 @ 1:20pm. Patient is aware and plan to attend.     Dental appt - not needed       Goals addressed this encounter:   Goals Addressed                 This Visit's Progress       Patient Stated      Other (pt-stated)        Goal Statement: I will attend my appt with endocrinologist in the next 6 months.   Date Goal set: 8/18/2020  Barriers: language barrier, non-compliance, lack of understanding  Strengths: good family support. Engaging with plan of care  Date to Achieve By: 2/18/2021  Patient expressed understanding of goal: Yes    Action steps to achieve this goal:  1. I will attend my appt with endocrinology on 1/11/2021 @ 12:00pm - tele visit.   2. I will answer my phone or return calls in a timely manner.   3. I will call [facility] RN or CHW with any concerns or questions.     [facility]  Katherine Peralta MD    [facility]    [address] [place] [id number]    Phone: [phone]        Goal update:  9/22/2020; 11/02/2020; 12/10/2020.            Plan:   1) Patient will attend her eye appt on 12/28/2020.   2) Patient will speak with her endocrinologist on 1/11/2021 at 12pm.   3) CCC RN will follow up post endocrinologist appt.

## 2021-06-13 NOTE — TELEPHONE ENCOUNTER
[place] with [facility],     Critical Glucose - 639 @ 1332 draw.     Paged on-call MD, Dr. Yang, reports contact to inform about result.  If to develop recurrent vomiting go to ED.     If otherwise feels good and restarting insulin can follow-up with MD tomorrow.      Would like for note to be forwarded to Dr. Daniels.     Call to patient @ 1946 x 3, voicemail.  Left message to call back.     Please relay above information if patient calls back.     Yolanda Lo, RN/[facility]    Reason for Disposition    Lab or radiology calling with CRITICAL test results    Additional Information    Negative: Lab calling with strep throat test results and triager can call in prescription    Negative: Lab calling with urinalysis test results and triager can call in prescription    Negative: Medication questions    Negative: ED call to PCP    Negative: Physician call to PCP    Negative: Call about patient who is currently hospitalized    Protocols used: PCP CALL - NO TRIAGE-A-

## 2021-06-13 NOTE — PROGRESS NOTES
-Care Guide sent out future appointments to patient in the mail.  -Patient's health insurance is active and all medications are current.  -Patient did not need help with form and paperwork on today outreach.  -Care Guide will outreach patient again in 4 weeks and informed to call sooner if needed.

## 2021-06-14 NOTE — PROGRESS NOTES
Clinic Care Coordination Contact    Follow Up Progress Note      Assessment: follow up on med compliance, DM II mgmt/teachig.   - Chart review completed today.   - Spoke with patient via phone    1) Med compliance and DM II teaching  - Patient states she was out of Novolog the past 2 weeks. States she called the pharmacy and was told that she had no refills and pharmacy will contact PCP but she never heard back from the pharmacy the past 2 weeks.   - CCC RN called [facility] today and was told that they did receive new script for Novolog on 1/13/2021.   - Per pharmacy, lisinopril and Atorvastatin was last filled back in November, 2020.   - Requested auto refills for all meds but patient has to call refills for Lantus and Novolog.     Reviewed meds with patient again today:  1) Tylenol - PRN     2) Atorvastatin 20mg (1) tab AM - has not been taking it since November, 2020.     3) Lantus 84 units at HS     4) Novolog 20 units with three times a day with meals.     5) Lisinopril 2.5mg (1) tab AM has not been taking it since November, 2020.       6) Metformin 500mg (2) tab AM and (1) tab at dinner. - was instructed to increase to 3 tabs by endocrinologist - Dr Katherine Peralta on 1/7/2021    - requested refill for all meds today including lantus and novolog. Patient will pick it up later today.   - Educated patient on the important of taking her meds as prescribed and administered insulins as directed.   - Long hx of non-compliance.   - need constant reassurance from CCC RN with med compliance and follow up appt with her endocrinologist as recommended.     - Last see by Dr Peralta, endocrinologist on 1/7/2021. Recommended to return ini 6 months ( around 7/7/2021)    Diabetes Education in 1-4 weeks, Diabetes Team PA in 1-3 months, me in 6 months   [phone]    - Patient states she has follow up appt already schedule with diabetic educator but she doesn't remember when. States it will be via phone. KANDY RN  will follow up with next outreach call.     Goals addressed this encounter:   Goals Addressed                 This Visit's Progress       Patient Stated      Medical (pt-stated)   20%     Goal Statement: I would like CCC RN to coordinate my care with endocrinology clinic, assist with meds refills concerns, and med teaching the next 90 days.   Date Goal set: 2/2/2021  Barriers: language barrier, lack of motivation, lack of knowledge how to navigate health care system   Strengths: agrees to work on goals and speak with CCC RN at outreach calls  Date to Achieve By: 5/2/2021  Patient expressed understanding of goal: Yes    Action steps to achieve this goal:  1. I will speak with CCC RN at outreach calls.   2. I will answer my calls or return call in a timely manner  3. I will take my meds ad insulins as prescribed.   4. I will call CCC RN with any concerns or questions.           COMPLETED: Other (pt-stated)        Goal Statement: I will attend my appt with endocrinologist in the next 6 months.   Date Goal set: 8/18/2020  Barriers: language barrier, non-compliance, lack of understanding  Strengths: good family support. Engaging with plan of care  Date to Achieve By: 2/18/2021  Patient expressed understanding of goal: Yes    Action steps to achieve this goal:  1. I had follow up with endocrinologist and will follow up again in 6 months.  2. I will reach out to CHW if I need assistance for coordination.  3. I will follow up with my doctor as scheduled.       [facility]  Katherine Peralta MD    [facility]    [address] [place] [id number]    Phone: [phone]        Goal update: 01/21/2021               Outreach Frequency: 6 weeks    Plan:   1) Patient will speak with diabetic educator as scheduled.   2) Patient will  her meds and insulin today and will call CCC RN with concerns.   3) CCC RN will follow up on 3/2/51857

## 2021-06-14 NOTE — TELEPHONE ENCOUNTER
Refill Approved    Rx renewed per Medication Renewal Policy. Medication was last renewed on 11/24/20.    Cheri Gresham, Care Connection Triage/Med Refill 1/13/2021     Requested Prescriptions   Pending Prescriptions Disp Refills     NOVOLOG FLEXPEN U-100 INSULIN 100 unit/mL (3 mL) injection pen [Pharmacy Med Name: NOVOLOG FLEXPEN SYRINGE 100 Solution Pen-injector] 15 mL 0     Sig: INJECT 20 UNITS UNDER THE SKIN 3 (THREE) TIMES A DAY BEFORE MEALS.       Insulin/GLP-1 Refill Protocol Passed - 1/12/2021  4:16 PM        Passed - Visit with PCP or prescribing provider visit in last 6 months     Last office visit with prescriber/PCP: 11/24/2020 OR same dept: 11/24/2020 Parish Daniels MD OR same specialty: 11/24/2020 Parish Daniels MD Last physical: Visit date not found Last MTM visit: Visit date not found     Next appt within 3 mo: Visit date not found  Next physical within 3 mo: Visit date not found  Prescriber OR PCP: Parish Daniels MD  Last diagnosis associated with med order: 1. Type 2 diabetes mellitus treated with insulin (H)  - NOVOLOG FLEXPEN U-100 INSULIN 100 unit/mL (3 mL) injection pen [Pharmacy Med Name: NOVOLOG FLEXPEN SYRINGE 100 Solution Pen-injector]; INJECT 20 UNITS UNDER THE SKIN 3 (THREE) TIMES A DAY BEFORE MEALS.  Dispense: 15 mL; Refill: 0    If protocol passes may refill for 6 months if within 3 months of last provider visit (or a total of 9 months).              Passed - A1C in last 6 months     Hemoglobin A1c   Date Value Ref Range Status   11/24/2020 12.9 (H) <=5.6 % Final     Comment:     Normal <5.7% Prediabete 5.7-6.4% Diabletes 6.5% or higher - adopted from ADA consensus guidelines               Passed - Microalbumin in last year     Microalbumin, Random Urine   Date Value Ref Range Status   11/24/2020 2.81 (H) 0.00 - 1.99 mg/dL Final                  Passed - Blood pressure in last year     BP Readings from Last 1 Encounters:   11/24/20 112/86             Passed - Creatinine done in last year      Creatinine   Date Value Ref Range Status   11/24/2020 1.07 0.60 - 1.10 mg/dL Final

## 2021-06-14 NOTE — PROGRESS NOTES
ASSESMENT AND PLAN:  Diagnoses and all orders for this visit:    Type 2 diabetes mellitus with insulin therapy  -     Ambulatory referral to Optometry  -     Ambulatory referral to Diabetes Education (Existing Diagnosis)  A1c 9.0 on 11/10/2017.  Appreciate input from endocrinology ,MTM pharmacist and diabetic education.  Advised to keep all upcoming follow-up appointments.    Hyperlipidemia   on 10/4/17.  Recheck in 1/17.  Continue Lipitor 10 mg daily.  No side effects reported.    Contraceptive education  Discussed in length options for birth control.  She declined to be on birth control at this time.  Will discuss again at next visit.      Need for influenza vaccination  -     Influenza, Seasonal Quad, Preservative Free 36+ Months      Spent 25 min face to face with patient with more the 50% spent in counseling and coordination of care and discussing problems listed above.        SUBJECTIVE: Tyson Skelton is a 22-year-old female with type 2 diabetes here for follow-up.  She was seen at [facility] on 11/14/2017.  Notes reviewed.  She is currently using Lantus 44 units at night and NovoLog 15 units with meals.  He brought in her blood sugar log.  Average blood glucose for last 14 days was 105.  The numbers ranges from 120-130s most days, the highest was 276 two weeks ago, the lowest was 94.  Denied hypoglycemic symptoms.    She had spontaneous AB in August, has not been using any birth control methods.  She is sexually active. Reported menstrual periods are regular.  LMP 12/1/2017.    Her last eye exam was several years ago.    She has no new complaints or concerns today.      Past Medical History:   Diagnosis Date     Chronic kidney disease      Diabetes mellitus, type II      Hyperglycemia without ketosis 2010    hospitalized at [facility]     Hyperlipidemia      Patient Active Problem List   Diagnosis     Vitamin D Deficiency     Type 2 diabetes mellitus with insulin therapy      "Hyperlipidemia     Proteinuria     Chronic Kidney Disease (NKF Classification)       Allergies:  No Known Allergies    History   Smoking Status     Passive Smoke Exposure - Never Smoker   Smokeless Tobacco     Not on file       Review of systems otherwise negative except as listed in HPI.   History   Smoking Status     Passive Smoke Exposure - Never Smoker   Smokeless Tobacco     Not on file       OBJECTICE: BP 94/70 (Patient Site: Left Arm, Patient Position: Sitting, Cuff Size: Adult Regular)  Pulse 96  Temp 98.1  F (36.7  C) (Oral)   Resp 16  Ht 4' 11\" (1.499 m)  Wt (!) 96 lb 8 oz (43.8 kg)  LMP 12/01/2017 (Within Days)  Breastfeeding? Unknown  BMI 19.49 kg/m2    DATA REVIEWED:  Additional History from Old Records Summarized (2):   Labs Reviewed or Ordered (1):      GEN-alert,  in no apparent distress.  HEENT-mucous membranes are moist, neck is supple.  CV-regular rate and rhythm with no murmur.   RESP-lungs clear to auscultation .  ABDOMEN- Soft , not tender.  EXTREM- No edema.No open lesions or ulcers.   SKIN-normal        [facility]   12/7/2017   This transcription uses voice recognition software, which may contain typographical errors.      "

## 2021-06-14 NOTE — PROGRESS NOTES
[facility]  ENDOCRINOLOGY    Diabetes Note 11/14/2017    Tyson Skelton, 1995, [id number]          Reason for visit      1. Type 2 diabetes mellitus with insulin therapy        HPI     Tyson Skelton is a very pleasant 22 y.o. old female who presents for follow up.  SUMMARY:  Tyson is here today in referral from her PCP, Dr Parish Daniels, for uncontrolled DM 2.  She is accompanied by a Professional .  She has an interesting hx in that while she was in High School, she weighed 170 lbs.  She is now at a mere 98 lbs.  It appears that she would be a Type 1, with enormous weight loss 2/2 ketotic state, but that is not the case.  Her C-peptide and BRYN-antibodies tests would indicate that she is indeed a Type 2 with some pancreatic function.  Her current A1c is 9, which is a great improvement from  14>, which, from our records, seems to be where she was residing over the last 2 years.  Her A1c hx, that we have from 2012, does indicate that she is capable of much better, with 7.9 reported in 2013. She reports that she has had a miscarriage about 3 months ago, most certainly contributed to by her out of control diabetes.  She tells me that since then, food hasn't tasted right, so she just doesn't eat.  Not good for blood sugars.  I explain how the pancreas and liver get together to help provide energy for the body in the absence of food.  We used the Endocrine System wall poster and her  did a really good job with some rather difficult material.  She is supposed to be taking Novolog with meals, but without meals, clearly no Novolog.  She is taking 50 units of Lantus daily.  She is not testing much, and has only 4 readings on her meter for the last two weeks.  She denies any hypoglycemia.          Blood glucose data:  Ave: 177 of 4 readings.    Past Medical History     Patient Active Problem List   Diagnosis     Vitamin D Deficiency     Type 2 diabetes mellitus with insulin therapy     Hyperlipidemia      "Proteinuria     Chronic Kidney Disease (NKF Classification)        Family History       family history includes Diabetes in her mother.    Social History      reports that she is a non-smoker but has been exposed to tobacco smoke. She does not have any smokeless tobacco history on file. She reports that she does not drink alcohol or use illicit drugs.      Review of Systems     Patient has no polyuria or polydipsia, no chest pain, dyspnea or TIA's, no numbness, tingling or pain in extremities  Remainder negative except as noted in HPI.    Vital Signs     BP (!) 84/68 (Patient Site: Right Arm, Patient Position: Sitting, Cuff Size: Adult Regular)  Pulse (!) 102  Ht 4' 11\" (1.499 m)  Wt (!) 98 lb 8 oz (44.7 kg)  LMP 06/06/2017 (Within Days)  BMI 19.89 kg/m2  Wt Readings from Last 3 Encounters:   11/14/17 (!) 98 lb 8 oz (44.7 kg)   09/13/17 (!) 96 lb 5 oz (43.7 kg)   09/07/17 (!) 99 lb (44.9 kg)       Physical Exam     Constitutional:  Well developed, Well nourished  HENT:  Normocephalic,   Neck: Thyroid normal, No lymph nodes, Supple  Eyes:  PERRL, Conjunctiva pink  Respiratory:  Normal breath sounds, No respiratory distress  Cardiovascular:  Normal heart rate, Normal rhythm, No murmurs      Assessment     1. Type 2 diabetes mellitus with insulin therapy        Plan     We did a lot of talking today, including the possibility of her getting pregnant again.  I explained to her how it was likely that her extremely high blood sugars were contributory to her loss of the pregnancy and how we would help her to keep her blood sugars low so she could safely carry a baby.  She seemed to like this information.  For now though, I want her to work on regular eating and regular testing.  Once we have this established, then we can work on other things.  She will return in 6 weeks.  Time spent with pt today: 30 min with >50% spent in counseling and coordination of care.        Linda VEE Endocrinology  11/14/2017  5:54 " PM        Lab Results     Hemoglobin A1c   Date Value Ref Range Status   11/10/2017 9.0 (H) 4.2 - 6.1 % Final   08/03/2017 >14.0 (H) 3.5 - 6.0 % Final   04/01/2015 >14.0 (H) 3.5 - 6.0 % Final   02/20/2015 >14.0 (H) 3.5 - 6.0 % Final   06/24/2014 13.6 (H) 4.2 - 6.1 % Final     Creatinine   Date Value Ref Range Status   08/20/2017 0.79 0.60 - 1.10 mg/dL Final   08/03/2017 0.82 0.60 - 1.10 mg/dL Final   04/01/2015 1.07 0.60 - 1.10 mg/dL Final     Microalbumin, Random Urine   Date Value Ref Range Status   10/04/2017 13.13 (H) 0.00 - 1.99 mg/dL Final       Cholesterol   Date Value Ref Range Status   02/20/2015 412 (H) <=199 mg/dL Final     HDL Cholesterol   Date Value Ref Range Status   02/20/2015 46 >=40 mg/dL Final     LDL Calculated   Date Value Ref Range Status   02/20/2015 316 (H) 0 - 129 mg/dL Final     Triglycerides   Date Value Ref Range Status   02/20/2015 252 (H) <=149 mg/dL Final       Lab Results   Component Value Date    ALT 7 08/03/2017    AST 11 08/03/2017    ALKPHOS 68 08/03/2017    BILITOT 0.4 08/03/2017         Current Medications     Outpatient Medications Prior to Visit   Medication Sig Dispense Refill     acetaminophen (TYLENOL) 325 MG tablet Take 1 tablet (325 mg total) by mouth every 4 (four) hours as needed for pain. 90 tablet 0     atorvastatin (LIPITOR) 10 MG tablet Take 1 tablet (10 mg total) by mouth daily. 30 tablet 3     blood glucose test (GLUCOSE BLOOD) strips Use to check blood sugar two times daily. (Patient taking differently: 2 (two) times a day. Contour Next test strips) 200 strip 3     blood-glucose meter Misc Test blood glucose twice a day 1 each 0     generic lancets Use to check blood sugar two times daily. 200 each 3     insulin aspart (NOVOLOG FLEXPEN) 100 unit/mL injection pen Inject 15 Units under the skin 3 (three) times a day before meals. 15 mL 11     insulin glargine (LANTUS; BASAGLAR) 100 unit/mL (3 mL) pen Inject 44-50 Units under the skin at bedtime. Increase as  "directed to up to 50 units 5 adj dose pen 11     lisinopril (PRINIVIL,ZESTRIL) 2.5 MG tablet Take 1 tablet (2.5 mg total) by mouth daily. 30 tablet 3     pen needle, diabetic 31 gauge x 5/16\" Ndle Use to inject insulin three times daily. 300 each 3     HYDROcodone-acetaminophen 5-325 mg per tablet Take 1 tablet by mouth every 6 (six) hours as needed for pain. 10 tablet 0     PNV,calcium 72-iron-folic acid (PRENATAL VITAMIN PLUS LOW IRON) 27 mg iron- 1 mg Tab Take one tablet daily.  OK to substitute with insurance preferred pre- vitamin. 90 tablet 3     No facility-administered medications prior to visit.            "

## 2021-06-14 NOTE — PROGRESS NOTES
-Care Guide confirmed transportation for patient today eye appointment at [facility] [facility] at 3:30 PM with [employer].  -Patient did not need help with form or paperwork on today outreach, all medications are current and health insurance is active.  -Care Guide will outreach patient again 4 weeks and informed to call sooner if needed.

## 2021-06-14 NOTE — PROGRESS NOTES
Clinic Care Coordination Contact    Community Health Worker Follow Up    Goals:   Goals Addressed                 This Visit's Progress      Other (pt-stated)   100%     Goal Statement: I will attend my appt with endocrinologist in the next 6 months.   Date Goal set: 8/18/2020  Barriers: language barrier, non-compliance, lack of understanding  Strengths: good family support. Engaging with plan of care  Date to Achieve By: 2/18/2021  Patient expressed understanding of goal: Yes    Action steps to achieve this goal:  1. I had follow up with endocrinologist and will follow up again in 6 months.  2. I will reach out to CHW if I need assistance for coordination.  3. I will follow up with my doctor as scheduled.       [facility]  Katherine Peralta MD    [facility]    [address] [place] [id number]    Phone: [phone]        Goal update: 01/21/2021        Conversation with patient:  -Patient is able to get all medications refill when needed.   -Patient to follow up with PCP as scheduled.  -Patient does not need immediate coordination assistance and no additional resource needed.  -Patient was informed to call with questions or concerns.      CHW Next Follow-up: In one month.    CHW Plan: CHW will assist patient to schedule follow up with endocrinologist in six months as recommended.

## 2021-06-14 NOTE — PROGRESS NOTES
-Care Guide reminded patient of coming appointment with endocrinologist on 11/14/2017 and arranged transportation.  -Patient reported all medications are current and health insurance is active.  -Patient did not need help with form or paperwork on today follow up call.  -Care Guide will outreach patient again in 4 weeks and informed to call sooner if needed.

## 2021-06-15 NOTE — PROGRESS NOTES
Clinic Care Coordination Contact  [facility]    Clinical Data: Care Coordinator Outreach  Outreach attempted x 1.  CHW called and left message on patient's phone with call back information and requested return call. Plan: Care Coordinator will try to reach patient again in two weeks.

## 2021-06-15 NOTE — PROGRESS NOTES
Clinic Care Coordination Contact    Follow Up Progress Note      Assessment:   - Chart review completed today.  - Unable to reach patient via phone today x3. Left a vm to call CCC RN back.  - Writer was able to reach patient's sister. Per sister, patient's phone is not always working. Sister states sometime she couldn't even reach her sister either.     - Patient has uncontrolled DM II and non- compliant with taking her meds and treatment plans.   - Had several no shows or need constant follow up from providers and care team to make sure she follows up with endocrinology clinic.  - There's barriers that could possible effect her follow through with treatment plans and with providers such language barrier and not always reachable.    Goals addressed this encounter:   Goals Addressed    None         Plan:   1) CCC RN will follow up in 6 weeks or sooner if needed.

## 2021-06-15 NOTE — PROGRESS NOTES
-Care Guide reminded patient of upcoming appointments.  -Patient's health insurance is active and all medications are current.  -Patient did not need help with form and paperwork on today outreach.  -Care Guide will outreach patient again in 4 to 6 weeks and informed to call sooner if needed.

## 2021-06-16 NOTE — TELEPHONE ENCOUNTER
Telephone Encounter by Mable Rg at 10/21/2019  2:09 PM     Author: Mable Rg Service: -- Author Type: --    Filed: 10/21/2019  2:10 PM Encounter Date: 10/20/2019 Status: Signed    : Mable Rg APPROVED:    Approval start date:[id number]   Approval end date:10/20/2020    Pharmacy has been notified of approval and will contact patient when medication is ready for pickup.

## 2021-06-16 NOTE — PROGRESS NOTES
ASSESMENT AND PLAN:  Diagnoses and all orders for this visit:    Absence of menstruation  -     Pregnancy (Beta-hCG, Qual), Urine    High-risk pregnancy in first trimester  -     Ambulatory referral to Obstetrics / Gynecology  Uncontrolled diabetes type 2 and previous first trimester miscarriage.  We will try to make appointment with OB ASAP.  Stressed the importance of good glycemic control during pregnancy to prevent maternal-fetal complications.    Type 2 diabetes mellitus with insulin therapy  -     Comprehensive Metabolic Panel  -     Glycosylated Hemoglobin A1c  -     Microalbumin, Random Urine  A1c 8.7 today, was 9.0 three months ago.  Continue current insulin regimen.  She will make follow-up appointment with endocrinology, was last seen in 2017.      SUBJECTIVE: Tyson Skelton is a 22-year-old female with history of uncontrolled type 2 diabetes here for confirmation of pregnancy.  Planned pregnancy, both partners happy and excited.  .  Had spontaneous first trimester AB in 2017 ( ~8 weeks).  She is feeling well, no morning sickness symptoms.  Only complaining of breast tenderness.  No bleeding or spotting.    She stopped taking lisinopril and Lipitor as soon as she found out she was pregnant.  She takes prenatal vitamin daily.    She is using Lantus 44 units every morning and NovoLog 15 units 3 times daily before meals.  Blood sugar readings ranges from 90s-180, the highest 249, states that she checks only fasting.        Past Medical History:   Diagnosis Date     Chronic kidney disease      Diabetes mellitus, type II      Hyperglycemia without ketosis     hospitalized at [facility]     Hyperlipidemia      Patient Active Problem List   Diagnosis     Vitamin D Deficiency     Type 2 diabetes mellitus with insulin therapy     Hyperlipidemia     Proteinuria     Chronic Kidney Disease (NKF Classification)       Allergies:  No Known Allergies    History   Smoking Status     Passive  "Smoke Exposure - Never Smoker   Smokeless Tobacco     Never Used       Review of systems otherwise negative except as listed in HPI.   History   Smoking Status     Passive Smoke Exposure - Never Smoker   Smokeless Tobacco     Never Used       OBJECTICE: BP 90/66 (Patient Site: Right Arm, Patient Position: Sitting, Cuff Size: Adult Regular)  Pulse (!) 104  Temp 98  F (36.7  C) (Oral)   Resp 16  Ht 4' 11\" (1.499 m)  Wt 101 lb 2 oz (45.9 kg)  LMP 01/06/2018 (Within Days)  BMI 20.42 kg/m2    DATA REVIEWED:    Labs Reviewed or Ordered (1):       GEN-alert,  in no apparent distress.  HEENT-mucous membranes are moist, neck is supple.  CV-regular rate and rhythm with no murmur.   RESP-lungs clear to auscultation .  ABDOMEN- Soft , not tender.  NEURO- Normal.   SKIN-normal    This transcription uses voice recognition software, which may contain typographical errors.        Parish Daniels   3/9/2018     "

## 2021-06-16 NOTE — PROGRESS NOTES
Clinic Care Coordination Contact    Follow Up Progress Note      Assessment: follow up on goals  - Chart review completed today.   - Unable reach patient via phone but her boyfriend answered the phone today.     - Per patient's boyfriend, patient is currently working and won't be home until 5pm. States patient doesn't work every day but he doesn't know when her days off are.   - Requested patient to call CCC RN when she's available.   - Per chart review, patient came to the clinic for her depo shot on 3/17/2021.   - It has been unsuccessful the past couple outreaches to reach patient.   - Patient has not been engaging with CCC team.   - Will plan to dis-enroll or graduate from [facility] if patient hasn't call CCC RN back post PCP appt scheduled on 4/29/2021.   - Patient has been following up with her diabetic educator, diabetes team PA, and  endocrinologist since Jan, 2021.   - A1c - 13.1 on 1/13/2021.     Goals addressed this encounter:   Goals Addressed                 This Visit's Progress       Patient Stated      Medical (pt-stated)   30%     Goal Statement: I would like CCC RN to coordinate my care with endocrinology clinic, assist with meds refills concerns, and med teaching the next 90 days.   Date Goal set: 2/2/2021  Barriers: language barrier, lack of motivation, lack of knowledge how to navigate health care system   Strengths: agrees to work on goals and speak with CCC RN at outreach calls  Date to Achieve By: 5/2/2021  Patient expressed understanding of goal: Yes    Action steps to achieve this goal:  1. I will speak with CCC RN at outreach calls.   2. I will answer my calls or return call in a timely manner  3. I will take my meds ad insulins as prescribed.   4. I will call CCC RN with any concerns or questions.                Outreach Frequency: 6 weeks    Plan:   1) Patient will attend her appt with PCP on 4/29/2021.   2) CCC RN plan to either dis-enroll or graduate if patient continue to not engaging with  CCC team but will wait until PCP appt.

## 2021-06-16 NOTE — PROGRESS NOTES
Clinic Care Coordination Contact  [facility]/[employer]    Clinical Data: Care Coordinator Outreach  Outreach attempted x 2. CHW called and unable to reach and unable to leave message on patient's phone to request a call back. CHW called patient twice it seems like someone  but it went on busy tone. Plan: Care Coordinator will try to reach patient again in 1 month.

## 2021-06-16 NOTE — PROGRESS NOTES
MTM Encounter    Assessment/Plan  Diabetes: Somewhat controlled, improving.  Although there is still room for improvement now that she is pregnant, we will keep her insulin dose the same as overall her glucose readings have been dropping for the past two weeks as she has gotten into more regular glucose checking again.  Her A1c is likely still somewhat elevated due to recent non-adherence to insulin.  We also started discussing self adjustment of glucose readings.  After running through a few examples, Tyson does not feel comfortable doing this herself at this time, but remains open to learning more about it.  I am encouraging an increase of 1 unit every three days that her blood sugar is out of range.  This could go a long ways towards improving her control as she progresses through this pregnancy.  She also requests refills of alcohol swabs for checking her blood sugar and giving herself insulin injections.  - alcohol swabs  -microalbumin, future    Pregnancy: Tyson has appropriate stopped all teratogenic drugs and has continued on her prenatal vitamin.  I shared that it is especially important to control her blood sugar now for her baby, but also for herself now that she does not have the same level of protection for her kidneys without the lisinopril.  We also discussed that insulin needs often change throughout the pregnancy, especially in the third trimester, and that it is likely that she will require higher doses of insulin and more frequent clinic visits at that time.    Follow Up  PCP next week  MTM in six weeks    Subjective/Objective  Tyson Skelton is a 22 y.o. female here for a follow up medication therapy management (MTM) appointment; her chief concern today is diabetes.    Diabetes: Nafisa is using Lantus 44 units daily and Novolog 15 units with meals three times per day.  She had a two month period where she was not using insulin because she was sick.  AM readings: 130, 117, 159, 101, 96, 90, 91,  124  Afternoon readings: 242, 180, 83, 130, 200, 110, 85  PM readings: 155, 112, 95, 76, 139  Lab Results   Component Value Date    HGBA1C 9.0 (H) 11/10/2017    HGBA1C >14.0 (H) 08/03/2017    HGBA1C >14.0 (H) 04/01/2015     Lab Results   Component Value Date    GLUF 160 (H) 12/24/2012    MICROALBUR 13.13 (H) 10/04/2017    LDLCALC 316 (H) 02/20/2015    CREATININE 0.79 08/20/2017       Pregnancy: Tyson had a positive home pregnancy test one week ago.  She stopped all her oral medications except for the prenatal vitamin as previously instructed.  She seems happy about her pregnancy.    ----------------    Tyson's medication list was reviewed with them, discussing reason for use, directions for use, and potential side effects of each medication as needed. Indication, safety, efficacy, and convenience was assessed for all medications addressed above.  No environmental factors were noted currently affecting patient.  This care plan was communicated via EMR with her primary care provider, Parish Daniels MD, who is the authorizing prescriber for this visit.  Direct supervision was available by either the patient's PCP or other available provider.  Time and complexity billing metrics are included in the docflowsheet linked to this visit.  Time spent: 40 minutes      Hair Rivas PharmD  [facility] Pharmacist  [facility]  [phone]

## 2021-06-16 NOTE — PROGRESS NOTES
[facility]  ENDOCRINOLOGY    Gestational Diabetes 3/27/2018    Tyson Skelton, 1995, [id number]          Reason for visit      1. Pre-existing type 2 diabetes mellitus during pregnancy in first trimester        HPI     Tyson Skelton is a very pleasant 22 y.o. old female who presents for management of Diabetes Mellitus during pregnancy.  She is currently 9w  pregnant . Due date is 10/13/18    Current carbohydrate intake:consistent with recommendations of 30g-60g-60g.  I have reviewed her blood glucose logs and note that the:  Fasting readings  are:above range on current regimen  Postprandial readings are:above range on current regimen  Current Lantus dose: 44  Current Prandial insulin: 15/15/15  Blood glucose logs/meter brought in and data reviewed and incorporated into decision-making.  [facility]    Therapy/Interventions in the past:  She has been seen by the Diabetes Educator- and has received instruction on carbohydrate counting and  consistency.  Records from referring provider and other sources have also been reviewed and incorporated into decision-making.      TODAY:  Tyson returns today in f/u for DM 2.  She is here with a professional . She is newly pregnant and very excited about it. Her numbers are all too high, but she hasn't started with the protocol yet.  She has been testing consistently before meals, which is not something that she was doing at her last appointment.  She is taking her insulin consistently, as well as eating 2-3 times daily.  Her current A1c is 8.7, which is much higher than we would like for it to be.  I was able to get Jana in here for instructions for testing, as well as parameters for her blood sugars.  All questions answered to her satisfaction.    Past Medical History       Patient Active Problem List   Diagnosis     Vitamin D Deficiency     Type 2 diabetes mellitus with insulin therapy     Hyperlipidemia     Proteinuria      "Chronic Kidney Disease (NKF Classification)     Pre-existing type 2 diabetes mellitus during pregnancy in first trimester        Past Surgical History     History reviewed. No pertinent surgical history.    Family History     Family History   Problem Relation Age of Onset     Diabetes Mother        Social History     Social History   Substance Use Topics     Smoking status: Passive Smoke Exposure - Never Smoker     Smokeless tobacco: Never Used     Alcohol use No       Review of Systems     Patient has no polyuria or polydipsia, no chest pain, dyspnea or TIA's, no numbness, tingling or pain in extremities  Remainder negative except as noted in HPI.      Vital Signs     BP 90/64 (Patient Site: Right Arm, Patient Position: Sitting, Cuff Size: Adult Regular)  Pulse 80  Ht 4' 11\" (1.499 m)  Wt 103 lb 8 oz (46.9 kg)  BMI 20.9 kg/m2  Wt Readings from Last 3 Encounters:   03/27/18 103 lb 8 oz (46.9 kg)   03/09/18 101 lb 2 oz (45.9 kg)   12/07/17 (!) 96 lb 8 oz (43.8 kg)       Physical Exam     GENERAL: Pleasant, alert, appropriate appearance for age. No acute distress,   HEENT: Normocephalic, atraumatic  NECK: Supple, no masses or  lymph nodes.  THYROID: No nodules or enlargement. Non-tender, no bruit  CHEST/RESPIRATORY: Normal chest wall and respirations. Clear to auscultation.  CARDIOVASCULAR: Regular rate and rhythm. S1, S2, no murmur, click, gallop, or rubs.  ABDOMEN: Gravid   LYMPHATIC: No palpable nodes in neck, supraclavicular,  SKIN: No melanosis,  ecchymosis,  vitiligo. No acanthosis nigricans  NEURO:  Non-focal, normal DTRs; no tremor.  PSYCH: Alert and oriented -appropriate affect. Orientation, judgement and memory appear intact.  MSK: No joint abnormalities, FROM in all four extremities. No kyphosis    Assessment     1. Pre-existing type 2 diabetes mellitus during pregnancy in first trimester        Plan     1. GESTATIONAL DIABETES-  Adjust dose as follows:    -Lantus [id number]   units. Increase by 2 units " every 2 days to keep fasting blood glucose below 95mg/dL  -Novolog 15  units with breakfast  -Novolog 15 units with lunch   -Novolog 15 units with dinner  -Increase by 2 units every 2 days to keep 1 hour after meal blood glucose less than 140mg/dL    We reviewed glucose goals of fasting blood glucose <95 mg/dL and 1 hour post prandial blood glucose of <140 mg/dL.    Monitor blood sugar 4 times daily: Fasting  and 1 hour after each meal.  Contact  this clinic [phone] if blood glucose is not within the above-mentioned goals.     We discussed the importance of excellent glycemic control during pregnancy to limit complications such as fetal macrosomia, shoulder dystocia,  hypoglycemia and hyperbilirubinemia.  I have discussed the patient's increased risk of recurrent GDM and/or development of type 2 diabetes later in life.        We will get her back in here in two weeks and make adjustments as necessary. Time spent with pt today: 25 min with >50% spent in counseling and coordination of care.        Linda Mensah  3/27/2018      Lab Results     Hemoglobin A1c   Date Value Ref Range Status   2018 8.7 (H) 3.5 - 6.0 % Final   11/10/2017 9.0 (H) 4.2 - 6.1 % Final   2017 >14.0 (H) 3.5 - 6.0 % Final   2015 >14.0 (H) 3.5 - 6.0 % Final   2015 >14.0 (H) 3.5 - 6.0 % Final     Creatinine   Date Value Ref Range Status   2018 0.53 (L) 0.60 - 1.10 mg/dL Final   2017 0.79 0.60 - 1.10 mg/dL Final   2017 0.82 0.60 - 1.10 mg/dL Final     Microalbumin, Random Urine   Date Value Ref Range Status   2018 11.39 (H) 0.00 - 1.99 mg/dL Final       Cholesterol   Date Value Ref Range Status   2018 140 <=199 mg/dL Final     HDL Cholesterol   Date Value Ref Range Status   2018 41 (L) >=50 mg/dL Final     LDL Calculated   Date Value Ref Range Status   2018 83 <=129 mg/dL Final     Triglycerides   Date Value Ref Range Status   2018 80 <=149 mg/dL Final       Lab  "Results   Component Value Date    ALT <9 03/09/2018    AST 15 03/09/2018    ALKPHOS 49 03/09/2018    BILITOT 0.3 03/09/2018         Current Medications     Outpatient Medications Prior to Visit   Medication Sig Dispense Refill     acetaminophen (TYLENOL) 325 MG tablet Take 1 tablet (325 mg total) by mouth every 4 (four) hours as needed for pain. 90 tablet 0     alcohol swabs (BD ALCOHOL SWABS) PadM Use for checking blood sugar and injecting insulin, up to 7 times daily. 500 each 3     generic lancets Use to check blood sugar two times daily. 200 each 3     insulin aspart (NOVOLOG FLEXPEN) 100 unit/mL injection pen Inject 15 Units under the skin 3 (three) times a day before meals. 15 mL 11     insulin glargine (LANTUS; BASAGLAR) 100 unit/mL (3 mL) pen Inject 44-50 Units under the skin at bedtime. Increase as directed to up to 50 units 5 adj dose pen 11     pen needle, diabetic 31 gauge x 5/16\" Ndle Use to inject insulin three times daily. 300 each 3     blood glucose meter (ONETOUCH ULTRA2) Dispense glucometer brand per patient's insurance at pharmacy discretion. (Patient taking differently: 3 (three) times a day. Dispense glucometer brand per patient's insurance at pharmacy discretion.) 1 each 0     blood glucose test strips Use 1 each As Directed as needed. Dispense brand per patient's insurance at pharmacy discretion. 200 strip 11     cholecalciferol, vitamin D3, (VITAMIN D3) 2,000 unit Tab Take one tab daily 30 each 11     No facility-administered medications prior to visit.        "

## 2021-06-16 NOTE — PROGRESS NOTES
-Care Guide reminded patient of upcoming appointment with pharmacist on 2/28/2018 at 1 pm and with PCP for physical on 3/9/2018 at 2 pm.  -Patient reported all medications are current and health insurance is active.  -Patient did not need help with form or paperwork on today follow up call.  -Care Guide will outreach patient again in 4 to 6 weeks and informed to call sooner if needed.

## 2021-06-16 NOTE — TELEPHONE ENCOUNTER
Telephone Encounter by Sophia Concepcion at 7/16/2019  9:43 AM     Author: Sophia Concepcion Service: -- Author Type: --    Filed: 7/16/2019  9:45 AM Encounter Date: 7/12/2019 Status: Signed    : Sophia Concepcion       This medication is no longer covered under patient's insurance, please see below for additional information:    Medication: Novolin Flex Pen 70/30    Covered alternatives: Please note, the Novolin covered is the vials. The flexpen is no longer preferred.             Starting July 1st, [place] Managed Care plans have been completely overhauled.  The formularies for these plans have completely changed, all managed care plans are trying to go under one main formulary across all managed care plans.  This medication for the patient is no longer on their insurance's formulary.  In order to be approved for a prior authorization the patient must try and fail 3 formulary alternatives or have a contraindication to the alternatives. There must be documentation, including chart notes to document failed alternatives and support contraindication to alternatives.  Would the provider like to change the medication to one of the listed alternatives?  If provider would like to pursue a prior authorization please route back to the PA team at [facility]  If provider would like to change the medication to a preferred alternative, please send a new prescription to the pharmacy and close this encounter.

## 2021-06-17 NOTE — PROGRESS NOTES
MTM Encounter    Assessment/Plan  Diabetes: Uncontrolled to goal of 6.5%.  Tysno is seeing diabetes ed for weekly insulin adjustments for the duration of her pregnancy, so I will no longer see her.  I have refilled her Lantus, Novolog and pen needles per her request (sounds like Phalen may have had an old dose on file still).  We will increase her evening meal by one unit as her morning readings are consistently the highest.  I have marked this in her diabetes log book for her.  - Increase Novolog to 15.15.16, reduce to 12 units with smaller meal  - Continue Lantus 46 units daily    Preganacy: Plan to resume lisinopril for microalbuminuria and atorvastatin for primary prevention after delivery.    Follow Up  As requested      Subjective/Objective  Tyson Skelton is a 22 y.o. female here for a follow up medication therapy management (MTM) appointment; her chief concern today is gestational diabetes.  Tyson is about three months along in her pregnancy.    Diabetes: Tyson is taking 46 units of Lantus at bedtime and 15 units with regular meals, 12 units with smaller meals.  AM fasting glucose readings:  Eye/foot/dental exams:  Lab Results   Component Value Date    HGBA1C 8.7 (H) 03/09/2018    HGBA1C 9.0 (H) 11/10/2017    HGBA1C >14.0 (H) 08/03/2017     Lab Results   Component Value Date    GLUF 160 (H) 12/24/2012    MICROALBUR 11.39 (H) 03/09/2018    LDLCALC 83 03/09/2018    CREATININE 0.53 (L) 03/09/2018     ----------------    Tyson's medication list was reviewed with them, discussing reason for use, directions for use, and potential side effects of each medication as needed. Indication, safety, efficacy, and convenience was assessed for all medications addressed above.  No environmental factors were noted currently affecting patient.  This care plan was communicated via EMR with her primary care provider, Parish Daniels MD, who is the authorizing prescriber for this visit.  Direct supervision was available by either the patient's PCP  or other available provider.  Time and complexity billing metrics are included in the docflowsheet linked to this visit.  Time spent: 15 minutes      Hair Rivas, [place]  [facility] Pharmacist  [facility] [place] [facility]

## 2021-06-17 NOTE — PROGRESS NOTES
[facility]  ENDOCRINOLOGY    Gestational Diabetes 2018    Tyson Skelton, 1995, [id number]          Reason for visit      1. Pre-existing type 2 diabetes mellitus during pregnancy in first trimester        HPI     Tyson Skelton is a very pleasant 22 y.o. old female who presents for management of Diabetes Mellitus during pregnancy.  She is currently 14w  pregnant . Due date is 10/13/18    Current carbohydrate intake:consistent with recommendations of 30g-60g-60g.  I have reviewed her blood glucose logs and note that the:  Fasting readings  are:above range on current regimen  Postprandial readings are:above range on current regimen  Current Lantus dose: 50  Current Prandial insulin: 12 for small meal and 16 for large  Blood glucose logs/meter brought in and data reviewed and incorporated into decision-making.  Planned delivery at: [facility]  OBGYN: [facility]  Therapy/Interventions in the past:  She has been seen by the Diabetes Educator- and has received instruction on carbohydrate counting and  consistency.  Records from referring provider and other sources have also been reviewed and incorporated into decision-making.      TODAY:  Tyson returns today in f/u for management of DM during pregnancy. She is here with a professional .  She has brought her log book with her and her numbers look good.  She reports that she often eats rice for her evening snack.  I explain that some protein would be a good idea.  She has several missing days on her log book and notes that she was out of strips. We will rectify that situation today.  She is not yet feeling baby move. She is not having any swelling at present.  has no concerns.    Past Medical History       Patient Active Problem List   Diagnosis     Vitamin D Deficiency     Type 2 diabetes mellitus with insulin therapy     Hyperlipidemia     Proteinuria     Chronic Kidney Disease (NKF Classification)     Pre-existing type 2 diabetes mellitus during pregnancy  "in first trimester        Past Surgical History     History reviewed. No pertinent surgical history.    Family History     Family History   Problem Relation Age of Onset     Diabetes Mother        Social History     Social History   Substance Use Topics     Smoking status: Passive Smoke Exposure - Never Smoker     Smokeless tobacco: Never Used     Alcohol use No       Review of Systems     Patient has no polyuria or polydipsia, no chest pain, dyspnea or TIA's, no numbness, tingling or pain in extremities  Remainder negative except as noted in HPI.      Vital Signs     BP (!) 84/58 (Patient Site: Right Arm, Patient Position: Sitting, Cuff Size: Adult Regular)  Pulse (!) 104  Ht 4' 11\" (1.499 m)  Wt 103 lb 14.4 oz (47.1 kg)  BMI 20.99 kg/m2  Wt Readings from Last 3 Encounters:   04/26/18 103 lb 14.4 oz (47.1 kg)   04/05/18 107 lb 14.4 oz (48.9 kg)   03/27/18 103 lb 8 oz (46.9 kg)       Physical Exam     GENERAL: Pleasant, alert, appropriate appearance for age. No acute distress,   HEENT: Normocephalic, atraumatic  NECK: Supple, no masses or  lymph nodes.  THYROID: No nodules or enlargement. Non-tender, no bruit  CHEST/RESPIRATORY: Normal chest wall and respirations. Clear to auscultation.  CARDIOVASCULAR: Regular rate and rhythm. S1, S2, no murmur, click, gallop, or rubs.  ABDOMEN: Gravid   LYMPHATIC: No palpable nodes in neck, supraclavicular,  SKIN: No melanosis,  ecchymosis,  vitiligo. No acanthosis nigricans  NEURO:  Non-focal, normal DTRs; no tremor.  PSYCH: Alert and oriented -appropriate affect. Orientation, judgement and memory appear intact.  MSK: No joint abnormalities, FROM in all four extremities. No kyphosis    Assessment     1. Pre-existing type 2 diabetes mellitus during pregnancy in first trimester        Plan     1. GESTATIONAL DIABETES-  Adjust dose as follows:    -NPH [id number]   units. Increase by 2 units every 2 days to keep fasting blood glucose below 95mg/dL  -Novolog 15  units with " breakfast  -Novolog 15 units with lunch   -Novolog 15 units with dinner  -Increase by 2 units every 2 days to keep 1 hour after meal blood glucose less than 140mg/dL    We reviewed glucose goals of fasting blood glucose <95 mg/dL and 1 hour post prandial blood glucose of <140 mg/dL.    Monitor blood sugar 4 times daily: Fasting  and 1 hour after each meal.  Contact  this clinic [phone] if blood glucose is not within the above-mentioned goals.     We discussed the importance of excellent glycemic control during pregnancy to limit complications such as fetal macrosomia, shoulder dystocia,  hypoglycemia and hyperbilirubinemia.  I have discussed the patient's increased risk of recurrent GDM and/or development of type 2 diabetes later in life.        She will f/u in 2 weeks. Time spent with pt today: 25 min with >50% spent in counseling and coordination of care.        Linda Mensah  2018        Lab Results     Hemoglobin A1c   Date Value Ref Range Status   2018 8.7 (H) 3.5 - 6.0 % Final   11/10/2017 9.0 (H) 4.2 - 6.1 % Final   2017 >14.0 (H) 3.5 - 6.0 % Final   2015 >14.0 (H) 3.5 - 6.0 % Final   2015 >14.0 (H) 3.5 - 6.0 % Final     Creatinine   Date Value Ref Range Status   2018 0.53 (L) 0.60 - 1.10 mg/dL Final   2017 0.79 0.60 - 1.10 mg/dL Final   2017 0.82 0.60 - 1.10 mg/dL Final     Microalbumin, Random Urine   Date Value Ref Range Status   2018 11.39 (H) 0.00 - 1.99 mg/dL Final       Cholesterol   Date Value Ref Range Status   2018 140 <=199 mg/dL Final     HDL Cholesterol   Date Value Ref Range Status   2018 41 (L) >=50 mg/dL Final     LDL Calculated   Date Value Ref Range Status   2018 83 <=129 mg/dL Final     Triglycerides   Date Value Ref Range Status   2018 80 <=149 mg/dL Final       Lab Results   Component Value Date    ALT <9 2018    AST 15 2018    ALKPHOS 49 2018    BILITOT 0.3 2018  "        Current Medications     Outpatient Medications Prior to Visit   Medication Sig Dispense Refill     acetaminophen (TYLENOL) 325 MG tablet Take 1 tablet (325 mg total) by mouth every 4 (four) hours as needed for pain. 90 tablet 0     alcohol swabs (BD ALCOHOL SWABS) PadM Use for checking blood sugar and injecting insulin, up to 7 times daily. 500 each 3     generic lancets Use to check blood sugar two times daily. 200 each 3     insulin aspart U-100 (NOVOLOG FLEXPEN U-100 INSULIN) 100 unit/mL injection pen Inject 12-16 Units under the skin 3 (three) times a day before meals. 15 mL 11     insulin glargine (LANTUS; BASAGLAR) 100 unit/mL (3 mL) pen Inject 44-50 Units under the skin at bedtime. Increase as directed to up to 50 units 5 adj dose pen 11     pen needle, diabetic 31 gauge x 5/16\" Ndle Use to inject insulin four times daily. 400 each 3     BLOOD SUGAR DIAGNOSTIC (BLOOD GLUCOSE TEST MISC) Use As Directed 3 (three) times a day. Contour Next EZ meter       No facility-administered medications prior to visit.        "

## 2021-06-17 NOTE — PROGRESS NOTES
-Care Guide arranged transportation all patient's future appointments.            Next outreach date: 5/16/2018.

## 2021-06-17 NOTE — PROGRESS NOTES
"ZENAIDA GDM Care Plan      Assessment/Plan: pt seen today for f/u. There was some issue with her getting her test strips so she has no readings since our last visit, with the expection of today she was able to check a FBG which was 169. Due to lack of numbers, unable to make any changes today. Pt will continue with 50 units at HS and 15/15/16 with meals or 12 with small meals. She will f/u again next week.       Time: 30  Visit Type: pregnancy clinic   Provider: [facility]   Provider's Diagnosis (per referral form): T2 DM in pregnancy   Lab Results   Component Value Date    HGBA1C 8.7 (H) 03/09/2018     Estimated Date of Delivery: 10/13/18  Pregnancy #: unknown   Previous GDM: unknown   Medications:   Current Outpatient Prescriptions:      acetaminophen (TYLENOL) 325 MG tablet, Take 1 tablet (325 mg total) by mouth every 4 (four) hours as needed for pain., Disp: 90 tablet, Rfl: 0     alcohol swabs (BD ALCOHOL SWABS) PadM, Use for checking blood sugar and injecting insulin, up to 7 times daily., Disp: 500 each, Rfl: 3     blood glucose test (CONTOUR NEXT TEST STRIPS) strips, Use to check blood sugar four times daily.  One Touch Ultra test strips., Disp: 400 strip, Rfl: 11     generic lancets, Use to check blood sugar two times daily., Disp: 200 each, Rfl: 3     insulin aspart U-100 (NOVOLOG FLEXPEN U-100 INSULIN) 100 unit/mL injection pen, Inject 12-16 Units under the skin 3 (three) times a day before meals., Disp: 15 mL, Rfl: 11     insulin glargine (LANTUS; BASAGLAR) 100 unit/mL (3 mL) pen, Inject 44-50 Units under the skin at bedtime. Increase as directed to up to 50 units, Disp: 5 adj dose pen, Rfl: 11     pen needle, diabetic 31 gauge x 5/16\" Ndle, Use to inject insulin four times daily., Disp: 400 each, Rfl: 3      BG monitoring goals: Fasting <95; 1 hour post start of meal <140. Test 4 x per day.  Check fasting a.m. ketones: No  GDM meal pattern/carb counting taught per guidelines: Yes    Past Goals:  Nutrition: GDM " meal plan MET  Activity: Walking after meals when able/staying active MET  Monitoring: BG 4x/day as directed, ketones every morning MET      New Goals:  Nutrition: GDM meal plan   Activity: Walking after meals when able/staying active   Monitoring: BG 4x/day as directed, ketones every morning    DIABETES CARE PLAN AND EDUCATION RECORD    Gestational Diabetes Disease Process/Preconception Care/Management During Pregnancy/Postpartum:Discussed    Meter (per above goals): Discussed    Nutrition Management    Weight: Assessed and Discussed  Portions/Balance: Assessed and Discussed  Carb ID/Count: Assessed and Discussed  Label Reading: Assessed and Discussed  Menu Planning: Assessed and Discussed  Dining Out: Assessed and Discussed  Physical Activity: Assessed and Discussed    Acute Complications: Prevent, Detect, Treat:    Goal Setting and Problem Solving: Assessed and Discussed  Barriers: Assessed and Discussed  Psychosocial Adjustments: Assessed and Discussed    I agree with the aforementioned diabetes plan.  Linda Bowmanred  [facility] Endocrinology  5/3/2018  11:28 AM

## 2021-06-17 NOTE — PROGRESS NOTES
"ZENAIDA GDM Care Plan      Assessment/Plan: pt seen today for f/u. She brings in BG log book. Pp readings are well controlled, a couple outlier elevations, not often. Also a couple readings in the upper 70's- 80's. Pt denies any s/s of low BG. FBG are WNL 50% of the time and elevated the other 50% of the time. Pt notes elevated when she eats rice at HS. Discussed trying to have a protein at HS rather than rice. She agrees to try this.   She is taking 50 units in the morning and 15/16/12 with meals.   No changes to insulin today.         Time: 30  Visit Type: pregnancy clinic   Provider: [facility]   Provider's Diagnosis (per referral form): T2 DM in pregnancy   Lab Results   Component Value Date    HGBA1C 8.7 (H) 03/09/2018     Estimated Date of Delivery: 10/13/18  Pregnancy #: unknown   Previous GDM: unknown   Medications:   Current Outpatient Prescriptions:      acetaminophen (TYLENOL) 325 MG tablet, Take 1 tablet (325 mg total) by mouth every 4 (four) hours as needed for pain., Disp: 90 tablet, Rfl: 0     alcohol swabs (BD ALCOHOL SWABS) PadM, Use for checking blood sugar and injecting insulin, up to 7 times daily., Disp: 500 each, Rfl: 3     blood glucose test (CONTOUR NEXT TEST STRIPS) strips, Use to check blood sugar four times daily.  One Touch Ultra test strips., Disp: 400 strip, Rfl: 11     generic lancets, Use to check blood sugar two times daily., Disp: 200 each, Rfl: 3     insulin aspart U-100 (NOVOLOG FLEXPEN U-100 INSULIN) 100 unit/mL injection pen, Inject 12-16 Units under the skin 3 (three) times a day before meals., Disp: 15 mL, Rfl: 11     insulin glargine (LANTUS; BASAGLAR) 100 unit/mL (3 mL) pen, Inject 44-50 Units under the skin at bedtime. Increase as directed to up to 50 units, Disp: 5 adj dose pen, Rfl: 11     pen needle, diabetic 31 gauge x 5/16\" Ndle, Use to inject insulin four times daily., Disp: 400 each, Rfl: 3      BG monitoring goals: Fasting <95; 1 hour post start of meal <140. Test 4 x per " day.  Check fasting a.m. ketones: No  GDM meal pattern/carb counting taught per guidelines: Yes    Past Goals:  Nutrition: GDM meal plan MET  Activity: Walking after meals when able/staying active MET  Monitoring: BG 4x/day as directed, ketones every morning MET      New Goals:  Nutrition: GDM meal plan   Activity: Walking after meals when able/staying active   Monitoring: BG 4x/day as directed, ketones every morning    DIABETES CARE PLAN AND EDUCATION RECORD    Gestational Diabetes Disease Process/Preconception Care/Management During Pregnancy/Postpartum:Discussed    Meter (per above goals): Discussed    Nutrition Management    Weight: Assessed and Discussed  Portions/Balance: Assessed and Discussed  Carb ID/Count: Assessed and Discussed  Label Reading: Assessed and Discussed  Menu Planning: Assessed and Discussed  Dining Out: Assessed and Discussed  Physical Activity: Assessed and Discussed    Acute Complications: Prevent, Detect, Treat:    Goal Setting and Problem Solving: Assessed and Discussed  Barriers: Assessed and Discussed  Psychosocial Adjustments: Assessed and Discussed      I agree with the aforementioned diabetes plan.  Linda REDDING [facility] Endocrinology  5/10/2018  9:56 AM

## 2021-06-17 NOTE — PROGRESS NOTES
"ZENAIDA GDM Care Plan      Assessment/Plan: pt seen today for f/u. She is taking 46 units of Lantus at HS and 15 units with meal. FBG are mostly WNL, some random elevations. Pp are all WNL as well, some random elevations. However, many readings after breakfast and after dinner in the 60's and 70's. Pt is feeling these. She believes she is eating less when this happens.   Will start taking 12 units with smaller meals and 15 units with regular meals. Pt will f/u again week.     Time: 30  Visit Type: pregnancy clinic   Provider: pt is unsure, has not yet had her appt   Provider's Diagnosis (per referral form): T2 DM in pregnancy   Lab Results   Component Value Date    HGBA1C 8.7 (H) 03/09/2018   Estimated Date of Delivery: 10/13/18  Pregnancy #:   Previous GDM: unknown   Medications:   Current Outpatient Prescriptions:      acetaminophen (TYLENOL) 325 MG tablet, Take 1 tablet (325 mg total) by mouth every 4 (four) hours as needed for pain., Disp: 90 tablet, Rfl: 0     alcohol swabs (BD ALCOHOL SWABS) PadM, Use for checking blood sugar and injecting insulin, up to 7 times daily., Disp: 500 each, Rfl: 3     BLOOD SUGAR DIAGNOSTIC (BLOOD GLUCOSE TEST MISC), Use As Directed 3 (three) times a day. Contour Next EZ meter, Disp: , Rfl:      generic lancets, Use to check blood sugar two times daily., Disp: 200 each, Rfl: 3     insulin aspart (NOVOLOG FLEXPEN) 100 unit/mL injection pen, Inject 15 Units under the skin 3 (three) times a day before meals., Disp: 15 mL, Rfl: 11     insulin glargine (LANTUS; BASAGLAR) 100 unit/mL (3 mL) pen, Inject 44-50 Units under the skin at bedtime. Increase as directed to up to 50 units, Disp: 5 adj dose pen, Rfl: 11     pen needle, diabetic 31 gauge x 5/16\" Ndle, Use to inject insulin three times daily., Disp: 300 each, Rfl: 3      BG monitoring goals: Fasting <95; 1 hour post start of meal <140. Test 4 x per day.  Check fasting a.m. ketones: No  GDM meal pattern/carb counting taught per " guidelines: Yes    Past Goals:  Nutrition: GDM meal plan MOSTLY MET   Activity: Walking after meals when able/staying active MOSTLY MET   Monitoring: BG 4x/day as directed, ketones every morning MET       New Goals:  Nutrition: GDM meal plan   Activity: Walking after meals when able/staying active   Monitoring: BG 4x/day as directed, ketones every morning    DIABETES CARE PLAN AND EDUCATION RECORD    Gestational Diabetes Disease Process/Preconception Care/Management During Pregnancy/Postpartum:Discussed    Meter (per above goals): Discussed    Nutrition Management    Weight: Assessed and Discussed  Portions/Balance: Assessed and Discussed  Carb ID/Count: Assessed and Discussed  Label Reading: Assessed and Discussed  Menu Planning: Assessed and Discussed  Dining Out: Assessed and Discussed  Physical Activity: Assessed and Discussed    Acute Complications: Prevent, Detect, Treat:    Goal Setting and Problem Solving: Assessed and Discussed  Barriers: Assessed and Discussed  Psychosocial Adjustments: Assessed and Discussed    I agree with the aforementioned diabetes plan.  Linda REDDING [facility] Endocrinology  4/5/2018  11:00 AM

## 2021-06-17 NOTE — PROGRESS NOTES
"[facility] GDM Care Plan      Assessment/Plan: pt seen today for f/u. She brings in BG log book, currently taking 50 units at HS and 15/15/16 with meals or 12 if it is a smaller meal. She is having better control. She notes she has had a couple feelings of low BG in the early morning. Reviewed signs and symptoms of hypoglycemia and treatment.   No changes to insulin regimen today.       Time: 30  Visit Type: pregnancy clinic   Provider: [facility]   Provider's Diagnosis (per referral form): T2 DM in pregnancy   Lab Results   Component Value Date    HGBA1C 8.7 (H) 03/09/2018     Estimated Date of Delivery: 10/13/18  Pregnancy #: ?  Previous GDM: ?   Medications:   Current Outpatient Prescriptions:      acetaminophen (TYLENOL) 325 MG tablet, Take 1 tablet (325 mg total) by mouth every 4 (four) hours as needed for pain., Disp: 90 tablet, Rfl: 0     alcohol swabs (BD ALCOHOL SWABS) PadM, Use for checking blood sugar and injecting insulin, up to 7 times daily., Disp: 500 each, Rfl: 3     BLOOD SUGAR DIAGNOSTIC (BLOOD GLUCOSE TEST MISC), Use As Directed 3 (three) times a day. Contour Next EZ meter, Disp: , Rfl:      generic lancets, Use to check blood sugar two times daily., Disp: 200 each, Rfl: 3     insulin aspart U-100 (NOVOLOG FLEXPEN U-100 INSULIN) 100 unit/mL injection pen, Inject 12-16 Units under the skin 3 (three) times a day before meals., Disp: 15 mL, Rfl: 11     insulin glargine (LANTUS; BASAGLAR) 100 unit/mL (3 mL) pen, Inject 44-50 Units under the skin at bedtime. Increase as directed to up to 50 units, Disp: 5 adj dose pen, Rfl: 11     pen needle, diabetic 31 gauge x 5/16\" Ndle, Use to inject insulin four times daily., Disp: 400 each, Rfl: 3      BG monitoring goals: Fasting <95; 1 hour post start of meal <140. Test 4 x per day.  Check fasting a.m. ketones: No  GDM meal pattern/carb counting taught per guidelines: Yes    Past Goals:  Nutrition: GDM meal plan, MET  Activity: Walking after meals when able/staying " active MET  Monitoring: BG 4x/day as directed, ketones every morning MET      New Goals:  Nutrition: GDM meal plan   Activity: Walking after meals when able/staying active   Monitoring: BG 4x/day as directed, ketones every morning    DIABETES CARE PLAN AND EDUCATION RECORD    Gestational Diabetes Disease Process/Preconception Care/Management During Pregnancy/Postpartum:Discussed    Meter (per above goals): Discussed    Nutrition Management    Weight: Assessed and Discussed  Portions/Balance: Assessed and Discussed  Carb ID/Count: Assessed and Discussed  Label Reading: Assessed and Discussed  Menu Planning: Assessed and Discussed  Dining Out: Assessed and Discussed  Physical Activity: Assessed and Discussed    Acute Complications: Prevent, Detect, Treat:    Goal Setting and Problem Solving: Assessed and Discussed  Barriers: Assessed and Discussed  Psychosocial Adjustments: Assessed and Discussed    I agree with the aforementioned diabetes plan.  Linda Bowmanred  [facility] Endocrinology  4/26/2018  10:27 AM

## 2021-06-17 NOTE — PROGRESS NOTES
[facility]  ENDOCRINOLOGY    Gestational Diabetes 2018    Tyson Skelton, 1995, [id number]          Reason for visit      1. Pre-existing type 2 diabetes mellitus during pregnancy in first trimester        HPI     Tyson Skelton is a very pleasant 22 y.o. old female who presents for management of Diabetes Mellitus during pregnancy.  She is currently 15w5d  pregnant . Due date is 10/13/18    Current carbohydrate intake:consistent with recommendations of 30g-60g-60g.  I have reviewed her blood glucose logs and note that the:  Fasting readings  are:above range on current regimen  Postprandial readings are:above range on current regimen  Current Lantus dose: 50  Current Prandial insulin: 15/15/16 and 12 for small meals  Blood glucose logs/meter brought in and data reviewed and incorporated into decision-making.  Planned delivery at: [facility]  OBGYN: [facility]  Therapy/Interventions in the past:  She has been seen by the Diabetes Educator- and has received instruction on carbohydrate counting and  consistency.  Records from referring provider and other sources have also been reviewed and incorporated into decision-making.      TODAY:  Tyson returns today in f/u for management of DM during pregnancy. She is here with a professional . She brings in her log book and she has had some difficulties getting test strips so there is missing data. She did manage one test this morning and it was 169. She reports that she has not yet seen the OB, and that there have been no doppler checks on baby's heart rate. She has not yet felt baby move. She is having no swelling.  Without data, it is difficult to safely make changes in her dosages.    Past Medical History       Patient Active Problem List   Diagnosis     Vitamin D Deficiency     Type 2 diabetes mellitus with insulin therapy     Hyperlipidemia     Proteinuria     Chronic Kidney Disease (NKF Classification)     Pre-existing type 2 diabetes mellitus during pregnancy  "in first trimester        Past Surgical History     History reviewed. No pertinent surgical history.    Family History     Family History   Problem Relation Age of Onset     Diabetes Mother        Social History     Social History   Substance Use Topics     Smoking status: Passive Smoke Exposure - Never Smoker     Smokeless tobacco: Never Used     Alcohol use No       Review of Systems     Patient has no polyuria or polydipsia, no chest pain, dyspnea or TIA's, no numbness, tingling or pain in extremities  Remainder negative except as noted in HPI.      Vital Signs     BP (!) 82/64 (Patient Site: Right Arm, Patient Position: Sitting, Cuff Size: Adult Regular)  Pulse (!) 120  Ht 4' 11\" (1.499 m)  Wt 104 lb (47.2 kg)  BMI 21.01 kg/m2  Wt Readings from Last 3 Encounters:   05/03/18 104 lb (47.2 kg)   04/26/18 103 lb 14.4 oz (47.1 kg)   04/05/18 107 lb 14.4 oz (48.9 kg)       Physical Exam     GENERAL: Pleasant, alert, appropriate appearance for age. No acute distress,   HEENT: Normocephalic, atraumatic  NECK: Supple, no masses or  lymph nodes.  THYROID: No nodules or enlargement. Non-tender, no bruit  CHEST/RESPIRATORY: Normal chest wall and respirations. Clear to auscultation.  CARDIOVASCULAR: Regular rate and rhythm. S1, S2, no murmur, click, gallop, or rubs.  ABDOMEN: Gravid   LYMPHATIC: No palpable nodes in neck, supraclavicular,  SKIN: No melanosis,  ecchymosis,  vitiligo. No acanthosis nigricans  NEURO:  Non-focal, normal DTRs; no tremor.  PSYCH: Alert and oriented -appropriate affect. Orientation, judgement and memory appear intact.  MSK: No joint abnormalities, FROM in all four extremities. No kyphosis    Assessment     1. Pre-existing type 2 diabetes mellitus during pregnancy in first trimester        Plan     1. GESTATIONAL DIABETES-  Adjust dose as follows:    -NPH [id number]   units. Increase by 2 units every 2 days to keep fasting blood glucose below 95mg/dL  -Novolog 15  units with breakfast  -Novolog " 15 units with lunch   -Novolog 16 units with dinner  -Increase by 0 units every 2 days to keep 1 hour after meal blood glucose less than 140mg/dL    We reviewed glucose goals of fasting blood glucose <95 mg/dL and 1 hour post prandial blood glucose of <140 mg/dL.    Monitor blood sugar 4 times daily: Fasting  and 1 hour after each meal.  Contact  this clinic [phone] if blood glucose is not within the above-mentioned goals.     We discussed the importance of excellent glycemic control during pregnancy to limit complications such as fetal macrosomia, shoulder dystocia,  hypoglycemia and hyperbilirubinemia.  I have discussed the patient's increased risk of recurrent GDM and/or development of type 2 diabetes later in life.        Paw will f/u next week, with data, hopefully.  Time spent with pt today: 25 min with >50% spent in counseling and coordination of care.        Linda Mensah  2018      Lab Results     Hemoglobin A1c   Date Value Ref Range Status   2018 8.7 (H) 3.5 - 6.0 % Final   11/10/2017 9.0 (H) 4.2 - 6.1 % Final   2017 >14.0 (H) 3.5 - 6.0 % Final   2015 >14.0 (H) 3.5 - 6.0 % Final   2015 >14.0 (H) 3.5 - 6.0 % Final     Creatinine   Date Value Ref Range Status   2018 0.53 (L) 0.60 - 1.10 mg/dL Final   2017 0.79 0.60 - 1.10 mg/dL Final   2017 0.82 0.60 - 1.10 mg/dL Final     Microalbumin, Random Urine   Date Value Ref Range Status   2018 11.39 (H) 0.00 - 1.99 mg/dL Final       Cholesterol   Date Value Ref Range Status   2018 140 <=199 mg/dL Final     HDL Cholesterol   Date Value Ref Range Status   2018 41 (L) >=50 mg/dL Final     LDL Calculated   Date Value Ref Range Status   2018 83 <=129 mg/dL Final     Triglycerides   Date Value Ref Range Status   2018 80 <=149 mg/dL Final       Lab Results   Component Value Date    ALT <9 2018    AST 15 2018    ALKPHOS 49 2018    BILITOT 0.3 2018  "        Current Medications     Outpatient Medications Prior to Visit   Medication Sig Dispense Refill     acetaminophen (TYLENOL) 325 MG tablet Take 1 tablet (325 mg total) by mouth every 4 (four) hours as needed for pain. 90 tablet 0     alcohol swabs (BD ALCOHOL SWABS) PadM Use for checking blood sugar and injecting insulin, up to 7 times daily. 500 each 3     blood glucose test (CONTOUR NEXT TEST STRIPS) strips Use to check blood sugar four times daily.  One Touch Ultra test strips. 400 strip 11     generic lancets Use to check blood sugar two times daily. 200 each 3     insulin aspart U-100 (NOVOLOG FLEXPEN U-100 INSULIN) 100 unit/mL injection pen Inject 12-16 Units under the skin 3 (three) times a day before meals. 15 mL 11     insulin glargine (LANTUS; BASAGLAR) 100 unit/mL (3 mL) pen Inject 44-50 Units under the skin at bedtime. Increase as directed to up to 50 units 5 adj dose pen 11     pen needle, diabetic 31 gauge x 5/16\" Ndle Use to inject insulin four times daily. 400 each 3     No facility-administered medications prior to visit.        "

## 2021-06-17 NOTE — PROGRESS NOTES
Clinic Care Coordination Contact  Community Health Worker Follow Up    CHW unable to reach patient for follow up, patient had multiple no shows with PCP, specialists and [facility] RN. CHW consulted with [facility] RN that CHW will monitor patient's appointment with PCP on 4/29/2021 for physical and CHW to dis-enroll patient if patient no show.

## 2021-06-17 NOTE — PROGRESS NOTES
Clinic Care Coordination Contact    Follow Up Progress Note      Assessment: follow up post PCP appt  - Chart review completed today.   - Unable to reach patient again today.     - CCC RN and CHW - [facility] weren't able to reach patient x5 (RN ) and CHW (4) times since 2/22021  - Patient wasn't engaging with [facility] team.   - Patient attended her appt with PCP today as scheduled but not answering [facility] RN call today.   - Patient hasn't been follow up with endocrinologist 1/7/2021.   - PCP provided patient with endocrinology clinic to call schedule follow up appt today per chart review.       Plan:   - Patient is dis-enroll from [facility] as of today due to not reachable and not engaging with [facility] team.

## 2021-06-17 NOTE — PROGRESS NOTES
FEMALE PREVENTATIVE EXAM    Assessment and Plan:   1. Routine general medical examination at a health care facility  Pap due in 2022  Depo due in 6/21.    2. Type 2 diabetes mellitus treated with insulin (H)  Managed by endocrinology.  Last seen in 1/21.  Instructed to call for make an appointment, contact number given.  [phone]    3. Mixed hyperlipidemia  - Lipid Cascade RANDOM  - atorvastatin (LIPITOR) 20 MG tablet; Take 1 tablet (20 mg total) by mouth daily.  Dispense: 30 tablet; Refill: 11    4. Immunization due  - Hepatitis B Vaccine Age 20 years and above  - HPV vaccine 9 valent 3 dose IM    5. Chronic kidney disease, unspecified CKD stage  - Basic Metabolic Panel  - lisinopriL (PRINIVIL,ZESTRIL) 2.5 MG tablet; Take 1 tablet (2.5 mg total) by mouth daily.  Dispense: 30 tablet; Refill: 11      Next follow up:  No follow-ups on file.    Immunization Review  Adult Imm Review: Due today, orders placed    I discussed the following with the patient:         Subjective:   Chief Complaint: Tyson Skelton is an 26 y.o. female here for a preventative health visit.    Patient has been advised of split billing requirements and indicates understanding: Yes  HPI:   The patient is here for physical.  No menstrual cycles while on Depo.  No specific concerns.  Diabetes is managed by endocrinology.  Her last Pap in 2019, normal.  States her blood sugar fluctuates 100- 200s depending on what she eats.  No known exposure to COVID-19.  No COVID-19 symptoms.  No physical complaints today.    Healthy Habits  Are you taking a daily aspirin? No  Do you typically exercising at least 40 min, 3-4 times per week?  NO  Do you usually eat at least 4 servings of fruit and vegetables a day, include whole grains and fiber and avoid regularly eating high fat foods? Yes  Have you had an eye exam in the past two years? Yes  Do you see a dentist twice per year? NO  Do you have any concerns regarding sleep? No    Safety Screen  If you own  "firearms, are they secured in a locked gun cabinet or with trigger locks? The patient does not own any firearms  Do you feel you are safe where you are living?: Yes (4/29/2021 11:01 AM)  Do you feel you are safe in your relationship(s)?: Yes (4/29/2021 11:01 AM)      Review of Systems:  Please see above.  The rest of the review of systems are negative for all systems.     Cancer Screening       Status Date      PAP SMEAR Next Due 5/7/2022      Done 5/7/2019 GYNECOLOGIC CYTOLOGY (PAP SMEAR)            History     Reviewed By Date/Time Sections Reviewed    Felipa Jolly, CMA 4/29/2021 11:03 AM Tobacco            Objective:   Vital Signs:   Visit Vitals  BP 96/70 (Patient Site: Left Arm, Patient Position: Sitting, Cuff Size: Adult Regular)   Pulse (!) 104   Temp 97.9  F (36.6  C) (Oral)   Resp 16   Ht 4' 11\" (1.499 m)   Wt 156 lb (70.8 kg)   SpO2 97%   BMI 31.51 kg/m           PHYSICAL EXAM  Gen - alert, orientated, NAD  Eyes - fundascopic exam limited by the undialated pupil but looks symmetric  ENT - oropharynx clear, TMs clear  Neck - supple, no palpable mass or lymphadenopathy  CV - RRR, no murmur  Breast - no dominant mass on either side, no axillary mass.  Resp - lungs CTA  Ab - soft, nontender, no palpable mass or organomegaly   - Declined. Deferred.   Extrem - warm, no edema  Neuro - CN II-XII intact, strength, sensation, reflexes intact and symmetric  Skin - no rash.  No atypical appearing lesions seen.       Additional Screenings Completed Today:     "

## 2021-06-17 NOTE — PROGRESS NOTES
"ZENAIDA GDM Care Plan    Assessment: pt seen today for f/u.  is present. Pt was seen by CATRACHITO Harris yesterday and instructed to increase to 16 unit with dinner. She was taking 15 units with all meals, now 15/15/16.   At our last visit, instructed pt to take 12 units with smaller meal. Today, pt has some lower readings. She states she has not yet \"tried\" to take 12 units. Discussed the differeneces in BG depending on the size of the meal and therefore less insulin is needed if less intake. She agrees to start taking 12 units with smaller meal. Continue 15/15/16.   She is taking 46 units of lantus in the morning.   FB, 135, 82, 99, 157, 154, 147, 143  Will increase to 50 units.   Discussed with Eun Mensah NP.       Visit Type: Individual Follow-up  Time: 30  Provider: [facility]   Provider's Diagnosis (per referral form): T2 DM in pregnancy   Lab Results   Component Value Date    HGBA1C 8.7 (H) 2018     Estimated Date of Delivery: 10/13/18  Pregnancy #: ?   Previous GDM: ?  Medications:   Current Outpatient Prescriptions:      acetaminophen (TYLENOL) 325 MG tablet, Take 1 tablet (325 mg total) by mouth every 4 (four) hours as needed for pain., Disp: 90 tablet, Rfl: 0     alcohol swabs (BD ALCOHOL SWABS) PadM, Use for checking blood sugar and injecting insulin, up to 7 times daily., Disp: 500 each, Rfl: 3     BLOOD SUGAR DIAGNOSTIC (BLOOD GLUCOSE TEST MISC), Use As Directed 3 (three) times a day. Contour Next EZ meter, Disp: , Rfl:      generic lancets, Use to check blood sugar two times daily., Disp: 200 each, Rfl: 3     insulin aspart U-100 (NOVOLOG FLEXPEN U-100 INSULIN) 100 unit/mL injection pen, Inject 12-16 Units under the skin 3 (three) times a day before meals., Disp: 15 mL, Rfl: 11     insulin glargine (LANTUS; BASAGLAR) 100 unit/mL (3 mL) pen, Inject 44-50 Units under the skin at bedtime. Increase as directed to up to 50 units, Disp: 5 adj dose pen, Rfl: 11     pen needle, diabetic 31 gauge x " "5/16\" Ndle, Use to inject insulin four times daily., Disp: 400 each, Rfl: 3    BG monitoring goals: Fasting <95; 1 hour post start of meal <140. Test 4 x per day.  Check fasting a.m. ketones: No  GDM meal pattern/carb counting taught per guidelines: Yes    Past Goals:  Nutrition: GDM meal plan MOSTLY MET   Activity: Walking after meals when able/staying active MOSTLY MET   Monitoring: BG 4x/day as directed, ketones every morning  MET       New Goals:  Nutrition: GDM meal plan   Activity: Walking after meals when able/staying active   Monitoring: BG 4x/day as directed, ketones every morning    DIABETES CARE PLAN AND EDUCATION RECORD    Gestational Diabetes Disease Process/Preconception Care/Management During Pregnancy/Postpartum:Discussed    Meter (per above goals): Discussed    Nutrition Management    Weight: Assessed and Discussed  Portions/Balance: Assessed and Discussed  Carb ID/Count: Assessed and Discussed  Label Reading: Assessed and Discussed  Menu Planning: Assessed and Discussed  Dining Out: Assessed and Discussed  Physical Activity: Assessed and Discussed    Acute Complications: Prevent, Detect, Treat:    Goal Setting and Problem Solving: Assessed and Discussed  Barriers: Assessed and Discussed  Psychosocial Adjustments: Assessed and Discussed      I agree with the aforementioned diabetes plan.  Linda REDDING [facility]  4/16/2018  11:10 AM    "

## 2021-06-17 NOTE — PROGRESS NOTES
[facility]  ENDOCRINOLOGY    Gestational Diabetes 2018    Tyson Skelton, 1995, [id number]          Reason for visit      1. Pre-existing type 2 diabetes mellitus during pregnancy in first trimester        HPI     Tyson Skelton is a very pleasant 22 y.o. old female who presents for management of Diabetes Mellitus during pregnancy.  She is currently 11w  pregnant . Due date is 10/13/18    Current carbohydrate intake:consistent with recommendations of 30g-60g-60g.  I have reviewed her blood glucose logs and note that the:  Fasting readings  are:above range on current regimen  Postprandial readings are:above range on current regimen  Current Lantus dose: 46  Current Prandial insulin: 15/15/15  Blood glucose logs/meter brought in and data reviewed and incorporated into decision-making.  Planned delivery at: [facility]  OBGYN: Unknown  Therapy/Interventions in the past:  She has been seen by the Diabetes Educator- and has received instruction on carbohydrate counting and  consistency.  Records from referring provider and other sources have also been reviewed and incorporated into decision-making.      TODAY:  Tyson returns today for management of her DM 2 during pregnancy.  She is here with a professional , but her english is pretty good.  She has brought in her log book with her and she has been having lows after meals occasionally.  She does get symptomatic and feels that it usually happens when she doesn't eat as much. She is not eating a bedtime snack consistently.  She has not yet seen her OB, but has an appointment for later this month.  She is feeling well. No movement detected yet, and she is having no swelling.       Past Medical History       Patient Active Problem List   Diagnosis     Vitamin D Deficiency     Type 2 diabetes mellitus with insulin therapy     Hyperlipidemia     Proteinuria     Chronic Kidney Disease (NKF Classification)     Pre-existing type 2 diabetes mellitus during pregnancy  "in first trimester        Past Surgical History     History reviewed. No pertinent surgical history.    Family History     Family History   Problem Relation Age of Onset     Diabetes Mother        Social History     Social History   Substance Use Topics     Smoking status: Passive Smoke Exposure - Never Smoker     Smokeless tobacco: Never Used     Alcohol use No       Review of Systems     Patient has no polyuria or polydipsia, no chest pain, dyspnea or TIA's, no numbness, tingling or pain in extremities  Remainder negative except as noted in HPI.      Vital Signs     /72 (Patient Site: Right Arm, Patient Position: Sitting, Cuff Size: Adult Regular)  Pulse 76  Ht 4' 11\" (1.499 m)  Wt 107 lb 14.4 oz (48.9 kg)  BMI 21.79 kg/m2  Wt Readings from Last 3 Encounters:   04/05/18 107 lb 14.4 oz (48.9 kg)   03/27/18 103 lb 8 oz (46.9 kg)   03/09/18 101 lb 2 oz (45.9 kg)       Physical Exam     GENERAL: Pleasant, alert, appropriate appearance for age. No acute distress,   HEENT: Normocephalic, atraumatic  NECK: Supple, no masses or  lymph nodes.  THYROID: No nodules or enlargement. Non-tender, no bruit  CHEST/RESPIRATORY: Normal chest wall and respirations. Clear to auscultation.  CARDIOVASCULAR: Regular rate and rhythm. S1, S2, no murmur, click, gallop, or rubs.  ABDOMEN: Gravid   LYMPHATIC: No palpable nodes in neck, supraclavicular,  SKIN: No melanosis,  ecchymosis,  vitiligo. No acanthosis nigricans  NEURO:  Non-focal, normal DTRs; no tremor.  PSYCH: Alert and oriented -appropriate affect. Orientation, judgement and memory appear intact.  MSK: No joint abnormalities, FROM in all four extremities. No kyphosis    Assessment     1. Pre-existing type 2 diabetes mellitus during pregnancy in first trimester        Plan     1. GESTATIONAL DIABETES-  Adjust dose as follows:    -NPH [id number]   units. Increase by 2 units every 2 days to keep fasting blood glucose below 95mg/dL  -Novolog 15  units with breakfast  -Novolog " 15 units with lunch   -Novolog 15 units with dinner  -Increase by 0 units every 2 days to keep 1 hour after meal blood glucose less than 140mg/dL    We reviewed glucose goals of fasting blood glucose <95 mg/dL and 1 hour post prandial blood glucose of <140 mg/dL.    Monitor blood sugar 4 times daily: Fasting  and 1 hour after each meal.  Contact  this clinic [phone] if blood glucose is not within the above-mentioned goals.     We discussed the importance of excellent glycemic control during pregnancy to limit complications such as fetal macrosomia, shoulder dystocia,  hypoglycemia and hyperbilirubinemia.  I have discussed the patient's increased risk of recurrent GDM and/or development of type 2 diabetes later in life.      We have instructed her to take 12 u of Novolog with smaller meals and 15 u with a regular meal. She verbalized understanding.  She will f/u in 2 weeks. Time spent with pt today: 25 min with >50% spent in counseling and coordination of care.          Linda Mensah  2018      Lab Results     Hemoglobin A1c   Date Value Ref Range Status   2018 8.7 (H) 3.5 - 6.0 % Final   11/10/2017 9.0 (H) 4.2 - 6.1 % Final   2017 >14.0 (H) 3.5 - 6.0 % Final   2015 >14.0 (H) 3.5 - 6.0 % Final   2015 >14.0 (H) 3.5 - 6.0 % Final     Creatinine   Date Value Ref Range Status   2018 0.53 (L) 0.60 - 1.10 mg/dL Final   2017 0.79 0.60 - 1.10 mg/dL Final   2017 0.82 0.60 - 1.10 mg/dL Final     Microalbumin, Random Urine   Date Value Ref Range Status   2018 11.39 (H) 0.00 - 1.99 mg/dL Final       Cholesterol   Date Value Ref Range Status   2018 140 <=199 mg/dL Final     HDL Cholesterol   Date Value Ref Range Status   2018 41 (L) >=50 mg/dL Final     LDL Calculated   Date Value Ref Range Status   2018 83 <=129 mg/dL Final     Triglycerides   Date Value Ref Range Status   2018 80 <=149 mg/dL Final       Lab Results   Component Value Date  "   ALT <9 03/09/2018    AST 15 03/09/2018    ALKPHOS 49 03/09/2018    BILITOT 0.3 03/09/2018         Current Medications     Outpatient Medications Prior to Visit   Medication Sig Dispense Refill     acetaminophen (TYLENOL) 325 MG tablet Take 1 tablet (325 mg total) by mouth every 4 (four) hours as needed for pain. 90 tablet 0     alcohol swabs (BD ALCOHOL SWABS) PadM Use for checking blood sugar and injecting insulin, up to 7 times daily. 500 each 3     BLOOD SUGAR DIAGNOSTIC (BLOOD GLUCOSE TEST MISC) Use As Directed 3 (three) times a day. Contour Next EZ meter       generic lancets Use to check blood sugar two times daily. 200 each 3     insulin aspart (NOVOLOG FLEXPEN) 100 unit/mL injection pen Inject 15 Units under the skin 3 (three) times a day before meals. 15 mL 11     insulin glargine (LANTUS; BASAGLAR) 100 unit/mL (3 mL) pen Inject 44-50 Units under the skin at bedtime. Increase as directed to up to 50 units 5 adj dose pen 11     pen needle, diabetic 31 gauge x 5/16\" Ndle Use to inject insulin three times daily. 300 each 3     nitrofurantoin (MACRODANTIN) 100 MG capsule Take 100 mg by mouth 2 (two) times a day.       No facility-administered medications prior to visit.        "

## 2021-06-18 NOTE — PROGRESS NOTES
Admission History & Physical  Tyson Skelton, 1995, [id number]    [facility]  Parish Daniels MD, [phone]    No emergency contact information on file.     Assessment and Plan:   Assessment: Pronation deformity, diabetes mellitus  Plan: The patient is to return to the clinic in 1 year for annual diabetic foot check  Active Problems:    * No active hospital problems. *      Chief Complaint:  Diabetic foot check     HPI:    Tyson Skelton is a 23 y.o. old female who presented to the clinic today for diabetic foot check.  She has been diabetic for the past 7 years.  She has no complaints at this time.  She has no numbness or tingling in her feet.  She is able to weight-bear without any discomfort.  She has not had any trauma to her feet.  She denies any redness or swelling involving her feet.  She has no tingling or numbness while resting in bed at night.  History is provided by patient    Medical History  Active Ambulatory (Non-Hospital) Problems    Diagnosis     Pre-existing type 2 diabetes mellitus during pregnancy in second trimester     Vitamin D Deficiency     Type 2 diabetes mellitus with insulin therapy     Hyperlipidemia     Proteinuria     Chronic Kidney Disease (NKF Classification)     Past Medical History:   Diagnosis Date     Chronic kidney disease      Diabetes mellitus, type II      Hyperglycemia without ketosis 2010     Hyperlipidemia      Patient Active Problem List    Diagnosis Date Noted     Pre-existing type 2 diabetes mellitus during pregnancy in second trimester 03/27/2018     Vitamin D Deficiency      Type 2 diabetes mellitus with insulin therapy      Hyperlipidemia      Proteinuria      Chronic Kidney Disease (NKF Classification)      Surgical History  She  has no past surgical history on file.   History reviewed. No pertinent surgical history. Social History  Reviewed, and she  reports that she is a non-smoker but has been exposed to tobacco smoke. She has never used smokeless  tobacco. She reports that she does not drink alcohol or use illicit drugs.  Social History   Substance Use Topics     Smoking status: Passive Smoke Exposure - Never Smoker     Smokeless tobacco: Never Used     Alcohol use No      Allergies  No Known Allergies Family History  Reviewed, and family history includes Diabetes in her mother.   Psychosocial Needs  Social History     Social History Narrative     Additional psychosocial needs reviewed per nursing assessment.       Prior to Admission Medications     (Not in a hospital admission)        Review of Systems - Negative     BP 92/64  Pulse 98  Temp 98.3  F (36.8  C) (Temporal)   SpO2 100%    Objective findings: General: The patient is alert and in no acute distress     Integument: Nails bilateral feet are normal length and color.  Skin bilateral feet warm and intact.     Vascular: DP and PT pulses +2/4 bilateral feet.  Capillary refill less than 2 seconds bilateral feet.     Neurologic: Negative clonus, negative Babinski bilateral feet.  Normal protective sensation plantar aspect both feet.  Negative Tinel sign when percussing the tarsal tunnel bilateral feet.     Musculoskeletal: Range of motion within normal limits bilateral feet.  Muscle power +5/5 bilaterally in all compartments.  There is mild flattening of the medial longitudinal arch noted bilaterally.    Assessment: Pronation deformity, diabetes mellitus      Plan: I informed the patient that she is doing extremely well at this particular time.  I recommended she continue to maintain her normal A1c's and blood sugars.  She is to return to the clinic for an annual diabetic foot check.

## 2021-06-18 NOTE — PROGRESS NOTES
[facility]  ENDOCRINOLOGY    Gestational Diabetes 2018    Tyson Skelton, 1995, [id number]          Reason for visit      Preexisting DM 2 during second trimester of pregnancy    HPI     Tyson Skelton is a very pleasant 22 y.o. old female who presents for management of Diabetes Mellitus during pregnancy.  She is currently 16w4d  pregnant . Due date is 10/13/18    Current carbohydrate intake:consistent with recommendations of 30g-60g-60g.  I have reviewed her blood glucose logs and note that the:  Fasting readings  are:above range on current regimen  Postprandial readings are:above range on current regimen  Current Lantus dose: 50  Current Prandial insulin: 15/16/12 and 12 for small meals  Blood glucose logs/meter brought in and data reviewed and incorporated into decision-making.  Planned delivery at: [facility]  OBGYN: [facility]    Therapy/Interventions in the past:  She has been seen by the Diabetes Educator- and has received instruction on carbohydrate counting and  consistency.  Records from referring provider and other sources have also been reviewed and incorporated into decision-making.      TODAY:  Tyson is here today in f/u for GDM.  She is here without an  today. Her english is OK. She has brought her log book with her.  Her FBS are elevated about half of the time. Her PP readings are good.  She has not yet seen her OB, but will do so next week.  She is not yet feeling baby move.  She is having no swelling.    Past Medical History       Patient Active Problem List   Diagnosis     Vitamin D Deficiency     Type 2 diabetes mellitus with insulin therapy     Hyperlipidemia     Proteinuria     Chronic Kidney Disease (NKF Classification)     Pre-existing type 2 diabetes mellitus during pregnancy in second trimester        Past Surgical History     No past surgical history on file.    Family History     Family History   Problem Relation Age of Onset     Diabetes Mother        Social History  "    Social History   Substance Use Topics     Smoking status: Passive Smoke Exposure - Never Smoker     Smokeless tobacco: Never Used     Alcohol use No       Review of Systems     Patient has no polyuria or polydipsia, no chest pain, dyspnea or TIA's, no numbness, tingling or pain in extremities  Remainder negative except as noted in HPI.      Vital Signs     BP (!) 88/64 (Patient Site: Right Arm, Patient Position: Sitting, Cuff Size: Adult Regular)  Pulse 88  Ht 4' 11\" (1.499 m)  Wt 108 lb 9.6 oz (49.3 kg)  BMI 21.93 kg/m2  Wt Readings from Last 3 Encounters:   05/10/18 108 lb 9.6 oz (49.3 kg)   05/03/18 104 lb (47.2 kg)   04/26/18 103 lb 14.4 oz (47.1 kg)       Physical Exam     GENERAL: Pleasant, alert, appropriate appearance for age. No acute distress,   HEENT: Normocephalic, atraumatic  NECK: Supple, no masses or  lymph nodes.  THYROID: No nodules or enlargement. Non-tender, no bruit  CHEST/RESPIRATORY: Normal chest wall and respirations. Clear to auscultation.  CARDIOVASCULAR: Regular rate and rhythm. S1, S2, no murmur, click, gallop, or rubs.  ABDOMEN: Gravid   LYMPHATIC: No palpable nodes in neck, supraclavicular,  SKIN: No melanosis,  ecchymosis,  vitiligo. No acanthosis nigricans  NEURO:  Non-focal, normal DTRs; no tremor.  PSYCH: Alert and oriented -appropriate affect. Orientation, judgement and memory appear intact.  MSK: No joint abnormalities, FROM in all four extremities. No kyphosis    Assessment     Preexisting DM 2 during second trimester of pregnancy    Plan     1. GESTATIONAL DIABETES-  Adjust dose as follows:    -NPH [id number]   units. Increase by 2 units every 2 days to keep fasting blood glucose below 95mg/dL  -Novolog 15  units with breakfast  -Novolog 16 units with lunch   -Novolog 12 units with dinner  -Increase by 2 units every 2 days to keep 1 hour after meal blood glucose less than 140mg/dL    We reviewed glucose goals of fasting blood glucose <95 mg/dL and 1 hour post prandial " blood glucose of <140 mg/dL.    Monitor blood sugar 4 times daily: Fasting  and 1 hour after each meal.  Contact  this clinic [phone] if blood glucose is not within the above-mentioned goals.     We discussed the importance of excellent glycemic control during pregnancy to limit complications such as fetal macrosomia, shoulder dystocia,  hypoglycemia and hyperbilirubinemia.  I have discussed the patient's increased risk of recurrent GDM and/or development of type 2 diabetes later in life.      No changes to insulin dosages today.  She will f/u in 2 weeks. Time spent with pt today: 25 min with >50% spent in counseling and coordination of care.          Linda Mensah  2018        Lab Results     Hemoglobin A1c   Date Value Ref Range Status   2018 8.7 (H) 3.5 - 6.0 % Final   11/10/2017 9.0 (H) 4.2 - 6.1 % Final   2017 >14.0 (H) 3.5 - 6.0 % Final   2015 >14.0 (H) 3.5 - 6.0 % Final   2015 >14.0 (H) 3.5 - 6.0 % Final     Creatinine   Date Value Ref Range Status   2018 0.53 (L) 0.60 - 1.10 mg/dL Final   2017 0.79 0.60 - 1.10 mg/dL Final   2017 0.82 0.60 - 1.10 mg/dL Final     Microalbumin, Random Urine   Date Value Ref Range Status   2018 11.39 (H) 0.00 - 1.99 mg/dL Final       Cholesterol   Date Value Ref Range Status   2018 140 <=199 mg/dL Final     HDL Cholesterol   Date Value Ref Range Status   2018 41 (L) >=50 mg/dL Final     LDL Calculated   Date Value Ref Range Status   2018 83 <=129 mg/dL Final     Triglycerides   Date Value Ref Range Status   2018 80 <=149 mg/dL Final       Lab Results   Component Value Date    ALT <9 2018    AST 15 2018    ALKPHOS 49 2018    BILITOT 0.3 2018         Current Medications     Outpatient Medications Prior to Visit   Medication Sig Dispense Refill     acetaminophen (TYLENOL) 325 MG tablet Take 1 tablet (325 mg total) by mouth every 4 (four) hours as needed for pain. 90  "tablet 0     alcohol swabs (BD ALCOHOL SWABS) PadM Use for checking blood sugar and injecting insulin, up to 7 times daily. 500 each 3     blood glucose test (CONTOUR NEXT TEST STRIPS) strips Use to check blood sugar four times daily.  One Touch Ultra test strips. 400 strip 11     generic lancets Use to check blood sugar two times daily. 200 each 3     insulin aspart U-100 (NOVOLOG FLEXPEN U-100 INSULIN) 100 unit/mL injection pen Inject 12-16 Units under the skin 3 (three) times a day before meals. 15 mL 11     insulin glargine (LANTUS; BASAGLAR) 100 unit/mL (3 mL) pen Inject 44-50 Units under the skin at bedtime. Increase as directed to up to 50 units 5 adj dose pen 11     pen needle, diabetic 31 gauge x 5/16\" Ndle Use to inject insulin four times daily. 400 each 3     No facility-administered medications prior to visit.        "

## 2021-06-19 NOTE — PROGRESS NOTES
Patient was referred back to be seen by [facility] in [place], Dr. Wynn until delivery due to no longer complication of pregnancy and last baby measure ultrasound was normal.     Patient's next appointment with [facility] is below and no transportation is needed.    Dr. Wynn:   Lucy HOLLIDAY  [address]  [place] [address]  [phone]    Wed, 08/22/2018 11:30 AM          Next outreach: 09/17/2018.

## 2021-06-20 NOTE — LETTER
Letter by Rachael Erickson RN at      Author: Dah, Rachael, RN Service: -- Author Type: --    Filed:  Encounter Date: 8/18/2020 Status: (Other)       Care Plan  About Me:    Patient Name:  Tyson Skelton    YOB: 1995  Age:         25 y.o.   [facility] MRN:    [id number] Telephone Information:  Home Phone [phone]   Mobile [phone]       Address:  [address] [place] [address] Email address:  [email or web address]      Emergency Contact(s)  Extended Emergency Contact Information      Name: Eron Anguiano    Relation: Sibling      Name: Declined, per patient    Relation: Declined          Primary language:  Reanna     needed? Yes   [employer]:  [phone] op. 1  Other communication barriers: Language barrier, Lack of coping  Preferred Method of Communication:     Current living arrangement: I live in a private home with family  Mobility Status/ Medical Equipment: Independent    Health Maintenance  Health Maintenance Reviewed:      My Access Plan  Medical Emergency 911   Primary Clinic Line Parish Daniels MD - [phone]   24 Hour Appointment Line [phone] or  [phone]) (toll-free)   24 Hour Nurse Line [phone] (toll-free)   Preferred Urgent Care [facility], [phone]   [facility]  [phone]   Preferred Pharmacy [facility] - [place], [place] - [address]     Behavioral Health Crisis Line The National Suicide Prevention Lifeline at 1-745.821.5905 or 911             My Care Team Members  Patient Care Team       Relationship Specialty Notifications Start End    Parish Daniels MD PCP - General Family Medicine  6/8/15     Phone: [phone] Fax: [phone]         [address] [place] [address] [address] [place] [address]    Tevin Jackson MD Physician Internal Medicine  9/24/18     Endocrinologist     Phone: [phone] Fax: [phone]         [address] [facility] [address] [place] [address]     Aries Avila CHW Community Health Worker Primary Care - CC Admissions 7/11/19     Phone: [phone] Fax: [phone]        Rachael Erickson, RN Lead Care Coordinator Primary Care - CC Admissions 7/11/19     Fax: [phone]         Parish Daniels MD Assigned PCP   7/28/19     Phone: [phone] Fax: [phone]         [address] [place] [address]    Sommer Logan PA-C Physician Assistant Endocrinology  4/23/20     Phone: [phone] Fax: [phone]         [address] [place] [address]    Magali Ding, TEODORO Diabetes Ed   4/23/20     Phone: [phone]         [address] [place] [address]            My Care Plans  Self Management and Treatment Plan  Goals and (Comments)  Goals        General    Other (pt-stated)     Notes - Note edited  8/19/2020 10:27 PM by Rachael Erickson, RN    Goal Statement: I will attend my appt with endocrinologist in the next 3 months.   Date Goal set: 4/21/2020  Barriers: language barrier, non-compliance, lack of understanding  Strengths: good family support. Engaging with plan of care  Date to Achieve By: 7/21/2020  Patient expressed understanding of goal: Yes    Action steps to achieve this goal:  1. I spoke with endocrinologist on the phone as scheduled and will follow up again in 3 months.    2. I will also answer my phone when CCC RN calls me for follow up as scheduled.   3. I will call [facility] with any questions or concerns regarding coordination assistance.    [facility]  Katherine Peralta MD    [facility]    [address] [facility]    [address] [place] [address]    Phone: [phone]                   Advance Care Plans/Directives Type:        My Medical and Care Information  Problem List   Patient Active Problem List   Diagnosis   ? Vitamin D Deficiency   ? Type 2 diabetes mellitus treated with insulin (H)   ? Hyperlipidemia   ? Chronic Kidney Disease (NKF Classification)   ? Hyperglycemia   ? Sepsis due to  Escherichia coli (H)      Current Medications and Allergies:  See printed Medication Report.    Care Coordination Start Date: 8/18/2020   Frequency of Care Coordination:     Form Last Updated: 08/19/2020

## 2021-06-20 NOTE — LETTER
Letter by Rachael Erickson RN at      Author: Dah, Rachael, RN Service: -- Author Type: --    Filed:  Encounter Date: 8/18/2020 Status: (Other)       CARE COORDINATION  [address]  [place] [address]    August 18, 2020  Tyson YFN Skelton  [address]  [place] [address]      Dear Tyson,    I am a clinic care coordinator  who works with Parish Daniels MD. I wanted to thank you for spending the time to talk with me.  Below is a description of clinic care coordination and how I can further assist you.      The clinic care coordination team is made up of a registered nurse,  and community health worker who understand the health care system. The goal of clinic care coordination is to help you manage your health and improve access to the health care system in the most efficient manner. The team can assist you in meeting your health care goals by providing education, coordinating services, strengthening the communication among your providers and supporting you with any resource needs.    Please feel free to contact the Community Health Worker at [phone] with any questions or concerns. We are focused on providing you with the highest-quality healthcare experience possible and that all starts with you.     Sincerely,     Rachael Erickson RN    Enclosed: I have enclosed a copy of the Care Plan. This has helpful information and goals that we have talked about. Please keep this in an easy to access place to use as needed.

## 2021-06-20 NOTE — PROGRESS NOTES
Programs applying for: Insurance  MNsure #:  Financial worker:   Insurance Tracking: Due Date unknown.            9/25/2018: pt pulled up for insurance tracking. I called [facility] today to check if she is due for renewal soon. I was advised the insurance will show as ending for 12/2018 because that is when the pregnancy status ends. As of 1/1/2019, I was advised she would then change to regular MA.  I asked when she would be due. I was advised since she wasn't with me, she couldn't tell me. This is new to [facility] on declining me a renewal date.  I will put her for outreach around Jan to verify everything is good.       Checked Mnits:  This subscriber has eligibility for MA: Medical Assistance.  Yvette Type PX: Pregnant woman  Eligibility Begin Date: 06/01/2018  Eligibility End Date: 12/31/2018

## 2021-06-21 NOTE — LETTER
Letter by Rachael Erickson RN at      Author: Dah, Rachael, RN Service: -- Author Type: --    Filed:  Encounter Date: 2/2/2021 Status: (Other)       CARE COORDINATION    [address]  [place] [address]     February 2, 2021    Tyson YFN Skelton  [address] [facility] [address]  [place] [address]      Dear Tyson,    I am a clinic care coordinator who works with Parish Daniels MD at ,[facility]  . I wanted to thank you for spending the time to talk with me.  Below is a description of clinic care coordination and how I can further assist you.      The clinic care coordination team is made up of a registered nurse,  and community health worker who understand the health care system. The goal of clinic care coordination is to help you manage your health and improve access to the health care system in the most efficient manner. The team can assist you in meeting your health care goals by providing education, coordinating services, strengthening the communication among your providers and supporting you with any resource needs.    Please feel free to contact the Community Health Worker at [phone]  with any questions or concerns. We are focused on providing you with the highest-quality healthcare experience possible and that all starts with you.     Sincerely,     Rachael Erickson RN    Enclosed: I have enclosed a copy of the Care Plan. This has helpful information and goals that we have talked about. Please keep this in an easy to access place to use as needed.

## 2021-06-21 NOTE — LETTER
Letter by Rachael Erickson RN at      Author: Dah, Rachael, RN Service: -- Author Type: --    Filed:  Encounter Date: 2/2/2021 Status: (Other)       Care Plan  About Me:    Patient Name:  Tyson Skelton    YOB: 1995  Age:         25 y.o.   [facility] MRN:    [id number] Telephone Information:  Home Phone [phone]   Mobile [phone]       Address:  [address] [place] [address] Email address:  [email or web address]      Emergency Contact(s)  Extended Emergency Contact Information      Name: Eron Anguiano    Relation: Sibling          Primary language:  Reanna     needed? Yes   [employer]:  [phone] op. 1  Other communication barriers: Language barrier, Lack of coping  Preferred Method of Communication:     Current living arrangement:    Mobility Status/ Medical Equipment:      Health Maintenance  Health Maintenance Reviewed:      My Access Plan  Medical Emergency 911   Primary Clinic Line Parish Daniels MD - [phone]   24 Hour Appointment Line [phone] or  [phone]) (toll-free)   24 Hour Nurse Line [phone] (toll-free)   Preferred Urgent Care     Preferred Hospital     Preferred Pharmacy [facility] - [place], [place] - [address] [place] [address]     Behavioral Health Crisis Line The National Suicide Prevention Lifeline at 1-826.779.2472 or 911             My Care Team Members  Patient Care Team       Relationship Specialty Notifications Start End    Parish Daniels MD PCP - General Family Medicine  6/8/15     Phone: [phone] Fax: [phone]         [address] [place] [address] [address] [place] [address]    Tevin Jackson MD Physician Internal Medicine  9/24/18     Endocrinologist     Phone: [phone] Fax: [phone]         [address] [facility] [address] [facility] [address]    Aries Avila CHW Community Health Worker Primary Care - CC Admissions 7/11/19     Phone: [phone] Fax: [phone]        Rachael Erickson, RN Lead Care Coordinator Primary Care - CC  Admissions 7/11/19     Fax: [phone]         Parish Daniels MD Assigned PCP   7/28/19     Phone: [phone] Fax: [phone]         [address] [place] [address]    Sommer Logan PADustinC Physician Assistant Endocrinology  4/23/20     Phone: [phone] Fax: [phone]         [address] [facility] [address]    Magali Ding, RN Diabetes Ed   4/23/20     Phone: [phone]         [address] [place] [address]            My Care Plans  Self Management and Treatment Plan  Goals and (Comments)  Goals        General    Medical (pt-stated)     Notes - Note edited  2/2/2021 12:28 PM by Rachael Erickson, RN    Goal Statement: I would like CCC RN to coordinate my care with endocrinology clinic, assist with meds refills concerns, and med teaching the next 90 days.   Date Goal set: 2/2/2021  Barriers: language barrier, lack of motivation, lack of knowledge how to navigate health care system   Strengths: agrees to work on goals and speak with CCC RN at outreach calls  Date to Achieve By: 5/2/2021  Patient expressed understanding of goal: Yes    Action steps to achieve this goal:  1. I will speak with CCC RN at outreach calls.   2. I will answer my calls or return call in a timely manner  3. I will take my meds ad insulins as prescribed.   4. I will call CCC RN with any concerns or questions.                  Advance Care Plans/Directives Type:        My Medical and Care Information  Problem List   Patient Active Problem List   Diagnosis   ? Vitamin D Deficiency   ? Type 2 diabetes mellitus treated with insulin (H)   ? Hyperlipidemia   ? Chronic Kidney Disease (NKF Classification)   ? Hyperglycemia   ? Sepsis due to Escherichia coli (H)      Current Medications and Allergies:  See printed Medication Report.    Care Coordination Start Date: 8/18/2020   Frequency of Care Coordination: 6 weeks   Form Last Updated: 02/02/2021

## 2021-06-22 NOTE — PROGRESS NOTES
ASSESMENT AND PLAN:  Diagnoses and all orders for this visit:    Contraceptive management  -     Pregnancy (Beta-hCG, Qual), Urine-negative.  -     medroxyPROGESTERone injection 150 mg (DEPO-PROVERA); Inject 1 mL (150 mg total) into the shoulder, thigh, or buttocks every 3 (three) months.      Type 2 diabetes mellitus with insulin therapy (H)  Managed by endocrinology at [facility].  Has follow-up appointment scheduled with  next month.  Instructed to keep that appointment.      SUBJECTIVE: Tyson Skelton is a 23-year-old female with history of type 2 diabetes managed by endocrinology here for birth control.  She delivered a viable baby boy via  section 2 months ago.  States she is doing well, denied postpartum depression symptoms.  She is not breast-feeding.  She wants to start depo Provera for birth control.  Last.  Was 18.  She uses Lantus 36 units daily and NovoLog 10 units with meals.  She has appointment with her endocrinologist next month.    Past Medical History:   Diagnosis Date     Chronic kidney disease      Diabetes mellitus, type II (H)      Hyperglycemia without ketosis     hospitalized at [facility]     Hyperlipidemia      Patient Active Problem List   Diagnosis     Vitamin D Deficiency     Type 2 diabetes mellitus with insulin therapy (H)     Hyperlipidemia     Proteinuria     Chronic Kidney Disease (NKF Classification)     Pre-existing type 2 diabetes mellitus during pregnancy in second trimester       Allergies:  No Known Allergies    Social History     Tobacco Use   Smoking Status Passive Smoke Exposure - Never Smoker   Smokeless Tobacco Never Used       Review of systems otherwise negative except as listed in HPI.   Social History     Tobacco Use   Smoking Status Passive Smoke Exposure - Never Smoker   Smokeless Tobacco Never Used       OBJECTICE: BP 90/64 (Patient Site: Left Arm, Patient Position: Sitting, Cuff Size: Adult Regular)   Pulse (!) 108   Temp 97.9  F (36.6  " C) (Oral)   Ht 4' 11\" (1.499 m)   Wt 133 lb 2 oz (60.4 kg)   LMP 11/28/2018 (Within Weeks)   SpO2 96%   BMI 26.89 kg/m          GEN-alert,  in no apparent distress.  HEENT-mucous membranes are moist, neck is supple.  CV-regular rate and rhythm with no murmur.   RESP-lungs clear to auscultation .  ABDOMEN- Soft , not tender.  Incision- well healed.  EXTREM- No edema.  SKIN-normal    This transcription uses voice recognition software, which may contain typographical errors.        Parish Daniels   12/12/2018   "

## 2021-06-23 NOTE — PROGRESS NOTES
Programs applying for: Insurance  MNsure #:  Financial worker:   Insurance Tracking: Dec 2019 for Jan 2020  PMI:[id number]     1/10/2019: FRG checked mnits, MA is active. FRG will track Health Insurance while patient is in [facility].   This subscriber has eligibility for MA: Medical Assistance.  Elig Type AA: Parent of a dependent child  Eligibility Begin Date: 01/01/2019  Eligibility End Date: --/--/----  This subscriber is eligible for the following service types: Medical Care , Chiropractic , Dental Care , Hospital , Hospital - Inpatient , Hospital - Outpatient , Emergency Services , Pharmacy , Professional (Physician) Visit - Office , Vision (Optometry) , Mental Health , Urgent Care          CLOSED ENCOUNTER:  9/25/2018: pt pulled up for insurance tracking. I called [facility] today to check if she is due for renewal soon. I was advised the insurance will show as ending for 12/2018 because that is when the pregnancy status ends. As of 1/1/2019, I was advised she would then change to regular MA.  I asked when she would be due. I was advised since she wasn't with me, she couldn't tell me. This is new to [facility] on declining me a renewal date.  I will put her for outreach around Jan to verify everything is good.       Checked Mnits:  This subscriber has eligibility for MA: Medical Assistance.  Elig Type PX: Pregnant woman  Eligibility Begin Date: 06/01/2018  Eligibility End Date: 12/31/2018

## 2021-06-24 NOTE — PROGRESS NOTES
Patient 23 yr old female accompanied by , Tanya Littlejohn today. Patient came to see [facility] RN today for yearly re-assessment.     Patient speaks good English. Graduated high school here.     Patient reports she stopped using both insulin, Novolog and Lantus since her son was born, 10/5/18. States she just got tired of it and simply stopped using it. Patient states she was diagnosed with DM II when she was 14. States she understands the consequences of not using insulin but still choose not to use it until she sees PCP on 3/21/19. Reports not checking BG either. States she knows when her BG elevated.     I still feel like patient needs further education on diabetes. A1c on 1023/19 was 9.9 and 7.1 on 10/7/9 after her son was born.     Eye appt - 1/30/19   Has f/u appt with Dr. Daniels, PCP on 3/21/19     Patient works as PCA 3 hours per day.   Depo shot every 3 months. Reports not ready for another child yet.       RN Recommendations and Referrals  N/A    Action Plan    RN Will  Will add the patient to [facility] RN tracking list  Be available to the patient as nursing needs arise    Care Guide Will  Within 2 weeks from the RN assessment visit, by   : Help the patient coordinate goal setting visit if not already coordinated. and At goal setting visit : Review goals set at PCAM visit and create or add to action plan.   1) schedule appt to see diabetic educator as soon as possible  2) f/u appt with endocrinologist   3) assist patient with transportation or teach patient how to call for to set up rides for her medical appt  4) schedule appt with [facility] RN post appt with 3/21/19 if new meds prescribed.       Goals  Goals        Patient Stated      I would like to get my diabetes A1C under good control. (under 8.0) (pt-stated)      Action steps to achieve this goal    1. I will continue taking Lantus and novolog as directed.   2. I will attend my appt with PCP on 3/21/19.  3. I will check my BG 2-3 times a day and call PCP when BG <70 or  >250 in 3 consecutive days.  4. I will follow up with endocrinologist as scheduled.        Goal update: 3/6/19              Clinic Care Coordination RN Assessed Needs  Patient Centered Assessment Method-No Data Recorded  Level 1:  A score of 12-24 indicates that the patient has very little to no initial need for RN or SW intervention.  Standard care guide outreach/support should be appropriate at the discretion of the .  The care guide can reach out to the RN/SW as needed for support or with new concerns.      PCAM (Patient Centered Assessment Method)          Emergency Plan  Emergency Plan Recommendation:    When to Use the Emergency Department (ED)  An emergency means you could die if you don t get care quickly. Or you could be hurt permanently (disabled). Read below to know when to use -- and when not to use -- an emergency department (also called ED).    Dangers to your life  Here are examples of emergencies. These need immediate care:  A hard time breathing  Severe chest pain  Choking  Severe bleeding  Suddenly not able to move or speak  Blacking out (fainting)`  Poisoning    Dangers of permanent injuries  Here are other emergencies. These also need immediate care:  Deep cuts or severe burns  Broken bones, or sudden severe pain and swelling in a joint    When it s an emergency  If you have an emergency, follow these steps:    1. Go to the nearest emergency department  If you can, go to the hospital ED closest to you right away.  If you cannot get there right away, or if it is not safe to take yourself, call 911 or your police emergency number.  2. Call your primary care doctor  Tell your doctor about the emergency. Call within 24 hours of going to the ED.  If you cannot call, have someone call for you.  Go to your doctor (not the ED) for any follow-up care.    When it s not an emergency  If a problem is not an emergency, follow these steps:    1. Call your primary care doctor  If you don t know the name  of your doctor, call your health plan.  If you cannot call, have someone call for you.  2. Follow instructions  Your doctor will tell you what you should do.  You may be told to see your doctor right away. You may be told to go to the ED. Or you may be told to go to an urgent care center.  Follow your doctor s advice.  Long-Term Complications of Diabetes can cause health problems over time. These are called complications. They are more likely to happen if your blood sugar is often too high. Over time, high blood sugar can damage blood vessels in your body. It is important to keep your blood sugar in your target range. This can help prevent or delay complications from diabetes.  Possible complications  Complications of diabetes include:    Eye problems, including damage to the blood vessels in the eyes (retinopathy), pressure in the eye (glaucoma), and clouding of the eye s lens (a cataract). Eye problems can eventually lead to irreversible blindness.     Tooth and gum problems (periodontal disease), causing loss of teeth and bone    Blood vessel (vascular) disease leading to circulation problems, heart attack or stroke, or a need for amputation of a limb     Problems with sexual function leading to erectile dysfunction in men and sexual discomfort in women     Kidney disease (nephropathy) can eventually lead to kidney failure, which may require dialysis or kidney transplant     Nerve problems (neuropathy), causing pain or loss of feeling in your feet and other parts of your body, potentially leading to an amputation of a limb     High blood pressure (hypertension), putting strain on your heart and blood vessels    Serious infections, possibly leading to loss of toes, feet, or limbs  How to avoid complications  The serious consequences of these complications may be avoidable for most people with diabetes by managing your blood glucose, blood pressure, and cholesterol levels. This can help you feel better and stay  healthy. You can manage diabetes by tracking your blood sugar. You can also eat healthy and exercise to avoid gaining weight. And you should take medicine if directed by your healthcare provider.  Call your health care provider if:    You vomit or have diarrhea for more than 6 hours.    Your blood glucose level is higher than usual or over 250 mg/dL after you have taken extra insulin (if recommended in your sick-day plan).    You take oral medicine for diabetes, and your blood sugar is higher than usual or over 250 mg/dL, before a meal and stays that high for more than 24 hours.    Your blood glucose is lower than usual or less than 70 mg/dL    You have moderate to large amounts of ketones in your blood or urine.    You aren t better after 2 days.      Chronic kidney disease can (CKD) happen because of many things. These include infections, diabetes, high blood pressure, kidney stones, circulation problems, and reactions to medicine. Having kidney disease means making many changes in your life. Learn as much as you can about it so that you can better adjust to these changes. It is important to remember that the main goal of treatment is to stop CKD from progressing to complete kidney failure. Treatments may vary based on the progression of CKD. Always follow your healthcare provider's instructions on how to manage your condition.  Here are some things you can do to help your condition.  Diet changes  Always discuss your diet with your healthcare provider before making any changes.  Salt (sodium) in your diet  Based on your condition, you may be told to eat 1,500 mg or less of sodium daily  Limit processed foods such as:  Frozen dinners and packaged meals  Canned fish and meats  Pickled foods  Salted snacks  Lunch meats  Sauces  Most cheeses  Fast foods  Don't add salt to your food while cooking or before eating at the table.  Eat unprocessed foods to lower the sodium, such as:  Fresh turkey and chicken  Lean  beef  Unsalted tuna  Fresh fish  Fresh vegetables and fruits  Season foods with fresh herbs, garlic, onions, citrus, flavored vinegar, and sodium-free spice blends instead of salt when cooking.  Don't use salt substitutes that are high in potassium. Ask your healthcare provider or a registered dietitian which salt substitutes to use.  Don't drink softened water, because of the sodium content. Make sure to read the label on bottled water for sodium content.  Don't take over-the-counter medicines that contain sodium bicarbonate or sodium carbonate. Read labels carefully.  Potassium in your diet   Based on your condition, you may be told to eat less than 1,500 mg to 2,700 mg of potassium daily.  Always drain canned foods such as vegetables, fruits, and meats before serving.  Don't eat whole-grain breads, wheat bran, and granolas.  Don't eat milk, buttermilk, and yogurt.  Don't eat nuts, seeds, peanut butter, dried beans, and peas.  Don't eat fig cookies, chocolate, and molasses.  Don't use salt substitutes that are high in potassium. Ask your healthcare provider or a registered dietitian which salt substitutes to use.  Protein in your diet  Based on your condition, your healthcare provider will talk with you about why you should limit protein in your diet.  Cut back on protein. Eat less meat, milk products, yogurt, eggs, and cheese.  Phosphorus in your diet  Don't drink beer, cocoa, dark geovanna, rosie, chocolate drinks, and canned ice teas.  Don't eat cheese, milk, ice cream, pudding, and yogurt.  Don't eat liver (beef, chicken), organ meats, oysters, crayfish, and sardines.  Don't eat beans (soy, kidney, black, garbanzo, and northern), peas (chick and split), bran cereals, nuts, and caramels.  Eat small meals often that are high in fiber and calories. You may be told to limit how much fluid you drink.  Other home care  Try not to wear yourself out or get overly fatigued.  Get plenty of rest and get more sleep at  night.  Move around and bend your legs to avoid getting blood clots when you rest for a long period of time.  Weigh yourself every day. Do this at the same time of day and in the same kind of clothes. Keep a record of your daily weights.  Take your medicines exactly as directed.  Keep all medical appointments.  Take steps to control high blood pressure or diabetes. Talk with your healthcare provider for advice.  Talk with your healthcare provider about dialysis. This procedure may help if your chronic kidney disease is progressing to end stage renal disease.  Follow-up care  Follow up with your healthcare provider, or as advised.    When to call your healthcare provider  Call your healthcare provider right away if you have any of the following:  Chest pain (call 911)  Trouble eating or drinking  Weight loss of more than 2 pounds in 24 hours or more than 5 pounds in 7 days  Little or no urine output  Trouble breathing  Muscle aches  Fever of 100.4 F (38 C) or higher, or as advised by your healthcare provider  Blood in your urine or stool  Bloody discharge from your nose, mouth, or ears  Severe headache or a seizure  Vomiting  Swelling of legs or ankles

## 2021-06-25 ENCOUNTER — RECORDS - HEALTHEAST (OUTPATIENT)
Dept: FAMILY MEDICINE | Facility: CLINIC | Age: 26
End: 2021-06-25

## 2021-06-25 NOTE — TELEPHONE ENCOUNTER
Called and L/m to call back to schedule DEPO on nurse only schedule for around 05/22 at 4pm on a Tuesday Thursday or Friday to have Dr. Daniels's nurse give the injection.    ----- Message from Felipa Jolly CMA sent at 3/15/2019  8:18 AM CDT -----  It okay she could schedule a nurse visit after her physical.  ----- Message -----  From: Roxie Corona)  Sent: 3/11/2019  10:44 AM  To: Felipa Jolly CMA    Pt has a Px scheduled for 03/26 already...but Depo not due until 05/22. Will you be okay with us scheduling her at 4 in may for her Depo, or do you want us to reschedule her Px for May?    ----- Message -----  From: Felipa Jolly CMA  Sent: 3/7/2019   2:22 PM  To: Roxie Corona (Sally)    Actually she needs a physical   ----- Message -----  From: Roxie Corona)  Sent: 3/6/2019   2:11 PM  To: Felipa Jolly CMA    Pt needs a Depo 05/22-06/05. She states that she needs an appointment 4 or after. If you are willing, I can create an appt for her around this time on a day you are working and have you give the injection? anish.

## 2021-06-26 NOTE — TELEPHONE ENCOUNTER
RN cannot approve Refill Request    RN can NOT refill this medication PCP messaged that patient is overdue for Labs. Last office visit: 11/24/2020 Parish Daniels MD Last Physical: 4/29/2021 Last MTM visit: Visit date not found Last visit same specialty: 11/24/2020 Parish Daniels MD.  Next visit within 3 mo: Visit date not found  Next physical within 3 mo: Visit date not found      Arlin Encarnacion, [facility] Triage/Med Refill 6/25/2021    Requested Prescriptions   Pending Prescriptions Disp Refills     NOVOLOG FLEXPEN U-100 INSULIN 100 unit/mL (3 mL) injection pen [Pharmacy Med Name: NOVOLOG FLEXPEN SYRINGE 100 Solution Pen-injector] 15 mL 3     Sig: INJECT 20 UNITS UNDER THE SKIN 3 (THREE) TIMES A DAY BEFORE MEALS.       Insulin/GLP-1 Refill Protocol Failed - 6/25/2021 11:33 AM        Failed - A1C in last 6 months     Hemoglobin A1c   Date Value Ref Range Status   11/24/2020 12.9 (H) <=5.6 % Final     Comment:     Normal <5.7% Prediabete 5.7-6.4% Diabletes 6.5% or higher - adopted from ADA consensus guidelines               Passed - Visit with PCP or prescribing provider visit in last 6 months     Last office visit with prescriber/PCP: Visit date not found OR same dept: 11/24/2020 Parish Daniels MD OR same specialty: 11/24/2020 Parish Daniels MD Last physical: 4/29/2021 Last MTM visit: Visit date not found     Next appt within 3 mo: Visit date not found  Next physical within 3 mo: Visit date not found  Prescriber OR PCP: Parish Daniels MD  Last diagnosis associated with med order: 1. Type 2 diabetes mellitus treated with insulin (H)  - NOVOLOG FLEXPEN U-100 INSULIN 100 unit/mL (3 mL) injection pen [Pharmacy Med Name: NOVOLOG FLEXPEN SYRINGE 100 Solution Pen-injector]; INJECT 20 UNITS UNDER THE SKIN 3 (THREE) TIMES A DAY BEFORE MEALS.  Dispense: 15 mL; Refill: 3    If protocol passes may refill for 6 months if within 3 months of last provider visit (or a total of 9 months).              Passed - Microalbumin in last year      Microalbumin, Random Urine   Date Value Ref Range Status   11/24/2020 2.81 (H) 0.00 - 1.99 mg/dL Final                  Passed - Blood pressure in last year     BP Readings from Last 1 Encounters:   04/29/21 96/70             Passed - Creatinine done in last year     Creatinine   Date Value Ref Range Status   04/29/2021 0.73 0.60 - 1.10 mg/dL Final

## 2021-06-29 NOTE — PROGRESS NOTES
Progress Notes by Rachael Erickson RN at 8/18/2020 11:00 AM     Author: Rachael Erickson RN Service: -- Author Type: Registered Nurse    Filed: 8/19/2020 10:39 PM Encounter Date: 8/18/2020 Status: Signed    : Rachael Erickson RN (Registered Nurse)       Clinic Care Coordination Contact    Clinic Care Coordination Contact  OUTREACH    Referral Information:  Referral Source: PCP    Primary Diagnosis: Diabetes    Chief Complaint   Patient presents with   ? Clinic Care Coordination - Follow-up   ? Clinic Care Coordination - Initial     Clinic Utilization  Difficulty keeping appointments:: No  Compliance Concerns: Yes  No-Show Concerns: No  No PCP office visit in Past Year: No  Utilization    Last refreshed: 8/16/2020  7:39 PM:  Hospital Admissions 1           Last refreshed: 8/16/2020  7:39 PM:  ED Visits 1           Last refreshed: 8/16/2020  7:39 PM:  No Show Count (past year) 2              Current as of: 8/16/2020  7:39 PM              Clinical Concerns:  CCC RN completed yearly re-assessment today with patient via phone.   Chart review completed today.   Spoke with patient via phone. She was at a relative house.     Patient denies abuse or neglect.   Denies financial issues or food insecurity.     Patient had 1 ED visit on 8/2/2020 and she was hospitalized from 8/2/2020 - 8/5/2020 for sepsis.   PRINCIPAL & ACTIVE DISCHARGE DIAGNOSES   Principal Problem:    Sepsis due to Escherichia coli (H)  Active Problems:    Type 2 diabetes mellitus treated with insulin (H)    Hyperglycemia  Patient states she completed ABX course and now she feels much better.     1) Uncontrolled DM II - Patient states her appt on 7/30/2020 was rescheduled on 8/3/2020 but when they called her she was the hospital.   - States appt hasn't reschedule yet and she doesn't know how.   - Writer tried calling endocrinology clinic today but was on hold for awhile and no one answer the phone. CHW to assist with rescheduling.   - A1c-10.9 improved from >1 the past  3 months.     Meds for DM II mgmt:   1) metformin 500mg two times a day with meals  2) Lantus 84 untis at HS    Patient states she's taking her meds and insulin as directed but at times she forgets, maybe 1-3 timer per week.     - Educated patient on the important of taking her meds and administer insulin as prescribed. Verbalized understanding.       Pain  Pain (GOAL):: No  Health Maintenance Reviewed:    Clinical Pathway: None     Medication Management:  States she's not home and unable to tell writer her med list.   Unable to review meds with patient today.     Functional Status:  Dependent ADLs:: Independent  Dependent IADLs:: Independent  Bed or wheelchair confined:: No  Mobility Status: Independent  Fallen 2 or more times in the past year?: No  Any fall with injury in the past year?: No    Living Situation:  Current living arrangement:: I live in a private home with family  Type of residence:: Apartment    Lifestyle & Psychosocial Needs:        Diet:: Diabetic diet  Inadequate nutrition (GOAL):: No  Tube Feeding: No  Inadequate activity/exercise (GOAL):: No  Significant changes in sleep pattern (GOAL): No  Transportation means:: Medical transport     Buddhism or spiritual beliefs that impact treatment:: No  Mental health DX:: No  Mental health management concern (GOAL):: No  Informal Support system:: Barbara based, Family, Friends, Spouse   Socioeconomic History   ? Marital status: Single     Spouse name: Not on file   ? Number of children: Not on file   ? Years of education: Not on file   ? Highest education level: Not on file     Tobacco Use   ? Smoking status: Passive Smoke Exposure - Never Smoker   ? Smokeless tobacco: Never Used   Substance and Sexual Activity   ? Alcohol use: No   ? Drug use: No         Resources and Interventions:  Current Resources:      Community Resources: None  Supplies used at home:: None  Equipment Currently Used at Home: none    Advance Care Plan/Directive  Advanced Care  Plans/Directives on file:: No    Referrals Placed: None     Goals:   Goals        General    Other (pt-stated)     Notes - Note edited  8/18/2020 10:30 AM by Rachael Erickson RN    Goal Statement: I will attend my appt with endocrinologist in the next 3 months.   Date Goal set: 4/21/2020  Barriers: language barrier, non-compliance, lack of understanding  Strengths: good family support. Engaging with plan of care  Date to Achieve By: 7/21/2020  Patient expressed understanding of goal: Yes    Action steps to achieve this goal:  1. I spoke with endocrinologist on the phone for follow up on 7/30/2020 and will follow up again in 3 months.    2. I will also answer my phone when CCC RN calls me for follow up on 8/18/2020.  3. I will call CHW with any questions or concerns regarding coordination assistance.    [facility]  Katherine Peralta MD    [facility]    [address] [place] [id number]    Phone: [phone]                Future Appointments              Today [facility] Clinical Support, [facility]    In 1 month [facility] Clinical Support, [facility]    In 3 months Parish Daniels MD [facility] , [facility]          Plan:   1) CHW to assist patient reschedule appt with endocrinology. Patient states she missed her appt because she was being hospitalized on 8/3/2020.   2) CCC RN will f/u on 9/23/2020

## 2021-07-03 NOTE — ADDENDUM NOTE
Addendum Note by Cary Leo PharmD at 7/11/2019 10:00 AM     Author: Cary Leo PharmD Service: -- Author Type: Pharmacist    Filed: 7/11/2019  4:39 PM Encounter Date: 7/11/2019 Status: Signed    : Cary Leo PharmD (Pharmacist)    Addended by: CARY LEO on: 7/11/2019 04:39 PM        Modules accepted: Orders

## 2021-07-14 PROBLEM — O24.112 PRE-EXISTING TYPE 2 DIABETES MELLITUS DURING PREGNANCY IN SECOND TRIMESTER: Status: RESOLVED | Noted: 2018-03-27 | Resolved: 2019-08-08

## 2021-08-06 DIAGNOSIS — E11.65 TYPE 2 DIABETES MELLITUS WITH HYPERGLYCEMIA, UNSPECIFIED WHETHER LONG TERM INSULIN USE (H): ICD-10-CM

## 2021-08-07 NOTE — TELEPHONE ENCOUNTER
Insulin pen needle    1/11/2021  [facility] [place]   Katherine Peralta MD  Endocrinology, Diabetes, and Metabolism

## 2021-12-10 ENCOUNTER — TRANSFERRED RECORDS (OUTPATIENT)
Dept: HEALTH INFORMATION MANAGEMENT | Facility: CLINIC | Age: 26
End: 2021-12-10
Payer: COMMERCIAL

## 2021-12-10 LAB — RETINOPATHY: NEGATIVE

## 2021-12-11 DIAGNOSIS — E11.65 TYPE 2 DIABETES MELLITUS WITH HYPERGLYCEMIA, UNSPECIFIED WHETHER LONG TERM INSULIN USE (H): ICD-10-CM

## 2021-12-14 RX ORDER — METFORMIN HCL 500 MG
1500 TABLET, EXTENDED RELEASE 24 HR ORAL DAILY
Qty: 270 TABLET | Refills: 0 | Status: SHIPPED | OUTPATIENT
Start: 2021-12-14 | End: 2022-03-23

## 2021-12-14 NOTE — TELEPHONE ENCOUNTER
metFORMIN (GLUCOPHAGE-XR) 500 MG 24 hr tablet      Last Written Prescription Date:  1/11/2021  Last Fill Quantity: 90,   # refills: 11  Last Office Visit : 1/11/2021  Future Office visit:  none    Routing refill request to provider for review/approval because:  Failed Endocrine medication protocol: test and appointment past due  - >6 months last A1C  - plan last visit 1/11/2021 RTC 6 months  - message sent to Endocrine scheduling

## 2021-12-15 DIAGNOSIS — E11.65 TYPE 2 DIABETES MELLITUS WITH HYPERGLYCEMIA, UNSPECIFIED WHETHER LONG TERM INSULIN USE (H): ICD-10-CM

## 2021-12-17 RX ORDER — METFORMIN HCL 500 MG
TABLET, EXTENDED RELEASE 24 HR ORAL
Qty: 90 TABLET | Refills: 11 | OUTPATIENT
Start: 2021-12-17

## 2022-03-18 DIAGNOSIS — E11.65 TYPE 2 DIABETES MELLITUS WITH HYPERGLYCEMIA, UNSPECIFIED WHETHER LONG TERM INSULIN USE (H): ICD-10-CM

## 2022-03-23 ENCOUNTER — TELEPHONE (OUTPATIENT)
Dept: ENDOCRINOLOGY | Facility: CLINIC | Age: 27
End: 2022-03-23

## 2022-03-23 RX ORDER — METFORMIN HCL 500 MG
1500 TABLET, EXTENDED RELEASE 24 HR ORAL
Qty: 270 TABLET | Refills: 1 | Status: SHIPPED | OUTPATIENT
Start: 2022-03-23 | End: 2022-09-20

## 2022-03-23 NOTE — LETTER
Patient:  Tyson Skelton  :   1995  MRN:     [id number]        Ms.Paw YFN Skelton  [address] [place] [address]        2022    Dear ,    I see that you do not have a follow-up appointment in endocrinology. I would recommend that you be evaluated by an endocrinologist to minimize complications. If you like to be seen at the [facility], please call [phone] select option #1 for an appointment.    Regards    Charla Wagner MD

## 2022-03-23 NOTE — TELEPHONE ENCOUNTER
METFORMIN HCL  MG TB2 500 Tablet      Last Written Prescription Date:  12/14/21  Last Fill Quantity: 270,   # refills: 0  Last Office Visit : 1/11/21 recommended 6 month follow up  Future Office visit:  None scheduled    Routing refill request to provider for review/approval because:  Overdue/abnormal  A1C  Lab Test 11/24/20  1309 08/03/20  0659 01/13/20  0000   A1C 12.9*   < >  --    HEMOGLOBINA1  --   --  13.1*     Nothing more recent in care everwhere nor media  Pending future orders in queue

## 2022-03-23 NOTE — TELEPHONE ENCOUNTER
Refills given for Metformin-patient taking 1500 mg daily.  Patient needs follow-up with Dr. Peralta.  Letter sent.

## 2022-04-20 DIAGNOSIS — Z76.0 ENCOUNTER FOR MEDICATION REFILL: Primary | ICD-10-CM

## 2022-04-21 RX ORDER — ATORVASTATIN CALCIUM 20 MG/1
TABLET, FILM COATED ORAL
Qty: 90 TABLET | Refills: 3 | Status: ON HOLD | OUTPATIENT
Start: 2022-04-21 | End: 2022-10-08

## 2022-04-21 RX ORDER — LISINOPRIL 2.5 MG/1
TABLET ORAL
Qty: 90 TABLET | Refills: 3 | Status: SHIPPED | OUTPATIENT
Start: 2022-04-21 | End: 2022-10-08

## 2022-09-16 DIAGNOSIS — E11.65 TYPE 2 DIABETES MELLITUS WITH HYPERGLYCEMIA, UNSPECIFIED WHETHER LONG TERM INSULIN USE (H): ICD-10-CM

## 2022-09-20 ENCOUNTER — TELEPHONE (OUTPATIENT)
Dept: ENDOCRINOLOGY | Facility: CLINIC | Age: 27
End: 2022-09-20

## 2022-09-20 RX ORDER — METFORMIN HCL 500 MG
1500 TABLET, EXTENDED RELEASE 24 HR ORAL
Qty: 90 TABLET | Refills: 0 | Status: ON HOLD | OUTPATIENT
Start: 2022-09-20 | End: 2022-10-08

## 2022-09-20 NOTE — TELEPHONE ENCOUNTER
----- Message from Yaa Brito RN sent at 9/20/2022 12:42 PM CDT -----  Regarding: appointment  Please call patient to schedule appointment. Former patient of Dr Peralta's so will need to re-establish care.  Last visit 1/11/2021  and no pending appointment.    Thank you, Yaa POWERS RN Med Refill Team

## 2022-09-20 NOTE — TELEPHONE ENCOUNTER
LENORA 09/20/2022 for pt to c/b to schedule appointment with new provider. Voicemail left with assistance of .

## 2022-09-20 NOTE — TELEPHONE ENCOUNTER
metFORMIN (GLUCOPHAGE-XR) 500 MG 24 hr tablet       Last Written Prescription Date:  3/23/2022  Last Fill Quantity: 240,   # refills: 0  Last Office Visit : 1/11/2021  Future Office visit:  none    Routing refill request to on-call Endocrine provider for review/approval because:  Failed medication protocol: Former patient of Dr Peralta's and appointment and labs due  - >18 months since last visit 1/11/2021   - message sent to scheduling  - A1C(11/24/2020)  - Creatinine (4/29/2021)    Hemoglobin A1C   Date Value Ref Range Status   11/24/2020 12.9 (H) <=5.6 % Final     Comment:     Normal <5.7% Prediabete 5.7-6.4% Diabletes 6.5% or higher - adopted from ADA consensus guidelines     Creatinine   Date Value Ref Range Status   04/29/2021 0.73 0.60 - 1.10 mg/dL Final

## 2022-09-22 NOTE — TELEPHONE ENCOUNTER
"LVM 09/22/2022 with assistance of  to schedule \"new diabetes\" appointment with new provider. No MyChart.   "

## 2022-09-26 NOTE — TELEPHONE ENCOUNTER
LVM w/  9/26/22 for pt to c/back to schedule f/up w/ Rosangela Palacios and establish care w/ korey santizo MD - former Dr. Peralta pt. MAX NUMBER OF ATTEMPTS REACHED.

## 2022-10-05 ENCOUNTER — HOSPITAL ENCOUNTER (INPATIENT)
Facility: HOSPITAL | Age: 27
LOS: 3 days | Discharge: HOME | End: 2022-10-08
Attending: EMERGENCY MEDICINE | Admitting: FAMILY MEDICINE
Payer: COMMERCIAL

## 2022-10-05 ENCOUNTER — APPOINTMENT (OUTPATIENT)
Dept: CT IMAGING | Facility: HOSPITAL | Age: 27
End: 2022-10-05
Attending: EMERGENCY MEDICINE
Payer: COMMERCIAL

## 2022-10-05 DIAGNOSIS — Z76.0 ENCOUNTER FOR MEDICATION REFILL: ICD-10-CM

## 2022-10-05 DIAGNOSIS — Z91.148 NONCOMPLIANCE WITH MEDICATION REGIMEN: ICD-10-CM

## 2022-10-05 DIAGNOSIS — E11.10 DIABETIC KETOACIDOSIS WITHOUT COMA ASSOCIATED WITH TYPE 2 DIABETES MELLITUS (H): ICD-10-CM

## 2022-10-05 DIAGNOSIS — E11.65 TYPE 2 DIABETES MELLITUS WITH HYPERGLYCEMIA, UNSPECIFIED WHETHER LONG TERM INSULIN USE (H): ICD-10-CM

## 2022-10-05 LAB
ALBUMIN UR-MCNC: 30 MG/DL
ANION GAP SERPL CALCULATED.3IONS-SCNC: 34 MMOL/L (ref 7–15)
APPEARANCE UR: CLEAR
BACTERIA #/AREA URNS HPF: ABNORMAL /HPF
BASE EXCESS BLDV CALC-SCNC: -17.6 MMOL/L
BASOPHILS # BLD AUTO: 0 10E3/UL (ref 0–0.2)
BASOPHILS NFR BLD AUTO: 0 %
BILIRUB UR QL STRIP: NEGATIVE
BUN SERPL-MCNC: 21.9 MG/DL (ref 6–20)
CALCIUM SERPL-MCNC: 9.2 MG/DL (ref 8.6–10)
CHLORIDE SERPL-SCNC: 84 MMOL/L (ref 98–107)
COLOR UR AUTO: ABNORMAL
CREAT SERPL-MCNC: 0.87 MG/DL (ref 0.51–0.95)
DEPRECATED HCO3 PLAS-SCNC: 9 MMOL/L (ref 22–29)
EOSINOPHIL # BLD AUTO: 0 10E3/UL (ref 0–0.7)
EOSINOPHIL NFR BLD AUTO: 0 %
ERYTHROCYTE [DISTWIDTH] IN BLOOD BY AUTOMATED COUNT: 12.5 % (ref 10–15)
FLUAV RNA SPEC QL NAA+PROBE: NEGATIVE
FLUBV RNA RESP QL NAA+PROBE: NEGATIVE
GFR SERPL CREATININE-BSD FRML MDRD: >90 ML/MIN/1.73M2
GLUCOSE BLDC GLUCOMTR-MCNC: 192 MG/DL (ref 70–99)
GLUCOSE BLDC GLUCOMTR-MCNC: 219 MG/DL (ref 70–99)
GLUCOSE BLDC GLUCOMTR-MCNC: 244 MG/DL (ref 70–99)
GLUCOSE BLDC GLUCOMTR-MCNC: 359 MG/DL (ref 70–99)
GLUCOSE BLDC GLUCOMTR-MCNC: 363 MG/DL (ref 70–99)
GLUCOSE BLDC GLUCOMTR-MCNC: 443 MG/DL (ref 70–99)
GLUCOSE SERPL-MCNC: 433 MG/DL (ref 70–99)
GLUCOSE UR STRIP-MCNC: >1000 MG/DL
HCG SERPL QL: NEGATIVE
HCO3 BLDV-SCNC: 13 MMOL/L (ref 24–30)
HCT VFR BLD AUTO: 47.4 % (ref 35–47)
HGB BLD-MCNC: 15.1 G/DL (ref 11.7–15.7)
HGB UR QL STRIP: NEGATIVE
HYALINE CASTS: 4 /LPF
IMM GRANULOCYTES # BLD: 0.1 10E3/UL
IMM GRANULOCYTES NFR BLD: 1 %
KETONES BLD-SCNC: 7.4 MMOL/L (ref 0–0.6)
KETONES UR STRIP-MCNC: >150 MG/DL
LACTATE SERPL-SCNC: 1.7 MMOL/L (ref 0.7–2)
LEUKOCYTE ESTERASE UR QL STRIP: ABNORMAL
LYMPHOCYTES # BLD AUTO: 1.5 10E3/UL (ref 0.8–5.3)
LYMPHOCYTES NFR BLD AUTO: 13 %
MAGNESIUM SERPL-MCNC: 2.2 MG/DL (ref 1.7–2.3)
MCH RBC QN AUTO: 28.7 PG (ref 26.5–33)
MCHC RBC AUTO-ENTMCNC: 31.9 G/DL (ref 31.5–36.5)
MCV RBC AUTO: 90 FL (ref 78–100)
MONOCYTES # BLD AUTO: 0.7 10E3/UL (ref 0–1.3)
MONOCYTES NFR BLD AUTO: 6 %
NEUTROPHILS # BLD AUTO: 9.2 10E3/UL (ref 1.6–8.3)
NEUTROPHILS NFR BLD AUTO: 80 %
NITRATE UR QL: NEGATIVE
NRBC # BLD AUTO: 0 10E3/UL
NRBC BLD AUTO-RTO: 0 /100
OXYHGB MFR BLDV: 78.2 % (ref 70–75)
PCO2 BLDV: 27 MM HG (ref 35–50)
PH BLDV: 7.2 [PH] (ref 7.35–7.45)
PH UR STRIP: 5.5 [PH] (ref 5–7)
PLATELET # BLD AUTO: 296 10E3/UL (ref 150–450)
PO2 BLDV: 52 MM HG (ref 25–47)
POTASSIUM SERPL-SCNC: 5.3 MMOL/L (ref 3.4–5.3)
PROCALCITONIN SERPL IA-MCNC: 0.03 NG/ML
RBC # BLD AUTO: 5.27 10E6/UL (ref 3.8–5.2)
RBC URINE: 1 /HPF
RSV RNA SPEC NAA+PROBE: NEGATIVE
SAO2 % BLDV: 79.9 % (ref 70–75)
SARS-COV-2 RNA RESP QL NAA+PROBE: NEGATIVE
SODIUM SERPL-SCNC: 127 MMOL/L (ref 136–145)
SP GR UR STRIP: 1.02 (ref 1–1.03)
SQUAMOUS EPITHELIAL: 3 /HPF
UROBILINOGEN UR STRIP-MCNC: <2 MG/DL
WBC # BLD AUTO: 11.5 10E3/UL (ref 4–11)
WBC URINE: 9 /HPF

## 2022-10-05 PROCEDURE — 83036 HEMOGLOBIN GLYCOSYLATED A1C: CPT | Performed by: EMERGENCY MEDICINE

## 2022-10-05 PROCEDURE — 258N000002 HC RX IP 258 OP 250: Performed by: EMERGENCY MEDICINE

## 2022-10-05 PROCEDURE — 74174 CTA ABD&PLVS W/CONTRAST: CPT

## 2022-10-05 PROCEDURE — 87040 BLOOD CULTURE FOR BACTERIA: CPT | Performed by: EMERGENCY MEDICINE

## 2022-10-05 PROCEDURE — 99223 1ST HOSP IP/OBS HIGH 75: CPT | Performed by: FAMILY MEDICINE

## 2022-10-05 PROCEDURE — 80076 HEPATIC FUNCTION PANEL: CPT | Performed by: FAMILY MEDICINE

## 2022-10-05 PROCEDURE — 83735 ASSAY OF MAGNESIUM: CPT | Performed by: EMERGENCY MEDICINE

## 2022-10-05 PROCEDURE — 36415 COLL VENOUS BLD VENIPUNCTURE: CPT | Performed by: EMERGENCY MEDICINE

## 2022-10-05 PROCEDURE — 82805 BLOOD GASES W/O2 SATURATION: CPT | Performed by: EMERGENCY MEDICINE

## 2022-10-05 PROCEDURE — 93005 ELECTROCARDIOGRAM TRACING: CPT | Performed by: EMERGENCY MEDICINE

## 2022-10-05 PROCEDURE — C9803 HOPD COVID-19 SPEC COLLECT: HCPCS

## 2022-10-05 PROCEDURE — 96361 HYDRATE IV INFUSION ADD-ON: CPT

## 2022-10-05 PROCEDURE — 81001 URINALYSIS AUTO W/SCOPE: CPT | Performed by: EMERGENCY MEDICINE

## 2022-10-05 PROCEDURE — 87637 SARSCOV2&INF A&B&RSV AMP PRB: CPT | Performed by: EMERGENCY MEDICINE

## 2022-10-05 PROCEDURE — 71275 CT ANGIOGRAPHY CHEST: CPT

## 2022-10-05 PROCEDURE — 258N000003 HC RX IP 258 OP 636: Performed by: EMERGENCY MEDICINE

## 2022-10-05 PROCEDURE — 80048 BASIC METABOLIC PNL TOTAL CA: CPT | Performed by: EMERGENCY MEDICINE

## 2022-10-05 PROCEDURE — 96360 HYDRATION IV INFUSION INIT: CPT

## 2022-10-05 PROCEDURE — 84145 PROCALCITONIN (PCT): CPT | Performed by: EMERGENCY MEDICINE

## 2022-10-05 PROCEDURE — 84100 ASSAY OF PHOSPHORUS: CPT | Performed by: FAMILY MEDICINE

## 2022-10-05 PROCEDURE — 250N000009 HC RX 250: Performed by: EMERGENCY MEDICINE

## 2022-10-05 PROCEDURE — 82010 KETONE BODYS QUAN: CPT | Performed by: EMERGENCY MEDICINE

## 2022-10-05 PROCEDURE — 85025 COMPLETE CBC W/AUTO DIFF WBC: CPT | Performed by: EMERGENCY MEDICINE

## 2022-10-05 PROCEDURE — 83605 ASSAY OF LACTIC ACID: CPT | Performed by: EMERGENCY MEDICINE

## 2022-10-05 PROCEDURE — 99285 EMERGENCY DEPT VISIT HI MDM: CPT | Mod: 25

## 2022-10-05 PROCEDURE — 83930 ASSAY OF BLOOD OSMOLALITY: CPT | Performed by: FAMILY MEDICINE

## 2022-10-05 PROCEDURE — 250N000011 HC RX IP 250 OP 636: Performed by: EMERGENCY MEDICINE

## 2022-10-05 PROCEDURE — 200N000001 HC R&B ICU

## 2022-10-05 PROCEDURE — 84703 CHORIONIC GONADOTROPIN ASSAY: CPT | Performed by: EMERGENCY MEDICINE

## 2022-10-05 RX ORDER — IOPAMIDOL 755 MG/ML
100 INJECTION, SOLUTION INTRAVASCULAR ONCE
Status: COMPLETED | OUTPATIENT
Start: 2022-10-05 | End: 2022-10-05

## 2022-10-05 RX ORDER — DEXTROSE MONOHYDRATE 25 G/50ML
25-50 INJECTION, SOLUTION INTRAVENOUS
Status: DISCONTINUED | OUTPATIENT
Start: 2022-10-05 | End: 2022-10-08 | Stop reason: HOSPADM

## 2022-10-05 RX ORDER — SODIUM CHLORIDE 450 MG/100ML
1000 INJECTION, SOLUTION INTRAVENOUS CONTINUOUS
Status: DISCONTINUED | OUTPATIENT
Start: 2022-10-05 | End: 2022-10-06

## 2022-10-05 RX ADMIN — SODIUM CHLORIDE 1000 ML: 4.5 INJECTION, SOLUTION INTRAVENOUS at 22:53

## 2022-10-05 RX ADMIN — INSULIN HUMAN 100 UNITS: 1 INJECTION, SOLUTION INTRAVENOUS at 20:21

## 2022-10-05 RX ADMIN — SODIUM CHLORIDE 1000 ML: 9 INJECTION, SOLUTION INTRAVENOUS at 20:01

## 2022-10-05 RX ADMIN — SODIUM CHLORIDE 1000 ML: 9 INJECTION, SOLUTION INTRAVENOUS at 18:21

## 2022-10-05 RX ADMIN — IOPAMIDOL 100 ML: 755 INJECTION, SOLUTION INTRAVENOUS at 21:15

## 2022-10-05 RX ADMIN — SODIUM CHLORIDE 1000 ML: 4.5 INJECTION, SOLUTION INTRAVENOUS at 20:41

## 2022-10-05 ASSESSMENT — ENCOUNTER SYMPTOMS
FEVER: 0
ABDOMINAL PAIN: 0
DIARRHEA: 0
VOMITING: 1
DYSURIA: 0
NAUSEA: 1
COUGH: 0
FATIGUE: 1
SHORTNESS OF BREATH: 0
TROUBLE SWALLOWING: 0

## 2022-10-05 ASSESSMENT — ACTIVITIES OF DAILY LIVING (ADL)
ADLS_ACUITY_SCORE: 35
ADLS_ACUITY_SCORE: 35

## 2022-10-05 NOTE — ED NOTES
ED Provider In Triage Note  [facility]  Encounter Date: Oct 5, 2022    Chief Complaint   Patient presents with     Fatigue       Brief HPI:   Tyson Skelton is a 27 year old female presenting to the Emergency Department with a chief complaint of malaise.  Patient has felt unwell for the last few days.  She is diffusely weak.  Says her whole body feels warm and burning.  Denies focal pain in her chest or abdomen.  Denies urinary symptoms.  No cough.  Does have diabetes, does not use insulin.    Brief Physical Exam:  BP 97/59   Pulse (!) 131   Temp 98.5  F (36.9  C) (Oral)   Resp 20   LMP 07/12/2022 (Approximate)   SpO2 99%   General: Diaphoretic and somewhat ill-appearing young female patient, sitting up in chair  HEENT: Atraumatic  Resp: No respiratory distress  Abdomen: Nontender  Neuro: Alert, oriented, answers questions appropriately  Psych: Behavior appropriate      Plan Initiated in Triage:  Orders Placed This Encounter     Basic metabolic panel     Lactic acid whole blood     Symptomatic; Unknown Influenza A/B & SARS-CoV2 (COVID-19) Virus PCR Multiplex     HCG QUALitative pregnancy (blood)     UA with Microscopic reflex to Culture     Procalcitonin     0.9% sodium chloride BOLUS       PIT Dispo:   Will attempt to room as soon as possible.    Patient is tachycardic, diaphoretic, appears somewhat ill.  Hyperglycemic to the 400s in triage.  Differential includes sepsis, DKA, COVID-19, etc.  Labs, VBG, screening COVID-19 test, UA, IV fluids ordered from triage, will attempt to room as soon as possible.          Jori Mcdowell MD on 10/5/2022 at 5:56 PM    Patient was evaluated by the Physician in Triage due to a limitation of available rooms in the Emergency Department. A plan of care was discussed based on the information obtained on the initial evaluation and patient was consuled to return back to the Emergency Department lobby after this initial evalutaiton until results were  obtained or a room became available in the Emergency Department. Patient was counseled not to leave prior to receiving the results of their workup.     Jori Mcdowell MD  [facility] EMERGENCY DEPARTMENT  [address] [place] [id number] [phone]     Jori Mcdowell MD  10/05/22 5925

## 2022-10-05 NOTE — ED TRIAGE NOTES
The pt arrives with c/o fatigue, body burning and chest pain since Monday.      Triage Assessment     Row Name 10/05/22 3285       Triage Assessment (Adult)    Airway WDL WDL       Cognitive/Neuro/Behavioral WDL    Cognitive/Neuro/Behavioral WDL WDL

## 2022-10-06 LAB
ALBUMIN SERPL BCG-MCNC: 4.5 G/DL (ref 3.5–5.2)
ALP SERPL-CCNC: 99 U/L (ref 35–104)
ALT SERPL W P-5'-P-CCNC: <5 U/L (ref 10–35)
ANION GAP SERPL CALCULATED.3IONS-SCNC: 15 MMOL/L (ref 7–15)
ANION GAP SERPL CALCULATED.3IONS-SCNC: 16 MMOL/L (ref 7–15)
AST SERPL W P-5'-P-CCNC: 17 U/L (ref 10–35)
ATRIAL RATE - MUSE: 114 BPM
ATRIAL RATE - MUSE: 242 BPM
BILIRUB DIRECT SERPL-MCNC: <0.2 MG/DL (ref 0–0.3)
BILIRUB SERPL-MCNC: 0.4 MG/DL
BUN SERPL-MCNC: 12.5 MG/DL (ref 6–20)
BUN SERPL-MCNC: 14.7 MG/DL (ref 6–20)
CALCIUM SERPL-MCNC: 7.1 MG/DL (ref 8.6–10)
CALCIUM SERPL-MCNC: 7.4 MG/DL (ref 8.6–10)
CHLORIDE SERPL-SCNC: 104 MMOL/L (ref 98–107)
CHLORIDE SERPL-SCNC: 99 MMOL/L (ref 98–107)
CREAT SERPL-MCNC: 0.49 MG/DL (ref 0.51–0.95)
CREAT SERPL-MCNC: 0.52 MG/DL (ref 0.51–0.95)
DEPRECATED HCO3 PLAS-SCNC: 14 MMOL/L (ref 22–29)
DEPRECATED HCO3 PLAS-SCNC: 15 MMOL/L (ref 22–29)
DIASTOLIC BLOOD PRESSURE - MUSE: NORMAL MMHG
DIASTOLIC BLOOD PRESSURE - MUSE: NORMAL MMHG
GFR SERPL CREATININE-BSD FRML MDRD: >90 ML/MIN/1.73M2
GFR SERPL CREATININE-BSD FRML MDRD: >90 ML/MIN/1.73M2
GLUCOSE BLDC GLUCOMTR-MCNC: 146 MG/DL (ref 70–99)
GLUCOSE BLDC GLUCOMTR-MCNC: 175 MG/DL (ref 70–99)
GLUCOSE BLDC GLUCOMTR-MCNC: 175 MG/DL (ref 70–99)
GLUCOSE BLDC GLUCOMTR-MCNC: 183 MG/DL (ref 70–99)
GLUCOSE BLDC GLUCOMTR-MCNC: 183 MG/DL (ref 70–99)
GLUCOSE BLDC GLUCOMTR-MCNC: 190 MG/DL (ref 70–99)
GLUCOSE BLDC GLUCOMTR-MCNC: 201 MG/DL (ref 70–99)
GLUCOSE BLDC GLUCOMTR-MCNC: 205 MG/DL (ref 70–99)
GLUCOSE BLDC GLUCOMTR-MCNC: 251 MG/DL (ref 70–99)
GLUCOSE BLDC GLUCOMTR-MCNC: 286 MG/DL (ref 70–99)
GLUCOSE BLDC GLUCOMTR-MCNC: 416 MG/DL (ref 70–99)
GLUCOSE SERPL-MCNC: 178 MG/DL (ref 70–99)
GLUCOSE SERPL-MCNC: 183 MG/DL (ref 70–99)
HBA1C MFR BLD: NORMAL %
HOLD SPECIMEN: NORMAL
INTERPRETATION ECG - MUSE: NORMAL
INTERPRETATION ECG - MUSE: NORMAL
KETONES BLD-SCNC: 3 MMOL/L (ref 0–0.6)
OSMOLALITY SERPL: 321 MMOL/KG (ref 275–295)
P AXIS - MUSE: 63 DEGREES
P AXIS - MUSE: NORMAL DEGREES
PHOSPHATE SERPL-MCNC: 5.1 MG/DL (ref 2.5–4.5)
POTASSIUM SERPL-SCNC: 3.6 MMOL/L (ref 3.4–5.3)
POTASSIUM SERPL-SCNC: 3.6 MMOL/L (ref 3.4–5.3)
PR INTERVAL - MUSE: 142 MS
PR INTERVAL - MUSE: NORMAL MS
PROT SERPL-MCNC: 9.1 G/DL (ref 6.4–8.3)
QRS DURATION - MUSE: 68 MS
QRS DURATION - MUSE: 72 MS
QT - MUSE: 362 MS
QT - MUSE: 434 MS
QTC - MUSE: 498 MS
QTC - MUSE: 616 MS
R AXIS - MUSE: 57 DEGREES
R AXIS - MUSE: 62 DEGREES
SODIUM SERPL-SCNC: 130 MMOL/L (ref 136–145)
SODIUM SERPL-SCNC: 133 MMOL/L (ref 136–145)
SYSTOLIC BLOOD PRESSURE - MUSE: NORMAL MMHG
SYSTOLIC BLOOD PRESSURE - MUSE: NORMAL MMHG
T AXIS - MUSE: 18 DEGREES
T AXIS - MUSE: 49 DEGREES
VENTRICULAR RATE- MUSE: 114 BPM
VENTRICULAR RATE- MUSE: 121 BPM

## 2022-10-06 PROCEDURE — 84520 ASSAY OF UREA NITROGEN: CPT | Performed by: FAMILY MEDICINE

## 2022-10-06 PROCEDURE — 258N000003 HC RX IP 258 OP 636: Performed by: INTERNAL MEDICINE

## 2022-10-06 PROCEDURE — 87040 BLOOD CULTURE FOR BACTERIA: CPT | Performed by: FAMILY MEDICINE

## 2022-10-06 PROCEDURE — 93010 ELECTROCARDIOGRAM REPORT: CPT | Mod: RTG | Performed by: INTERNAL MEDICINE

## 2022-10-06 PROCEDURE — 250N000013 HC RX MED GY IP 250 OP 250 PS 637: Performed by: INTERNAL MEDICINE

## 2022-10-06 PROCEDURE — 200N000001 HC R&B ICU

## 2022-10-06 PROCEDURE — 258N000001 HC RX 258: Performed by: EMERGENCY MEDICINE

## 2022-10-06 PROCEDURE — 36415 COLL VENOUS BLD VENIPUNCTURE: CPT | Performed by: FAMILY MEDICINE

## 2022-10-06 PROCEDURE — 82010 KETONE BODYS QUAN: CPT | Performed by: FAMILY MEDICINE

## 2022-10-06 PROCEDURE — 99233 SBSQ HOSP IP/OBS HIGH 50: CPT | Performed by: INTERNAL MEDICINE

## 2022-10-06 PROCEDURE — 258N000003 HC RX IP 258 OP 636: Performed by: FAMILY MEDICINE

## 2022-10-06 PROCEDURE — 258N000001 HC RX 258: Performed by: FAMILY MEDICINE

## 2022-10-06 PROCEDURE — 250N000013 HC RX MED GY IP 250 OP 250 PS 637: Performed by: FAMILY MEDICINE

## 2022-10-06 PROCEDURE — 250N000012 HC RX MED GY IP 250 OP 636 PS 637: Performed by: FAMILY MEDICINE

## 2022-10-06 RX ORDER — ATORVASTATIN CALCIUM 10 MG/1
20 TABLET, FILM COATED ORAL EVERY EVENING
Status: DISCONTINUED | OUTPATIENT
Start: 2022-10-06 | End: 2022-10-08 | Stop reason: HOSPADM

## 2022-10-06 RX ORDER — NICOTINE POLACRILEX 4 MG
15-30 LOZENGE BUCCAL
Status: DISCONTINUED | OUTPATIENT
Start: 2022-10-06 | End: 2022-10-06

## 2022-10-06 RX ORDER — DEXTROSE MONOHYDRATE 25 G/50ML
25-50 INJECTION, SOLUTION INTRAVENOUS
Status: DISCONTINUED | OUTPATIENT
Start: 2022-10-06 | End: 2022-10-06

## 2022-10-06 RX ORDER — SODIUM CHLORIDE 9 MG/ML
INJECTION, SOLUTION INTRAVENOUS CONTINUOUS
Status: DISCONTINUED | OUTPATIENT
Start: 2022-10-06 | End: 2022-10-06

## 2022-10-06 RX ORDER — NICOTINE POLACRILEX 4 MG
15-30 LOZENGE BUCCAL
Status: DISCONTINUED | OUTPATIENT
Start: 2022-10-06 | End: 2022-10-08 | Stop reason: HOSPADM

## 2022-10-06 RX ORDER — DEXTROSE MONOHYDRATE 25 G/50ML
25-50 INJECTION, SOLUTION INTRAVENOUS
Status: DISCONTINUED | OUTPATIENT
Start: 2022-10-06 | End: 2022-10-08 | Stop reason: HOSPADM

## 2022-10-06 RX ADMIN — INSULIN GLARGINE 15 UNITS: 100 INJECTION, SOLUTION SUBCUTANEOUS at 07:57

## 2022-10-06 RX ADMIN — SODIUM CHLORIDE 1000 ML: 9 INJECTION, SOLUTION INTRAVENOUS at 02:33

## 2022-10-06 RX ADMIN — INSULIN ASPART 1 UNITS: 100 INJECTION, SOLUTION INTRAVENOUS; SUBCUTANEOUS at 07:57

## 2022-10-06 RX ADMIN — DEXTROSE AND SODIUM CHLORIDE: 5; 450 INJECTION, SOLUTION INTRAVENOUS at 05:56

## 2022-10-06 RX ADMIN — INSULIN ASPART 3 UNITS: 100 INJECTION, SOLUTION INTRAVENOUS; SUBCUTANEOUS at 18:38

## 2022-10-06 RX ADMIN — DEXTROSE AND SODIUM CHLORIDE 1000 ML: 5; 450 INJECTION, SOLUTION INTRAVENOUS at 00:01

## 2022-10-06 RX ADMIN — INSULIN ASPART 3 UNITS: 100 INJECTION, SOLUTION INTRAVENOUS; SUBCUTANEOUS at 12:25

## 2022-10-06 RX ADMIN — ATORVASTATIN CALCIUM 20 MG: 10 TABLET, FILM COATED ORAL at 20:54

## 2022-10-06 RX ADMIN — SODIUM CHLORIDE 1000 ML: 9 INJECTION, SOLUTION INTRAVENOUS at 10:18

## 2022-10-06 RX ADMIN — METFORMIN HYDROCHLORIDE 500 MG: 500 TABLET ORAL at 19:00

## 2022-10-06 ASSESSMENT — ACTIVITIES OF DAILY LIVING (ADL)
WALKING_OR_CLIMBING_STAIRS_DIFFICULTY: NO
ADLS_ACUITY_SCORE: 24
DIFFICULTY_EATING/SWALLOWING: NO
ADLS_ACUITY_SCORE: 24
ADLS_ACUITY_SCORE: 35
ADLS_ACUITY_SCORE: 20
ADLS_ACUITY_SCORE: 24
CHANGE_IN_FUNCTIONAL_STATUS_SINCE_ONSET_OF_CURRENT_ILLNESS/INJURY: NO
DRESSING/BATHING_DIFFICULTY: NO
FALL_HISTORY_WITHIN_LAST_SIX_MONTHS: NO
ADLS_ACUITY_SCORE: 24
DEPENDENT_IADLS:: INDEPENDENT
ADLS_ACUITY_SCORE: 28
DOING_ERRANDS_INDEPENDENTLY_DIFFICULTY: NO
WEAR_GLASSES_OR_BLIND: YES
CONCENTRATING,_REMEMBERING_OR_MAKING_DECISIONS_DIFFICULTY: NO
ADLS_ACUITY_SCORE: 24
ADLS_ACUITY_SCORE: 28
ADLS_ACUITY_SCORE: 24
TOILETING_ISSUES: NO

## 2022-10-06 NOTE — PLAN OF CARE
[facility] - ICU    RN Progress Note:    Pt is alert, grossly oriented, and able to make needs known. Vital signs stable. Pt continues IV insulin, and fluid Per MD directed DKA Protocol. Anion Gap 15. Blood glucose 175 mg/dL. Pt remains NPO. Bellow the knee sequential compression devices in place, bilaterally. Will continue to monitor. Kieran Barry RN           Pertinent Assessments:      Please refer to flowsheet rows for full assessment     Vital signs          Mobility Level:     4         Key Events - This Shift:     No overnight events              Plan:     Continue treatment plan, as ordered.  Advance activity, as tolerated.

## 2022-10-06 NOTE — ED PROVIDER NOTES
EMERGENCY DEPARTMENT ENCOUNTER      NAME: Tyson Skelton  AGE: 27 year old female  YOB: 1995  MRN: [id number]  EVALUATION DATE & TIME: No admission date for patient encounter.    PCP: Parish Daniels    ED PROVIDER: Lucy Barnett M.D.        Chief Complaint   Patient presents with     Fatigue         FINAL IMPRESSION:    1. Diabetic ketoacidosis without coma associated with type 2 diabetes mellitus (H)    2. Noncompliance with medication regimen            MEDICAL DECISION MAKIN year old female with history of type 2 diabetes and medication noncompliance who presents emergency department with not feeling well.  Ultimately found to have DKA, likely due to medication noncompliance.  No obvious sites for infection or sepsis.  DKA protocol initiated and patient to go to the ICU under the management of the hospitalist.  Patient is aware of this plan and agrees.      ED COURSE:  7:38 PM  I met with the patient to gather history and perform my exam. ED course and treatment discussed.    9:53 PM   I spoke with intensivist Dr. Pratt regarding plan for admission.  She agrees with the plan for ICU.  Though given the fact the patient is not requiring any respiratory support or pressors likely she will be managed by the hospitalist service.    9:59 PM  Patient updated all her results.  She confirms again that she has not taken any of her diabetes medications now for couple years.  She understands the plan for admission to the ICU for DKA.  All of her questions have been answered.    10:07 PM  Patient has been accepted to the ICU by Dr. Kowalski, hospitalist.  Patient otherwise hemodynamically stable.  Appears that she does have a primary care provider but is not following up.  She admits that she is not compliant with her medications.  Likely this is the source for her DKA.  No signs for sepsis otherwise or source for infection otherwise.      COVID-19 PPE worn during patient evaluation:  Mask: n95 and  homemade masks   Eye Protection: goggles   Gown: none   Hair cover: yes  Face shield: none   Patient wearing a mask: yes     At the conclusion of the encounter I discussed the results of all of the tests and the disposition. Their questions were answered. The patient (and any family present) acknowledged understanding and were agreeable with the care plan.      Total critical care time: 30 minutes  Critical care time was exclusive of separately billable procedures and treating other patients.  Critical care was necessary to treat or prevent imminent or life-threatening deterioration of the following conditions: DKA  Critical care was time spent personally by me on the following activities: development of treatment plan with patient or surrogate, discussions with consultants, examination of patient, evaluation of patient's response to treatment, obtaining history from patient or surrogate, ordering and performing treatments and interventions, ordering and review of laboratory studies, ordering and review of radiographic studies and re-evaluation of patient's condition, this excludes any separately billable procedures.      CONSULTANTS:  Intensivist - Dr. Pratt        MEDICATIONS GIVEN IN THE EMERGENCY:  Medications   dextrose 5% and 0.45% NaCl infusion (has no administration in time range)   dextrose 50 % injection 25-50 mL (has no administration in time range)   0.9% sodium chloride BOLUS (0 mLs Intravenous Stopped 10/5/22 2002)     Followed by   0.45% sodium chloride infusion (1,000 mLs Intravenous New Bag 10/5/22 2041)   insulin regular (MYXREDLIN) 1 unit/mL ED DKA infusion ( Intravenous Rate/Dose Verify 10/5/22 2158)   0.9% sodium chloride BOLUS (0 mLs Intravenous Stopped 10/5/22 2152)   iopamidol (ISOVUE-370) solution 100 mL (100 mLs Intravenous Given 10/5/22 2115)           NEW PRESCRIPTIONS STARTED AT TODAY'S ER VISIT     Medication List      There are no discharge medications for this visit.    "          CONDITION:  critical      DISPOSITION:  ICU as accepted by Dr. Kowalski, hospitalist          =================================================================  =================================================================    HPI    Patient information was obtained from: Patient     Use of Intrepreter: N/A      Tyson Skelton is a 27 year old female with history of DM type II, CKD, who presents to the ER with complaints of body aches.    Patient reports she has had ongoing diffuse body \"burning\" pain for the past 2 days. She states that this started gradually and reports associated chest pain and vomiting. Denies any fever, diarrhea, or any other complaints. She does report a longstanding history of DM but states she has not taken her medication for the past 2 years or so.     She denies actual fevers or urinary symptoms.      REVIEW OF SYSTEMS  Review of Systems   Constitutional: Positive for fatigue. Negative for fever.   HENT: Negative for trouble swallowing.    Respiratory: Negative for cough and shortness of breath.    Cardiovascular: Positive for chest pain.   Gastrointestinal: Positive for nausea and vomiting. Negative for abdominal pain and diarrhea.   Genitourinary: Negative for dysuria.   Allergic/Immunologic: Negative for immunocompromised state.   All other systems reviewed and are negative.        PAST MEDICAL HISTORY:  Past Medical History:   Diagnosis Date     Chronic kidney disease      Diabetes mellitus, type II (H)      Hyperglycemia without ketosis 2010    hospitalized at [facility]          PAST SURGICAL HISTORY:  History reviewed. No pertinent surgical history.      CURRENT MEDICATIONS:    Prior to Admission medications    Medication Sig Start Date End Date Taking? Authorizing Provider   acetaminophen (TYLENOL) 500 MG tablet Take 1,000 mg by mouth 10/8/18   Reported, Patient   atorvastatin (LIPITOR) 20 MG tablet TAKE 1 TABLET (20 MG TOTAL) BY MOUTH DAILY " FOR CHOLESTEROL 4/21/22   Parish Daniels MD   insulin glargine (LANTUS SOLOSTAR) 100 UNIT/ML pen INJECT 84 UNITS AT BEDTIME *18 DAYS*  Call clinic  to schedule follow up appointment.  IMPORTANT. 2/28/22   Katherine Peralta MD   insulin pen needle (31G X 5 MM) 31G X 5 MM miscellaneous Use 5 pen needles daily or as directed. 8/7/21   Parish Daniels MD   lisinopril (ZESTRIL) 2.5 MG tablet TAKE 1 TABLET (2.5 MG TOTAL) BY MOUTH DAILY FOR BLOOD PRESSURE 4/21/22   Parish Daniels MD   metFORMIN (GLUCOPHAGE XR) 500 MG 24 hr tablet Take 3 tablets (1,500 mg) by mouth daily (with dinner) 9/20/22   Kira Thomas MD   NOVOLOG FLEXPEN 100 UNIT/ML soln Inject 20 unit(s) w/ meals + 1 unit per 25>140 Approx 100 unit dale max 4/13/20   Sommer Logan PA-C   NOVOLOG FLEXPEN 100 UNIT/ML soln 1 unit per 10 grams of carbs + 1u per 25>140 10/16/19   Katherine Peralta MD         ALLERGIES:  No Known Allergies      FAMILY HISTORY:  Family History   Problem Relation Age of Onset     Diabetes Mother          SOCIAL HISTORY:  Social History     Socioeconomic History     Marital status: Single   Tobacco Use     Smoking status: Passive Smoke Exposure - Never Smoker     Smokeless tobacco: Never Used   Substance and Sexual Activity     Alcohol use: No     Drug use: No         VITALS:  Patient Vitals for the past 24 hrs:   BP Temp Temp src Pulse Resp SpO2   10/05/22 2045 114/74 -- -- 118 21 100 %   10/05/22 2030 115/84 -- -- 119 19 100 %   10/05/22 2015 113/78 -- -- (!) 121 20 100 %   10/05/22 2000 105/71 -- -- 119 19 100 %   10/05/22 1754 97/59 98.5  F (36.9  C) Oral (!) 131 20 99 %       Wt Readings from Last 3 Encounters:   04/29/21 70.8 kg (156 lb)   11/24/20 68.7 kg (151 lb 8 oz)   01/13/20 64.2 kg (141 lb 9.6 oz)       CrCl cannot be calculated (Unknown ideal weight.).    PHYSICAL EXAM    Constitutional:  Well developed, Well nourished, NAD, GCS 15  HENT:  Normocephalic, Atraumatic, Bilateral external ears normal,  Nose normal. Neck- Supple, No stridor.   Eyes:  PERRL, EOMI, Conjunctiva normal, No discharge.  Respiratory:  Normal breath sounds, No respiratory distress, No wheezing, Speaks full sentences easily. No cough.   Cardiovascular:  Normal heart rate, Regular rhythm, No rubs, No gallops. Chest wall nontender.   GI:  No excessive obesity.  Bowel sounds normal, Soft, mild abd tenderness, No masses, No flank tenderness. No rebound or guarding.   : deferred  Musculoskeletal: No cyanosis, No clubbing. Good range of motion in all major joints. No major deformities noted.   Integument:  Warm, Dry, No erythema, No rash.  No petechiae.   Neurologic:  Alert & oriented x 3, No focal deficits noted.   Psychiatric:  Affect normal, Cooperative         LAB:  All pertinent labs reviewed and interpreted.  Recent Results (from the past 24 hour(s))   Glucose by meter    Collection Time: 10/05/22  5:58 PM   Result Value Ref Range    GLUCOSE BY METER POCT 443 (H) 70 - 99 mg/dL   Basic metabolic panel    Collection Time: 10/05/22  6:14 PM   Result Value Ref Range    Sodium 127 (L) 136 - 145 mmol/L    Potassium 5.3 3.4 - 5.3 mmol/L    Chloride 84 (L) 98 - 107 mmol/L    Carbon Dioxide (CO2) 9 (LL) 22 - 29 mmol/L    Anion Gap 34 (H) 7 - 15 mmol/L    Urea Nitrogen 21.9 (H) 6.0 - 20.0 mg/dL    Creatinine 0.87 0.51 - 0.95 mg/dL    Calcium 9.2 8.6 - 10.0 mg/dL    Glucose 433 (H) 70 - 99 mg/dL    GFR Estimate >90 >60 mL/min/1.73m2   Lactic acid whole blood    Collection Time: 10/05/22  6:14 PM   Result Value Ref Range    Lactic Acid 1.7 0.7 - 2.0 mmol/L   HCG QUALitative pregnancy (blood)    Collection Time: 10/05/22  6:14 PM   Result Value Ref Range    hCG Serum Qualitative Negative Negative   Procalcitonin    Collection Time: 10/05/22  6:14 PM   Result Value Ref Range    Procalcitonin 0.03 <0.05 ng/mL   Ketone Beta-Hydroxybutyrate Quantitative    Collection Time: 10/05/22  6:14 PM   Result Value Ref Range    Ketone (Beta-Hydroxybutyrate)  Quantitative 7.4 (HH) 0.0 - 0.6 mmol/L   Blood gas venous    Collection Time: 10/05/22  6:14 PM   Result Value Ref Range    pH Venous 7.20 (LL) 7.35 - 7.45    pCO2 Venous 27 (L) 35 - 50 mm Hg    pO2 Venous 52 (H) 25 - 47 mm Hg    Bicarbonate Venous 13 (L) 24 - 30 mmol/L    Base Excess/Deficit (+/-) -17.6   mmol/L    Oxyhemoglobin Venous 78.2 (H) 70.0 - 75.0 %    O2 Sat, Venous 79.9 (H) 70.0 - 75.0 %   CBC with platelets and differential    Collection Time: 10/05/22  6:14 PM   Result Value Ref Range    WBC Count 11.5 (H) 4.0 - 11.0 10e3/uL    RBC Count 5.27 (H) 3.80 - 5.20 10e6/uL    Hemoglobin 15.1 11.7 - 15.7 g/dL    Hematocrit 47.4 (H) 35.0 - 47.0 %    MCV 90 78 - 100 fL    MCH 28.7 26.5 - 33.0 pg    MCHC 31.9 31.5 - 36.5 g/dL    RDW 12.5 10.0 - 15.0 %    Platelet Count 296 150 - 450 10e3/uL    % Neutrophils 80 %    % Lymphocytes 13 %    % Monocytes 6 %    % Eosinophils 0 %    % Basophils 0 %    % Immature Granulocytes 1 %    NRBCs per 100 WBC 0 <1 /100    Absolute Neutrophils 9.2 (H) 1.6 - 8.3 10e3/uL    Absolute Lymphocytes 1.5 0.8 - 5.3 10e3/uL    Absolute Monocytes 0.7 0.0 - 1.3 10e3/uL    Absolute Eosinophils 0.0 0.0 - 0.7 10e3/uL    Absolute Basophils 0.0 0.0 - 0.2 10e3/uL    Absolute Immature Granulocytes 0.1 <=0.4 10e3/uL    Absolute NRBCs 0.0 10e3/uL   Symptomatic; Unknown Influenza A/B & SARS-CoV2 (COVID-19) Virus PCR Multiplex Nasopharyngeal    Collection Time: 10/05/22  6:15 PM    Specimen: Nasopharyngeal; Swab   Result Value Ref Range    Influenza A PCR Negative Negative    Influenza B PCR Negative Negative    RSV PCR Negative Negative    SARS CoV2 PCR Negative Negative   UA with Microscopic reflex to Culture    Collection Time: 10/05/22  6:16 PM    Specimen: Urine, Midstream   Result Value Ref Range    Color Urine Light Yellow Colorless, Straw, Light Yellow, Yellow    Appearance Urine Clear Clear    Glucose Urine >1000 (A) Negative mg/dL    Bilirubin Urine Negative Negative    Ketones Urine >150 (A)  Negative mg/dL    Specific Gravity Urine 1.024 1.001 - 1.030    Blood Urine Negative Negative    pH Urine 5.5 5.0 - 7.0    Protein Albumin Urine 30  (A) Negative mg/dL    Urobilinogen Urine <2.0 <2.0 mg/dL    Nitrite Urine Negative Negative    Leukocyte Esterase Urine 25 Venancio/uL (A) Negative    Bacteria Urine Few (A) None Seen /HPF    RBC Urine 1 <=2 /HPF    WBC Urine 9 (H) <=5 /HPF    Squamous Epithelials Urine 3 (H) <=1 /HPF    Hyaline Casts Urine 4 (H) <=2 /LPF   Glucose by meter    Collection Time: 10/05/22  8:16 PM   Result Value Ref Range    GLUCOSE BY METER POCT 359 (H) 70 - 99 mg/dL   Glucose by meter    Collection Time: 10/05/22  8:56 PM   Result Value Ref Range    GLUCOSE BY METER POCT 363 (H) 70 - 99 mg/dL   Glucose by meter    Collection Time: 10/05/22  9:56 PM   Result Value Ref Range    GLUCOSE BY METER POCT 244 (H) 70 - 99 mg/dL       Lab Results   Component Value Date    ABORH AB POS 08/02/2020           RADIOLOGY:  Reviewed all pertinent imaging. Please see official radiology report.    CTA Chest Abdomen Pelvis w Contrast   Final Result   IMPRESSION:   1.  No thoracic or abdominal aortic dissection or other acute abnormality in the chest, abdomen, or pelvis.               EKG:    Indication: Chest pain    Performed at: 20:08p  Impression: Sinus tachycardia at 121 bpm.  Flipped T waves noted in lead III, aVR and possibly aVF as well as V3- V6.  QRS 68 ms,  ms electronically calculated though this may be erroneous.  No prior EKGs to compare to..       I have independently reviewed and interpreted the EKG(s) documented above.        PROCEDURES:  DKA protocol      I, Matias Skaggs, am serving as a scribe to document services personally performed by Dr. Lucy Barnett based on my observation and the provider's statements to me. I, Dr. Lucy Barnett MD attest that Matias Skaggs is acting in a scribe capacity, has observed my performance of the services and has documented them in accordance  with my direction.        Lucy Barnett M.D. [facility]  Emergency Medicine and Medical Toxicology  Formerly [facility] EMERGENCY DEPARTMENT  [facility] [facility] [id number] [phone]  Dept: [phone]           Lucy Barnett MD  10/05/22 7172

## 2022-10-06 NOTE — PROGRESS NOTES
[facility]    Medicine Progress Note - Hospitalist Service    Date of Admission:  10/5/2022    Assessment & Plan          Tyson Skelton is a 27 year old female with a known history of type 2 diabetes presented to the hospital with fatigue, headache and vomiting. Found to have DKA.      DKA, type II: Admits being off of medication for 1 to 2 years. DKA resolved after insulin drip. HbA1C >14.   - Increase Lantus to 20 units QAM.   - NovoLog sliding scale  - Start metformin 500 mg bid  - Diabetes educator input appreciated.      Hyponatremia: Improved. Continue to monitor.      History of essential hypertension: previously on medication.  Off medication now. Her BP was low here and needs fluid bolus. Monitor.      Hyperlipidemia: has not been take Lipitor; resume now       Diet: Moderate Consistent Carb (60 g CHO per Meal) Diet    DVT Prophylaxis: Low Risk/Ambulatory with no VTE prophylaxis indicated  Mchugh Catheter: Not present  Central Lines: None  Cardiac Monitoring: ACTIVE order. Indication: DKA  Code Status: Full Code      Disposition Plan      Plan to discharge home in 1-2 days     The patient's care was discussed with the Bedside Nurse, Care Coordinator/ and Patient.    Brigitte Christie MD  Hospitalist Service  [facility]  Securely message with the [employer] Console (learn more here)  Text page via [employer] Paging/Directory       ______________________________________________________________________    Interval History   Patient reports that she stopped taking insulin simply because she does not like it. She was advised to use an insulin pump in the past, but she does not like it and rather to do the injection.     Data reviewed today: I reviewed all medications, new labs and imaging results over the last 24 hours.     Physical Exam   Vital Signs: Temp: 98.4  F (36.9  C) Temp src: Oral BP: (!) 85/55 Pulse: 91   Resp: 12 SpO2: 98 % O2 Device: None (Room air)     Weight: 131 lbs 3.2 oz    General appearance: not in acute distress  HEENT: PERRL, EOMI  Lungs: Clear breath sounds in bilateral lung fields  Cardiovascular: Regular rate and rhythm, normal S1-S2  Abdomen: Soft, non tender, no distension, normal bowel sound  Musculoskeletal: No joint swelling  Skin: No rash and no edema  Neurology: AAO ×3.  Cranial nerves II - XII normal.  Normal muscle strength in all four extremities.    Data   Recent Labs   Lab 10/06/22  1152 10/06/22  0748 10/06/22  0559 10/06/22  0432 10/06/22  0407 10/06/22  0202 10/06/22  0151 10/05/22  2016 10/05/22  1814   WBC  --   --   --   --   --   --   --   --  11.5*   HGB  --   --   --   --   --   --   --   --  15.1   MCV  --   --   --   --   --   --   --   --  90   PLT  --   --   --   --   --   --   --   --  296   NA  --   --   --   --  133*  --  130*  --  127*   POTASSIUM  --   --   --   --  3.6  --  3.6  --  5.3   CHLORIDE  --   --   --   --  104  --  99  --  84*   CO2  --   --   --   --  14*  --  15*  --  9*   BUN  --   --   --   --  12.5  --  14.7  --  21.9*   CR  --   --   --   --  0.49*  --  0.52  --  0.87   ANIONGAP  --   --   --   --  15  --  16*  --  34*   GISELE  --   --   --   --  7.1*  --  7.4*  --  9.2   * 175* 205*   < > 183*   < > 178*   < > 433*   ALBUMIN  --   --   --   --   --   --   --   --  4.5   PROTTOTAL  --   --   --   --   --   --   --   --  9.1*   BILITOTAL  --   --   --   --   --   --   --   --  0.4   ALKPHOS  --   --   --   --   --   --   --   --  99   ALT  --   --   --   --   --   --   --   --  <5*   AST  --   --   --   --   --   --   --   --  17    < > = values in this interval not displayed.

## 2022-10-06 NOTE — CONSULTS
DIABETES CARE    Situation:  Consulted by Provider for Diabetes Education.  27 year old female  with type 2 Diabetes. Patient was admitted for concerns for DKA and hypglycemia.      Background:  Related Co-morbidities include: HTN, HLD  PCP: Parish Daniels MD    Social:Lives in home with child and spouse.     Diabetes History:   History of type 2 diabetes needing insulin.     Meds for BG Management PTA:  She has not taken for 1-2 years any diabetes medications she states.   On her med list:   Novolog inject 20 units with meals plus sliding scale/ not taking for a year or two  Lantus 84 units/ not taking for year or two  Mefformin 500 mg 24 hour tablet, also not taking    Current Inpatient Meds for BG Management:  Novolog medium insulin correction 1 drops 50 over 140 mg/dl  Lantus 15 units in am.     Labs:  Hemoglobin A1C: 11.1%  Hgb: Unreadable per new lab / so elevated  SCr: 0.49 mg/dL   GFR: >90 mL/min/1.73m^2    Blood Glucose POC:      Latest Reference Range & Units 10/06/22 01:06 10/06/22 02:02 10/06/22 03:15 10/06/22 04:32 10/06/22 05:15 10/06/22 05:59 10/06/22 07:48   GLUCOSE BY METER POCT 70 - 99 mg/dL 183 (H) 183 (H) 190 (H) 201 (H) 175 (H) 205 (H) 175 (H)     Diet Order: 60 gram CHO   Intake: Good   Weight: 59.5 kg    BMI: 26.5 kg/m^2    DM EDUCATION/COUNSELING:  Barriers to Learning and/or DM Self-Management: Language  Previous DM Education: Yes  Current education and/or visit with patient and/or caregiver:  Educated/reviewed diabetes basics: pathophysiology, hyperglycemia, long term complications, treatment.  Educated/reviewed hypoglycemia: sx, causes, treatment.  Explained normal/goals of blood sugar control, A1C, when to call provider.    Educated on normal pancreas function and need for basal insulin and mealtime insulin.  She needs insulin to avoid going into DKA again.  She has not taken for 1-2 years any medications.    Discussed that she will need to do so or she will end up hospitalized again.   She  "agrees to this.    Gave her meter and she states she also knows how to use. Order supplies at discharge.       She states she remembers how to inject insulin. Will have nurses have her do a few times here before going home.    Carbohydrates were briefly discussed and their effect on BG. Reinforced healthy eating. .    Written handouts given on all education provided.     Recommendations:  1. Restart insulin at doses determined during hospitalization.   2. Start checking blood sugars when discharged before all meals. Goals given.   3. See MD and outpatient diabetes educator or endocrinology.     Refer to \"Guidelines for Insulin Initiation and Care in Hospitalized Adults\"  link in Diabetes Management Order set for dosing guidelines.  Hospital goals for blood glucose levels are < 180 mg/dL for improved health outcome    DISCHARGE NEEDS:  RX needed at DC (preferred by patient's insurance):  1. Accuchek Guide strips, box of 100   2. Softclix lancets, box of 100  Insulin requiring  E11.65  To test BG 3-4 x per day  Novolog box of 5 pens for meal  Lantus box of 5 pens for basal longacting insulin  Pen needles 32 gauge x 4 mm, box of 100    Thank you,   Raysa Marrero RN, [facility]/ Pager [phone]      "

## 2022-10-06 NOTE — H&P
[facility]    History and Physical - Hospitalist Service       Date of Admission:  10/5/2022    Assessment & Plan      Tyson Skelton is a 27 year old female with a known history of type 2 diabetes admitted to the hospital with concerns for DKA    DKA  ---has known DM - II but presents with hyperglycemia - ketones  - AGMA  --- Admits to being off of medication for 1 to 2 years  --- Admit, use DKA order set; admit to ICU  --- Previously on Lantus, NovoLog, metformin  --- Plan to transition onto Lantus but with start at half dosing given that she has been off of medications for such a long time.  --- Checking A1c  --- Diabetic Ed consultation for morning    Hyponatremia  --- Spurious, secondary to hypoglycemia  --- Continue IV fluid and glucose management    Hypertension--- previously on medication.  Off medication now.  Monitor    Hyperlipidemia--has not been take Lipitor; resume now     Diet:  N.p.o. while on insulin drip  DVT Prophylaxis: Low Risk/Ambulatory with no VTE prophylaxis indicated  Mchugh Catheter: Not present  Central Lines: None  Code Status:  Full code    Clinically Significant Risk Factors Present on Admission         # Hyponatremia: Na = 127 mmol/L (Ref range: 136 - 145 mmol/L) on admission, will monitor as appropriate    # Anion Gap Metabolic Acidosis: AG = 34 mmol/L (Ref range: 7 - 15 mmol/L) on admission, will monitor and treat as appropriate      # Hypertension: home medication list includes antihypertensive(s)          Disposition Plan   Anticipate discharge in 1 to 2 days  The patient's care was discussed with the Patient.    Kimber Angulo MD  [facility]  Securely message with the Vocera Web Console (learn more here)  Text page via [employer] Paging/Directory      ______________________________________________________________________    Chief Complaint   Fatigue, body burning, headache, vomiting    History is obtained from the patient in the presence of the  Reanna     History of Present Illness   Tyson Skelton is a 27 year old female with a known history of type 2 diabetes previously on insulin came into the emergency room department for further work-up and evaluation of a 3-day history of feeling burning, nausea and vomiting and chest discomfort.  Patient stated the symptoms have been going on and she felt extremely fatigued with her eyes hurting her and were somewhat blurry.  She admits to not taking any of her diabetes medicine for more than a year, she is unsure how long, and does not have a reason why.  She denies any diarrhea.  She denies any dysuria GERD she consider hematuria.  Denies any history of illicit drug use, tobacco or alcohol use    In the emergency room department patient was found to be hyperglycemic with ketonuria as well as an elevated anion gap metabolic acidosis and is being admitted    Review of Systems    The 5 point Review of Systems is negative other than noted in the HPI or here.     Past Medical History    I have reviewed this patient's medical history and updated it with pertinent information if needed.   Past Medical History:   Diagnosis Date     Chronic kidney disease      Diabetes mellitus, type II (H)      Hyperglycemia without ketosis 2010    hospitalized at [facility]     Hyperlipidemia        Past Surgical History   I have reviewed this patient's surgical history and updated it with pertinent information if needed.  History reviewed. No pertinent surgical history.    Social History   I have reviewed this patient's social history and updated it with pertinent information if needed.  Social History     Tobacco Use     Smoking status: Passive Smoke Exposure - Never Smoker     Smokeless tobacco: Never Used   Substance Use Topics     Alcohol use: No     Drug use: No       Family History   I have reviewed this patient's family history and updated it with pertinent information if needed.  Family History   Problem Relation  Age of Onset     Diabetes Mother        Prior to Admission Medications   Prior to Admission Medications   Prescriptions Last Dose Informant Patient Reported? Taking?   NOVOLOG FLEXPEN 100 UNIT/ML soln Not Taking at Unknown time  No No   Si unit per 10 grams of carbs + 1u per 25>140   Patient not taking: Reported on 10/5/2022   NOVOLOG FLEXPEN 100 UNIT/ML soln More than a month  No Yes   Sig: Inject 20 unit(s) w/ meals + 1 unit per 25>140 Approx 100 unit dale max   atorvastatin (LIPITOR) 20 MG tablet More than a month  No Yes   Sig: TAKE 1 TABLET (20 MG TOTAL) BY MOUTH DAILY FOR CHOLESTEROL   insulin glargine (LANTUS SOLOSTAR) 100 UNIT/ML pen More than a month  No Yes   Sig: INJECT 84 UNITS AT BEDTIME *18 DAYS*  Call clinic  to schedule follow up appointment.  IMPORTANT.   insulin pen needle (31G X 5 MM) 31G X 5 MM miscellaneous   No No   Sig: Use 5 pen needles daily or as directed.   lisinopril (ZESTRIL) 2.5 MG tablet More than a month  No Yes   Sig: TAKE 1 TABLET (2.5 MG TOTAL) BY MOUTH DAILY FOR BLOOD PRESSURE   metFORMIN (GLUCOPHAGE XR) 500 MG 24 hr tablet More than a month  No Yes   Sig: Take 3 tablets (1,500 mg) by mouth daily (with dinner)      Facility-Administered Medications: None     Allergies   No Known Allergies    Physical Exam   Vital Signs: Temp: 98.5  F (36.9  C) Temp src: Oral BP: 104/67 Pulse: 113   Resp: 20 SpO2: 98 %      Weight: 0 lbs 0 oz    General Appearance: pleasant, NAD, resting but awakens appropriately  Eyes: Sclera nonicteric, extraocular muscles intact  HEENT: Oropharynx moist.  Tongue midline  Respiratory: Clear to auscultation bilaterally  Cardiovascular: Tachycardic without significant murmurs rubs or gallop  GI: Soft and nontender without hepatosplenomegaly or masses  Lymph/Hematologic: No anterior posterior cervical adenopathy or supraclavicular nodes  Skin: No significant bruising or open areas or rashes notable  Musculoskeletal: No lower extremity  edema  Neurologic: Grossly intact, no focal deficits appreciated.  Not tremulous    Data   Data reviewed today: I reviewed all medications, new labs and imaging results over the last 24 hours    CTA Chest Abdomen Pelvis w Contrast   Final Result   IMPRESSION:   1.  No thoracic or abdominal aortic dissection or other acute abnormality in the chest, abdomen, or pelvis.             Recent Results (from the past 24 hour(s))   Glucose by meter    Collection Time: 10/05/22  5:58 PM   Result Value Ref Range    GLUCOSE BY METER POCT 443 (H) 70 - 99 mg/dL   Basic metabolic panel    Collection Time: 10/05/22  6:14 PM   Result Value Ref Range    Sodium 127 (L) 136 - 145 mmol/L    Potassium 5.3 3.4 - 5.3 mmol/L    Chloride 84 (L) 98 - 107 mmol/L    Carbon Dioxide (CO2) 9 (LL) 22 - 29 mmol/L    Anion Gap 34 (H) 7 - 15 mmol/L    Urea Nitrogen 21.9 (H) 6.0 - 20.0 mg/dL    Creatinine 0.87 0.51 - 0.95 mg/dL    Calcium 9.2 8.6 - 10.0 mg/dL    Glucose 433 (H) 70 - 99 mg/dL    GFR Estimate >90 >60 mL/min/1.73m2   Lactic acid whole blood    Collection Time: 10/05/22  6:14 PM   Result Value Ref Range    Lactic Acid 1.7 0.7 - 2.0 mmol/L   HCG QUALitative pregnancy (blood)    Collection Time: 10/05/22  6:14 PM   Result Value Ref Range    hCG Serum Qualitative Negative Negative   Procalcitonin    Collection Time: 10/05/22  6:14 PM   Result Value Ref Range    Procalcitonin 0.03 <0.05 ng/mL   Ketone Beta-Hydroxybutyrate Quantitative    Collection Time: 10/05/22  6:14 PM   Result Value Ref Range    Ketone (Beta-Hydroxybutyrate) Quantitative 7.4 (HH) 0.0 - 0.6 mmol/L   Blood gas venous    Collection Time: 10/05/22  6:14 PM   Result Value Ref Range    pH Venous 7.20 (LL) 7.35 - 7.45    pCO2 Venous 27 (L) 35 - 50 mm Hg    pO2 Venous 52 (H) 25 - 47 mm Hg    Bicarbonate Venous 13 (L) 24 - 30 mmol/L    Base Excess/Deficit (+/-) -17.6   mmol/L    Oxyhemoglobin Venous 78.2 (H) 70.0 - 75.0 %    O2 Sat, Venous 79.9 (H) 70.0 - 75.0 %   CBC with platelets  and differential    Collection Time: 10/05/22  6:14 PM   Result Value Ref Range    WBC Count 11.5 (H) 4.0 - 11.0 10e3/uL    RBC Count 5.27 (H) 3.80 - 5.20 10e6/uL    Hemoglobin 15.1 11.7 - 15.7 g/dL    Hematocrit 47.4 (H) 35.0 - 47.0 %    MCV 90 78 - 100 fL    MCH 28.7 26.5 - 33.0 pg    MCHC 31.9 31.5 - 36.5 g/dL    RDW 12.5 10.0 - 15.0 %    Platelet Count 296 150 - 450 10e3/uL    % Neutrophils 80 %    % Lymphocytes 13 %    % Monocytes 6 %    % Eosinophils 0 %    % Basophils 0 %    % Immature Granulocytes 1 %    NRBCs per 100 WBC 0 <1 /100    Absolute Neutrophils 9.2 (H) 1.6 - 8.3 10e3/uL    Absolute Lymphocytes 1.5 0.8 - 5.3 10e3/uL    Absolute Monocytes 0.7 0.0 - 1.3 10e3/uL    Absolute Eosinophils 0.0 0.0 - 0.7 10e3/uL    Absolute Basophils 0.0 0.0 - 0.2 10e3/uL    Absolute Immature Granulocytes 0.1 <=0.4 10e3/uL    Absolute NRBCs 0.0 10e3/uL   Magnesium    Collection Time: 10/05/22  6:14 PM   Result Value Ref Range    Magnesium 2.2 1.7 - 2.3 mg/dL   Symptomatic; Unknown Influenza A/B & SARS-CoV2 (COVID-19) Virus PCR Multiplex Nasopharyngeal    Collection Time: 10/05/22  6:15 PM    Specimen: Nasopharyngeal; Swab   Result Value Ref Range    Influenza A PCR Negative Negative    Influenza B PCR Negative Negative    RSV PCR Negative Negative    SARS CoV2 PCR Negative Negative   UA with Microscopic reflex to Culture    Collection Time: 10/05/22  6:16 PM    Specimen: Urine, Midstream   Result Value Ref Range    Color Urine Light Yellow Colorless, Straw, Light Yellow, Yellow    Appearance Urine Clear Clear    Glucose Urine >1000 (A) Negative mg/dL    Bilirubin Urine Negative Negative    Ketones Urine >150 (A) Negative mg/dL    Specific Gravity Urine 1.024 1.001 - 1.030    Blood Urine Negative Negative    pH Urine 5.5 5.0 - 7.0    Protein Albumin Urine 30  (A) Negative mg/dL    Urobilinogen Urine <2.0 <2.0 mg/dL    Nitrite Urine Negative Negative    Leukocyte Esterase Urine 25 Venancio/uL (A) Negative    Bacteria Urine Few  (A) None Seen /HPF    RBC Urine 1 <=2 /HPF    WBC Urine 9 (H) <=5 /HPF    Squamous Epithelials Urine 3 (H) <=1 /HPF    Hyaline Casts Urine 4 (H) <=2 /LPF   Glucose by meter    Collection Time: 10/05/22  8:16 PM   Result Value Ref Range    GLUCOSE BY METER POCT 359 (H) 70 - 99 mg/dL   Glucose by meter    Collection Time: 10/05/22  8:56 PM   Result Value Ref Range    GLUCOSE BY METER POCT 363 (H) 70 - 99 mg/dL   Glucose by meter    Collection Time: 10/05/22  9:56 PM   Result Value Ref Range    GLUCOSE BY METER POCT 244 (H) 70 - 99 mg/dL   Glucose by meter    Collection Time: 10/05/22 10:28 PM   Result Value Ref Range    GLUCOSE BY METER POCT 219 (H) 70 - 99 mg/dL   Glucose by meter    Collection Time: 10/05/22 10:48 PM   Result Value Ref Range    GLUCOSE BY METER POCT 192 (H) 70 - 99 mg/dL   Glucose by meter    Collection Time: 10/05/22 11:50 PM   Result Value Ref Range    GLUCOSE BY METER POCT 146 (H) 70 - 99 mg/dL

## 2022-10-06 NOTE — PHARMACY-ADMISSION MEDICATION HISTORY
Pharmacy Note - Admission Medication History    Pertinent Provider Information: she has not taken any of her medications in nearly one year, including insulin. She states she is too lethargic to take medications on a daily basis.      ______________________________________________________________________    Prior To Admission (PTA) med list completed and updated in EMR.       PTA Med List   Medication Sig Last Dose     atorvastatin (LIPITOR) 20 MG tablet TAKE 1 TABLET (20 MG TOTAL) BY MOUTH DAILY FOR CHOLESTEROL More than a month     insulin glargine (LANTUS SOLOSTAR) 100 UNIT/ML pen INJECT 84 UNITS AT BEDTIME *18 DAYS*  Call clinic  to schedule follow up appointment.  IMPORTANT. More than a month     lisinopril (ZESTRIL) 2.5 MG tablet TAKE 1 TABLET (2.5 MG TOTAL) BY MOUTH DAILY FOR BLOOD PRESSURE More than a month     metFORMIN (GLUCOPHAGE XR) 500 MG 24 hr tablet Take 3 tablets (1,500 mg) by mouth daily (with dinner) More than a month     NOVOLOG FLEXPEN 100 UNIT/ML soln Inject 20 unit(s) w/ meals + 1 unit per 25>140 Approx 100 unit dale max More than a month       Information source(s): Patient, Clinic records and [facility]  Method of interview communication: in-person    Summary of Changes to PTA Med List  New: nothing   Discontinued: all her medications?   Changed: nothing     Patient was asked about OTC/herbal products specifically.  PTA med list reflects this.    In the past week, patient estimated taking medication this percent of the time:  less than 50% due to illness.    Allergies were reviewed, assessed, and updated with the patient.      Patient does not use any multi-dose medications prior to admission.    The information provided in this note is only as accurate as the sources available at the time of the update(s).    Thank you for the opportunity to participate in the care of this patient.    Harshal Shaikh [facility]  10/5/2022 8:49 PM

## 2022-10-06 NOTE — DISCHARGE INSTRUCTIONS
DIABETES REMINDERS:  1) Check your blood sugar before all meals and at bedtime. Goals are in #2  Always bring your blood sugar log and meter to your diabetes-related appointments.  2) Your blood sugar goals:  80-130mg/dL before eating  and  mg/dL 2 hours after eating (or per your doctor).  3) Always be prepared to treat a low blood sugar should it happen. Keep a sugar-containing beverage or food nearby.  4) When to call your clinic:   Blood sugar over 400 mg/dL.   If you have 2 to 3 low blood sugars (under 70mg/dL) in a row,   Low reading the same time of day several days in a row,  Blood sugars elevated and you can not get them down with your usual diabetes regimen,  You are ill and can't keep blood sugars controlled.   5) When to call 911:  If your blood glucose does not get better with treatment, or if you/someone else is unable to give you treatment.  6) Talk to your primary care doctor about an appointment with a Certified Diabetes Educator to assist you with your BG management.  7) Follow insulin regimen on discharge orders until able to see provider where doses may be adjusted based on blood sugar patterns.

## 2022-10-06 NOTE — CONSULTS
Care Management Initial Consult    General Information  Assessment completed with: Patient, Other, patient, [facility]  Type of CM/SW Visit: Initial Assessment    Primary Care Provider verified and updated as needed: Yes   Readmission within the last 30 days: no previous admission in last 30 days         Advance Care Planning: Advance Care Planning Reviewed: no concerns identified          Communication Assessment  Patient's communication style: spoken language (non-English) (Did speak to writer in some english when talking to.)    Hearing Difficulty or Deaf: no   Wear Glasses or Blind: yes    Cognitive  Cognitive/Neuro/Behavioral: WDL                      Living Environment:   People in home: spouse  Child, and   Current living Arrangements: house      Able to return to prior arrangements:         Family/Social Support:  Care provided by: self  Provides care for: child(petra)  Marital Status:     [place], and [facility]       Description of Support System: Supportive         Current Resources:   Patient receiving home care services: No     Community Resources:    Equipment currently used at home: none  Supplies currently used at home:      Employment/Financial:  Employment Status: other (see comments) (does not work)        Financial Concerns:             Lifestyle & Psychosocial Needs:  Social Determinants of Health     Tobacco Use: Medium Risk     Smoking Tobacco Use: Passive Smoke Exposure - Never Smoker     Smokeless Tobacco Use: Never Used   Alcohol Use: Not on file   Financial Resource Strain: Not on file   Food Insecurity: Not on file   Transportation Needs: Not on file   Physical Activity: Not on file   Stress: Not on file   Social Connections: Not on file   Intimate Partner Violence: Not on file   Depression: Not at risk     PHQ-2 Score: 0   Housing Stability: Not on file       Functional Status:  Prior to admission patient needed assistance:   Dependent ADLs:: Independent  Dependent IADLs::  Independent       Mental Health Status:          Chemical Dependency Status:                Values/Beliefs:  Spiritual, Cultural Beliefs, Baptism Practices, Values that affect care:                 Additional Information:  Assessed. Spoke with patient, and sister Eron Anguiano. Patient does understand, and speak a little english. Lives at home with , and child. Ind at baseline. No svcs. Prior. Does not drive,  drives. No therapy consults placed at this time. Family will transport.     CM will continue to follow plan of care, review recommendations, and assist with any discharge needs anticipated.     Dominique Frost RN

## 2022-10-07 LAB
GLUCOSE BLDC GLUCOMTR-MCNC: 201 MG/DL (ref 70–99)
GLUCOSE BLDC GLUCOMTR-MCNC: 212 MG/DL (ref 70–99)
GLUCOSE BLDC GLUCOMTR-MCNC: 222 MG/DL (ref 70–99)
GLUCOSE BLDC GLUCOMTR-MCNC: 250 MG/DL (ref 70–99)
GLUCOSE BLDC GLUCOMTR-MCNC: 268 MG/DL (ref 70–99)

## 2022-10-07 PROCEDURE — 90686 IIV4 VACC NO PRSV 0.5 ML IM: CPT | Performed by: FAMILY MEDICINE

## 2022-10-07 PROCEDURE — G0008 ADMIN INFLUENZA VIRUS VAC: HCPCS | Performed by: FAMILY MEDICINE

## 2022-10-07 PROCEDURE — 120N000001 HC R&B MED SURG/OB

## 2022-10-07 PROCEDURE — 250N000013 HC RX MED GY IP 250 OP 250 PS 637: Performed by: INTERNAL MEDICINE

## 2022-10-07 PROCEDURE — 250N000011 HC RX IP 250 OP 636: Performed by: FAMILY MEDICINE

## 2022-10-07 PROCEDURE — 99233 SBSQ HOSP IP/OBS HIGH 50: CPT | Performed by: INTERNAL MEDICINE

## 2022-10-07 PROCEDURE — 250N000013 HC RX MED GY IP 250 OP 250 PS 637: Performed by: FAMILY MEDICINE

## 2022-10-07 RX ORDER — HYDROCORTISONE 2.5 %
CREAM (GRAM) TOPICAL 2 TIMES DAILY
Status: DISCONTINUED | OUTPATIENT
Start: 2022-10-07 | End: 2022-10-08 | Stop reason: HOSPADM

## 2022-10-07 RX ADMIN — INFLUENZA A VIRUS A/VICTORIA/2570/2019 IVR-215 (H1N1) ANTIGEN (FORMALDEHYDE INACTIVATED), INFLUENZA A VIRUS A/DARWIN/9/2021 SAN-010 (H3N2) ANTIGEN (FORMALDEHYDE INACTIVATED), INFLUENZA B VIRUS B/PHUKET/3073/2013 ANTIGEN (FORMALDEHYDE INACTIVATED), AND INFLUENZA B VIRUS B/MICHIGAN/01/2021 ANTIGEN (FORMALDEHYDE INACTIVATED) 0.5 ML: 15; 15; 15; 15 INJECTION, SUSPENSION INTRAMUSCULAR at 20:55

## 2022-10-07 RX ADMIN — METFORMIN HYDROCHLORIDE 500 MG: 500 TABLET ORAL at 18:17

## 2022-10-07 RX ADMIN — HYDROCORTISONE: 25 CREAM TOPICAL at 20:54

## 2022-10-07 RX ADMIN — METFORMIN HYDROCHLORIDE 500 MG: 500 TABLET ORAL at 10:09

## 2022-10-07 RX ADMIN — ATORVASTATIN CALCIUM 20 MG: 10 TABLET, FILM COATED ORAL at 20:54

## 2022-10-07 ASSESSMENT — ACTIVITIES OF DAILY LIVING (ADL)
ADLS_ACUITY_SCORE: 22
ADLS_ACUITY_SCORE: 24
ADLS_ACUITY_SCORE: 22

## 2022-10-07 NOTE — PROGRESS NOTES
[facility]    Medicine Progress Note - Hospitalist Service    Date of Admission:  10/5/2022    Assessment & Plan          Tyson Skelton is a 27 year old female with a known history of type 2 diabetes presented to the hospital with fatigue, headache and vomiting. Found to have DKA.      DKA, type II: Admits being off of medication for 1 to 2 years. DKA resolved after insulin drip. HbA1C >14.   - Increase Lantus to 30 units QAM.   - Patient states that she does not know how to do carb count, but she feels comfortable doing the sliding scale. Will start Novolog 5 units tidac. Continue NovoLog sliding scale  - Started metformin 500 mg bid  - Diabetes educator input appreciated.      Hyponatremia: Improved. Continue to monitor.      History of essential hypertension: previously on medication.  Off medication now. Her BP was low after admission and needs fluid bolus. Improved now. Monitor.      Hyperlipidemia: not taking Lipitor. Resume now       Diet: Moderate Consistent Carb (60 g CHO per Meal) Diet    DVT Prophylaxis: Low Risk/Ambulatory with no VTE prophylaxis indicated  Mchugh Catheter: Not present  Central Lines: None  Cardiac Monitoring: ACTIVE order. Indication: DKA  Code Status: Full Code      Disposition Plan      Plan to discharge home tomorrow if blood sugar is better controlled.     The patient's care was discussed with the Bedside Nurse, Care Coordinator/ and Patient.    Brigitte Christie MD  Hospitalist Service  [facility]  Securely message with the Vocera Web Console (learn more here)  Text page via [employer] Paging/Directory       ______________________________________________________________________    Interval History   Patient's blood sugar improved, but remains high. Patient states that she does not know how to do carb count, but she feels comfortable doing the sliding scale.     Data reviewed today: I reviewed all medications, new labs and imaging  results over the last 24 hours.     Physical Exam   Vital Signs: Temp: 98  F (36.7  C) Temp src: Oral BP: 94/68 Pulse: 89   Resp: 14 SpO2: 97 % O2 Device: None (Room air)    Weight: 131 lbs 3.2 oz    General appearance: not in acute distress  HEENT: PERRL, EOMI  Lungs: Clear breath sounds in bilateral lung fields  Cardiovascular: Regular rate and rhythm, normal S1-S2  Abdomen: Soft, non tender, no distension, normal bowel sound  Musculoskeletal: No joint swelling  Skin: No rash and no edema  Neurology: AAO ×3.  Cranial nerves II - XII normal.  Normal muscle strength in all four extremities.    Data   Recent Labs   Lab 10/07/22  1357 10/07/22  1008 10/07/22  0257 10/06/22  0432 10/06/22  0407 10/06/22  0202 10/06/22  0151 10/05/22  2016 10/05/22  1814   WBC  --   --   --   --   --   --   --   --  11.5*   HGB  --   --   --   --   --   --   --   --  15.1   MCV  --   --   --   --   --   --   --   --  90   PLT  --   --   --   --   --   --   --   --  296   NA  --   --   --   --  133*  --  130*  --  127*   POTASSIUM  --   --   --   --  3.6  --  3.6  --  5.3   CHLORIDE  --   --   --   --  104  --  99  --  84*   CO2  --   --   --   --  14*  --  15*  --  9*   BUN  --   --   --   --  12.5  --  14.7  --  21.9*   CR  --   --   --   --  0.49*  --  0.52  --  0.87   ANIONGAP  --   --   --   --  15  --  16*  --  34*   GSIELE  --   --   --   --  7.1*  --  7.4*  --  9.2   * 222* 250*   < > 183*   < > 178*   < > 433*   ALBUMIN  --   --   --   --   --   --   --   --  4.5   PROTTOTAL  --   --   --   --   --   --   --   --  9.1*   BILITOTAL  --   --   --   --   --   --   --   --  0.4   ALKPHOS  --   --   --   --   --   --   --   --  99   ALT  --   --   --   --   --   --   --   --  <5*   AST  --   --   --   --   --   --   --   --  17    < > = values in this interval not displayed.

## 2022-10-07 NOTE — PLAN OF CARE
Problem: Plan of Care - These are the overarching goals to be used throughout the patient stay.    Goal: Plan of Care Review/Shift Note  Description: The Plan of Care Review/Shift note should be completed every shift.  The Outcome Evaluation is a brief statement about your assessment that the patient is improving, declining, or no change.  This information will be displayed automatically on your shift note.  Outcome: Ongoing, Progressing  Goal: Absence of Hospital-Acquired Illness or Injury  Intervention: Identify and Manage Fall Risk  Recent Flowsheet Documentation  Taken 10/6/2022 1600 by Emil Isaac RN  Safety Promotion/Fall Prevention:   activity supervised   nonskid shoes/slippers when out of bed   lighting adjusted   clutter free environment maintained   check orthostatic blood pressure   room near nurse's station   room organization consistent   safety round/check completed   supervised activity  Taken 10/6/2022 1200 by Emil Isaac RN  Safety Promotion/Fall Prevention:   activity supervised   nonskid shoes/slippers when out of bed   lighting adjusted   clutter free environment maintained   check orthostatic blood pressure   room near nurse's station   room organization consistent   safety round/check completed   supervised activity  Taken 10/6/2022 0800 by Emil Isaac RN  Safety Promotion/Fall Prevention:   activity supervised   nonskid shoes/slippers when out of bed   lighting adjusted   clutter free environment maintained   check orthostatic blood pressure   room near nurse's station   room organization consistent   safety round/check completed   supervised activity  Intervention: Prevent Skin Injury  Recent Flowsheet Documentation  Taken 10/6/2022 1600 by Emil Isaac RN  Body Position: position changed independently  Taken 10/6/2022 1200 by Emil Isaac RN  Body Position: position changed independently  Taken 10/6/2022 0800 by Emil Isaac RN  Body Position: position changed  independently  Intervention: Prevent and Manage VTE (Venous Thromboembolism) Risk  Recent Flowsheet Documentation  Taken 10/6/2022 1600 by Emil Isaac RN  Range of Motion: active ROM (range of motion) encouraged  VTE Prevention/Management: (Patient Ambulating) SCDs (sequential compression devices) off  Activity Management:   activity adjusted per tolerance   activity encouraged  Taken 10/6/2022 1200 by Emil Isaac RN  Range of Motion: active ROM (range of motion) encouraged  VTE Prevention/Management: (Patient Ambulating) SCDs (sequential compression devices) off  Activity Management:   activity adjusted per tolerance   activity encouraged  Taken 10/6/2022 0800 by Emil Isaac RN  Range of Motion: active ROM (range of motion) encouraged  VTE Prevention/Management: (Patient Ambulating) SCDs (sequential compression devices) off  Activity Management:   activity adjusted per tolerance   ambulated to bathroom  Intervention: Prevent Infection  Recent Flowsheet Documentation  Taken 10/6/2022 1600 by Emil Isaac RN  Infection Prevention:   hand hygiene promoted   rest/sleep promoted   single patient room provided   visitors restricted/screened   personal protective equipment utilized   environmental surveillance performed  Taken 10/6/2022 1200 by Emil Isaac RN  Infection Prevention:   hand hygiene promoted   rest/sleep promoted   single patient room provided   visitors restricted/screened   personal protective equipment utilized   environmental surveillance performed  Taken 10/6/2022 0800 by Emil Isaac RN  Infection Prevention:   hand hygiene promoted   rest/sleep promoted   single patient room provided   visitors restricted/screened   personal protective equipment utilized   environmental surveillance performed  Goal: Optimal Comfort and Wellbeing  Outcome: Ongoing, Progressing  Goal: Readiness for Transition of Care  Outcome: Ongoing, Progressing   Goal Outcome Evaluation:             [facility]  [facility] - ICU    RN Progress Note:            Pertinent Assessments:      Please refer to flowsheet rows for full assessment     BP's were soft mid shift,  Ordered and pt received 1000L bolus over 4 hours, with improvement. Afebrile, Sats >98 on RA, urine output adequate. BG managed with Lantus and Aspart, which has been effective. SBA to IND to bathroom. On portable tele.          Mobility Level:       5         Key Events - This Shift:     Only the soft BP mid day. MAPS were in the low 60s. Pt had diabetic educator consult today.               Plan:     Pt now Med-Surg with transfer orders.          Point of Contact Update No

## 2022-10-07 NOTE — PLAN OF CARE
[facility] - ICU    RN Progress Note:            Pertinent Assessments:      Please refer to flowsheet rows for full assessment     A/O x4.  Pleasant and understanding of English.  Wants to go home as soon as possible to see son.          Mobility Level:     5        Key Events - This Shift:     Blood sugars remain in the 200's.  Increased lantus dosing and sliding scale doses.  Patient was able to self administer insulin at lunch time and has used insulin pens in the past.  Good appetite.  Doesn't understand carb counting per MD so keeping overnight one more night to be more aggressive with sugar controls.               Plan:     Transfer to medical floor and discharge tomorrow if sugars are more stable.          Point of Contact Update NO  If No, reason: patient speaking to family on phone;doesn't want us to call.  Name:  Phone Number  Summary of Conversation:

## 2022-10-07 NOTE — PLAN OF CARE
Goal Outcome Evaluation:      Pt is transferred from ICU to room 127 around 1700. She's alert oriented x4. Intro to her new room and call light system done. Pt is able to make her needs known to staff. Assisted pt  to order dinner and ate 100%.  AC blood sugar was 201 and 268 at HS. Pt covered with sliding scale insulin as ordered. No significant changes in general status this kiki. Will cont to monitor.

## 2022-10-07 NOTE — PLAN OF CARE
Goal Outcome Evaluation:    [facility] - ICU    RN Progress Note:            Pertinent Assessments:      Please refer to flowsheet rows for full assessment     Patient is general medicine status         Mobility Level:     5         Key Events - This Shift:     Uneventful shift              Plan:     Transfer to med surg floor when bed available         Point of Contact Update YES-OR-NO: No  If No, reason:    Name:   Phone Number:   Summary of Conversation:

## 2022-10-08 VITALS
WEIGHT: 131.2 LBS | OXYGEN SATURATION: 100 % | HEART RATE: 86 BPM | BODY MASS INDEX: 26.45 KG/M2 | RESPIRATION RATE: 18 BRPM | DIASTOLIC BLOOD PRESSURE: 64 MMHG | HEIGHT: 59 IN | SYSTOLIC BLOOD PRESSURE: 97 MMHG | TEMPERATURE: 97.8 F

## 2022-10-08 LAB
GLUCOSE BLDC GLUCOMTR-MCNC: 212 MG/DL (ref 70–99)
GLUCOSE BLDC GLUCOMTR-MCNC: 255 MG/DL (ref 70–99)

## 2022-10-08 PROCEDURE — 99239 HOSP IP/OBS DSCHRG MGMT >30: CPT | Performed by: INTERNAL MEDICINE

## 2022-10-08 PROCEDURE — 250N000013 HC RX MED GY IP 250 OP 250 PS 637: Performed by: INTERNAL MEDICINE

## 2022-10-08 RX ORDER — LANCETS
EACH MISCELLANEOUS
Qty: 500 EACH | Refills: 0 | Status: SHIPPED | OUTPATIENT
Start: 2022-10-08

## 2022-10-08 RX ORDER — INSULIN ASPART 100 [IU]/ML
1-7 INJECTION, SOLUTION INTRAVENOUS; SUBCUTANEOUS AT BEDTIME
Qty: 15 ML | Refills: 0 | Status: SHIPPED | OUTPATIENT
Start: 2022-10-08 | End: 2022-12-06

## 2022-10-08 RX ORDER — INSULIN ASPART 100 [IU]/ML
INJECTION, SOLUTION INTRAVENOUS; SUBCUTANEOUS
Qty: 30 ML | Refills: 0 | Status: SHIPPED | OUTPATIENT
Start: 2022-10-08 | End: 2022-12-06

## 2022-10-08 RX ORDER — ALBUTEROL SULFATE 108 UG/1
AEROSOL, METERED RESPIRATORY (INHALATION)
Qty: 100 EACH | Refills: 3 | Status: CANCELLED | OUTPATIENT
Start: 2022-10-08

## 2022-10-08 RX ORDER — FLURBIPROFEN SODIUM 0.3 MG/ML
SOLUTION/ DROPS OPHTHALMIC
Qty: 100 EACH | Refills: 3 | Status: CANCELLED | OUTPATIENT
Start: 2022-10-08

## 2022-10-08 RX ORDER — ATORVASTATIN CALCIUM 20 MG/1
20 TABLET, FILM COATED ORAL AT BEDTIME
Qty: 90 TABLET | Refills: 0 | Status: SHIPPED | OUTPATIENT
Start: 2022-10-08 | End: 2023-07-06

## 2022-10-08 RX ORDER — PEN NEEDLE, DIABETIC 32GX 5/32"
NEEDLE, DISPOSABLE MISCELLANEOUS
Qty: 100 EACH | Refills: 3 | Status: CANCELLED | OUTPATIENT
Start: 2022-10-08

## 2022-10-08 RX ORDER — INSULIN GLARGINE 100 [IU]/ML
30 INJECTION, SOLUTION SUBCUTANEOUS EVERY MORNING
Qty: 30 ML | Refills: 0 | Status: SHIPPED | OUTPATIENT
Start: 2022-10-08 | End: 2022-12-06

## 2022-10-08 RX ORDER — METHYLPREDNISOLONE SODIUM SUCCINATE 125 MG/2ML
INJECTION, POWDER, FOR SOLUTION INTRAMUSCULAR; INTRAVENOUS
Qty: 100 EACH | Refills: 3 | Status: CANCELLED | OUTPATIENT
Start: 2022-10-08

## 2022-10-08 RX ORDER — GLUCOSAMINE HCL/CHONDROITIN SU 500-400 MG
CAPSULE ORAL
Qty: 100 EACH | Refills: 3 | Status: SHIPPED | OUTPATIENT
Start: 2022-10-08 | End: 2022-10-08

## 2022-10-08 RX ORDER — INSULIN ASPART 100 [IU]/ML
5 INJECTION, SOLUTION INTRAVENOUS; SUBCUTANEOUS
Qty: 15 ML | Refills: 0 | Status: SHIPPED | OUTPATIENT
Start: 2022-10-08 | End: 2022-12-06

## 2022-10-08 RX ORDER — LANCING DEVICE/LANCETS
KIT MISCELLANEOUS
Qty: 1 EACH | Refills: 0 | Status: SHIPPED | OUTPATIENT
Start: 2022-10-08

## 2022-10-08 RX ORDER — GLUCOSAMINE HCL/CHONDROITIN SU 500-400 MG
CAPSULE ORAL
Qty: 500 EACH | Refills: 0 | Status: SHIPPED | OUTPATIENT
Start: 2022-10-08

## 2022-10-08 RX ADMIN — METFORMIN HYDROCHLORIDE 500 MG: 500 TABLET ORAL at 08:21

## 2022-10-08 ASSESSMENT — ACTIVITIES OF DAILY LIVING (ADL)
ADLS_ACUITY_SCORE: 22

## 2022-10-08 NOTE — PLAN OF CARE
Goal Outcome Evaluation:    Pt cleared to discharge home with family. Discussed diabetic reg. At home. She stated she understood how to use insulin pens and she knows to check her blood glucose everyday before meals. She has a PCP in community and knows to make an appointment this week IN PERSON for lab follow up and diabetic check in. All belongings accounted for.

## 2022-10-08 NOTE — PLAN OF CARE
Problem: Diabetes Comorbidity  Goal: Blood Glucose Level Within Targeted Range  Outcome: Ongoing, Progressing   Goal Outcome Evaluation:    At 2 am BG is 212. Pt is pleasant in mood. Denies pain. Vital signs stable.

## 2022-10-08 NOTE — DISCHARGE SUMMARY
[facility]  Hospitalist Discharge Summary      Date of Admission:  10/5/2022  Date of Discharge:  10/8/2022  Discharging Provider: Brigitte Christie MD  Discharge Service: Hospitalist Service    Discharge Diagnoses     Principal Problem:    Diabetic ketoacidosis without coma associated with type 2 diabetes mellitus (H)  Active Problems:    Type 2 diabetes mellitus with hyperglycemia, with long-term current use of insulin (H)    Noncompliance with medication regimen    Hyperlipidemia    Hyponatremia      Follow-ups Needed After Discharge   Follow-up Appointments     Follow-up and recommended labs and tests       * Follow up with primary care provider, Parish Daniels, within 7 days to   evaluate medication change.  No follow up labs or test are needed. Need to   follow up on diabetes and optimize medication doses.  * A referral for endocrinology is provided. You will be contacted about   the appointment.           Discharge Disposition   Discharged to home  Condition at discharge: Stable      Hospital Course     Tyson Skelton is a 27 year old female with a known history of type 2 diabetes and hyperlipidemia presented to the hospital with fatigue, headache and vomiting on 10/5/22. CTA chest, abdomen and pelvis revealed no acute process. She was found to have DKA. Patient admits being off diabetes medication for 1 to 2 years. DKA resolved after insulin drip. Hyponatremia also resolved. Her HbA1C is more than 14. She was started Lantus, Novolog with carb count and sliding scale. The Lantus dose was gradually increased to 30 units QAM. Patient states that she does not know how to do carb count, but she feels comfortable doing the sliding scale. Fixed dose of Novolog 5 units tidac was started. Patient used to take metformin, which is resumed at 500 mg bid. With the above regimen, her blood sugar was at the level of 200 - 250. Patient is recommended to follow up with PCP to further optimize her medication. A  referral for endocrinology was also provided.      Consultations This Hospital Stay   DIABETES EDUCATION IP CONSULT  CARE MANAGEMENT / SOCIAL WORK IP CONSULT    Code Status   Full Code    Time Spent on this Encounter   I, Brigitte Christie MD, personally saw the patient today and spent greater than 30 minutes discharging this patient.       Brigitte Christie MD  [address] [address]  Phone: [phone]  Fax: [phone]  ______________________________________________________________________    Physical Exam   Vital Signs: Temp: 97.8  F (36.6  C) Temp src: Oral BP: 97/64 Pulse: 86   Resp: 18 SpO2: 100 % O2 Device: None (Room air)    Weight: 131 lbs 3.2 oz    General appearance: not in acute distress  HEENT: PERRL, EOMI  Lungs: Clear breath sounds in bilateral lung fields  Cardiovascular: Regular rate and rhythm, normal S1-S2  Abdomen: Soft, non tender, no distension  Musculoskeletal: No joint swelling  Skin: No rash and no edema  Neurology: AAO ×3.  Cranial nerves II - XII normal.  Normal muscle strength in all four extremities.       Primary Care Physician   Parish Daniels    Discharge Orders      Adult Endocrinology  Referral      Reason for your hospital stay    * Diabetes with DKA     Follow-up and recommended labs and tests     * Follow up with primary care provider, Parish Daniels, within 7 days to evaluate medication change.  No follow up labs or test are needed. Need to follow up on diabetes and optimize medication doses.  * A referral for endocrinology is provided. You will be contacted about the appointment.     Activity    Your activity upon discharge: activity as tolerated     Diet    Follow this diet upon discharge: Moderate Consistent Carb (60 g CHO per Meal) Diet       Significant Results and Procedures   Most Recent 3 CBC's:Recent Labs   Lab Test 10/05/22  1814 08/04/20  0512 08/03/20  0645   WBC 11.5* 8.2 12.1*   HGB 15.1 11.1* 11.4*   MCV 90 85  85    300 282     Most Recent 3 BMP's:Recent Labs   Lab Test 10/08/22  0806 10/08/22  0205 10/07/22  2210 10/06/22  0432 10/06/22  0407 10/06/22  0202 10/06/22  0151 10/05/22  2016 10/05/22  1814   NA  --   --   --   --  133*  --  130*  --  127*   POTASSIUM  --   --   --   --  3.6  --  3.6  --  5.3   CHLORIDE  --   --   --   --  104  --  99  --  84*   CO2  --   --   --   --  14*  --  15*  --  9*   BUN  --   --   --   --  12.5  --  14.7  --  21.9*   CR  --   --   --   --  0.49*  --  0.52  --  0.87   ANIONGAP  --   --   --   --  15  --  16*  --  34*   GISELE  --   --   --   --  7.1*  --  7.4*  --  9.2   * 212* 268*   < > 183*   < > 178*   < > 433*    < > = values in this interval not displayed.       EXAM: CTA CHEST ABDOMEN PELVIS W CONTRAST  LOCATION: [facility]  DATE/TIME: 10/5/2022 9:17 PM     INDICATION: chest pain, abd pain  COMPARISON: None.  TECHNIQUE: CT angiogram chest abdomen pelvis during arterial phase of injection of IV contrast. 2D and 3D MIP reconstructions were performed by the CT technologist. Dose reduction techniques were used.   CONTRAST: isovue 370 100ml     FINDINGS:   CT ANGIOGRAM CHEST, ABDOMEN, AND PELVIS: Thoracic aorta appears normal. No thoracic aortic aneurysm or dissection.     Abdominal aorta appears normal. No abdominal aortic aneurysm or dissection.     Common, internal, external, and common femoral arteries appear normal bilaterally.     LUNGS AND PLEURA: No pulmonary mass, consolidation, or suspicious pulmonary nodule. No pleural effusion or pneumothorax.     MEDIASTINUM/AXILLAE: No adenopathy or mass. Pulmonary arteries normal in caliber. No evidence for pulmonary embolism. Cardiac chambers unremarkable. No pericardial effusion.     CORONARY ARTERY CALCIFICATION: None.     HEPATOBILIARY: Normal.     PANCREAS: Normal.     SPLEEN: Normal.     ADRENAL GLANDS: Normal.     KIDNEYS/BLADDER: Normal.     BOWEL: Normal. Normal appendix. No free air, free  fluid, or inflammatory change.     LYMPH NODES: Normal.     PELVIC ORGANS: Normal.     MUSCULOSKELETAL: Normal.                                                                      IMPRESSION:  1.  No thoracic or abdominal aortic dissection or other acute abnormality in the chest, abdomen, or pelvis.         Discharge Medications   Current Discharge Medication List      START taking these medications    Details   alcohol swab prep pads Use to swab area of injection/teresa as directed.  Qty: 500 each, Refills: 0    Associated Diagnoses: Type 2 diabetes mellitus with hyperglycemia, unspecified whether long term insulin use (H)      blood glucose (ACCU-CHEK SOFTCLIX) lancing device Lancing device to be used with lancets.  Qty: 1 each, Refills: 0    Associated Diagnoses: Type 2 diabetes mellitus with hyperglycemia, unspecified whether long term insulin use (H)      blood glucose (NO BRAND SPECIFIED) test strip Use to test blood sugar 5 times daily or as directed.  Qty: 500 strip, Refills: 0    Comments: Accuchek Guide strip.  Associated Diagnoses: Type 2 diabetes mellitus with hyperglycemia, unspecified whether long term insulin use (H)      blood glucose monitoring (SOFTCLIX) lancets Use to test blood sugar 4 times daily.  Qty: 500 each, Refills: 0    Associated Diagnoses: Type 2 diabetes mellitus with hyperglycemia, unspecified whether long term insulin use (H)      metFORMIN (GLUCOPHAGE) 500 MG tablet Take 1 tablet (500 mg) by mouth 2 times daily (with meals)  Qty: 180 tablet, Refills: 0    Associated Diagnoses: Type 2 diabetes mellitus with hyperglycemia, unspecified whether long term insulin use (H)         CONTINUE these medications which have CHANGED    Details   atorvastatin (LIPITOR) 20 MG tablet Take 1 tablet (20 mg) by mouth At Bedtime  Qty: 90 tablet, Refills: 0    Associated Diagnoses: Encounter for medication refill      !! insulin aspart (NOVOLOG FLEXPEN) 100 UNIT/ML pen Inject 1-7 Units Subcutaneous At  Bedtime  Qty: 15 mL, Refills: 0    Comments:  - 224 give 1 units.  - 249 give 2 units.  - 274 give 3 units.  - 299 give 4 units.  - 324 give 5 units.  - 349 give 6 units. BG greater than or equal to 350 give 7 units.  Associated Diagnoses: Type 2 diabetes mellitus with hyperglycemia, unspecified whether long term insulin use (H)      insulin glargine (LANTUS SOLOSTAR) 100 UNIT/ML pen Inject 30 Units Subcutaneous every morning Call clinic  to schedule follow up appointment.  IMPORTANT.  Qty: 30 mL, Refills: 0    Comments: If Lantus is not covered by insurance, may substitute Basaglar or Semglee or other insulin glargine product per insurance preference at same dose and frequency.    Associated Diagnoses: Type 2 diabetes mellitus with hyperglycemia, unspecified whether long term insulin use (H)      insulin pen needle (32G X 4 MM) 32G X 4 MM miscellaneous Use 5 pen needles daily or as directed.  Qty: 500 each, Refills: 0    Associated Diagnoses: Type 2 diabetes mellitus with hyperglycemia, unspecified whether long term insulin use (H)      !! NOVOLOG FLEXPEN 100 UNIT/ML soln Inject 5 Units Subcutaneous 3 times daily (with meals)  Qty: 15 mL, Refills: 0    Associated Diagnoses: Type 2 diabetes mellitus with hyperglycemia, unspecified whether long term insulin use (H)      !! NOVOLOG FLEXPEN 100 UNIT/ML soln For Pre-Meal  - 164 give 1 unit. For Pre-Meal  - 189 give 2 units. For Pre-Meal  - 214 give 3 units. For Pre-Meal  - 239 give 4 units. For Pre-Meal  - 264 give 5 units. For Pre-Meal  - 289 give 6 units. For Pre-Meal  - 314 give 7 units. For Pre-Meal  - 339 give 8 units. For Pre-Meal  - 364 give 9 units. For Pre-Meal BG greater than or equal to 365 give 10 units.  Qty: 30 mL, Refills: 0    Associated Diagnoses: Type 2 diabetes mellitus with hyperglycemia, unspecified whether long term insulin use (H)       !! -  Potential duplicate medications found. Please discuss with provider.      STOP taking these medications       lisinopril (ZESTRIL) 2.5 MG tablet Comments:   Reason for Stopping:         metFORMIN (GLUCOPHAGE XR) 500 MG 24 hr tablet Comments:   Reason for Stopping:             Allergies   No Known Allergies

## 2022-10-10 PROBLEM — E87.1 HYPONATREMIA: Status: ACTIVE | Noted: 2022-10-10

## 2022-10-10 PROBLEM — E11.65 TYPE 2 DIABETES MELLITUS WITH HYPERGLYCEMIA, WITH LONG-TERM CURRENT USE OF INSULIN (H): Status: ACTIVE | Noted: 2019-09-13

## 2022-10-10 PROBLEM — Z79.4 TYPE 2 DIABETES MELLITUS WITH HYPERGLYCEMIA, WITH LONG-TERM CURRENT USE OF INSULIN (H): Status: ACTIVE | Noted: 2019-09-13

## 2022-10-10 PROBLEM — E78.5 HYPERLIPIDEMIA: Status: ACTIVE | Noted: 2022-10-10

## 2022-10-10 LAB — BACTERIA BLD CULT: NO GROWTH

## 2022-10-11 LAB
BACTERIA BLD CULT: NO GROWTH
BACTERIA BLD CULT: NO GROWTH

## 2022-10-14 ENCOUNTER — HOSPITAL ENCOUNTER (EMERGENCY)
Facility: HOSPITAL | Age: 27
Discharge: HOME | End: 2022-10-14
Attending: EMERGENCY MEDICINE | Admitting: EMERGENCY MEDICINE
Payer: COMMERCIAL

## 2022-10-14 VITALS
WEIGHT: 153.8 LBS | TEMPERATURE: 97.1 F | HEIGHT: 59 IN | RESPIRATION RATE: 18 BRPM | OXYGEN SATURATION: 94 % | SYSTOLIC BLOOD PRESSURE: 117 MMHG | DIASTOLIC BLOOD PRESSURE: 85 MMHG | BODY MASS INDEX: 31 KG/M2 | HEART RATE: 96 BPM

## 2022-10-14 DIAGNOSIS — R60.9 DEPENDENT EDEMA: ICD-10-CM

## 2022-10-14 LAB
ALBUMIN SERPL BCG-MCNC: 3.1 G/DL (ref 3.5–5.2)
ALBUMIN UR-MCNC: 10 MG/DL
ALP SERPL-CCNC: 53 U/L (ref 35–104)
ALT SERPL W P-5'-P-CCNC: 14 U/L (ref 10–35)
ANION GAP SERPL CALCULATED.3IONS-SCNC: 9 MMOL/L (ref 7–15)
APPEARANCE UR: CLEAR
AST SERPL W P-5'-P-CCNC: 46 U/L (ref 10–35)
BASOPHILS # BLD AUTO: 0 10E3/UL (ref 0–0.2)
BASOPHILS NFR BLD AUTO: 0 %
BILIRUB DIRECT SERPL-MCNC: <0.2 MG/DL (ref 0–0.3)
BILIRUB SERPL-MCNC: <0.2 MG/DL
BILIRUB UR QL STRIP: NEGATIVE
BUN SERPL-MCNC: 10 MG/DL (ref 6–20)
CALCIUM SERPL-MCNC: 8.3 MG/DL (ref 8.6–10)
CHLORIDE SERPL-SCNC: 103 MMOL/L (ref 98–107)
COLOR UR AUTO: ABNORMAL
CREAT SERPL-MCNC: 0.56 MG/DL (ref 0.51–0.95)
DEPRECATED HCO3 PLAS-SCNC: 28 MMOL/L (ref 22–29)
EOSINOPHIL # BLD AUTO: 0.1 10E3/UL (ref 0–0.7)
EOSINOPHIL NFR BLD AUTO: 2 %
ERYTHROCYTE [DISTWIDTH] IN BLOOD BY AUTOMATED COUNT: 13 % (ref 10–15)
GFR SERPL CREATININE-BSD FRML MDRD: >90 ML/MIN/1.73M2
GLUCOSE SERPL-MCNC: 166 MG/DL (ref 70–99)
GLUCOSE UR STRIP-MCNC: >1000 MG/DL
HCG UR QL: NEGATIVE
HCT VFR BLD AUTO: 35.8 % (ref 35–47)
HGB BLD-MCNC: 11.1 G/DL (ref 11.7–15.7)
HGB UR QL STRIP: NEGATIVE
IMM GRANULOCYTES # BLD: 0 10E3/UL
IMM GRANULOCYTES NFR BLD: 0 %
KETONES UR STRIP-MCNC: NEGATIVE MG/DL
LEUKOCYTE ESTERASE UR QL STRIP: NEGATIVE
LYMPHOCYTES # BLD AUTO: 2.3 10E3/UL (ref 0.8–5.3)
LYMPHOCYTES NFR BLD AUTO: 39 %
MCH RBC QN AUTO: 28.8 PG (ref 26.5–33)
MCHC RBC AUTO-ENTMCNC: 31 G/DL (ref 31.5–36.5)
MCV RBC AUTO: 93 FL (ref 78–100)
MONOCYTES # BLD AUTO: 0.5 10E3/UL (ref 0–1.3)
MONOCYTES NFR BLD AUTO: 9 %
NEUTROPHILS # BLD AUTO: 2.9 10E3/UL (ref 1.6–8.3)
NEUTROPHILS NFR BLD AUTO: 50 %
NITRATE UR QL: NEGATIVE
NRBC # BLD AUTO: 0 10E3/UL
NRBC BLD AUTO-RTO: 0 /100
NT-PROBNP SERPL-MCNC: 725 PG/ML (ref 0–450)
PH UR STRIP: 7.5 [PH] (ref 5–7)
PLATELET # BLD AUTO: 261 10E3/UL (ref 150–450)
POTASSIUM SERPL-SCNC: 3.9 MMOL/L (ref 3.4–5.3)
PROT SERPL-MCNC: 6.4 G/DL (ref 6.4–8.3)
RBC # BLD AUTO: 3.85 10E6/UL (ref 3.8–5.2)
RBC URINE: 1 /HPF
SODIUM SERPL-SCNC: 140 MMOL/L (ref 136–145)
SP GR UR STRIP: 1.02 (ref 1–1.03)
SQUAMOUS EPITHELIAL: 5 /HPF
UROBILINOGEN UR STRIP-MCNC: <2 MG/DL
WBC # BLD AUTO: 5.9 10E3/UL (ref 4–11)
WBC URINE: 5 /HPF

## 2022-10-14 PROCEDURE — 83880 ASSAY OF NATRIURETIC PEPTIDE: CPT | Performed by: EMERGENCY MEDICINE

## 2022-10-14 PROCEDURE — 81025 URINE PREGNANCY TEST: CPT | Performed by: EMERGENCY MEDICINE

## 2022-10-14 PROCEDURE — 82248 BILIRUBIN DIRECT: CPT | Performed by: EMERGENCY MEDICINE

## 2022-10-14 PROCEDURE — 85004 AUTOMATED DIFF WBC COUNT: CPT | Performed by: EMERGENCY MEDICINE

## 2022-10-14 PROCEDURE — 81001 URINALYSIS AUTO W/SCOPE: CPT | Performed by: EMERGENCY MEDICINE

## 2022-10-14 PROCEDURE — 99284 EMERGENCY DEPT VISIT MOD MDM: CPT

## 2022-10-14 PROCEDURE — 36415 COLL VENOUS BLD VENIPUNCTURE: CPT | Performed by: EMERGENCY MEDICINE

## 2022-10-14 RX ORDER — FUROSEMIDE 20 MG
20 TABLET ORAL DAILY
Qty: 10 TABLET | Refills: 0 | Status: SHIPPED | OUTPATIENT
Start: 2022-10-14 | End: 2022-12-06

## 2022-10-14 RX ORDER — POTASSIUM CHLORIDE 750 MG/1
10 TABLET, EXTENDED RELEASE ORAL 2 TIMES DAILY
Qty: 20 TABLET | Refills: 0 | Status: SHIPPED | OUTPATIENT
Start: 2022-10-14 | End: 2022-12-06

## 2022-10-14 NOTE — DISCHARGE INSTRUCTIONS
As discussed your swelling should go down over the next few days.  However if you develop any shortness of breath return immediately

## 2022-10-14 NOTE — ED PROVIDER NOTES
EMERGENCY DEPARTMENT ENCOUNTER      NAME: Tyson Skelton  AGE: 27 year old female  YOB: 1995  MRN: [id number]  EVALUATION DATE & TIME: No admission date for patient encounter.    PCP: Parish Daniels    ED PROVIDER: Damian Dallas M.D.      Chief Complaint   Patient presents with     Swelling         FINAL IMPRESSION:  1. Dependent edema          ED COURSE & MEDICAL DECISION MAKING:    Pertinent Labs & Imaging studies reviewed. (See chart for details)  27 year old female presents to the Emergency Department for evaluation of facial swelling and leg swelling.  Symptoms ongoing for last few days.  Patient recently hospitalized for DKA.  Restarted on both insulin and metformin.  Denies any shortness of breath or any other symptomatology.  Laboratory evaluation obtained from triage area with mild hyperglycemia.  Urine analysis unremarkable.  BNP slightly elevated.  Exam reveals some fullness to the face but difficult to ascertain which is new.  Does have some edema to the lower extremities.  Uncertain as to how this relates to her recent hospitalization.  Given absence of respiratory symptomatology will proceed with outpatient Lasix to initiate diuresis.  Patient given clear instructions to return for any shortness of breath.. Patient appears non toxic with stable vitals signs. Overall exam is benign.      3:30 PM I met with the patient for the initial interview and physical examination. Discussed plan for treatment and workup in the ED.    3:40 PM Patient to be discharged.    At the conclusion of the encounter I discussed the results of all of the tests and the disposition. The questions were answered and return precautions provided. The patient or family acknowledged understanding and was agreeable with the care plan.       PPE: Provider wore gloves, eye protection, and paper mask.     MEDICATIONS GIVEN IN THE EMERGENCY:  Medications - No data to display    NEW PRESCRIPTIONS STARTED AT TODAY'S ER VISIT  New  Prescriptions    FUROSEMIDE (LASIX) 20 MG TABLET    Take 1 tablet (20 mg) by mouth daily    POTASSIUM CHLORIDE ER (K-TAB/KLOR-CON) 10 MEQ CR TABLET    Take 1 tablet (10 mEq) by mouth 2 times daily          =================================================================    HPI    Patient information was obtained from: Patient    Use of Intrepreter: Reanna via language line      Tyson Skelton is a 27 year old female with a pertient medical history of DM II, CKD, and HLP, who presents to the ED for evaluation of swelling.    The patient reports two days of swelling to her bilateral lower extremities and her face.  She denies any pain.  She was recently admitted for hyperglycemia, but she reports her sugars have been okay recently.  She denies any cough, shortness of breath, nausea, vomiting, or other complaints at this time.     Per chart review, the patient was admitted at this hospital from 10/5-10/8/22 for DKA.  Her Metformin was resumed at 500 mg BID.    REVIEW OF SYSTEMS   Constitutional:  Denies fever, chills  Respiratory:  Denies productive cough or increased work of breathing  Cardiovascular:  Denies chest pain, palpitations  GI:  Denies abdominal pain, nausea, vomiting, or change in bowel or bladder habits   Musculoskeletal:  Denies any new muscle/joint pain. Positive for swelling to bilateral lower extremities and face.  Skin:  Denies rash   Neurologic:  Denies focal weakness  All systems negative except as marked.     PAST MEDICAL HISTORY:  Past Medical History:   Diagnosis Date     Chronic kidney disease      Diabetes mellitus, type II (H)      Hyperglycemia without ketosis 2010    hospitalized at [facility]        PAST SURGICAL HISTORY:  No past surgical history on file.      CURRENT MEDICATIONS:    No current facility-administered medications for this encounter.    Current Outpatient Medications:      furosemide (LASIX) 20 MG tablet, Take 1 tablet (20 mg) by mouth daily, Disp: 10  tablet, Rfl: 0     potassium chloride ER (K-TAB/KLOR-CON) 10 MEQ CR tablet, Take 1 tablet (10 mEq) by mouth 2 times daily, Disp: 20 tablet, Rfl: 0     alcohol swab prep pads, Use to swab area of injection/teresa as directed., Disp: 500 each, Rfl: 0     atorvastatin (LIPITOR) 20 MG tablet, Take 1 tablet (20 mg) by mouth At Bedtime, Disp: 90 tablet, Rfl: 0     blood glucose (ACCU-CHEK SOFTCLIX) lancing device, Lancing device to be used with lancets., Disp: 1 each, Rfl: 0     blood glucose (NO BRAND SPECIFIED) test strip, Use to test blood sugar 5 times daily or as directed., Disp: 500 strip, Rfl: 0     blood glucose monitoring (SOFTCLIX) lancets, Use to test blood sugar 4 times daily., Disp: 500 each, Rfl: 0     insulin aspart (NOVOLOG FLEXPEN) 100 UNIT/ML pen, Inject 1-7 Units Subcutaneous At Bedtime, Disp: 15 mL, Rfl: 0     insulin glargine (LANTUS SOLOSTAR) 100 UNIT/ML pen, Inject 30 Units Subcutaneous every morning Call clinic  to schedule follow up appointment.  IMPORTANT., Disp: 30 mL, Rfl: 0     insulin pen needle (32G X 4 MM) 32G X 4 MM miscellaneous, Use 5 pen needles daily or as directed., Disp: 500 each, Rfl: 0     metFORMIN (GLUCOPHAGE) 500 MG tablet, Take 1 tablet (500 mg) by mouth 2 times daily (with meals), Disp: 180 tablet, Rfl: 0     NOVOLOG FLEXPEN 100 UNIT/ML soln, Inject 5 Units Subcutaneous 3 times daily (with meals), Disp: 15 mL, Rfl: 0     NOVOLOG FLEXPEN 100 UNIT/ML soln, For Pre-Meal  - 164 give 1 unit. For Pre-Meal  - 189 give 2 units. For Pre-Meal  - 214 give 3 units. For Pre-Meal  - 239 give 4 units. For Pre-Meal  - 264 give 5 units. For Pre-Meal  - 289 give 6 units. For Pre-Meal  - 314 give 7 units. For Pre-Meal  - 339 give 8 units. For Pre-Meal  - 364 give 9 units. For Pre-Meal BG greater than or equal to 365 give 10 units., Disp: 30 mL, Rfl: 0    ALLERGIES:  No Known Allergies    FAMILY HISTORY:  Family History   Problem  "Relation Age of Onset     Diabetes Mother        SOCIAL HISTORY:   Social History     Socioeconomic History     Marital status:    Tobacco Use     Smoking status: Passive Smoke Exposure - Never Smoker     Smokeless tobacco: Never   Substance and Sexual Activity     Alcohol use: No     Drug use: No   Social History Narrative    Has one child       VITALS:  Patient Vitals for the past 24 hrs:   BP Temp Temp src Pulse Resp SpO2 Height Weight   10/14/22 1018 127/84 97.1  F (36.2  C) Temporal 108 18 100 % 1.499 m (4' 11\") 69.8 kg (153 lb 12.8 oz)        PHYSICAL EXAM    Constitutional:  Awake, alert, in no apparent distress  HENT:  Normocephalic, Atraumatic. Bilateral external ears normal. Oropharynx moist. Nose normal. Neck- Normal range of motion, Supple, No stridor. Slight edema to mid-face, no tenderness.  Eyes:  PERRL, EOMI with no signs of entrapment, Conjunctiva normal, No discharge.   Respiratory:  Normal breath sounds, No respiratory distress, No wheezing.    Cardiovascular:  Normal heart rate, Normal rhythm, No appreciable rubs or gallops.   GI:  Soft, No tenderness, No distension, No palpable masses  Musculoskeletal:  I bilateral piting edema to mid calf, no tenderness. Good range of motion in all major joints. No tenderness to palpation or major deformities noted.  Integument:  Warm, Dry, No erythema, No rash.   Neurologic:  Alert & oriented, Normal motor function, Normal sensory function, No focal deficits noted.   Psychiatric:  Affect normal, Judgment normal, Mood normal.     LAB:  All pertinent labs reviewed and interpreted.  Results for orders placed or performed during the hospital encounter of 10/14/22   Basic metabolic panel   Result Value Ref Range    Sodium 140 136 - 145 mmol/L    Potassium 3.9 3.4 - 5.3 mmol/L    Chloride 103 98 - 107 mmol/L    Carbon Dioxide (CO2) 28 22 - 29 mmol/L    Anion Gap 9 7 - 15 mmol/L    Urea Nitrogen 10.0 6.0 - 20.0 mg/dL    Creatinine 0.56 0.51 - 0.95 mg/dL    " Calcium 8.3 (L) 8.6 - 10.0 mg/dL    Glucose 166 (H) 70 - 99 mg/dL    GFR Estimate >90 >60 mL/min/1.73m2   Hepatic function panel   Result Value Ref Range    Protein Total 6.4 6.4 - 8.3 g/dL    Albumin 3.1 (L) 3.5 - 5.2 g/dL    Bilirubin Total <0.2 <=1.2 mg/dL    Alkaline Phosphatase 53 35 - 104 U/L    AST 46 (H) 10 - 35 U/L    ALT 14 10 - 35 U/L    Bilirubin Direct <0.20 0.00 - 0.30 mg/dL   Nt probnp inpatient   Result Value Ref Range    N terminal Pro BNP Inpatient 725 (H) 0 - 450 pg/mL   UA with Microscopic reflex to Culture    Specimen: Urine, Midstream   Result Value Ref Range    Color Urine Light Yellow Colorless, Straw, Light Yellow, Yellow    Appearance Urine Clear Clear    Glucose Urine >1000 (A) Negative mg/dL    Bilirubin Urine Negative Negative    Ketones Urine Negative Negative mg/dL    Specific Gravity Urine 1.018 1.001 - 1.030    Blood Urine Negative Negative    pH Urine 7.5 (H) 5.0 - 7.0    Protein Albumin Urine 10 (A) Negative mg/dL    Urobilinogen Urine <2.0 <2.0 mg/dL    Nitrite Urine Negative Negative    Leukocyte Esterase Urine Negative Negative    RBC Urine 1 <=2 /HPF    WBC Urine 5 <=5 /HPF    Squamous Epithelials Urine 5 (H) <=1 /HPF   HCG qualitative urine   Result Value Ref Range    hCG Urine Qualitative Negative Negative   CBC with platelets and differential   Result Value Ref Range    WBC Count 5.9 4.0 - 11.0 10e3/uL    RBC Count 3.85 3.80 - 5.20 10e6/uL    Hemoglobin 11.1 (L) 11.7 - 15.7 g/dL    Hematocrit 35.8 35.0 - 47.0 %    MCV 93 78 - 100 fL    MCH 28.8 26.5 - 33.0 pg    MCHC 31.0 (L) 31.5 - 36.5 g/dL    RDW 13.0 10.0 - 15.0 %    Platelet Count 261 150 - 450 10e3/uL    % Neutrophils 50 %    % Lymphocytes 39 %    % Monocytes 9 %    % Eosinophils 2 %    % Basophils 0 %    % Immature Granulocytes 0 %    NRBCs per 100 WBC 0 <1 /100    Absolute Neutrophils 2.9 1.6 - 8.3 10e3/uL    Absolute Lymphocytes 2.3 0.8 - 5.3 10e3/uL    Absolute Monocytes 0.5 0.0 - 1.3 10e3/uL    Absolute  Eosinophils 0.1 0.0 - 0.7 10e3/uL    Absolute Basophils 0.0 0.0 - 0.2 10e3/uL    Absolute Immature Granulocytes 0.0 <=0.4 10e3/uL    Absolute NRBCs 0.0 10e3/uL       RADIOLOGY:  Reviewed all pertinent imaging. Please see official radiology report.  No orders to display     PROCEDURES:   None       I, Valentín Marin, am serving as a scribe to document services personally performed by Damian Dallas MD, based on my observation and the provider's statements to me. I, Damian Dallas MD attest that Valentín Marin is acting in a scribe capacity, has observed my performance of the services and has documented them in accordance with my direction.    Damian Dallas M.D.  Emergency Medicine  [facility] EMERGENCY DEPARTMENT      Damian Dallas MD  10/14/22 4074

## 2022-10-14 NOTE — ED TRIAGE NOTES
amb to triage.  Pt states for couple of days noted swelling in face first.  Now reports swelling noted in abd and bilat legs as well.  Denies rash. Denies diff breathing or swallowing.  Denies CP or SOB.  GCS-15.  C/o discomfort to R eye only.  Denies changes with ision.  Denies any allergens like new foods or new detergents. Etc.  No swelling around mouth.  Reports also having itching in eyes.  Pt is diabetic.      Triage Assessment       Row Name 10/14/22 1020       Triage Assessment (Adult)    Airway WDL WDL       Respiratory WDL    Respiratory WDL WDL       Skin Circulation/Temperature WDL    Skin Circulation/Temperature WDL WDL       Cardiac WDL    Cardiac WDL X;all    Pulse Rate & Regularity tachycardic       Peripheral/Neurovascular WDL    Peripheral Neurovascular WDL WDL       Cognitive/Neuro/Behavioral WDL    Cognitive/Neuro/Behavioral WDL WDL

## 2022-10-17 NOTE — TELEPHONE ENCOUNTER
Concerning unexplained weightloss of >60lbs in last year, in combination with severe functional decline, pathologic fractures, and B symptoms (see HPI for full details).  Has history of pulmonary nodule due for 12 month followup+mediastinal lymphadenopathy.  Due for colonoscopy (last 2011 with 10 year f/u recommended); having early satiety and bloating symptoms  Discovered patient has history of two brothers dying early from cardiac issues that were said to be genetic, possibly arrhythmia va MI based on description    Workup:  - CT C/A/P with contrast: no lung changes or lymphadenopathy  - CT head given some concern for slowed mentation: no acute changes or intracranial lesions  - HIV, HCV,TSH, RPR, quant gold, blood cultures wnl  - CA 19-9, CA-125, CEA wnl  - TTE without concerning findings  - SPEP, YIN normal and without monoclonal peaks. K/L ratio normal  - Peripheral smear: possible early neutrophil hypersegmentation    Plan:  - given abdominal bloating symptoms, reported recent melena, GI planning for inpatient EGD this admission, and outpatient colonoscopy in the future (concerned she cannot tolerate bowel prep at this time)  - only minimal confusion and slight nystagmus; will trial empiric high-dose thiamine taper since she's at risk for nutritional deficiencies/Wernicke's  - discussed with oncology service via phone about early hypersegmented neutrophils: will also check copper levels. With normal CBC differential and negative rest of workup, stated there was low suspicion for myeloproliferative process at this time, but may benefit from an outpatient hematology f/u.        Diabetes is managed by Endo. Please send request to managing provider.    Dr. Parish Daniels  10/21/2020 2:15 PM

## 2022-10-18 PROBLEM — E04.9 GOITER: Status: ACTIVE | Noted: 2018-06-07

## 2022-10-18 PROBLEM — E55.9 VITAMIN D DEFICIENCY: Status: ACTIVE | Noted: 2022-10-18

## 2022-10-18 PROBLEM — O09.90 SUPERVISION OF HIGH-RISK PREGNANCY: Status: ACTIVE | Noted: 2018-05-23

## 2022-10-18 PROBLEM — R73.9 HYPERGLYCEMIA: Status: ACTIVE | Noted: 2020-08-02

## 2022-10-18 PROBLEM — A41.51 SEPSIS DUE TO ESCHERICHIA COLI (H): Status: ACTIVE | Noted: 2020-08-05

## 2022-10-18 PROBLEM — O14.90 PREECLAMPSIA: Status: ACTIVE | Noted: 2018-10-08

## 2022-10-18 PROBLEM — N18.9 CHRONIC KIDNEY DISEASE, UNSPECIFIED: Status: ACTIVE | Noted: 2022-10-18

## 2022-10-18 PROBLEM — Z98.891 S/P CESAREAN SECTION: Status: ACTIVE | Noted: 2018-10-08

## 2022-10-18 PROBLEM — O26.832: Status: ACTIVE | Noted: 2022-10-18

## 2022-10-19 ENCOUNTER — OFFICE VISIT (OUTPATIENT)
Dept: FAMILY MEDICINE | Facility: CLINIC | Age: 27
End: 2022-10-19
Payer: COMMERCIAL

## 2022-10-19 VITALS
TEMPERATURE: 97.2 F | WEIGHT: 153 LBS | OXYGEN SATURATION: 99 % | DIASTOLIC BLOOD PRESSURE: 84 MMHG | BODY MASS INDEX: 30.9 KG/M2 | HEART RATE: 104 BPM | SYSTOLIC BLOOD PRESSURE: 118 MMHG

## 2022-10-19 DIAGNOSIS — Z09 HOSPITAL DISCHARGE FOLLOW-UP: ICD-10-CM

## 2022-10-19 DIAGNOSIS — Z11.59 NEED FOR HEPATITIS C SCREENING TEST: ICD-10-CM

## 2022-10-19 DIAGNOSIS — E11.65 TYPE 2 DIABETES MELLITUS WITH HYPERGLYCEMIA, UNSPECIFIED WHETHER LONG TERM INSULIN USE (H): Primary | ICD-10-CM

## 2022-10-19 DIAGNOSIS — Z11.4 SCREENING FOR HIV (HUMAN IMMUNODEFICIENCY VIRUS): ICD-10-CM

## 2022-10-19 DIAGNOSIS — Z13.220 SCREENING FOR HYPERLIPIDEMIA: ICD-10-CM

## 2022-10-19 DIAGNOSIS — Z12.4 CERVICAL CANCER SCREENING: ICD-10-CM

## 2022-10-19 DIAGNOSIS — E11.10 DIABETIC KETOACIDOSIS WITHOUT COMA ASSOCIATED WITH TYPE 2 DIABETES MELLITUS (H): ICD-10-CM

## 2022-10-19 PROCEDURE — 99214 OFFICE O/P EST MOD 30 MIN: CPT | Performed by: FAMILY MEDICINE

## 2022-10-19 NOTE — PROGRESS NOTES
1. Type 2 diabetes mellitus with hyperglycemia, unspecified whether long term insulin use (H)  2. Diabetic ketoacidosis without coma associated with type 2 diabetes mellitus (H)  This is a 28 yo female with poorly controlled type II DM - with ketoacidosis/recently hospitalization - reviewed A1c but no specific result is noted from lab.  She takes insulin, but also Metformin.  It is unclear to patient what kind of diabetes she has - diagnosed at age 14 - hasn't always been compliant with therapy.  Reviewed hospital records  I have reviewed available hospital records, consults, notes, discharge summary as well as imaging and lab results.  In addition I have reviewed her medication list and made any adjustments as indicated in orders.      I think she would benefit from having MTM review of her medications/intervention.  May benefit from continuous glucose monitoring.  She likely would benefit from diabetes education as well (seems to have knowledge deficit)  - Med Therapy Management Referral    3. Screening for hyperlipidemia  4. Cervical cancer screening  5. Screening for HIV (human immunodeficiency virus)  6. Need for hepatitis C screening test  Reviewed HM - needs follow up - declines any routine screening today       Hemoglobin A1C   Date Value Ref Range Status   10/05/2022   Final     Comment:     Greater than analytical measurement  Normal <5.7%   Prediabetes 5.7-6.4%    Diabetes 6.5% or higher     Note: Adopted from ADA consensus guidelines.         Subjective   Paw is a 27 year old, presenting for the following health issues:  Hospital F/U      Was hospitalized for high blood sugars -   Went to hospital because felt sick - whole body is burning - tired - no more energy  Couldn't eat as well -     Diagnosed with diabetes age 14 - when came to [place]  Type 2 (?) -- takes insulin but also takes Metformin   Whole family has diabetes - patient first, then mom, then sister (age 20s)    Had miscarriage x 1 (due to high  "blood sugars?)  Then successful pregnancy - 4 year ago     Wasn't taking medication for a year - before hospitalization   Sometimes just \"lazy\" and doesn't want to take medicine   Vision is okay - last eye appointment a few months ago     Lantus 30 units qam  Novolog sliding scale - TID with meals - 5-6 units is average that she takes   Metformin is \"different\" than previous - now not XR  Previously took 500 mg TID (or 3/day) -   Both give her diarrhea -     Recent: (evening to morning numbers):  273, 134  220, 131,221  200, 310,164  204,174, 174  282, 142, 177  193,276,198  158,142,198  109,138,225  287,211,227  184,125,179    Not taking Metformin -         History of Present Illness       Reason for visit:  Hospital follow up           Hospital Follow-up Visit:    Hospital/Nursing Home/IP Rehab Facility: [facility]  Date of Admission: 10/5/22  Date of Discharge: 10/8/22  Reason(s) for Admission: Principal Problem:    Diabetic ketoacidosis without coma associated with type 2 diabetes mellitus (H)  Active Problems:    Type 2 diabetes mellitus with hyperglycemia, with long-term current use of insulin (H)    Noncompliance with medication regimen    Hyperlipidemia    Hyponatremia       Was your hospitalization related to COVID-19? No   Problems taking medications regularly:  None  Medication changes since discharge: it is unclear exactly how patient is taking her medications (especially insulin)  Problems adhering to non-medication therapy:  None    Summary of hospitalization:  [facility] discharge summary reviewed  Diagnostic Tests/Treatments reviewed.  Follow up needed: need diabetes followup and more intensive care than previous -   Other Healthcare Providers Involved in Patient s Care:         MTM - referral made  Update since discharge: improved.   Post Medication Reconciliation Status: Discharge medications reconciled, continue medications without change - difficult to " verify exactly what she is taking currently       Plan of care communicated with patient     ED/UC Followup:    Facility:  [facility]  Date of visit: 10/14/22, previous admission 10/6-10/8  Reason for visit: Dependent Edema  Current Status: doing better, medication is helping         Review of Systems   Constitutional: Negative for chills, fever and unexpected weight change.   HENT: Negative.    Eyes: Positive for visual disturbance (some blurriness).   Respiratory: Negative for cough and shortness of breath.    Cardiovascular: Negative for chest pain and peripheral edema.   Gastrointestinal: Negative for abdominal pain.   Endocrine: Positive for polyuria. Negative for polydipsia.   Genitourinary: Negative.    Musculoskeletal: Negative.    Skin: Negative.    Allergic/Immunologic: Negative.    Neurological: Negative.    Hematological: Does not bruise/bleed easily.   Psychiatric/Behavioral: Negative.    All other systems reviewed and are negative.           Objective    /84 (BP Location: Left arm, Patient Position: Sitting, Cuff Size: Adult Regular)   Pulse 104   Temp 97.2  F (36.2  C)   Wt 69.4 kg (153 lb)   LMP 10/09/2022 (Approximate)   SpO2 99%   BMI 30.90 kg/m    Body mass index is 30.9 kg/m .  Physical Exam  Constitutional:       General: She is not in acute distress.     Appearance: Normal appearance. She is well-developed. She is not ill-appearing.   HENT:      Head: Normocephalic and atraumatic.      Right Ear: Tympanic membrane and external ear normal.      Left Ear: Tympanic membrane and external ear normal.      Nose: Nose normal.      Mouth/Throat:      Mouth: Mucous membranes are moist.      Pharynx: No oropharyngeal exudate.   Eyes:      General:         Right eye: No discharge.         Left eye: No discharge.      Extraocular Movements: Extraocular movements intact.      Conjunctiva/sclera: Conjunctivae normal.   Neck:      Thyroid: No thyromegaly.      Trachea: No tracheal deviation.    Cardiovascular:      Rate and Rhythm: Normal rate and regular rhythm.      Pulses: Normal pulses.      Heart sounds: Normal heart sounds, S1 normal and S2 normal. No murmur heard.    No friction rub. No S3 or S4 sounds.   Pulmonary:      Effort: Pulmonary effort is normal. No respiratory distress.      Breath sounds: Normal breath sounds. No wheezing or rales.   Abdominal:      General: Bowel sounds are normal.      Palpations: Abdomen is soft. There is no mass.      Tenderness: There is no abdominal tenderness.   Musculoskeletal:         General: Normal range of motion.      Cervical back: Neck supple.      Comments: Diabetic foot exam: normal DP and PT pulses, no trophic changes or ulcerative lesions and normal sensory exam     Lymphadenopathy:      Cervical: No cervical adenopathy.   Skin:     General: Skin is warm and dry.      Findings: No rash.   Neurological:      General: No focal deficit present.      Mental Status: She is alert and oriented to person, place, and time.      Motor: No abnormal muscle tone.      Deep Tendon Reflexes: Reflexes are normal and symmetric.   Psychiatric:         Behavior: Behavior normal.         Thought Content: Thought content normal.            No results found for any visits on 10/19/22.

## 2022-10-21 ENCOUNTER — TELEPHONE (OUTPATIENT)
Dept: FAMILY MEDICINE | Facility: CLINIC | Age: 27
End: 2022-10-21

## 2022-10-21 NOTE — TELEPHONE ENCOUNTER
MTM referral from: [facility] visit (referral by provider)    MT referral outreach attempt #2 on October 21, 2022 at 9:22 AM      Outcome: Patient not reachable after several attempts invalid phone on file  could not get through line , will route to [facility] Pharmacist/Provider as an FYI.  [facility] scheduling number is [phone].  Thank you for the referral.    Katherine Roberson [facility]

## 2022-10-21 NOTE — TELEPHONE ENCOUNTER
Patient referred by Dr. Tucker. Prefers [facility] due to location.     Please continue outreach for initial MTM visit. In person, with meds.     Thanks!   Lamonte

## 2022-10-23 ASSESSMENT — ENCOUNTER SYMPTOMS
NEUROLOGICAL NEGATIVE: 1
ALLERGIC/IMMUNOLOGIC NEGATIVE: 1
SHORTNESS OF BREATH: 0
FEVER: 0
UNEXPECTED WEIGHT CHANGE: 0
PSYCHIATRIC NEGATIVE: 1
POLYDIPSIA: 0
BRUISES/BLEEDS EASILY: 0
MUSCULOSKELETAL NEGATIVE: 1
ABDOMINAL PAIN: 0
COUGH: 0
CHILLS: 0

## 2022-10-24 NOTE — TELEPHONE ENCOUNTER
Attempted call to all phone numbers listed. Unable to reach patient. Left message at each # for call back. Okay to schedule on call back

## 2022-10-28 NOTE — TELEPHONE ENCOUNTER
RECORDS RECEIVED FROM: Diabetes, prior Dr. Peralta pt   DATE RECEIVED: 1/25/2023   NOTES (FOR ALL VISITS) STATUS DETAILS   OFFICE NOTES from referring provider N/A    OFFICE NOTES from other specialist Internal [facility]:  10[facility]19/22 [facility]    [facility]:  1/11/21, 11/30/20 - ENDO OV with Dr. Peralta   ED NOTES Internal [facility]:  10/5/22 - Admission with Dr. Christie   OPERATIVE REPORT  (thyroid, pituitary, adrenal, parathyroid) N/A    MEDICATION LIST Internal    IMAGING      XR (Chest) Internal [facility]:  8/2/20 - XR Chest   CT (HEAD/NECK/CHEST/ABDOMEN) Internal [facility]:  10/5/22 - CTA Chest/Abd/Pelvis   LABS     DIABETES: HBGA1C, CREATININE, FASTING LIPIDS, MICROALBUMIN URINE, POTASSIUM, TSH, T4    THYROID: TSH, T4, CBC, THYRODLONULIN, TOTAL T3, FREE T4, CALCITONIN, CEA Internal   [facility]:  10/14/22 - BMP  10/14/22 - CBC  10/14/22 - Routine UA  10/8/22 - Glucose  10/5/22 - HBGA1C  4/29/21 - Lipid  11/24/20 - CMP

## 2022-11-04 DIAGNOSIS — E11.65 TYPE 2 DIABETES MELLITUS WITH HYPERGLYCEMIA, UNSPECIFIED WHETHER LONG TERM INSULIN USE (H): ICD-10-CM

## 2022-11-10 RX ORDER — METFORMIN HCL 500 MG
1500 TABLET, EXTENDED RELEASE 24 HR ORAL
Qty: 90 TABLET | Refills: 0 | OUTPATIENT
Start: 2022-11-10

## 2022-11-10 NOTE — TELEPHONE ENCOUNTER
Replaced by a different metformin prescription at hospital discharge. Please confirm what metformin dose patient is taking and if she needs refill.

## 2022-11-10 NOTE — TELEPHONE ENCOUNTER
METFORMIN HCL  MG TB2 500 Tablet      Requested: Take 3 tablets (1,500 mg) by mouth daily (with dinner)  Discontinued Stop at Discharge Brigitte Christie MD 10/8/22 0755     Last Office Visit : 1-11-21 ( [place])  Future Office visit:  1-25-23 ( [place])    Current med list:  metFORMIN (GLUCOPHAGE) 500 MG tablet 180 tablet 0 10/8/2022  No   Sig - Route: Take 1 tablet (500 mg) by mouth 2 times daily (with meals) - Oral   Prescribing Provider's NPI: [id number]  Brigitte Christie      Routing refill request to provider for review/approval because:  Requested med not on med list- discontinue by other                                  provider- hospital   new rx : change dosage   Authorize denial of requested Rx

## 2022-12-05 ENCOUNTER — IMMUNIZATION (OUTPATIENT)
Dept: FAMILY MEDICINE | Facility: CLINIC | Age: 27
End: 2022-12-05
Payer: COMMERCIAL

## 2022-12-05 PROCEDURE — 91301 PR COVID VAC MODERNA 100 MCG/0.5 ML IM: CPT

## 2022-12-05 PROCEDURE — 0011A PR COVID VAC MODERNA 100 MCG/0.5 ML IM: CPT

## 2022-12-05 NOTE — PROGRESS NOTES
Medication Therapy Management (MTM) Encounter    ASSESSMENT:                            Medication Adherence/Access: No concerns from outside dispense history.     Type 2 Diabetes:  A1C above goal <7%. Fasting blood glucose above goal . Prandial sugars above goal <180. Does not tolerate higher doses of metformin. Will continue 1 tab for now. Will add in GLP1 to help with blood glucose and appetite.     Reviewed that outpatient sliding scale can be difficult to manage, and not ideal. Will start with having patient track how much insulin she is using so we can start to move towards set dosing. Continue previous doses from hospital discharge. Sent refills so that patient does not have to skip insulin doses.     Long-term hope to eliminate meal time insulin, if able.     Would benefit from CGM to help track blood glucose and make better adjustments.     Hyperlipidemia: Despite age, appropriately using statin due to history of LDL >190. Last LDL at goal <100.       Edema: Resolved. Okay to discontinue Furosemide and Potassium.         PLAN:                            1. Restart Freestyle Andrews monitor   2. Start Bydureon 2 mg once weekly.   3. Refill insulin. Continue previous doses.   4. Stop Furosemide and potassium.     Follow-up: Return in about 4 weeks (around 1/3/2023) for Medication Management Pharmacist, in person.    SUBJECTIVE/OBJECTIVE:                          Tyson Skelton is a 27 year old female coming in for an initial visit. She was referred to me from Rolanda Smith MD. Professional Reanna  by phone (ID# [id number]).      Reason for visit: Referred for diabetes management.    Allergies/ADRs: Reviewed in chart  Past Medical History: Reviewed in chart  Tobacco: She reports that she has never smoked. She has been exposed to tobacco smoke. She has never used smokeless tobacco.  Alcohol:  reports no history of alcohol use.      Medication Adherence/Access:      Did not bring medicines.  Brought her meter.         Type 2 Diabetes:  Prescribed NovoLog 5 units 3 times daily with meals + 1 to 7 units at bedtime, Lantus 30 units daily, metformin 500 mg twice daily. Patient is not experiencing side effects.    Using Lantus 30 units daily. Using Novolog per sliding scale - doesn't have a lot of Novolog left, so has been using sparingly - only using 1-2 times per day but eating 2-3 times per day.     Only using 1 tab of metformin because 2 tabs causes diarrhea.  Had the extended release earlier this year. No difference in diarrhea.  Does take after food.       Blood sugar monitorin-3 time(s) daily. Ranges (based on glucometer readings):     Usually checks before eating.     14 day average = 340   243, 199, 442, 310, 295, 358, 435, 424, 357, 399, 326, 408, 233, 378, 345, 369,     Used CGM in the past. Had trouble with getting continued refills from pharmacy.     Symptoms of low blood sugar? none  Symptoms of high blood sugar? polydipsia and polyuria    Diet/Exercise: 2-3 meals per day. Typically eating rice soup, fish paste, green vegetables.   Aspirin: No  Statin: Yes: Atorvastatin 20 mg daily  ACEi/ARB: No.     Hemoglobin A1C   Date Value Ref Range Status   10/05/2022   Final     Comment:     Greater than analytical measurement  Normal <5.7%   Prediabetes 5.7-6.4%    Diabetes 6.5% or higher     Note: Adopted from ADA consensus guidelines.   2020 12.9 (H) <=5.6 % Final     Comment:     Normal <5.7% Prediabete 5.7-6.4% Diabletes 6.5% or higher - adopted from ADA consensus guidelines   2020 10.9 (H) <=5.6 % Final     Comment:     Prediabetes:   HBA1c       5.7 to 6.4%        Diabetes:        HBA1c        >=6.5%   Patients with Hgb F >5%, total bilirubin >10.0 mg/dL, abnormal red cell turnover, severe renal or hepatic disease or malignancy should not have this A1C method used to diagnose or monitor diabetes.            Microalbumin Urine mg/dL   Date Value Ref Range Status   2020 2.81  (H) 0.00 - 1.99 mg/dL Final      No results found for: UCRR  LDL Cholesterol Calculated   Date Value Ref Range Status   04/29/2021 100 <=129 mg/dL Final     LDL Cholesterol Direct   Date Value Ref Range Status   11/24/2020 139 (H) <=129 mg/dl Final     Creatinine   Date Value Ref Range Status   10/14/2022 0.56 0.51 - 0.95 mg/dL Final          Hyperlipidemia: Prescribed atorvastatin 20 mg daily     Latest Reference Range & Units 05/07/19 11:05   LDL Cholesterol Calculated <=129 mg/dL 219 (H)       Recent Labs   Lab Test 04/29/21  1122 11/24/20  1332   CHOL 196 225*   HDL 39* 36*    139*   TRIG 283* 402*         Edema: Seen in ED on 10/14 - swelling of the face and legs. Was prescribed furosemide 20 mg daily x10 days + potassium chloride 10 mEq daily.     Finished both of these. No longer having trouble with swelling.     Last Comprehensive Metabolic Panel:  Sodium   Date Value Ref Range Status   10/14/2022 140 136 - 145 mmol/L Final     Potassium   Date Value Ref Range Status   10/14/2022 3.9 3.4 - 5.3 mmol/L Final     Comment:     Specimen slightly hemolyzed, potassium may be falsely elevated.   04/29/2021 3.8 3.5 - 5.0 mmol/L Final     Chloride   Date Value Ref Range Status   10/14/2022 103 98 - 107 mmol/L Final   04/29/2021 105 98 - 107 mmol/L Final     Carbon Dioxide (CO2)   Date Value Ref Range Status   10/14/2022 28 22 - 29 mmol/L Final   04/29/2021 27 22 - 31 mmol/L Final     Anion Gap   Date Value Ref Range Status   10/14/2022 9 7 - 15 mmol/L Final   04/29/2021 10 5 - 18 mmol/L Final     Glucose   Date Value Ref Range Status   10/14/2022 166 (H) 70 - 99 mg/dL Final   04/29/2021 73 70 - 125 mg/dL Final     GLUCOSE BY METER POCT   Date Value Ref Range Status   10/08/2022 255 (H) 70 - 99 mg/dL Final     Urea Nitrogen   Date Value Ref Range Status   10/14/2022 10.0 6.0 - 20.0 mg/dL Final   04/29/2021 19 8 - 22 mg/dL Final     Creatinine   Date Value Ref Range Status   10/14/2022 0.56 0.51 - 0.95 mg/dL  Final     GFR Estimate   Date Value Ref Range Status   10/14/2022 >90 >60 mL/min/1.73m2 Final     Comment:     Effective December 21, 2021 eGFRcr in adults is calculated using the 2021 CKD-EPI creatinine equation which includes age and gender (Keny et al., [place], DOI: 10.1056/EQJJax0170760)   04/29/2021 >60 >60 mL/min/1.73m2 Final     Calcium   Date Value Ref Range Status   10/14/2022 8.3 (L) 8.6 - 10.0 mg/dL Final          Today's Vitals: BP 99/71   Pulse 111   LMP 10/09/2022 (Approximate)   ----------------      I spent 45 minutes with this patient today. All changes were made via collaborative practice agreement with Rolanda Tucker MD. A copy of the visit note was provided to the patient's provider(s).    A summary of these recommendations was given to the patient.    Lamonte Wright, MacarenaD  Medication Therapy Management (MTM) Pharmacist  [facility]         Medication Therapy Recommendations  Dependent edema    Current Medication: furosemide (LASIX) 20 MG tablet (Discontinued)   Rationale: No medical indication at this time - Unnecessary medication therapy - Indication   Recommendation: Discontinue Medication   Status: Accepted per CPA         Type 2 diabetes mellitus with hyperglycemia, with long-term current use of insulin (H)    Current Medication: NOVOLOG FLEXPEN 100 UNIT/ML soln (Discontinued)   Rationale: Medication product not available - Adherence - Adherence   Recommendation: Provide Adherence Intervention   Status: Accepted per CPA          Current Medication: insulin glargine (LANTUS SOLOSTAR) 100 UNIT/ML pen   Rationale: Synergistic therapy - Needs additional medication therapy - Indication   Recommendation: Start Medication - Bydureon 2 MG Pen   Status: Accepted per CPA

## 2022-12-06 ENCOUNTER — OFFICE VISIT (OUTPATIENT)
Dept: PHARMACY | Facility: CLINIC | Age: 27
End: 2022-12-06
Payer: COMMERCIAL

## 2022-12-06 VITALS — SYSTOLIC BLOOD PRESSURE: 99 MMHG | HEART RATE: 111 BPM | DIASTOLIC BLOOD PRESSURE: 71 MMHG

## 2022-12-06 DIAGNOSIS — E78.5 HYPERLIPIDEMIA, UNSPECIFIED HYPERLIPIDEMIA TYPE: ICD-10-CM

## 2022-12-06 DIAGNOSIS — Z79.4 TYPE 2 DIABETES MELLITUS WITH HYPERGLYCEMIA, WITH LONG-TERM CURRENT USE OF INSULIN (H): Primary | ICD-10-CM

## 2022-12-06 DIAGNOSIS — E11.65 TYPE 2 DIABETES MELLITUS WITH HYPERGLYCEMIA, WITH LONG-TERM CURRENT USE OF INSULIN (H): Primary | ICD-10-CM

## 2022-12-06 DIAGNOSIS — R60.9 DEPENDENT EDEMA: ICD-10-CM

## 2022-12-06 PROCEDURE — 99607 MTMS BY PHARM ADDL 15 MIN: CPT | Performed by: PHARMACIST

## 2022-12-06 PROCEDURE — 99605 MTMS BY PHARM NP 15 MIN: CPT | Performed by: PHARMACIST

## 2022-12-06 RX ORDER — INSULIN ASPART 100 [IU]/ML
1-7 INJECTION, SOLUTION INTRAVENOUS; SUBCUTANEOUS AT BEDTIME
Qty: 15 ML | Refills: 0 | Status: SHIPPED | OUTPATIENT
Start: 2022-12-06 | End: 2023-01-25

## 2022-12-06 RX ORDER — INSULIN GLARGINE 100 [IU]/ML
30 INJECTION, SOLUTION SUBCUTANEOUS EVERY MORNING
Qty: 30 ML | Refills: 1 | Status: SHIPPED | OUTPATIENT
Start: 2022-12-06 | End: 2023-01-03

## 2022-12-06 NOTE — PATIENT INSTRUCTIONS
"Recommendations from today's MTM visit:                                                         Restart Freestyle Andrews monitor   Start Bydureon 2 mg once weekly.   Refill insulin. Continue previous doses.     Follow-up: Return in about 4 weeks (around 1/3/2023) for Medication Management Pharmacist, in person.    It was great speaking with you today.  I value your experience and would be very thankful for your time in providing feedback in our clinic survey. In the next few days, you may receive an email or text message from [employer] with a link to a survey related to your  clinical pharmacist.\"     To schedule another MTM appointment, please call the clinic directly or you may call the MTM scheduling line at [phone] or toll-free at [phone].     My Clinical Pharmacist's contact information:                                                      Please feel free to contact me with any questions or concerns you have.      Lamonte Wright, PharmD  Medication Therapy Management (MTM) Pharmacist  [facility]      "

## 2023-01-02 ENCOUNTER — IMMUNIZATION (OUTPATIENT)
Dept: FAMILY MEDICINE | Facility: CLINIC | Age: 28
End: 2023-01-02
Attending: INTERNAL MEDICINE
Payer: COMMERCIAL

## 2023-01-02 PROCEDURE — 91301 PR COVID VAC MODERNA 100 MCG/0.5 ML IM: CPT

## 2023-01-02 PROCEDURE — 0012A PR COVID VAC MODERNA 100 MCG/0.5 ML IM: CPT

## 2023-01-03 ENCOUNTER — OFFICE VISIT (OUTPATIENT)
Dept: PHARMACY | Facility: CLINIC | Age: 28
End: 2023-01-03
Payer: COMMERCIAL

## 2023-01-03 VITALS — SYSTOLIC BLOOD PRESSURE: 93 MMHG | HEART RATE: 106 BPM | DIASTOLIC BLOOD PRESSURE: 65 MMHG

## 2023-01-03 DIAGNOSIS — E11.65 TYPE 2 DIABETES MELLITUS WITH HYPERGLYCEMIA, WITH LONG-TERM CURRENT USE OF INSULIN (H): Primary | ICD-10-CM

## 2023-01-03 DIAGNOSIS — E78.5 HYPERLIPIDEMIA, UNSPECIFIED HYPERLIPIDEMIA TYPE: ICD-10-CM

## 2023-01-03 DIAGNOSIS — Z79.4 TYPE 2 DIABETES MELLITUS WITH HYPERGLYCEMIA, WITH LONG-TERM CURRENT USE OF INSULIN (H): Primary | ICD-10-CM

## 2023-01-03 PROCEDURE — 99605 MTMS BY PHARM NP 15 MIN: CPT | Performed by: PHARMACIST

## 2023-01-03 PROCEDURE — 99607 MTMS BY PHARM ADDL 15 MIN: CPT | Performed by: PHARMACIST

## 2023-01-03 RX ORDER — INSULIN GLARGINE 100 [IU]/ML
35 INJECTION, SOLUTION SUBCUTANEOUS EVERY MORNING
Qty: 30 ML | Refills: 1 | Status: SHIPPED | OUTPATIENT
Start: 2023-01-03 | End: 2023-01-25

## 2023-01-03 NOTE — PROGRESS NOTES
Medication Therapy Management (MTM) Encounter    ASSESSMENT:                            Medication Adherence/Access: No concerns from outside dispense history.     Type 2 Diabetes:  A1C above goal <7%. Fasting blood glucose above goal . Prandial sugars above goal <180. Does not tolerate higher doses of metformin. Adverse effects with bydureon. Will discontinue. As patient is planning on getting pregnant, will not replace with alternative GLP1 or other insulin sensitizing agents - continue with metformin and insulin. Start with increasing basal insulin until fasting sugars are better controlled. Home titration instructions provided.     Hyperlipidemia: Appropriately using statin due to history of LDL >190. Last LDL at goal <100.         PLAN:                            1. Stop Bydureon.   2. Increase Lantus to 35 units daily. Increase by 2 units every 3 days until fasting blood sugars are below 180 or if any episodes of low blood glucose.     Follow-up: Return in about 4 weeks (around 1/31/2023) for Medication Management Pharmacist, in person.    SUBJECTIVE/OBJECTIVE:                          Tyson Skelton is a 27 year old female coming in for a follow-up visit. She was referred to me from Rolanda Smith MD. Declined . Today's visit is a follow-up MTM visit from 12/06/2022.    Reason for visit: Referred for diabetes management.    Allergies/ADRs: Reviewed in chart  Past Medical History: Reviewed in chart  Tobacco: She reports that she has never smoked. She has been exposed to tobacco smoke. She has never used smokeless tobacco.  Alcohol:  reports no history of alcohol use.      Medication Adherence/Access:      Did not bring medicines. Brought her meter.         Type 2 Diabetes:  Prescribed NovoLog 5 units 3 times daily with meals + 1 to 7 units at bedtime, Lantus 30 units daily, metformin 500 mg twice daily. Patient is not experiencing side effects (has previously had diarrhea with higher  doses of Metformin). At last MTM visit, started Bydureon 2 mg weekly.     Gets a headache from Bydureon.     Using Lantus 30 units daily. Using Novolog per sliding scale - now giving three times daily with meals -usually using about 10 units daily.     Is trying to have another child.     Blood sugar monitoring: CGM. Ranges (based on glucometer readings):       Symptoms of low blood sugar? none  Symptoms of high blood sugar? polydipsia and polyuria - a little better.     Diet/Exercise: 2-3 meals per day. Typically eating rice soup, fish paste, green vegetables.   Aspirin: No  Statin: Yes: Atorvastatin 20 mg daily  ACEi/ARB: No.     Hemoglobin A1C   Date Value Ref Range Status   10/05/2022   Final     Comment:     Greater than analytical measurement  Normal <5.7%   Prediabetes 5.7-6.4%    Diabetes 6.5% or higher     Note: Adopted from ADA consensus guidelines.   11/24/2020 12.9 (H) <=5.6 % Final     Comment:     Normal <5.7% Prediabete 5.7-6.4% Diabletes 6.5% or higher - adopted from ADA consensus guidelines   08/03/2020 10.9 (H) <=5.6 % Final     Comment:     Prediabetes:   HBA1c       5.7 to 6.4%        Diabetes:        HBA1c        >=6.5%   Patients with Hgb F >5%, total bilirubin >10.0 mg/dL, abnormal red cell turnover, severe renal or hepatic disease or malignancy should not have this A1C method used to diagnose or monitor diabetes.            Microalbumin Urine mg/dL   Date Value Ref Range Status   11/24/2020 2.81 (H) 0.00 - 1.99 mg/dL Final      No results found for: UCRR  LDL Cholesterol Calculated   Date Value Ref Range Status   04/29/2021 100 <=129 mg/dL Final     LDL Cholesterol Direct   Date Value Ref Range Status   11/24/2020 139 (H) <=129 mg/dl Final     Creatinine   Date Value Ref Range Status   10/14/2022 0.56 0.51 - 0.95 mg/dL Final          Hyperlipidemia: Prescribed atorvastatin 20 mg daily     Latest Reference Range & Units 05/07/19 11:05   LDL Cholesterol Calculated <=129 mg/dL 219 (H)        Recent Labs   Lab Test 04/29/21  1122 11/24/20  1332   CHOL 196 225*   HDL 39* 36*    139*   TRIG 283* 402*         Today's Vitals: BP 93/65   Pulse 106   ----------------      I spent 25 minutes with this patient today. All changes were made via collaborative practice agreement with Rolanda Tucker MD. A copy of the visit note was provided to the patient's provider(s).    A summary of these recommendations was given to the patient.    Lamonte Wright, MacarenaD  Medication Therapy Management (MTM) Pharmacist  [facility]         Medication Therapy Recommendations  Type 2 diabetes mellitus with hyperglycemia, with long-term current use of insulin (H)    Current Medication: exenatide ER (BYDUREON BCISE) 2 MG/0.85ML auto-injector (Discontinued)   Rationale: Undesirable effect - Adverse medication event - Safety   Recommendation: Discontinue Medication   Status: Accepted per CPA          Current Medication: insulin glargine (LANTUS SOLOSTAR) 100 UNIT/ML pen (Discontinued)   Rationale: Dose too low - Dosage too low - Effectiveness   Recommendation: Increase Dose   Status: Accepted per CPA

## 2023-01-03 NOTE — PATIENT INSTRUCTIONS
"Recommendations from today's MTM visit:                                                         Stop Bydureon.   Increase Lantus to 35 units daily. Increase by 2 units every 3 days until fasting blood sugars are below 180 or if any episodes of low blood glucose.     Follow-up: Return in about 4 weeks (around 1/31/2023) for Medication Management Pharmacist, in person.    It was great speaking with you today.  I value your experience and would be very thankful for your time in providing feedback in our clinic survey. In the next few days, you may receive an email or text message from [employer] with a link to a survey related to your  clinical pharmacist.\"     To schedule another MTM appointment, please call the clinic directly or you may call the MTM scheduling line at [phone] or toll-free at [phone].     My Clinical Pharmacist's contact information:                                                      Please feel free to contact me with any questions or concerns you have.      Lamonte Wright, PharmD  Medication Therapy Management (MTM) Pharmacist  [facility]      "

## 2023-01-23 ENCOUNTER — TELEPHONE (OUTPATIENT)
Dept: ENDOCRINOLOGY | Facility: CLINIC | Age: 28
End: 2023-01-23
Payer: COMMERCIAL

## 2023-01-23 NOTE — TELEPHONE ENCOUNTER
Spoke with patient via . Patient is still using Ciklum device. Requested patient to upload data for appointment. Patient states unsure how to upload device. Patient unable to come into clinic for upload. Patient reported blood glucose readings over the phone. Data obtained listed below:    1/23  8:16AM- 332  1:19PM- 372    1/22  8:15AM- 330  1:18PM- 282  5:41PM- 350    1/21  5:21PM- HIGH    1/20  9:13AM- 198  11:53AM- 296    1/19  8:57AM- 326  3:29PM- 182    1/18  7:56AM- 264  11:15AM- 239    1/17  6:55AM- 283  2:09PM- 145    1/16  8:40AM- 243  12:30PM- 248  6:45PM- 249    1/15  8:39AM- 221  9:55PM- 330    1/14  9:30AM- 205  4:09PM- 136    1/13  9:18AM- 208  7:38PM- 181    1/12  9:12AM- 263  2:13PM- 226  8:48PM- 319    1/11  9:25AM- 232  12:36PM- 122  7:58PM- 215    1/10  8:12AM- 282  1:36PM- 214    1/9  7:26AM- 311  1:39PM- 223    Jana Umana LPN 01/23/23 5:41 PM

## 2023-01-23 NOTE — PROGRESS NOTES
Tyson Skelton  is being evaluated via a billable video visit.      How would you like to obtain your AVS? Mail a copy  For the video visit, send the invitation by: Text to cell phone: [phone]  Will anyone else be joining your video visit? No    Outcome for 01/23/23 5:43 PM: Data obtained via phone and located below- Patient using [employer] but unable to upload.   Jana Umana LPN     1/23  8:16AM- 332  1:19PM- 372    1/22  8:15AM- 330  1:18PM- 282  5:41PM- 350    1/21  5:21PM- HIGH    1/20  9:13AM- 198  11:53AM- 296    1/19  8:57AM- 326  3:29PM- 182    1/18  7:56AM- 264  11:15AM- 239    1/17  6:55AM- 283  2:09PM- 145    1/16  8:40AM- 243  12:30PM- 248  6:45PM- 249    1/15  8:39AM- 221  9:55PM- 330    1/14  9:30AM- 205  4:09PM- 136    1/13  9:18AM- 208  7:38PM- 181    1/12  9:12AM- 263  2:13PM- 226  8:48PM- 319    1/11  9:25AM- 232  12:36PM- 122  7:58PM- 218    1/10  8:12AM- 282  1:36PM- 214    1/9  7:26AM- 311  1:39PM- 223

## 2023-01-25 ENCOUNTER — VIRTUAL VISIT (OUTPATIENT)
Dept: ENDOCRINOLOGY | Facility: CLINIC | Age: 28
End: 2023-01-25
Attending: INTERNAL MEDICINE
Payer: COMMERCIAL

## 2023-01-25 ENCOUNTER — PRE VISIT (OUTPATIENT)
Dept: ENDOCRINOLOGY | Facility: CLINIC | Age: 28
End: 2023-01-25

## 2023-01-25 DIAGNOSIS — E11.65 TYPE 2 DIABETES MELLITUS WITH HYPERGLYCEMIA, WITH LONG-TERM CURRENT USE OF INSULIN (H): ICD-10-CM

## 2023-01-25 DIAGNOSIS — E11.65 TYPE 2 DIABETES MELLITUS WITH HYPERGLYCEMIA, UNSPECIFIED WHETHER LONG TERM INSULIN USE (H): ICD-10-CM

## 2023-01-25 DIAGNOSIS — Z79.4 TYPE 2 DIABETES MELLITUS WITH HYPERGLYCEMIA, WITH LONG-TERM CURRENT USE OF INSULIN (H): ICD-10-CM

## 2023-01-25 PROCEDURE — 99215 OFFICE O/P EST HI 40 MIN: CPT | Mod: 95 | Performed by: PHYSICIAN ASSISTANT

## 2023-01-25 RX ORDER — INSULIN ASPART 100 [IU]/ML
INJECTION, SOLUTION INTRAVENOUS; SUBCUTANEOUS
Qty: 15 ML | Refills: 3 | Status: SHIPPED | OUTPATIENT
Start: 2023-01-25 | End: 2023-08-15

## 2023-01-25 RX ORDER — INSULIN GLARGINE 100 [IU]/ML
INJECTION, SOLUTION SUBCUTANEOUS
Qty: 30 ML | Refills: 3 | COMMUNITY
Start: 2023-01-25 | End: 2023-02-08

## 2023-01-25 NOTE — LETTER
1/25/2023       RE: Tyson Skelton  [address]  [place] [address]     Dear Colleague,    Thank you for referring your patient, Tyson Skelton, to the [facility] ENDOCR[facility] [place] at [facility]. Please see a copy of my visit note below.    Tyson Skelton  is being evaluated via a billable video visit.      How would you like to obtain your AVS? Mail a copy  For the video visit, send the invitation by: Text to cell phone: [phone]  Will anyone else be joining your video visit? No    Outcome for 01/23/23 5:43 PM: Data obtained via phone and located below- Patient using [employer] but unable to upload.   Jana Umana LPN     1/23  8:16AM- 332  1:19PM- 372    1/22  8:15AM- 330  1:18PM- 282  5:41PM- 350    1/21  5:21PM- HIGH    1/20  9:13AM- 198  11:53AM- 296    1/19  8:57AM- 326  3:29PM- 182    1/18  7:56AM- 264  11:15AM- 239    1/17  6:55AM- 283  2:09PM- 145    1/16  8:40AM- 243  12:30PM- 248  6:45PM- 249    1/15  8:39AM- 221  9:55PM- 330    1/14  9:30AM- 205  4:09PM- 136    1/13  9:18AM- 208  7:38PM- 181    1/12  9:12AM- 263  2:13PM- 226  8:48PM- 319    1/11  9:25AM- 232  12:36PM- 122  7:58PM- 218    1/10  8:12AM- 282  1:36PM- 214    1/9  7:26AM- 311  1:39PM- 223    Due to the COVID 19 pandemic this visit was converted to a video visit in order to help prevent spread of infection in this patient and the general population.    Time of start: 1:30 pm  Time of end: 1:57 pm  Total duration of video visit: 27 minutes.  Providers location: off site.  Patients location:home-[place].    ISHA Skelton is a 27 year old female with type 2 diabetes mellitus. Video visit for diabetes follow up today.  Reanna  used today.  Pt las seen by Dr. Katherine Peralta in Jan 2021.  Pt gives hx of type 2 diabetes mellitus dx at age 14.  She has hx of microalbuminuria.  Pt denies hx of retinopathy or neuropathy.  She has a 4 year old son and chart states pt has hx of  preeclampsia which she denied.  She would like to get pregnant again.  Pt also has hx of goiter, hyperlipidemia, sepsis- E coli and Vit A def.  For her diabetes, she is currently taking Lantus 35 units subcutaneous each am and is prescribed to take Novolog 5 units with meals.   She admits to missing Novolog doses.  Pt is also prescribed to take Metformin 500 mg BID- she does not tolerate a higher dose of Metformin.  No exercise and she eats rice daily.  Most recent A1C was 12.9 % on 10/5/2022.  She is currently using a Freestyle Libre2 sensor and provided me with blood sugar readings which I scanned in her note below.  Her blood sugars are high in the 200-300 range.  Avoid adding GLP-1 or SGLT2 since she is interested in getting pregnant in the near future.  On ROS today, blurred vision.  LMP was 12/23/2022.  Denies n/v, SOB at rest, cough, fever or chest pain.  No abd pain, diarrhea, dysuria or hematuria.  Pt denies sx of neuropathy or foot ulcers.    Diabetes Care  Retinopathy: none per pt; seen 1 year ago. Reminded her to schedule her annual diabetic eye exam.  Nephropathy:yes; urine microalbuminuria + in 11/2020. Not taking an ace/ARB. Will hold off on adding an ace or ARB since she would like to get pregnant in the near future.  Neuropathy: none.  Foot Exam: no exam today.  Taking aspirin:no.  Lipids:  in 4/2021. Pt taking Lipitor.  Insulin: Basal and meal time insulin.  DM meds: Metformin.  Testing: Freestyle Libre2 sensor.            ROS  See under HPI.    Allergies  No Known Allergies    Medications  Current Outpatient Medications   Medication Sig Dispense Refill     alcohol swab prep pads Use to swab area of injection/teresa as directed. 500 each 0     atorvastatin (LIPITOR) 20 MG tablet Take 1 tablet (20 mg) by mouth At Bedtime 90 tablet 0     blood glucose (ACCU-CHEK SOFTCLIX) lancing device Lancing device to be used with lancets. 1 each 0     blood glucose (NO BRAND SPECIFIED) test strip Use to  test blood sugar 5 times daily or as directed. 500 strip 0     blood glucose monitoring (SOFTCLIX) lancets Use to test blood sugar 4 times daily. 500 each 0     Continuous Blood Gluc Sensor (FREESTYLE DUC 2 SENSOR) MISC 1 each every 14 days Use 1 sensor every 14 days. Use to read blood sugars per 's instructions. 2 each 5     insulin aspart (NOVOLOG FLEXPEN) 100 UNIT/ML pen Inject 1-7 Units Subcutaneous At Bedtime 15 mL 0     insulin glargine (LANTUS SOLOSTAR) 100 UNIT/ML pen Inject 35 Units Subcutaneous every morning Increase by 2 units every 3 days until fasting (before breakfast) blood glucose is less than 180. 30 mL 1     insulin pen needle (32G X 4 MM) 32G X 4 MM miscellaneous Use 5 pen needles daily or as directed. 500 each 0     metFORMIN (GLUCOPHAGE) 500 MG tablet Take 1 tablet (500 mg) by mouth 2 times daily (with meals) 180 tablet 0       Family History  family history includes Diabetes in her mother.   Mother, sister, uncles and cousins with type 2 DM.    Social History   reports that she has never smoked. She has been exposed to tobacco smoke. She has never used smokeless tobacco. She reports that she does not drink alcohol and does not use drugs.     One son.  She does not work outside the home at this time.    Past Medical History  Past Medical History:   Diagnosis Date     Chronic kidney disease      Diabetes mellitus, type II (H)      Hyperglycemia without ketosis 2010    hospitalized at [facility]        No past surgical history on file.    Physical Exam    No exam today.    RESULTS  Creatinine   Date Value Ref Range Status   10/14/2022 0.56 0.51 - 0.95 mg/dL Final     GFR Estimate   Date Value Ref Range Status   10/14/2022 >90 >60 mL/min/1.73m2 Final     Comment:     Effective December 21, 2021 eGFRcr in adults is calculated using the 2021 CKD-EPI creatinine equation which includes age and gender (Keny mills al., NEJM, DOI: 10.1056/UMPWlq6895478)   04/29/2021  >60 >60 mL/min/1.73m2 Final     Hemoglobin A1C   Date Value Ref Range Status   10/05/2022   Final     Comment:     Greater than analytical measurement  Normal <5.7%   Prediabetes 5.7-6.4%    Diabetes 6.5% or higher     Note: Adopted from ADA consensus guidelines.     Potassium   Date Value Ref Range Status   10/14/2022 3.9 3.4 - 5.3 mmol/L Final     Comment:     Specimen slightly hemolyzed, potassium may be falsely elevated.   04/29/2021 3.8 3.5 - 5.0 mmol/L Final     ALT   Date Value Ref Range Status   10/14/2022 14 10 - 35 U/L Final     AST   Date Value Ref Range Status   10/14/2022 46 (H) 10 - 35 U/L Final     Comment:     Specimen is hemolyzed which can falsely elevate AST. Analysis of a non-hemolyzed specimen may result in a lower value.       Cholesterol   Date Value Ref Range Status   04/29/2021 196 <=199 mg/dL Final   11/24/2020 225 (H) <=199 mg/dL Final     Direct Measure HDL   Date Value Ref Range Status   04/29/2021 39 (L) >=50 mg/dL Final   11/24/2020 36 (L) >=50 mg/dL Final     LDL Cholesterol Calculated   Date Value Ref Range Status   04/29/2021 100 <=129 mg/dL Final   11/24/2020   Final     Comment:     Invalid, Triglycerides >400     LDL Cholesterol Direct   Date Value Ref Range Status   11/24/2020 139 (H) <=129 mg/dl Final   10/04/2017 136 (H) <=129 mg/dl Final     Triglycerides   Date Value Ref Range Status   04/29/2021 283 (H) <=149 mg/dL Final   11/24/2020 402 (H) <=149 mg/dL Final         ASSESSMENT/PLAN:    1.  TYPE 2 DIABETES MELLITUS: Uncontrolled type 2 diabetes mellitus with nephropathy.  Discuss in length the importance of good glycemic control months prior to conception for both baby and mothers health.  Increase Lantus 38 units subcutaneous each am and increase Novolog 7 units with meals.  Referred to diabetes education.  Reminded her to have her annual diabetic eye exam.  She denies sx of neuropathy or foot ulcers.  Encouraged her to make healthy food choices, limit her rice intake and  start getting some form of regular exercise.    2.  NEPHROPATHY: Urine microalbuminuria + .  Most recent creat 0.56 with GFR > 90 mL/min in Oct 2022.  Avoid adding ace/ARB at this time since she plans to get pregnant in the near future.    3.  LIPIDS:  in 4/2021. Pt taking Lipitor.    4.  WEIGHT: See above.  Avoid GLP-1 at this time.    5.  FOLLOW UP: With me in 3 months.  Referral to see CDE placed today.  Labs ordered today.    Time spent reviewing chart,labs and glucose data today = 8 minutes.  Time for video visit today = 27 minutes.  Time for documentation today = 15 minutes.    Total time for visit today = 50 minutes.    Cheri Key PA-C

## 2023-01-25 NOTE — PROGRESS NOTES
Due to the COVID 19 pandemic this visit was converted to a video visit in order to help prevent spread of infection in this patient and the general population.    Time of start: 1:30 pm  Time of end: 1:57 pm  Total duration of video visit: 27 minutes.  Providers location: off site.  Patients location:home-[place].    HPI  Tyson Skelton is a 27 year old female with type 2 diabetes mellitus. Video visit for diabetes follow up today.  Reanna  used today.  Pt las seen by Dr. Katherine Peralta in Jan 2021.  Pt gives hx of type 2 diabetes mellitus dx at age 14.  She has hx of microalbuminuria.  Pt denies hx of retinopathy or neuropathy.  She has a 4 year old son and chart states pt has hx of preeclampsia which she denied.  She would like to get pregnant again.  Pt also has hx of goiter, hyperlipidemia, sepsis- E coli and Vit A def.  For her diabetes, she is currently taking Lantus 35 units subcutaneous each am and is prescribed to take Novolog 5 units with meals.   She admits to missing Novolog doses.  Pt is also prescribed to take Metformin 500 mg BID- she does not tolerate a higher dose of Metformin.  No exercise and she eats rice daily.  Most recent A1C was 12.9 % on 10/5/2022.  She is currently using a Freestyle Libre2 sensor and provided me with blood sugar readings which I scanned in her note below.  Her blood sugars are high in the 200-300 range.  Avoid adding GLP-1 or SGLT2 since she is interested in getting pregnant in the near future.  On ROS today, blurred vision.  LMP was 12/23/2022.  Denies n/v, SOB at rest, cough, fever or chest pain.  No abd pain, diarrhea, dysuria or hematuria.  Pt denies sx of neuropathy or foot ulcers.    Diabetes Care  Retinopathy: none per pt; seen 1 year ago. Reminded her to schedule her annual diabetic eye exam.  Nephropathy:yes; urine microalbuminuria + in 11/2020. Not taking an ace/ARB. Will hold off on adding an ace or ARB since she would like to get pregnant in the near  future.  Neuropathy: none.  Foot Exam: no exam today.  Taking aspirin:no.  Lipids:  in 4/2021. Pt taking Lipitor.  Insulin: Basal and meal time insulin.  DM meds: Metformin.  Testing: Freestyle Libre2 sensor.            ROS  See under HPI.    Allergies  No Known Allergies    Medications  Current Outpatient Medications   Medication Sig Dispense Refill     alcohol swab prep pads Use to swab area of injection/teresa as directed. 500 each 0     atorvastatin (LIPITOR) 20 MG tablet Take 1 tablet (20 mg) by mouth At Bedtime 90 tablet 0     blood glucose (ACCU-CHEK SOFTCLIX) lancing device Lancing device to be used with lancets. 1 each 0     blood glucose (NO BRAND SPECIFIED) test strip Use to test blood sugar 5 times daily or as directed. 500 strip 0     blood glucose monitoring (SOFTCLIX) lancets Use to test blood sugar 4 times daily. 500 each 0     Continuous Blood Gluc Sensor (FREESTYLE DUC 2 SENSOR) MISC 1 each every 14 days Use 1 sensor every 14 days. Use to read blood sugars per 's instructions. 2 each 5     insulin aspart (NOVOLOG FLEXPEN) 100 UNIT/ML pen Inject 1-7 Units Subcutaneous At Bedtime 15 mL 0     insulin glargine (LANTUS SOLOSTAR) 100 UNIT/ML pen Inject 35 Units Subcutaneous every morning Increase by 2 units every 3 days until fasting (before breakfast) blood glucose is less than 180. 30 mL 1     insulin pen needle (32G X 4 MM) 32G X 4 MM miscellaneous Use 5 pen needles daily or as directed. 500 each 0     metFORMIN (GLUCOPHAGE) 500 MG tablet Take 1 tablet (500 mg) by mouth 2 times daily (with meals) 180 tablet 0       Family History  family history includes Diabetes in her mother.   Mother, sister, uncles and cousins with type 2 DM.    Social History   reports that she has never smoked. She has been exposed to tobacco smoke. She has never used smokeless tobacco. She reports that she does not drink alcohol and does not use drugs.     One son.  She does not work outside the home at this  time.    Past Medical History  Past Medical History:   Diagnosis Date     Chronic kidney disease      Diabetes mellitus, type II (H)      Hyperglycemia without ketosis 2010    hospitalized at [facility]        No past surgical history on file.    Physical Exam    No exam today.    RESULTS  Creatinine   Date Value Ref Range Status   10/14/2022 0.56 0.51 - 0.95 mg/dL Final     GFR Estimate   Date Value Ref Range Status   10/14/2022 >90 >60 mL/min/1.73m2 Final     Comment:     Effective December 21, 2021 eGFRcr in adults is calculated using the 2021 CKD-EPI creatinine equation which includes age and gender (Keny et al., NEJ, DOI: 10.1056/DZSAss0514101)   04/29/2021 >60 >60 mL/min/1.73m2 Final     Hemoglobin A1C   Date Value Ref Range Status   10/05/2022   Final     Comment:     Greater than analytical measurement  Normal <5.7%   Prediabetes 5.7-6.4%    Diabetes 6.5% or higher     Note: Adopted from ADA consensus guidelines.     Potassium   Date Value Ref Range Status   10/14/2022 3.9 3.4 - 5.3 mmol/L Final     Comment:     Specimen slightly hemolyzed, potassium may be falsely elevated.   04/29/2021 3.8 3.5 - 5.0 mmol/L Final     ALT   Date Value Ref Range Status   10/14/2022 14 10 - 35 U/L Final     AST   Date Value Ref Range Status   10/14/2022 46 (H) 10 - 35 U/L Final     Comment:     Specimen is hemolyzed which can falsely elevate AST. Analysis of a non-hemolyzed specimen may result in a lower value.       Cholesterol   Date Value Ref Range Status   04/29/2021 196 <=199 mg/dL Final   11/24/2020 225 (H) <=199 mg/dL Final     Direct Measure HDL   Date Value Ref Range Status   04/29/2021 39 (L) >=50 mg/dL Final   11/24/2020 36 (L) >=50 mg/dL Final     LDL Cholesterol Calculated   Date Value Ref Range Status   04/29/2021 100 <=129 mg/dL Final   11/24/2020   Final     Comment:     Invalid, Triglycerides >400     LDL Cholesterol Direct   Date Value Ref Range Status   11/24/2020 139 (H) <=129  mg/dl Final   10/04/2017 136 (H) <=129 mg/dl Final     Triglycerides   Date Value Ref Range Status   04/29/2021 283 (H) <=149 mg/dL Final   11/24/2020 402 (H) <=149 mg/dL Final         ASSESSMENT/PLAN:    1.  TYPE 2 DIABETES MELLITUS: Uncontrolled type 2 diabetes mellitus with nephropathy.  Discuss in length the importance of good glycemic control months prior to conception for both baby and mothers health.  Increase Lantus 38 units subcutaneous each am and increase Novolog 7 units with meals.  Referred to diabetes education.  Reminded her to have her annual diabetic eye exam.  She denies sx of neuropathy or foot ulcers.  Encouraged her to make healthy food choices, limit her rice intake and start getting some form of regular exercise.    2.  NEPHROPATHY: Urine microalbuminuria + .  Most recent creat 0.56 with GFR > 90 mL/min in Oct 2022.  Avoid adding ace/ARB at this time since she plans to get pregnant in the near future.    3.  LIPIDS:  in 4/2021. Pt taking Lipitor.    4.  WEIGHT: See above.  Avoid GLP-1 at this time.    5.  FOLLOW UP: With me in 3 months.  Referral to see CDE placed today.  Labs ordered today.    Time spent reviewing chart,labs and glucose data today = 8 minutes.  Time for video visit today = 27 minutes.  Time for documentation today = 15 minutes.    Total time for visit today = 50 minutes.    Cheri Key PA-C

## 2023-01-25 NOTE — NURSING NOTE
Patient denies any changes since echeck-in regarding medication and allergies and states all information entered during echeck-in remains accurate.    Pia Razo MA

## 2023-02-07 NOTE — PROGRESS NOTES
Medication Therapy Management (MTM) Encounter    ASSESSMENT:                            Medication Adherence/Access: No concerns from outside dispense history.      Type 2 Diabetes:  A1C above goal <7%. Did not recheck today as CGM readings are elevated and A1C likely still not at goal. Fasting blood glucose above goal . Prandial sugars above goal <180. Currently managing with metformin and insulin and unable to utilize other agents due to family planning. Increasing basal insulin until fasting sugars are <180 is appropriate at this time. Patient was not following insulin titration provided at the last visit resulting in continous elevated readings. Provided education on insulin titration to help the patient understand the process. Discussed potentially postponing pregnancy and prioritizing diabetes management for a healthier pregnancy in the future    Hyperlipidemia: Appropriately using statin due to history of LDL >190. Last LDL at goal <100. Discontinue in the event that the patient becomes pregnant.      PLAN:                              1. Increase Lantus to 40 units daily. Increase by 2 units every 3 days until fasting blood sugars are below 180.      Follow-up: Return in about 4 weeks (around 8/3/2023) for Medication Management Pharmacist, in person.    SUBJECTIVE/OBJECTIVE:                          Tyson Skelton is a 27 year old female coming in for a follow-up visit. She was referred to me from Rolanda Smith MD. Declined . Today's visit is a follow-up MTM visit from 1/3/2023.     Reason for visit: Diabetes management.     Allergies/ADRs: Reviewed in chart  Past Medical History: Reviewed in chart  Tobacco: She reports that she has never smoked. She has been exposed to tobacco smoke. She has never used smokeless tobacco.  Alcohol:  reports no history of alcohol use.        Medication Adherence/Access:       Did not bring medicines. Brought her meter.            Type 2 Diabetes:   Prescribed NovoLog 5 units 3 times daily with meals + 1 to 7 units at bedtime, Lantus 35 units daily, metformin 500 mg twice daily. Patient is not experiencing side effects (has previously had diarrhea with higher doses of Metformin).      Using Lantus 35 units - only adding 2 units on days that blood glucose are high, has not titrated as directed at last visit.     Using Mostly 5 units Novolog. Adding additional depending on blood sugar (sliding scale). Mostly giving 10 units. Sometimes twice daily - sometimes three times. Forgets to give.       Bydureon discontinued at last visit. No longer having headaches.     Is trying to have another child.       Blood sugar monitoring: CGM. Ranges (based on glucometer readings):                     Diet/Exercise: 2-3 meals per day. Typically eating rice soup, fish paste, green vegetables.   Aspirin: No  Statin: Yes: Atorvastatin 20 mg daily  ACEi/ARB: No.     Hemoglobin A1C   Date Value Ref Range Status   10/05/2022   Final     Comment:     Greater than analytical measurement  Normal <5.7%   Prediabetes 5.7-6.4%    Diabetes 6.5% or higher     Note: Adopted from ADA consensus guidelines.   11/24/2020 12.9 (H) <=5.6 % Final     Comment:     Normal <5.7% Prediabete 5.7-6.4% Diabletes 6.5% or higher - adopted from ADA consensus guidelines   08/03/2020 10.9 (H) <=5.6 % Final     Comment:     Prediabetes:   HBA1c       5.7 to 6.4%        Diabetes:        HBA1c        >=6.5%   Patients with Hgb F >5%, total bilirubin >10.0 mg/dL, abnormal red cell turnover, severe renal or hepatic disease or malignancy should not have this A1C method used to diagnose or monitor diabetes.           Hemoglobin A1C POCT   Date Value Ref Range Status   01/13/2020 13.1 (A) 4.3 - 6 % Final   10/16/2019 10.9 (A) 4.3 - 6 % Final         Hyperlipidemia: Prescribed atorvastatin 20 mg daily    -Due for lipids    Recent Labs   Lab Test 04/29/21  1122 11/24/20  1332   CHOL 196 225*   HDL 39* 36*    139*    TRIG 283* 402*       Today's Vitals: /80   Pulse 105   ----------------      I spent 30 minutes with this patient today. All changes were made via collaborative practice agreement with Rolanad Smith MD. A copy of the visit note was provided to the patient's provider(s).    A summary of these recommendations was given to the patient.    Laurita Gutierrez, PharmD student    I spent 100% of the time in direct supervision of the PharmD IV student. I have reviewed the note and agree with the assessment/plan as it is now written.     Lamonte Wright, MacarenaD  Medication Therapy Management (MTM) Pharmacist  [facility]           Medication Therapy Recommendations  Type 2 diabetes mellitus with hyperglycemia, with long-term current use of insulin (H)    Current Medication: insulin glargine (LANTUS SOLOSTAR) 100 UNIT/ML pen   Rationale: Does not understand instructions - Adherence - Adherence   Recommendation: Provide Education   Status: Patient Agreed - Adherence/Education

## 2023-02-08 ENCOUNTER — OFFICE VISIT (OUTPATIENT)
Dept: PHARMACY | Facility: CLINIC | Age: 28
End: 2023-02-08
Payer: COMMERCIAL

## 2023-02-08 VITALS — SYSTOLIC BLOOD PRESSURE: 111 MMHG | DIASTOLIC BLOOD PRESSURE: 80 MMHG | HEART RATE: 105 BPM

## 2023-02-08 DIAGNOSIS — E78.5 HYPERLIPIDEMIA, UNSPECIFIED HYPERLIPIDEMIA TYPE: ICD-10-CM

## 2023-02-08 DIAGNOSIS — E11.65 TYPE 2 DIABETES MELLITUS WITH HYPERGLYCEMIA, WITH LONG-TERM CURRENT USE OF INSULIN (H): Primary | ICD-10-CM

## 2023-02-08 DIAGNOSIS — Z79.4 TYPE 2 DIABETES MELLITUS WITH HYPERGLYCEMIA, WITH LONG-TERM CURRENT USE OF INSULIN (H): Primary | ICD-10-CM

## 2023-02-08 PROCEDURE — 99606 MTMS BY PHARM EST 15 MIN: CPT | Performed by: PHARMACIST

## 2023-02-08 PROCEDURE — 99607 MTMS BY PHARM ADDL 15 MIN: CPT | Performed by: PHARMACIST

## 2023-02-08 RX ORDER — INSULIN GLARGINE 100 [IU]/ML
40 INJECTION, SOLUTION SUBCUTANEOUS EVERY MORNING
Qty: 30 ML | Refills: 3 | Status: SHIPPED | OUTPATIENT
Start: 2023-02-08 | End: 2023-03-08

## 2023-02-08 NOTE — PATIENT INSTRUCTIONS
"Recommendations from today's MTM visit:                                                         Increase Lantus to 40 units daily. Increase by 2 units every 3 days until fasting blood sugars are below 180     Follow-up: Return in about 4 weeks (around 3/8/2023) for Medication Management Pharmacist, in person.    It was great speaking with you today.  I value your experience and would be very thankful for your time in providing feedback in our clinic survey. In the next few days, you may receive an email or text message from [employer] with a link to a survey related to your  clinical pharmacist.\"     To schedule another MTM appointment, please call the clinic directly or you may call the MTM scheduling line at [phone] or toll-free at [phone].     My Clinical Pharmacist's contact information:                                                      Please feel free to contact me with any questions or concerns you have.      Lamonte Wright, PharmD  Medication Therapy Management (MTM) Pharmacist  [facility]      "

## 2023-02-09 ENCOUNTER — VIRTUAL VISIT (OUTPATIENT)
Dept: EDUCATION SERVICES | Facility: CLINIC | Age: 28
End: 2023-02-09
Attending: PHYSICIAN ASSISTANT
Payer: COMMERCIAL

## 2023-02-09 DIAGNOSIS — Z79.4 TYPE 2 DIABETES MELLITUS WITH HYPERGLYCEMIA, WITH LONG-TERM CURRENT USE OF INSULIN (H): Primary | ICD-10-CM

## 2023-02-09 DIAGNOSIS — E11.65 TYPE 2 DIABETES MELLITUS WITH HYPERGLYCEMIA, WITH LONG-TERM CURRENT USE OF INSULIN (H): Primary | ICD-10-CM

## 2023-02-09 DIAGNOSIS — E11.65 TYPE 2 DIABETES MELLITUS WITH HYPERGLYCEMIA, UNSPECIFIED WHETHER LONG TERM INSULIN USE (H): ICD-10-CM

## 2023-02-09 PROCEDURE — 99207 PR NO BILLABLE SERVICE THIS VISIT: CPT | Mod: VID

## 2023-02-09 NOTE — PROGRESS NOTES
NO SHOW    Connected to visit at 1:00 PM.    Left message at 1:16 PM to join visit or call back to reschedule.    Disconnected at 1:20 PM.    Shae Webb RN, Diabetes Educator  Diabetes Education Department  [facility] [place]  [phone]

## 2023-03-07 NOTE — PROGRESS NOTES
Medication Therapy Management (MTM) Encounter    ASSESSMENT:                            Medication Adherence/Access: Does not missed insulin doses. Consider phone alarms for med reminders.        Type 2 Diabetes:  A1C above goal <7%. Did not recheck today as CGM readings are elevated and A1C likely still not at goal. Fasting blood glucose above goal . Prandial sugars above goal <180.     With sliding scale insulin, having adequate post-prandial decreases. Will monitor closely. Once fasting sugars are in range, may need to reduce sliding scale to reduce chances of hypoglycemia.     Currently managing with metformin and insulin and unable to utilize other agents due to family planning. Increasing basal insulin until fasting sugars are <180 is appropriate at this time.       Hyperlipidemia: Appropriately using statin due to history of LDL >190. Last LDL at goal <100. Discontinue in the event that the patient becomes pregnant.      PLAN:                              1. Increase Lantus to 50 units daily. Increase by 2 units every 3 days until fasting blood sugars are below 180.      Follow-up: Return in about 4 weeks (around 8/3/2023) for Medication Management Pharmacist, in person.    SUBJECTIVE/OBJECTIVE:                          Tyson Skelton is a 27 year old female coming in for a follow-up visit. She was referred to me from Rolanda Smith MD. Declined . Today's visit is a follow-up MTM visit from 2/9/2023.     Reason for visit: Diabetes management.     Allergies/ADRs: Reviewed in chart  Past Medical History: Reviewed in chart  Tobacco: She reports that she has never smoked. She has been exposed to tobacco smoke. She has never used smokeless tobacco.  Alcohol:  reports no history of alcohol use.        Medication Adherence/Access:       Did not bring medicines. Brought her meter.   Forgets insulin 1-2 times per week.            Type 2 Diabetes:  Prescribed NovoLog 5 units 3 times daily with  meals + 1 to 7 units at bedtime, Lantus 40 units daily (provided instructions to titrate at last visit), metformin 500 mg twice daily. Patient is not experiencing side effects (has previously had diarrhea with higher doses of Metformin).      Using Lantus 44 units. Stopped at 44 because she thought increase more would be too much.                 Bydureon discontinued at last visit. No longer having headaches.     Is trying to have another child.       Blood sugar monitoring: CGM. Ranges (based on glucometer readings):                            Diet/Exercise: 2-3 meals per day. Typically eating rice soup, fish paste, green vegetables.   Aspirin: No  Statin: Yes: Atorvastatin 20 mg daily  ACEi/ARB: No.     Hemoglobin A1C   Date Value Ref Range Status   10/05/2022   Final     Comment:     Greater than analytical measurement  Normal <5.7%   Prediabetes 5.7-6.4%    Diabetes 6.5% or higher     Note: Adopted from ADA consensus guidelines.   11/24/2020 12.9 (H) <=5.6 % Final     Comment:     Normal <5.7% Prediabete 5.7-6.4% Diabletes 6.5% or higher - adopted from ADA consensus guidelines   08/03/2020 10.9 (H) <=5.6 % Final     Comment:     Prediabetes:   HBA1c       5.7 to 6.4%        Diabetes:        HBA1c        >=6.5%   Patients with Hgb F >5%, total bilirubin >10.0 mg/dL, abnormal red cell turnover, severe renal or hepatic disease or malignancy should not have this A1C method used to diagnose or monitor diabetes.           Hemoglobin A1C POCT   Date Value Ref Range Status   01/13/2020 13.1 (A) 4.3 - 6 % Final   10/16/2019 10.9 (A) 4.3 - 6 % Final         Hyperlipidemia: Prescribed atorvastatin 20 mg daily    -Due for lipids    Recent Labs   Lab Test 04/29/21  1122 11/24/20  1332   CHOL 196 225*   HDL 39* 36*    139*   TRIG 283* 402*       Today's Vitals: There were no vitals taken for this visit.  ----------------      I spent 30 minutes with this patient today. All changes were made via collaborative practice  agreement with Rolanda Smith MD. A copy of the visit note was provided to the patient's provider(s).    A summary of these recommendations was given to the patient.    Lamonte Wright PharmD  Medication Therapy Management (MTM) Pharmacist  [facility]           Medication Therapy Recommendations  No medication therapy recommendations to display

## 2023-03-08 ENCOUNTER — OFFICE VISIT (OUTPATIENT)
Dept: PHARMACY | Facility: CLINIC | Age: 28
End: 2023-03-08
Payer: COMMERCIAL

## 2023-03-08 VITALS — HEART RATE: 106 BPM | DIASTOLIC BLOOD PRESSURE: 73 MMHG | SYSTOLIC BLOOD PRESSURE: 103 MMHG

## 2023-03-08 DIAGNOSIS — E78.5 HYPERLIPIDEMIA, UNSPECIFIED HYPERLIPIDEMIA TYPE: ICD-10-CM

## 2023-03-08 DIAGNOSIS — Z79.4 TYPE 2 DIABETES MELLITUS WITH HYPERGLYCEMIA, WITH LONG-TERM CURRENT USE OF INSULIN (H): Primary | ICD-10-CM

## 2023-03-08 DIAGNOSIS — E11.65 TYPE 2 DIABETES MELLITUS WITH HYPERGLYCEMIA, WITH LONG-TERM CURRENT USE OF INSULIN (H): Primary | ICD-10-CM

## 2023-03-08 PROCEDURE — 99606 MTMS BY PHARM EST 15 MIN: CPT | Performed by: PHARMACIST

## 2023-03-08 PROCEDURE — 99607 MTMS BY PHARM ADDL 15 MIN: CPT | Performed by: PHARMACIST

## 2023-03-08 RX ORDER — INSULIN GLARGINE 100 [IU]/ML
50 INJECTION, SOLUTION SUBCUTANEOUS EVERY MORNING
Qty: 30 ML | Refills: 3 | Status: SHIPPED | OUTPATIENT
Start: 2023-03-08 | End: 2023-04-12

## 2023-03-08 NOTE — PATIENT INSTRUCTIONS
"Recommendations from today's MTM visit:                                                         1. Increase Lantus to 50 units daily. Increase by 2 units every 3 days until fasting blood sugars are below 180.     Follow-up: Return in about 4 weeks (around 4/5/2023) for Medication Management Pharmacist, in person.    It was great speaking with you today.  I value your experience and would be very thankful for your time in providing feedback in our clinic survey. In the next few days, you may receive an email or text message from [employer] with a link to a survey related to your  clinical pharmacist.\"     To schedule another MTM appointment, please call the clinic directly or you may call the MTM scheduling line at [phone] or toll-free at [phone].     My Clinical Pharmacist's contact information:                                                      Please feel free to contact me with any questions or concerns you have.      Lamonte Wright, PharmD  Medication Therapy Management (MTM) Pharmacist  [facility]      "

## 2023-03-31 ENCOUNTER — TRANSFERRED RECORDS (OUTPATIENT)
Dept: HEALTH INFORMATION MANAGEMENT | Facility: CLINIC | Age: 28
End: 2023-03-31

## 2023-03-31 LAB — RETINOPATHY: NEGATIVE

## 2023-04-11 NOTE — PROGRESS NOTES
Medication Therapy Management (MTM) Encounter    ASSESSMENT:                            Medication Adherence/Access: Does not missed insulin doses. Consider phone alarms for med reminders.        Type 2 Diabetes:  A1C above goal <7%. Did not recheck today as CGM readings are elevated and A1C likely still not at goal. Fasting blood glucose above goal . Prandial sugars above goal <180.     Has been appropriately titrating basal insulin. Average sugars have decreased from 300s to mid 200s. Time in target has increase from 1% to 22%. Time above 250 has decreased from 81 to 49%.     Currently managing with metformin and insulin and unable to utilize other agents due to family planning. Increasing basal insulin until fasting sugars are <180 is appropriate at this time. Will try ER formulation of metformin to see if that is more tolerable and allow patient to titrate doses (did have in the past so may not be, but willing to retrial today). Likely very insulin resistant based on current doses.       Hyperlipidemia: Appropriately using statin due to history of LDL >190. Last LDL at goal <100. Discontinue in the event that the patient becomes pregnant.      PLAN:                              1. Increase Lantus to 70 units daily. Increase by 2 units every 3 days until fasting blood sugars are below 180.   2. Switch from Metformin IR to Metformin 500 mg ER and try to increase to 1 tab twice daily.        Follow-up: Return in about 4 weeks (around 5/10/2023) for Medication Management Pharmacist, in person.     SUBJECTIVE/OBJECTIVE:                          Tyson Skelton is a 27 year old female coming in for a follow-up visit. She was referred to me from Rolanda Smith MD. Declined . Today's visit is a follow-up MTM visit from 3/13365.     Reason for visit: Diabetes management.     Allergies/ADRs: Reviewed in chart  Past Medical History: Reviewed in chart  Tobacco: She reports that she has never smoked.  She has been exposed to tobacco smoke. She has never used smokeless tobacco.  Alcohol:  reports no history of alcohol use.        Medication Adherence/Access:       Did not bring medicines. Brought her meter.   Forgets insulin 1-2 times per week.            Type 2 Diabetes:  Prescribed NovoLog 5 units 3 times daily with meals + 1 to 7 units at bedtime, Lantus 40 units daily (provided instructions to titrate at last visit), metformin 500 mg twice daily. Patient is not experiencing side effects (has previously had diarrhea with higher doses of Metformin).          Up to 62 units of Lantus. Continues same Novolog dose.               Is trying to have another child.       Blood sugar monitoring: CGM. Ranges (based on glucometer readings):                      No episodes of hypoglcyemia  No polyuria, polydipsia.         Diet/Exercise: 2-3 meals per day. Typically eating rice soup, fish paste, green vegetables.   Aspirin: No  Statin: Yes: Atorvastatin 20 mg daily  ACEi/ARB: No.     Hemoglobin A1C   Date Value Ref Range Status   10/05/2022   Final     Comment:     Greater than analytical measurement  Normal <5.7%   Prediabetes 5.7-6.4%    Diabetes 6.5% or higher     Note: Adopted from ADA consensus guidelines.   11/24/2020 12.9 (H) <=5.6 % Final     Comment:     Normal <5.7% Prediabete 5.7-6.4% Diabletes 6.5% or higher - adopted from ADA consensus guidelines   08/03/2020 10.9 (H) <=5.6 % Final     Comment:     Prediabetes:   HBA1c       5.7 to 6.4%        Diabetes:        HBA1c        >=6.5%   Patients with Hgb F >5%, total bilirubin >10.0 mg/dL, abnormal red cell turnover, severe renal or hepatic disease or malignancy should not have this A1C method used to diagnose or monitor diabetes.           Hemoglobin A1C POCT   Date Value Ref Range Status   01/13/2020 13.1 (A) 4.3 - 6 % Final   10/16/2019 10.9 (A) 4.3 - 6 % Final         Hyperlipidemia: Prescribed atorvastatin 20 mg daily    -Due for lipids    Recent Labs   Lab  Test 04/29/21  1122 11/24/20  1332   CHOL 196 225*   HDL 39* 36*    139*   TRIG 283* 402*       Today's Vitals: /81   Pulse 109   ----------------      I spent 30 minutes with this patient today. All changes were made via collaborative practice agreement with Rolanda Smith MD. A copy of the visit note was provided to the patient's provider(s).    A summary of these recommendations was given to the patient.    Lamonte Wright, MacarenaD  Medication Therapy Management (MTM) Pharmacist  [facility]           Medication Therapy Recommendations  Type 2 diabetes mellitus with hyperglycemia, with long-term current use of insulin (H)    Current Medication: insulin glargine (LANTUS SOLOSTAR) 100 UNIT/ML pen (Discontinued)   Rationale: Dose too low - Dosage too low - Effectiveness   Recommendation: Increase Dose   Status: Accepted per CPA          Current Medication: metFORMIN (GLUCOPHAGE) 500 MG tablet (Discontinued)   Rationale: Undesirable effect - Adverse medication event - Safety   Recommendation: Change Medication Formulation  - metFORMIN 500 MG 24 hr tablet   Status: Accepted per CPA

## 2023-04-12 ENCOUNTER — OFFICE VISIT (OUTPATIENT)
Dept: PHARMACY | Facility: CLINIC | Age: 28
End: 2023-04-12
Payer: COMMERCIAL

## 2023-04-12 VITALS — HEART RATE: 109 BPM | SYSTOLIC BLOOD PRESSURE: 115 MMHG | DIASTOLIC BLOOD PRESSURE: 81 MMHG

## 2023-04-12 DIAGNOSIS — E78.5 HYPERLIPIDEMIA, UNSPECIFIED HYPERLIPIDEMIA TYPE: ICD-10-CM

## 2023-04-12 DIAGNOSIS — Z79.4 TYPE 2 DIABETES MELLITUS WITH HYPERGLYCEMIA, WITH LONG-TERM CURRENT USE OF INSULIN (H): Primary | ICD-10-CM

## 2023-04-12 DIAGNOSIS — E11.65 TYPE 2 DIABETES MELLITUS WITH HYPERGLYCEMIA, WITH LONG-TERM CURRENT USE OF INSULIN (H): Primary | ICD-10-CM

## 2023-04-12 PROCEDURE — 99607 MTMS BY PHARM ADDL 15 MIN: CPT | Performed by: PHARMACIST

## 2023-04-12 PROCEDURE — 99606 MTMS BY PHARM EST 15 MIN: CPT | Performed by: PHARMACIST

## 2023-04-12 RX ORDER — INSULIN GLARGINE 100 [IU]/ML
70 INJECTION, SOLUTION SUBCUTANEOUS EVERY MORNING
Qty: 30 ML | Refills: 3 | Status: SHIPPED | OUTPATIENT
Start: 2023-04-12 | End: 2023-07-07

## 2023-04-12 RX ORDER — METFORMIN HCL 500 MG
500 TABLET, EXTENDED RELEASE 24 HR ORAL 2 TIMES DAILY WITH MEALS
Qty: 180 TABLET | Refills: 1 | Status: SHIPPED | OUTPATIENT
Start: 2023-04-12 | End: 2023-10-06

## 2023-04-25 ENCOUNTER — TELEPHONE (OUTPATIENT)
Dept: ENDOCRINOLOGY | Facility: CLINIC | Age: 28
End: 2023-04-25
Payer: COMMERCIAL

## 2023-04-25 NOTE — PROGRESS NOTES
Outcome for 04/25/23 5:01 PM: Left Voicemail - used larissa  id# [id number]  Mable Osullivan MA  Outcome for 04/26/23 10:27 AM: Left Voicemail   Jana Umana LPN   Outcome for 04/26/23 1:33 PM: Data obtained via phone and located below  Jana Umana LPN       Patient is showing 4/5 [place] met. A1c not in range   Mable Osullivan MA     4/26  12:41 PM- 134  8:22 AM- 244    4/25  8:40 PM - 194  3:17 PM- 136  8:04 AM- 211    4/24  1:02 PM- 222  8:02 AM- 319    4/23  8:31 PM- 289  8:04 AM- 242    4/22  1:54 PM- 176  8:24 AM- 321    4/21  9:08 AM- 289    4/20  1:40 PM- 350  8:02 AM- 297    4/19  8:02 AM- 227    4/18  12:37 PM- 284  7:58 AM- 279    4/17  8:39 PM- 159  1:10 PM- 186  8:02 AM- 260    4/16  6:04 PM- 383  8:02 AM- 272    4/15  MISSED    4/14  8:03 AM- 168    4/13  7:11 PM- 257  1:31 PM- 212  8:02 AM- 218      No show for virtual visit today.  No charge today.  Cheri Key PA-C

## 2023-04-25 NOTE — TELEPHONE ENCOUNTER
Called patient and left voicemail. Patient has an appointment on 4/27/23. Need patient to upload their Andrews device to site for provider to review prior to their appointment.  Mable Osullivan MA

## 2023-04-26 NOTE — TELEPHONE ENCOUNTER
Called patient and left voicemail. Patient has an appointment on 4/27/2023. Need patient to upload their Andrews device to site for provider to review prior to their appointment.    Jana Umana LPN 04/26/23 10:25 AM

## 2023-04-26 NOTE — TELEPHONE ENCOUNTER
Spoke with patient in regards to obtaining ronnie data for appointment tomorrow. Patient is unable to upload ronnie at home. Obtained ronnie readings verbally with  over the phone and documented below.    4/26  12:41 PM- 134  8:22 AM- 244    4/25  8:40 PM - 194  3:17 PM- 136  8:04 AM- 211    4/24  1:02 PM- 222  8:02 AM- 319    4/23  8:31 PM- 289  8:04 AM- 242    4/22  1:54 PM- 176  8:24 AM- 321    4/21  9:08 AM- 289    4/20  1:40 PM- 350  8:02 AM- 297    4/19  8:02 AM- 227    4/18  12:37 PM- 284  7:58 AM- 279    4/17  8:39 PM- 159  1:10 PM- 186  8:02 AM- 260    4/16  6:04 PM- 383  8:02 AM- 272    4/15  MISSED    4/14  8:03 AM- 168    4/13  7:11 PM- 257  1:31 PM- 212  8:02 AM- 218    Jana Umana LPN 04/26/23 2:06 PM

## 2023-04-27 ENCOUNTER — VIRTUAL VISIT (OUTPATIENT)
Dept: ENDOCRINOLOGY | Facility: CLINIC | Age: 28
End: 2023-04-27
Payer: COMMERCIAL

## 2023-04-27 DIAGNOSIS — E11.65 TYPE 2 DIABETES MELLITUS WITH HYPERGLYCEMIA, UNSPECIFIED WHETHER LONG TERM INSULIN USE (H): Primary | ICD-10-CM

## 2023-04-27 PROCEDURE — 99207 PR NO BILLABLE SERVICE THIS VISIT: CPT | Mod: 93 | Performed by: PHYSICIAN ASSISTANT

## 2023-04-27 NOTE — NURSING NOTE
Is the patient currently in the state of [place]? YES    Visit mode:TELEPHONE    If the visit is dropped, the patient can be reconnected by: TELEPHONE VISIT: Phone number: [phone]    Will anyone else be joining the visit? NO      How would you like to obtain your AVS? MyChart    Are changes needed to the allergy or medication list? NO    Reason for visit: Follow Up and Video Visit

## 2023-04-27 NOTE — LETTER
4/27/2023       RE: Tyson Skelton  [address]  [place] [address]     Dear Colleague,    Thank you for referring your patient, Tyson Skelton, to the [facility] [place] [facility]. Please see a copy of my visit note below.    Outcome for 04/25/23 5:01 PM: Left Voicemail - used larissa  id# [id number]  Mable Osullivan MA  Outcome for 04/26/23 10:27 AM: Left Voicemail   Jana Umana LPN   Outcome for 04/26/23 1:33 PM: Data obtained via phone and located below  Jana Umana LPN       Patient is showing 4/5 [place]CM met. A1c not in range   Mbale Osullivan MA     4/26  12:41 PM- 134  8:22 AM- 244    4/25  8:40 PM - 194  3:17 PM- 136  8:04 AM- 211    4/24  1:02 PM- 222  8:02 AM- 319    4/23  8:31 PM- 289  8:04 AM- 242    4/22  1:54 PM- 176  8:24 AM- 321    4/21  9:08 AM- 289    4/20  1:40 PM- 350  8:02 AM- 297    4/19  8:02 AM- 227    4/18  12:37 PM- 284  7:58 AM- 279    4/17  8:39 PM- 159  1:10 PM- 186  8:02 AM- 260    4/16  6:04 PM- 383  8:02 AM- 272    4/15  MISSED    4/14  8:03 AM- 168    4/13  7:11 PM- 257  1:31 PM- 212  8:02 AM- 218      No show for virtual visit today.  No charge today.  Cheri Key PA-C          Again, thank you for allowing me to participate in the care of your patient.      Sincerely,    Cheri Key PA-C

## 2023-05-09 ENCOUNTER — APPOINTMENT (OUTPATIENT)
Dept: CT IMAGING | Facility: HOSPITAL | Age: 28
End: 2023-05-09
Attending: EMERGENCY MEDICINE
Payer: COMMERCIAL

## 2023-05-09 ENCOUNTER — APPOINTMENT (OUTPATIENT)
Dept: RADIOLOGY | Facility: HOSPITAL | Age: 28
End: 2023-05-09
Attending: EMERGENCY MEDICINE
Payer: COMMERCIAL

## 2023-05-09 ENCOUNTER — HOSPITAL ENCOUNTER (EMERGENCY)
Facility: HOSPITAL | Age: 28
Discharge: HOME | End: 2023-05-09
Attending: EMERGENCY MEDICINE | Admitting: EMERGENCY MEDICINE
Payer: COMMERCIAL

## 2023-05-09 VITALS
BODY MASS INDEX: 30.84 KG/M2 | WEIGHT: 153 LBS | DIASTOLIC BLOOD PRESSURE: 73 MMHG | HEART RATE: 101 BPM | RESPIRATION RATE: 16 BRPM | OXYGEN SATURATION: 97 % | TEMPERATURE: 99.9 F | HEIGHT: 59 IN | SYSTOLIC BLOOD PRESSURE: 129 MMHG

## 2023-05-09 DIAGNOSIS — N30.00 ACUTE CYSTITIS WITHOUT HEMATURIA: ICD-10-CM

## 2023-05-09 DIAGNOSIS — J18.9 PNEUMONIA OF RIGHT MIDDLE LOBE DUE TO INFECTIOUS ORGANISM: ICD-10-CM

## 2023-05-09 LAB
ALBUMIN UR-MCNC: 300 MG/DL
ANION GAP SERPL CALCULATED.3IONS-SCNC: 13 MMOL/L (ref 7–15)
APPEARANCE UR: CLEAR
BACTERIA #/AREA URNS HPF: ABNORMAL /HPF
BILIRUB UR QL STRIP: NEGATIVE
BUN SERPL-MCNC: 8.7 MG/DL (ref 6–20)
CALCIUM SERPL-MCNC: 8.9 MG/DL (ref 8.6–10)
CHLORIDE SERPL-SCNC: 99 MMOL/L (ref 98–107)
COLOR UR AUTO: ABNORMAL
CREAT SERPL-MCNC: 0.65 MG/DL (ref 0.51–0.95)
DEPRECATED HCO3 PLAS-SCNC: 24 MMOL/L (ref 22–29)
ERYTHROCYTE [DISTWIDTH] IN BLOOD BY AUTOMATED COUNT: 12.7 % (ref 10–15)
FLUAV RNA SPEC QL NAA+PROBE: NEGATIVE
FLUBV RNA RESP QL NAA+PROBE: NEGATIVE
GFR SERPL CREATININE-BSD FRML MDRD: >90 ML/MIN/1.73M2
GLUCOSE SERPL-MCNC: 136 MG/DL (ref 70–99)
GLUCOSE UR STRIP-MCNC: 30 MG/DL
HCG UR QL: NEGATIVE
HCT VFR BLD AUTO: 41.7 % (ref 35–47)
HGB BLD-MCNC: 14 G/DL (ref 11.7–15.7)
HGB UR QL STRIP: ABNORMAL
HOLD SPECIMEN: NORMAL
HYALINE CASTS: 4 /LPF
KETONES UR STRIP-MCNC: NEGATIVE MG/DL
LEUKOCYTE ESTERASE UR QL STRIP: NEGATIVE
MAGNESIUM SERPL-MCNC: 1.6 MG/DL (ref 1.7–2.3)
MCH RBC QN AUTO: 28.8 PG (ref 26.5–33)
MCHC RBC AUTO-ENTMCNC: 33.6 G/DL (ref 31.5–36.5)
MCV RBC AUTO: 86 FL (ref 78–100)
MUCOUS THREADS #/AREA URNS LPF: PRESENT /LPF
NITRATE UR QL: NEGATIVE
PH UR STRIP: 7 [PH] (ref 5–7)
PLATELET # BLD AUTO: 312 10E3/UL (ref 150–450)
POTASSIUM SERPL-SCNC: 4 MMOL/L (ref 3.4–5.3)
RBC # BLD AUTO: 4.86 10E6/UL (ref 3.8–5.2)
RBC URINE: 5 /HPF
RSV RNA SPEC NAA+PROBE: NEGATIVE
SARS-COV-2 RNA RESP QL NAA+PROBE: NEGATIVE
SODIUM SERPL-SCNC: 136 MMOL/L (ref 136–145)
SP GR UR STRIP: 1.02 (ref 1–1.03)
SQUAMOUS EPITHELIAL: 3 /HPF
UROBILINOGEN UR STRIP-MCNC: <2 MG/DL
WBC # BLD AUTO: 20.6 10E3/UL (ref 4–11)
WBC URINE: 14 /HPF

## 2023-05-09 PROCEDURE — 71046 X-RAY EXAM CHEST 2 VIEWS: CPT

## 2023-05-09 PROCEDURE — 96366 THER/PROPH/DIAG IV INF ADDON: CPT

## 2023-05-09 PROCEDURE — 83735 ASSAY OF MAGNESIUM: CPT | Performed by: EMERGENCY MEDICINE

## 2023-05-09 PROCEDURE — 96361 HYDRATE IV INFUSION ADD-ON: CPT

## 2023-05-09 PROCEDURE — C9803 HOPD COVID-19 SPEC COLLECT: HCPCS

## 2023-05-09 PROCEDURE — 258N000003 HC RX IP 258 OP 636: Performed by: EMERGENCY MEDICINE

## 2023-05-09 PROCEDURE — 250N000011 HC RX IP 250 OP 636: Performed by: EMERGENCY MEDICINE

## 2023-05-09 PROCEDURE — 87637 SARSCOV2&INF A&B&RSV AMP PRB: CPT | Performed by: EMERGENCY MEDICINE

## 2023-05-09 PROCEDURE — 80048 BASIC METABOLIC PNL TOTAL CA: CPT | Performed by: EMERGENCY MEDICINE

## 2023-05-09 PROCEDURE — 250N000013 HC RX MED GY IP 250 OP 250 PS 637: Performed by: EMERGENCY MEDICINE

## 2023-05-09 PROCEDURE — 99285 EMERGENCY DEPT VISIT HI MDM: CPT | Mod: CS,25

## 2023-05-09 PROCEDURE — 85027 COMPLETE CBC AUTOMATED: CPT | Performed by: EMERGENCY MEDICINE

## 2023-05-09 PROCEDURE — 70450 CT HEAD/BRAIN W/O DYE: CPT

## 2023-05-09 PROCEDURE — 81025 URINE PREGNANCY TEST: CPT | Performed by: EMERGENCY MEDICINE

## 2023-05-09 PROCEDURE — 96375 TX/PRO/DX INJ NEW DRUG ADDON: CPT

## 2023-05-09 PROCEDURE — 87086 URINE CULTURE/COLONY COUNT: CPT | Performed by: EMERGENCY MEDICINE

## 2023-05-09 PROCEDURE — 36415 COLL VENOUS BLD VENIPUNCTURE: CPT | Performed by: STUDENT IN AN ORGANIZED HEALTH CARE EDUCATION/TRAINING PROGRAM

## 2023-05-09 PROCEDURE — 81001 URINALYSIS AUTO W/SCOPE: CPT | Performed by: EMERGENCY MEDICINE

## 2023-05-09 PROCEDURE — 96365 THER/PROPH/DIAG IV INF INIT: CPT

## 2023-05-09 RX ORDER — LEVOFLOXACIN 500 MG/1
500 TABLET, FILM COATED ORAL DAILY
Qty: 10 TABLET | Refills: 0 | Status: SHIPPED | OUTPATIENT
Start: 2023-05-09 | End: 2023-05-19

## 2023-05-09 RX ORDER — CEFTRIAXONE 1 G/1
1 INJECTION, POWDER, FOR SOLUTION INTRAMUSCULAR; INTRAVENOUS ONCE
Status: COMPLETED | OUTPATIENT
Start: 2023-05-09 | End: 2023-05-09

## 2023-05-09 RX ORDER — MAGNESIUM OXIDE 400 MG/1
400 TABLET ORAL DAILY
Qty: 10 TABLET | Refills: 0 | Status: SHIPPED | OUTPATIENT
Start: 2023-05-09 | End: 2023-11-15

## 2023-05-09 RX ORDER — ONDANSETRON 2 MG/ML
4 INJECTION INTRAMUSCULAR; INTRAVENOUS ONCE
Status: COMPLETED | OUTPATIENT
Start: 2023-05-09 | End: 2023-05-09

## 2023-05-09 RX ORDER — ACETAMINOPHEN 325 MG/1
975 TABLET ORAL ONCE
Status: COMPLETED | OUTPATIENT
Start: 2023-05-09 | End: 2023-05-09

## 2023-05-09 RX ORDER — SODIUM CHLORIDE 9 MG/ML
INJECTION, SOLUTION INTRAVENOUS CONTINUOUS
Status: DISCONTINUED | OUTPATIENT
Start: 2023-05-09 | End: 2023-05-09 | Stop reason: HOSPADM

## 2023-05-09 RX ORDER — ACETAMINOPHEN 325 MG/1
650 TABLET ORAL ONCE
Status: COMPLETED | OUTPATIENT
Start: 2023-05-09 | End: 2023-05-09

## 2023-05-09 RX ADMIN — ACETAMINOPHEN 650 MG: 325 TABLET ORAL at 10:30

## 2023-05-09 RX ADMIN — ONDANSETRON 4 MG: 2 INJECTION INTRAMUSCULAR; INTRAVENOUS at 09:19

## 2023-05-09 RX ADMIN — CEFTRIAXONE SODIUM 1 G: 1 INJECTION, POWDER, FOR SOLUTION INTRAMUSCULAR; INTRAVENOUS at 10:39

## 2023-05-09 RX ADMIN — SODIUM CHLORIDE 1000 ML: 9 INJECTION, SOLUTION INTRAVENOUS at 10:32

## 2023-05-09 RX ADMIN — ACETAMINOPHEN 975 MG: 325 TABLET ORAL at 09:20

## 2023-05-09 RX ADMIN — SODIUM CHLORIDE 1000 ML: 9 INJECTION, SOLUTION INTRAVENOUS at 09:19

## 2023-05-09 ASSESSMENT — ENCOUNTER SYMPTOMS
DIZZINESS: 1
COUGH: 0
NAUSEA: 1
HEADACHES: 1
FEVER: 1
FATIGUE: 1
ABDOMINAL PAIN: 0

## 2023-05-09 NOTE — DISCHARGE INSTRUCTIONS
Encourage rest and fluids.  Tylenol or ibuprofen every 6 hours as needed for fever, aches and pains.  Increase fluid intake to treat dehydration.  Levaquin once daily for the next 10 days.  Magnesium oxide once daily for the next 10 days.  Do not take these 2 medications at the same time.  See your clinic for follow-up in the next week.  See them sooner or return if worse or problems.

## 2023-05-09 NOTE — ED PROVIDER NOTES
EMERGENCY DEPARTMENT ENCOUNTER      NAME: Tyson Skelton  AGE: 28 year old female  YOB: 1995  MRN: [id number]  EVALUATION DATE & TIME: No admission date for patient encounter.    PCP: Parish Daniels    ED PROVIDER: Grayson Lao M.D.      Chief Complaint   Patient presents with     Flu Symptoms         FINAL IMPRESSION:  1.  Acute urinary tract infection.  2.  Acute right middle lobe pneumonia.      ED COURSE & MEDICAL DECISION MAKING:    10:15 AM I met with the patient to gather history and to perform my initial exam. We discussed plans for the ED course, including diagnostic testing and treatment. PPE worn: cloth mask.  Patient with symptoms since yesterday of feeling tired, dizzy, headache, nausea, fever.  No obvious source of infection noted.  12:23 PM.  Viral testing was negative.  Pregnancy test negative.  Chemistries negative.  Magnesium slightly low at 1.6.  Urinalysis showing multiple white cells and infection.  Leukocytosis is noted.  Head CT unremarkable.  Chest x-ray showing right middle lobe infiltrates.  Patient will be started on Levaquin to cover both UTI and pneumonia.  Also magnesium supplementation.  Patient in agreement with the plan.  Heart rate down to 106 after hydration.    Pertinent Labs & Imaging studies reviewed. (See chart for details)    28 year old female presents to the Emergency Department for evaluation of fever.    At the conclusion of the encounter I discussed the results of all of the tests and the disposition. The questions were answered. The patient or family acknowledged understanding and was agreeable with the care plan.          Medical Decision Making    History:    Supplemental history from: Documented in chart, if applicable    External Record(s) reviewed: Both inpatient and outpatient computer records reviewed.    Work Up:    Chart documentation includes differential considered and any EKGs or imaging independently interpreted by provider, where specified.   Differential diagnosis includes a pneumonia, urinary tract infection, etc.    In additional to work up documented, I considered the following work up: Documented in chart, if applicable.    External consultation:    Discussion of management with another provider: Documented in chart, if applicable    Complicating factors:    Care impacted by chronic illness: Diabetes    Care affected by social determinants of health: N/A    Disposition considerations: Evaluation proceeding.  Depending on results, patient may require admission.      MEDICATIONS GIVEN IN THE EMERGENCY:  Medications   0.9% sodium chloride BOLUS (1,000 mLs Intravenous $New Bag 5/9/23 0919)   ondansetron (ZOFRAN) injection 4 mg (4 mg Intravenous $Given 5/9/23 0919)   acetaminophen (TYLENOL) tablet 975 mg (975 mg Oral $Given 5/9/23 0920)       NEW PRESCRIPTIONS STARTED AT TODAY'S ER VISIT  New Prescriptions    No medications on file          =================================================================    HPI    Patient information was obtained from: the patient    Use of : N/A      Tyson Skelton is a 28 year old female with a pertinent history of type II DM, diabetic ketoacidosis, HLD, E. Coli sepsis, CKD, and hyponatremia, who presents to this ED for evaluation of flu-like symptoms.     Yesterday, the patient began experiencing fatigue and headache. She endorses associated nausea, fever, and dizziness as well. She notes that she is not experiencing any pain right now. She has been taking tylenol without sufficient relief. No tylenol yet today. The patient denies abdominal pain, cough, and any other symptoms at this time.     She does not identify any waxing or waning symptoms otherwise, exacerbating or alleviating features,associated symptoms except as mentioned. She denies any pain related complaints.    REVIEW OF SYSTEMS   Review of Systems   Constitutional: Positive for fatigue and fever.   Respiratory: Negative for cough.   "  Gastrointestinal: Positive for nausea. Negative for abdominal pain.   Neurological: Positive for dizziness and headaches.   All other systems reviewed and are negative.       PAST MEDICAL HISTORY:  Past Medical History:   Diagnosis Date     Chronic kidney disease      Diabetes mellitus, type II (H)      Hyperglycemia without ketosis 2010    hospitalized at [facility]        PAST SURGICAL HISTORY:  No past surgical history on file.        CURRENT MEDICATIONS:    alcohol swab prep pads  atorvastatin (LIPITOR) 20 MG tablet  blood glucose (ACCU-CHEK SOFTCLIX) lancing device  blood glucose (NO BRAND SPECIFIED) test strip  blood glucose monitoring (SOFTCLIX) lancets  Continuous Blood Gluc Sensor (FREESTYLE DUC 2 SENSOR) MISC  insulin aspart (NOVOLOG FLEXPEN) 100 UNIT/ML pen  insulin glargine (LANTUS SOLOSTAR) 100 UNIT/ML pen  insulin pen needle (32G X 4 MM) 32G X 4 MM miscellaneous  metFORMIN (GLUCOPHAGE XR) 500 MG 24 hr tablet        ALLERGIES:  No Known Allergies    FAMILY HISTORY:  Family History   Problem Relation Age of Onset     Diabetes Mother        SOCIAL HISTORY:   Social History     Socioeconomic History     Marital status:    Tobacco Use     Smoking status: Never     Passive exposure: Yes     Smokeless tobacco: Never   Substance and Sexual Activity     Alcohol use: No     Drug use: No   Social History Narrative    Has one child   No drugs, alcohol, or tobacco.    VITALS:  BP (!) 137/97   Pulse (!) 129   Temp 99.9  F (37.7  C) (Oral)   Resp 20   Ht 1.499 m (4' 11\")   Wt 69.4 kg (153 lb)   SpO2 96%   BMI 30.90 kg/m      PHYSICAL EXAM    Vital Signs:  BP (!) 137/97   Pulse (!) 129   Temp 99.9  F (37.7  C) (Oral)   Resp 20   Ht 1.499 m (4' 11\")   Wt 69.4 kg (153 lb)   SpO2 96%   BMI 30.90 kg/m    General:  On entering the room  is in no apparent distress.    Neck:  Neck supple with full range of motion and nontender.  Negative Kernig and presents to signs.  No " meningismus.  Back:  Back and spine are nontender.  No costovertebral angle tenderness.    HEENT:  Oropharynx clear with moist mucous membranes.  HEENT unremarkable.    Pulmonary:  Chest clear to auscultation without rhonchi rales or wheezing.    Cardiovascular:  Cardiac regular rate and rhythm without murmurs rubs or gallops.    Abdomen:  Abdomen soft nontender.  There is no rebound or guarding.    Muskuloskeletal:  she moves all 4 without any difficulty and has normal neurovascular exams.  Extremities without clubbing, cyanosis, or edema.  Legs and calves are nontender.    Neuro:  she is alert and oriented ×3 and moves all extremities symmetrically.    Psych:  Normal affect.    Skin:  Unremarkable and warm and dry.       LAB:  All pertinent labs reviewed and interpreted.  Labs Ordered and Resulted from Time of ED Arrival to Time of ED Departure   CBC WITH PLATELETS - Abnormal       Result Value    WBC Count 20.6 (*)     RBC Count 4.86      Hemoglobin 14.0      Hematocrit 41.7      MCV 86      MCH 28.8      MCHC 33.6      RDW 12.7      Platelet Count 312     BASIC METABOLIC PANEL - Abnormal    Sodium 136      Potassium 4.0      Chloride 99      Carbon Dioxide (CO2) 24      Anion Gap 13      Urea Nitrogen 8.7      Creatinine 0.65      Calcium 8.9      Glucose 136 (*)     GFR Estimate >90     MAGNESIUM - Abnormal    Magnesium 1.6 (*)    ROUTINE UA WITH MICROSCOPIC REFLEX TO CULTURE - Abnormal    Color Urine Light Yellow      Appearance Urine Clear      Glucose Urine 30 (*)     Bilirubin Urine Negative      Ketones Urine Negative      Specific Gravity Urine 1.016      Blood Urine 0.1 mg/dL (*)     pH Urine 7.0      Protein Albumin Urine 300 (*)     Urobilinogen Urine <2.0      Nitrite Urine Negative      Leukocyte Esterase Urine Negative      Bacteria Urine Few (*)     Mucus Urine Present (*)     RBC Urine 5 (*)     WBC Urine 14 (*)     Squamous Epithelials Urine 3 (*)     Hyaline Casts Urine 4 (*)    INFLUENZA A/B,  RSV, & SARS-COV2 PCR - Normal    Influenza A PCR Negative      Influenza B PCR Negative      RSV PCR Negative      SARS CoV2 PCR Negative     HCG QUALITATIVE URINE - Normal    hCG Urine Qualitative Negative     URINE CULTURE       RADIOLOGY:  Reviewed all pertinent imaging. Please see official radiology report.  Head CT w/o contrast   Final Result   IMPRESSION:   1.  Overall unremarkable head CT with no acute intracranial abnormality.      2.  Please note that noncontrast head CT is insensitive for findings relating to meningitis, which may be clinically suspected given the submitted clinical history. Contrast-enhanced brain MRI could be considered if clinically warranted.      XR Chest 2 Views   Final Result   IMPRESSION: Low lung volumes. Patchy groundglass opacities are seen in the right midlung on the PA view which may reflect an early pneumonitis.      Left lung is clear. No effusions. Heart and pulmonary vascularity are normal.                 EKG:          PROCEDURES:         I, Yesenia Easton, am serving as a scribe to document services personally performed by Dr. Lao based on my observation and the provider's statements to me. IGrayson MD attest that Yesenia Easton is acting in a scribe capacity, has observed my performance of the services and has documented them in accordance with my direction.    Grayson Lao M.D.  Emergency Medicine  [facility] EMERGENCY DEPARTMENT  [facility] [id number] [phone]  Dept: [phone]       Grayson Lao MD  05/09/23 1220       Grayson Lao MD  05/09/23 0485

## 2023-05-09 NOTE — ED TRIAGE NOTES
Pt is here as she has felt tired, dizzy , has had a headache,nausea and fever since last night.  Temp is 99.9 today.  Hr 133. Last had tylenol last evening.

## 2023-05-10 LAB — BACTERIA UR CULT: NORMAL

## 2023-05-26 DIAGNOSIS — E11.65 TYPE 2 DIABETES MELLITUS WITH HYPERGLYCEMIA, WITH LONG-TERM CURRENT USE OF INSULIN (H): ICD-10-CM

## 2023-05-26 DIAGNOSIS — Z79.4 TYPE 2 DIABETES MELLITUS WITH HYPERGLYCEMIA, WITH LONG-TERM CURRENT USE OF INSULIN (H): ICD-10-CM

## 2023-06-20 ENCOUNTER — TELEPHONE (OUTPATIENT)
Dept: ENDOCRINOLOGY | Facility: CLINIC | Age: 28
End: 2023-06-20
Payer: COMMERCIAL

## 2023-06-20 NOTE — TELEPHONE ENCOUNTER
Called patient and left voicemail. Patient has an appointment on 6/22/23. Need patient to upload their Andrews device to site for provider to review prior to their appointment.  Mable Osullivan MA

## 2023-06-20 NOTE — PROGRESS NOTES
Virtual Visit Details    Type of service:  Telephone Visit   Phone call duration:  minutes     Outcome for 06/20/23 2:23 PM: Left Voicemail fredrick dias  id# [id number]  Mable Osullivan MA  Outcome for 06/21/23 11:23 AM: Left Voicemail   Pia Razo MA  Outcome for 06/22/23 9:22 AM: Data obtained via phone and located below- ronnie readings. Pt unable to upload from home.   Mable Osullivan MA    6/22/23  8:42am: 157    6/21/23  6:16pm: 267  3:16pm: 256  8:24am: 230    6/20/23  1:57pm: 167  9:48am: 251    6/19/23  5:17pm: 63  1:53pm: 129  9:44am: 239    6/18/23  7:24pm: 167  1:24pm: 211  9:58am: 224    6/17/23  10:00am: 150    6/16/23  2:34pm: 173  7:52am: 205    6/15/23  1:49pm: 372  8:05am: 245      PATIENT TELLS ME HER DIABETES IS BEING MANAGED BY HER PCP STAFF.  SHE PREFERS TO SEE HER PCP STAFF/PHARM D FOR HER DIABETES CARE.  NO CHARGE TODAY.  SUSHMA PERES PA-C

## 2023-06-21 NOTE — TELEPHONE ENCOUNTER
Attempted to reach patient for upcoming appointment; ronnie data needed. No answer, LM on VM to call office back.     Appointment with Cheri Key PA-C on 6/22/23    Pia Razo MA

## 2023-06-21 NOTE — TELEPHONE ENCOUNTER
Patient states she does not know how to upload MoFuse devise but will give the BG numbers she has been saving

## 2023-06-22 ENCOUNTER — VIRTUAL VISIT (OUTPATIENT)
Dept: ENDOCRINOLOGY | Facility: CLINIC | Age: 28
End: 2023-06-22
Payer: COMMERCIAL

## 2023-06-22 DIAGNOSIS — E11.65 TYPE 2 DIABETES MELLITUS WITH HYPERGLYCEMIA, UNSPECIFIED WHETHER LONG TERM INSULIN USE (H): Primary | ICD-10-CM

## 2023-06-22 PROCEDURE — 99207 PR NO BILLABLE SERVICE THIS VISIT: CPT | Mod: 93 | Performed by: PHYSICIAN ASSISTANT

## 2023-06-22 NOTE — NURSING NOTE
Is the patient currently in the state of [place]? YES    Visit mode:TELEPHONE    If the visit is dropped, the patient can be reconnected by: VIDEO VISIT: Text to cell phone: [phone]    Will anyone else be joining the visit? NO      How would you like to obtain your AVS? MyChart    Are changes needed to the allergy or medication list? YES: Pt states she is not taking magnesium oxide.     Reason for visit: RECHECK

## 2023-06-22 NOTE — LETTER
6/22/2023       RE: Tyson Skelton  [address]  [place] [address]     Dear Colleague,    Thank you for referring your patient, Tyson Skelton, to the [facility]. Please see a copy of my visit note below.    Virtual Visit Details    Type of service:  Telephone Visit   Phone call duration:  minutes     Outcome for 06/20/23 2:23 PM: Left Voicemail fredrick dias  id# [id number]  Mable Osullivan MA  Outcome for 06/21/23 11:23 AM: Left Voicemail   Pia Razo MA  Outcome for 06/22/23 9:22 AM: Data obtained via phone and located below- ronnie readings. Pt unable to upload from home.   Mable Osullivan MA    6/22/23  8:42am: 157    6/21/23  6:16pm: 267  3:16pm: 256  8:24am: 230    6/20/23  1:57pm: 167  9:48am: 251    6/19/23  5:17pm: 63  1:53pm: 129  9:44am: 239    6/18/23  7:24pm: 167  1:24pm: 211  9:58am: 224    6/17/23  10:00am: 150    6/16/23  2:34pm: 173  7:52am: 205    6/15/23  1:49pm: 372  8:05am: 245      PATIENT TELLS ME HER DIABETES IS BEING MANAGED BY HER PCP STAFF.  SHE PREFERS TO SEE HER PCP STAFF/PHARM D FOR HER DIABETES CARE.  NO CHARGE TODAY.  CHERI KEY PA-C      Again, thank you for allowing me to participate in the care of your patient.      Sincerely,    Cheri Key PA-C

## 2023-07-01 DIAGNOSIS — Z76.0 ENCOUNTER FOR MEDICATION REFILL: ICD-10-CM

## 2023-07-01 NOTE — TELEPHONE ENCOUNTER
"Routing refill request to provider for review/approval because:  Labs not current:  LDL  Patient needs to be seen   Break in medication    Last Written Prescription Date:  10/8/2022  Last Fill Quantity: 90,  # refills: 0   Last office visit provider:  10/19/2022     Requested Prescriptions   Pending Prescriptions Disp Refills     atorvastatin (LIPITOR) 20 MG tablet [Pharmacy Med Name: ATORVASTATIN CALCIUM 20 MG 20 Tablet] 90 tablet 3     Sig: TAKE 1 TABLET (20 MG TOTAL) BY MOUTH DAILY FOR CHOLESTEROL       Statins Protocol Failed - 7/1/2023 10:13 AM        Failed - LDL on file in past 12 months     Recent Labs   Lab Test 04/29/21  1122                Failed - Recent (12 mo) or future (30 days) visit within the authorizing provider's specialty     Patient has had an office visit with the authorizing provider or a provider within the authorizing providers department within the previous 12 mos or has a future within next 30 days. See \"Patient Info\" tab in inbasket, or \"Choose Columns\" in Meds & Orders section of the refill encounter.              Passed - No abnormal creatine kinase in past 12 months     Recent Labs   Lab Test 08/02/20  0351   CKT 32                Passed - Medication is active on med list        Passed - Patient is age 18 or older        Passed - No active pregnancy on record        Passed - No positive pregnancy test in past 12 months             Lucy Roy RN 07/01/23 6:22 PM  "

## 2023-07-05 NOTE — PROGRESS NOTES
Medication Therapy Management (MTM) Encounter    ASSESSMENT:                            Medication Adherence/Access: Does not missed insulin doses. Consider phone alarms for med reminders.        Type 2 Diabetes:  A1C above goal <7%. Did not recheck today as CGM readings are elevated and A1C likely still not at goal. Fasting blood glucose above goal . Prandial sugars above goal <180. Likely diet related - patient is have very high carb meals with large portions of rice. Probably about 4 times the recommended amount per meal. Reviewed recommended portion sizes. Encouraged patient to have more meats and veggies.     Will hold off on further insulin titrations at this time.     Currently managing with metformin and insulin and unable to utilize other agents due to family planning.        Will try ER formulation of metformin to see if that is more tolerable and allow patient to titrate doses (did have in the past so may not be, but willing to retrial today). Likely very insulin resistant based on current doses.       Hyperlipidemia: Appropriately using statin due to history of LDL >190. Last LDL at goal <100. Discontinue in the event that the patient becomes pregnant.      PLAN:                              1. Continue current insulin doses. Focus on reducing portions of rice to approximately 1 cup per meal.   2. Try again to increase metformin to twice daily.        Follow-up: Return in about 4 weeks (around 8/4/2023) for Medication Management Pharmacist, in person.     SUBJECTIVE/OBJECTIVE:                          Tyson Skelton is a 28 year old female coming in for a follow-up visit. She was referred to me from Rolanda Smith MD. Declined . Today's visit is a follow-up MTM visit from 04/12/2023.     Reason for visit: Diabetes management.     Allergies/ADRs: Reviewed in chart  Past Medical History: Reviewed in chart  Tobacco: She reports that she has never smoked. She has been exposed to  tobacco smoke. She has never used smokeless tobacco.  Alcohol:  reports no history of alcohol use.        Medication Adherence/Access:       Did not bring medicines. Brought her meter.   Forgets insulin 1-2 times per week.            Type 2 Diabetes:  Prescribed NovoLog 5 units 3 times daily with meals + 1 to 7 units at bedtime, Lantus 40 units daily (provided instructions to titrate at last visit), metformin 500 mg ER twice daily (switched from IR at last visit).     States she's using 100 units of Lantus daily. Demonstrates on demo pen.   Still only using 1 tab metformin.                   Is trying to have another child.       Blood sugar monitoring: CGM. Ranges (based on glucometer readings):     Only 2 days worth of data from sensor. Has trouble keeping it on because her child will pull it off.       14 day average: 243   , 326, 163,   PM: 160, 399, 407, 305         No episodes of hypoglcyemia  No polyuria, polydipsia.         Diet/Exercise: Typically eating rice, noodles, meats, veggies. Eating very high carb meals, with lots of rice. More rice than noodles. 2-3 times per day. Has veggies with every meal, but meat only sometimes. Doesn't have much fruit. Mostly drinks water. Sometimes diet coke. Has tea or coffee - less than weekly.   Aspirin: No  Statin: Yes: Atorvastatin 20 mg daily  ACEi/ARB: No.     Hemoglobin A1C   Date Value Ref Range Status   10/05/2022   Final     Comment:     Greater than analytical measurement  Normal <5.7%   Prediabetes 5.7-6.4%    Diabetes 6.5% or higher     Note: Adopted from ADA consensus guidelines.   11/24/2020 12.9 (H) <=5.6 % Final     Comment:     Normal <5.7% Prediabete 5.7-6.4% Diabletes 6.5% or higher - adopted from ADA consensus guidelines   08/03/2020 10.9 (H) <=5.6 % Final     Comment:     Prediabetes:   HBA1c       5.7 to 6.4%        Diabetes:        HBA1c        >=6.5%   Patients with Hgb F >5%, total bilirubin >10.0 mg/dL, abnormal red cell turnover, severe  renal or hepatic disease or malignancy should not have this A1C method used to diagnose or monitor diabetes.           Hemoglobin A1C POCT   Date Value Ref Range Status   01/13/2020 13.1 (A) 4.3 - 6 % Final   10/16/2019 10.9 (A) 4.3 - 6 % Final         Hyperlipidemia: Prescribed atorvastatin 20 mg daily    -Due for lipids    Recent Labs   Lab Test 04/29/21  1122 11/24/20  1332   CHOL 196 225*   HDL 39* 36*    139*   TRIG 283* 402*       Today's Vitals: /80   Pulse 96   ----------------      I spent 30 minutes with this patient today. All changes were made via collaborative practice agreement with Rolanda Smith MD. A copy of the visit note was provided to the patient's provider(s).    A summary of these recommendations was given to the patient.    Lamonte Wright, MacarenaD  Medication Therapy Management (MTM) Pharmacist  [facility] and Pain Center           Medication Therapy Recommendations  No medication therapy recommendations to display          No pertinent past medical history <<----- Click to add NO pertinent Past Medical History

## 2023-07-06 RX ORDER — ATORVASTATIN CALCIUM 20 MG/1
TABLET, FILM COATED ORAL
Qty: 90 TABLET | Refills: 3 | Status: SHIPPED | OUTPATIENT
Start: 2023-07-06 | End: 2024-03-22

## 2023-07-07 ENCOUNTER — OFFICE VISIT (OUTPATIENT)
Dept: PHARMACY | Facility: CLINIC | Age: 28
End: 2023-07-07
Payer: COMMERCIAL

## 2023-07-07 VITALS — SYSTOLIC BLOOD PRESSURE: 113 MMHG | DIASTOLIC BLOOD PRESSURE: 80 MMHG | HEART RATE: 96 BPM

## 2023-07-07 DIAGNOSIS — Z79.4 TYPE 2 DIABETES MELLITUS WITH HYPERGLYCEMIA, WITH LONG-TERM CURRENT USE OF INSULIN (H): Primary | ICD-10-CM

## 2023-07-07 DIAGNOSIS — E78.5 HYPERLIPIDEMIA, UNSPECIFIED HYPERLIPIDEMIA TYPE: ICD-10-CM

## 2023-07-07 DIAGNOSIS — E11.65 TYPE 2 DIABETES MELLITUS WITH HYPERGLYCEMIA, WITH LONG-TERM CURRENT USE OF INSULIN (H): Primary | ICD-10-CM

## 2023-07-07 PROCEDURE — 99606 MTMS BY PHARM EST 15 MIN: CPT | Performed by: PHARMACIST

## 2023-07-07 RX ORDER — INSULIN GLARGINE 100 [IU]/ML
100 INJECTION, SOLUTION SUBCUTANEOUS EVERY MORNING
Qty: 30 ML | Refills: 3 | Status: SHIPPED | OUTPATIENT
Start: 2023-07-07 | End: 2023-11-30

## 2023-07-07 NOTE — PATIENT INSTRUCTIONS
"Recommendations from today's MTM visit:                                                           1. Continue current insulin doses. Focus on reducing portions of rice to approximately 1 cup per meal.   2. Try again to increase metformin to twice daily.     Follow-up: Return in about 4 weeks (around 8/4/2023) for Medication Management Pharmacist, in person.    It was great speaking with you today.  I value your experience and would be very thankful for your time in providing feedback in our clinic survey. In the next few days, you may receive an email or text message from [employer] with a link to a survey related to your  clinical pharmacist.\"     To schedule another MTM appointment, please call the clinic directly or you may call the MTM scheduling line at [phone] or toll-free at [phone].     My Clinical Pharmacist's contact information:                                                      Please feel free to contact me with any questions or concerns you have.      Lamonte Wright, PharmD  Medication Therapy Management (MTM) Pharmacist  [facility] and [facility]      "

## 2023-08-14 NOTE — PROGRESS NOTES
Medication Therapy Management (MTM) Encounter    ASSESSMENT:                            Medication Adherence/Access: Likely forgetting doses of Novolog. Discussed phone alarms as a reminder and placing Novolog at the table as a visual reminder when eating.        Type 2 Diabetes:  A1C above goal <7%. Did not recheck today as CGM readings are elevated and A1C likely still not at goal. Averages above goal <180. Not at time in target goal >70%. More recently, has been having days of being consistently in the range. In July- was running high likely due to missed Novolog doses. Reviewed strategies to remember doses. Will continue to try to increase Metformin use.  doses may improve tolerability.     Also discussed addition of exercise to help reduce blood glucose. Start with small amount of time and increase slowly.      Hyperlipidemia: Appropriately using statin due to history of LDL >190. Last LDL at goal <100. Discontinue in the event that the patient becomes pregnant.      PLAN:                              1. Separate metformin to 1 tab twice daily. Try to take 2 tabs every day.   2. Set alarms on your phone to remind you to take insulin.   3. Try placing your Novolog pens (or your medicine box) at the dinner table.   4. Aim for 10-15 minutes of activity like walking each day.      Follow-up: Return in about 6 weeks (around 9/26/2023) for Medication Management Pharmacist, in person.     SUBJECTIVE/OBJECTIVE:                          Tyson Skelton is a 28 year old female coming in for a follow-up visit. She was referred to me from Rolanda Smith MD. Declined . Today's visit is a follow-up MTM visit from 04/12/2023.     Reason for visit: Diabetes management.     Allergies/ADRs: Reviewed in chart  Past Medical History: Reviewed in chart  Tobacco: She reports that she has never smoked. She has been exposed to tobacco smoke. She has never used smokeless tobacco.  Alcohol:  reports no history  Message from MyChart:  Original authorizing provider: Karen Collins MD    Queta Hamiltonchandni would like a refill of the following medications:  NIFEdipine ER (ADALAT CC) 30 MG TB24 [Karen Collins MD]    Preferred pharmacy: [id number] [address]    Comment:     of alcohol use.        Medication Adherence/Access:       Did not bring medicines. Brought her meter.   Forgets insulin 1-2 times per week.            Type 2 Diabetes:  Prescribed NovoLog 5 units 3 times daily with meals + 1 to 7 units at bedtime, Lantus 40 units daily (provided instructions to titrate at last visit), metformin 500 mg ER twice daily (switched from IR at last visit).     States she's using 100 units of Lantus daily. (Dials to 80 then to 20).   Is taking Metformin 2 tabs every other day and 1 tab in between. Having diarrhea with the higher dose. Taking both tabs at the same time after food.     Mostly using 15-20 units of Novolog per meal.         Is trying to have another child.       Blood sugar monitoring: CGM. Ranges (based on glucometer readings):                         No episodes of hypoglcyemia  No polyuria, polydipsia.         Diet/Exercise: Eating more meat and veggies. Eating less rice- probably about 1.5 cups. Not getting any exercise during the day.     Aspirin: No  Statin: Yes: Atorvastatin 20 mg daily  ACEi/ARB: No.     Hemoglobin A1C   Date Value Ref Range Status   10/05/2022   Final     Comment:     Greater than analytical measurement  Normal <5.7%   Prediabetes 5.7-6.4%    Diabetes 6.5% or higher     Note: Adopted from ADA consensus guidelines.   11/24/2020 12.9 (H) <=5.6 % Final     Comment:     Normal <5.7% Prediabete 5.7-6.4% Diabletes 6.5% or higher - adopted from ADA consensus guidelines   08/03/2020 10.9 (H) <=5.6 % Final     Comment:     Prediabetes:   HBA1c       5.7 to 6.4%        Diabetes:        HBA1c        >=6.5%   Patients with Hgb F >5%, total bilirubin >10.0 mg/dL, abnormal red cell turnover, severe renal or hepatic disease or malignancy should not have this A1C method used to diagnose or monitor diabetes.           Hemoglobin A1C POCT   Date Value Ref Range Status   01/13/2020 13.1 (A) 4.3 - 6 % Final   10/16/2019 10.9 (A) 4.3 - 6 % Final         Hyperlipidemia:  Prescribed atorvastatin 20 mg daily    -Due for lipids    Recent Labs   Lab Test 04/29/21  1122 11/24/20  1332   CHOL 196 225*   HDL 39* 36*    139*   TRIG 283* 402*       Today's Vitals: /75   Pulse 102   ----------------      I spent 30 minutes with this patient today. All changes were made via collaborative practice agreement with Parish Daniels MD. A copy of the visit note was provided to the patient's provider(s).    A summary of these recommendations was given to the patient.    Lamonte Wright, MacarenaD  Medication Therapy Management (MTM) Pharmacist  [facility]           Medication Therapy Recommendations  Type 2 diabetes mellitus with hyperglycemia, with long-term current use of insulin (H)    Current Medication: insulin aspart (NOVOLOG FLEXPEN) 100 UNIT/ML pen   Rationale: Patient forgets to take - Adherence - Adherence   Recommendation: Provide Adherence Intervention   Status: Patient Agreed - Adherence/Education          Current Medication: metFORMIN (GLUCOPHAGE XR) 500 MG 24 hr tablet   Rationale: Undesirable effect - Adverse medication event - Safety   Recommendation: Change Administration Time   Status: Accepted per CPA

## 2023-08-15 ENCOUNTER — OFFICE VISIT (OUTPATIENT)
Dept: PHARMACY | Facility: CLINIC | Age: 28
End: 2023-08-15
Payer: COMMERCIAL

## 2023-08-15 VITALS — SYSTOLIC BLOOD PRESSURE: 104 MMHG | HEART RATE: 102 BPM | DIASTOLIC BLOOD PRESSURE: 75 MMHG

## 2023-08-15 DIAGNOSIS — Z79.4 TYPE 2 DIABETES MELLITUS WITH HYPERGLYCEMIA, WITH LONG-TERM CURRENT USE OF INSULIN (H): Primary | ICD-10-CM

## 2023-08-15 DIAGNOSIS — E78.5 HYPERLIPIDEMIA, UNSPECIFIED HYPERLIPIDEMIA TYPE: ICD-10-CM

## 2023-08-15 DIAGNOSIS — E11.65 TYPE 2 DIABETES MELLITUS WITH HYPERGLYCEMIA, WITH LONG-TERM CURRENT USE OF INSULIN (H): Primary | ICD-10-CM

## 2023-08-15 PROCEDURE — 99607 MTMS BY PHARM ADDL 15 MIN: CPT | Performed by: PHARMACIST

## 2023-08-15 PROCEDURE — 99606 MTMS BY PHARM EST 15 MIN: CPT | Performed by: PHARMACIST

## 2023-08-15 RX ORDER — INSULIN ASPART 100 [IU]/ML
15-20 INJECTION, SOLUTION INTRAVENOUS; SUBCUTANEOUS
Qty: 45 ML | Refills: 1 | Status: SHIPPED | OUTPATIENT
Start: 2023-08-15 | End: 2023-11-30

## 2023-08-15 NOTE — PATIENT INSTRUCTIONS
"Recommendations from today's MTM visit:                                                         1. Separate metformin to 1 tab twice daily. Try to take 2 tabs every day.   2. Set alarms on your phone to remind you to take insulin.   3. Try placing your Novolog pens (or your medicine box) at the dinner table.   4. Aim for 10-15 minutes of activity like walking each day.     Follow-up: Return in about 6 weeks (around 9/26/2023) for Medication Management Pharmacist, in person.    It was great speaking with you today.  I value your experience and would be very thankful for your time in providing feedback in our clinic survey. In the next few days, you may receive an email or text message from [employer] with a link to a survey related to your  clinical pharmacist.\"     To schedule another MTM appointment, please call the clinic directly or you may call the MTM scheduling line at [phone] or toll-free at [phone].     My Clinical Pharmacist's contact information:                                                      Please feel free to contact me with any questions or concerns you have.      Lamonte Wright, PharmD  Medication Therapy Management (MTM) Pharmacist  [facility]      "

## 2023-09-11 DIAGNOSIS — E11.65 TYPE 2 DIABETES MELLITUS WITH HYPERGLYCEMIA, WITH LONG-TERM CURRENT USE OF INSULIN (H): ICD-10-CM

## 2023-09-11 DIAGNOSIS — Z79.4 TYPE 2 DIABETES MELLITUS WITH HYPERGLYCEMIA, WITH LONG-TERM CURRENT USE OF INSULIN (H): ICD-10-CM

## 2023-10-06 DIAGNOSIS — Z79.4 TYPE 2 DIABETES MELLITUS WITH HYPERGLYCEMIA, WITH LONG-TERM CURRENT USE OF INSULIN (H): ICD-10-CM

## 2023-10-06 DIAGNOSIS — E11.65 TYPE 2 DIABETES MELLITUS WITH HYPERGLYCEMIA, WITH LONG-TERM CURRENT USE OF INSULIN (H): ICD-10-CM

## 2023-10-06 RX ORDER — METFORMIN HCL 500 MG
500 TABLET, EXTENDED RELEASE 24 HR ORAL 2 TIMES DAILY WITH MEALS
Qty: 180 TABLET | Refills: 3 | Status: SHIPPED | OUTPATIENT
Start: 2023-10-06 | End: 2024-09-17

## 2023-10-10 NOTE — PROGRESS NOTES
Medication Therapy Management (MTM) Encounter    ASSESSMENT:                            Medication Adherence/Access: CGM readings indicate missed doses of Novolog and Lantus. Previously discussed phone alarms, but patient didn't have her phone and didn't set alarms after the visit. Set alarms today. 9 am for Lantus.        Type 2 Diabetes:  A1C above goal <7%. Did not recheck today as CGM readings are elevated and A1C likely still not at goal. Averages above goal <180, but improved since increasing Metformin. IF diarrhea, resolves, will continue to titrate dose. Not at time in target goal >70%. Continues to struggle with diet changes. Also forgetting insulin doses. Plan as above. Over-treating low normal readings. May benefit from decreasing Novolog to prevent large post-prandial drops leading to need to treat. Also discussed appropriate treatment of lows. Follow-up with CDE for nutrition education.     Blood pressure at goal <140/90. However, pulse persistently elevated. Will have patient schedule with PCP for additional workup.      Hyperlipidemia: Appropriately using statin due to history of LDL >190. Last LDL at goal <100. Discontinue in the event that the patient becomes pregnant.      PLAN:                              1. Take your Lantus at 9 am every day. We set an alarm reminder.   2. Decrease Novolog to 18 units with meals.   3. If diarrhea goes away, increase Metformin to 3 tabs daily.      Follow-up: Return in about 3 weeks (around 11/1/2023) for Next Scheduled visit with Diabetes Education.   Schedule a visit with PCP.     SUBJECTIVE/OBJECTIVE:                          Tyson Skelton is a 28 year old female coming in for a follow-up visit. She was referred to me from Rolanda Smith MD. Declined . Today's visit is a follow-up MTM visit from 08/15/2023.     Reason for visit: Diabetes management.     Allergies/ADRs: Reviewed in chart  Past Medical History: Reviewed in chart  Tobacco: She  reports that she has never smoked. She has been exposed to tobacco smoke. She has never used smokeless tobacco.  Alcohol:  reports no history of alcohol use.        Medication Adherence/Access:       Did not bring medicines. Brought her meter.   Forgets insulin 1-2 times per week.            Type 2 Diabetes:  Prescribed NovoLog 5 units 3 times daily with meals + 1 to 7 units at bedtime, Lantus 40 units daily (provided instructions to titrate at last visit), metformin 500 mg ER twice daily (switched from IR at last visit).     Was able to increase metformin to twice daily after meals. Still having some diarrhea, but not always. Typically 1-2 times per week.      States she's using 100 units of Lantus daily. (Dials to 80 then to 20).     Mostly using  20 units of Novolog per meal. Does forget doses about 2 times per week.     Is trying to have another child.       Blood sugar monitoring: CGM. Ranges (based on glucometer readings):                 Friday 10/6 felt a little shaky and low so treated with rice.      No episodes of hypoglcyemia  No polyuria, polydipsia.         Diet/Exercise: Eating more meat and veggies. Eating less rice- probably about 1.5 cups. Not getting any exercise during the day.     Aspirin: No  Statin: Yes: Atorvastatin 20 mg daily  ACEi/ARB: No.     Hemoglobin A1C   Date Value Ref Range Status   10/05/2022   Final     Comment:     Greater than analytical measurement  Normal <5.7%   Prediabetes 5.7-6.4%    Diabetes 6.5% or higher     Note: Adopted from ADA consensus guidelines.   11/24/2020 12.9 (H) <=5.6 % Final     Comment:     Normal <5.7% Prediabete 5.7-6.4% Diabletes 6.5% or higher - adopted from ADA consensus guidelines   08/03/2020 10.9 (H) <=5.6 % Final     Comment:     Prediabetes:   HBA1c       5.7 to 6.4%        Diabetes:        HBA1c        >=6.5%   Patients with Hgb F >5%, total bilirubin >10.0 mg/dL, abnormal red cell turnover, severe renal or hepatic disease or malignancy should  not have this A1C method used to diagnose or monitor diabetes.           Hemoglobin A1C POCT   Date Value Ref Range Status   01/13/2020 13.1 (A) 4.3 - 6 % Final   10/16/2019 10.9 (A) 4.3 - 6 % Final         Hyperlipidemia: Prescribed atorvastatin 20 mg daily    -Due for lipids    Recent Labs   Lab Test 04/29/21  1122 11/24/20  1332   CHOL 196 225*   HDL 39* 36*    139*   TRIG 283* 402*       Today's Vitals: /88   Pulse 106   ----------------      I spent 30 minutes with this patient today. All changes were made via collaborative practice agreement with Parish Daniels MD. A copy of the visit note was provided to the patient's provider(s).    A summary of these recommendations was given to the patient.    Lamonte Wright, MacarenaD  Medication Therapy Management (MTM) Pharmacist  [facility]           Medication Therapy Recommendations  Type 2 diabetes mellitus with hyperglycemia, with long-term current use of insulin (H)    Current Medication: insulin aspart (NOVOLOG FLEXPEN) 100 UNIT/ML pen   Rationale: Dose too high - Dosage too high - Safety   Recommendation: Decrease Dose   Status: Accepted per CPA          Current Medication: insulin glargine (LANTUS SOLOSTAR) 100 UNIT/ML pen   Rationale: Patient forgets to take - Adherence - Adherence   Recommendation: Provide Adherence Intervention   Status: Patient Agreed - Adherence/Education

## 2023-10-11 ENCOUNTER — OFFICE VISIT (OUTPATIENT)
Dept: PHARMACY | Facility: CLINIC | Age: 28
End: 2023-10-11
Payer: COMMERCIAL

## 2023-10-11 VITALS — SYSTOLIC BLOOD PRESSURE: 116 MMHG | HEART RATE: 106 BPM | DIASTOLIC BLOOD PRESSURE: 88 MMHG

## 2023-10-11 DIAGNOSIS — E78.5 HYPERLIPIDEMIA, UNSPECIFIED HYPERLIPIDEMIA TYPE: ICD-10-CM

## 2023-10-11 DIAGNOSIS — R60.9 DEPENDENT EDEMA: ICD-10-CM

## 2023-10-11 DIAGNOSIS — Z79.4 TYPE 2 DIABETES MELLITUS WITH HYPERGLYCEMIA, WITH LONG-TERM CURRENT USE OF INSULIN (H): Primary | ICD-10-CM

## 2023-10-11 DIAGNOSIS — E11.65 TYPE 2 DIABETES MELLITUS WITH HYPERGLYCEMIA, WITH LONG-TERM CURRENT USE OF INSULIN (H): Primary | ICD-10-CM

## 2023-10-11 PROCEDURE — 99607 MTMS BY PHARM ADDL 15 MIN: CPT | Performed by: PHARMACIST

## 2023-10-11 PROCEDURE — 99606 MTMS BY PHARM EST 15 MIN: CPT | Performed by: PHARMACIST

## 2023-10-11 NOTE — PATIENT INSTRUCTIONS
"Recommendations from today's MTM visit:                                                         1. Take your Lantus at 9 am every day. We set an alarm reminder.   2. Decrease Novolog to 18 units with meals.   3. If diarrhea goes away, increase Metformin to 3 tabs daily.     Follow-up: Return in about 3 weeks (around 11/1/2023) for Next Scheduled visit with Diabetes Education.  Call to schedule follow-up with Dr. Daniels.     It was great speaking with you today.  I value your experience and would be very thankful for your time in providing feedback in our clinic survey. In the next few days, you may receive an email or text message from [employer] with a link to a survey related to your  clinical pharmacist.\"     To schedule another MTM appointment, please call the clinic directly or you may call the MTM scheduling line at [phone] or toll-free at [phone].     My Clinical Pharmacist's contact information:                                                      Please feel free to contact me with any questions or concerns you have.      Lamonte Wright, PharmD  Medication Therapy Management (MTM) Pharmacist  [facility]      "

## 2023-10-11 NOTE — Clinical Note
Sending your way for nutrition education. Feel free to adjust insulin as needed. Avoiding additional agents because she's planning for pregnancy. Send back my way when you feel appropriate.

## 2023-11-03 ENCOUNTER — OFFICE VISIT (OUTPATIENT)
Dept: FAMILY MEDICINE | Facility: CLINIC | Age: 28
End: 2023-11-03
Payer: COMMERCIAL

## 2023-11-03 VITALS
BODY MASS INDEX: 33.28 KG/M2 | WEIGHT: 165.1 LBS | SYSTOLIC BLOOD PRESSURE: 138 MMHG | DIASTOLIC BLOOD PRESSURE: 85 MMHG | OXYGEN SATURATION: 98 % | RESPIRATION RATE: 16 BRPM | TEMPERATURE: 97.2 F | HEART RATE: 103 BPM | HEIGHT: 59 IN

## 2023-11-03 DIAGNOSIS — Z78.9 ATTEMPTING TO CONCEIVE: ICD-10-CM

## 2023-11-03 DIAGNOSIS — N92.6 IRREGULAR PERIODS: Primary | ICD-10-CM

## 2023-11-03 DIAGNOSIS — E11.65 TYPE 2 DIABETES MELLITUS WITH HYPERGLYCEMIA, WITH LONG-TERM CURRENT USE OF INSULIN (H): ICD-10-CM

## 2023-11-03 DIAGNOSIS — Z79.4 TYPE 2 DIABETES MELLITUS WITH HYPERGLYCEMIA, WITH LONG-TERM CURRENT USE OF INSULIN (H): ICD-10-CM

## 2023-11-03 PROBLEM — A41.51 SEPSIS DUE TO ESCHERICHIA COLI (H): Status: RESOLVED | Noted: 2020-08-05 | Resolved: 2023-11-03

## 2023-11-03 LAB — HCG UR QL: NEGATIVE

## 2023-11-03 PROCEDURE — 99213 OFFICE O/P EST LOW 20 MIN: CPT | Mod: 25 | Performed by: FAMILY MEDICINE

## 2023-11-03 PROCEDURE — 90686 IIV4 VACC NO PRSV 0.5 ML IM: CPT | Performed by: FAMILY MEDICINE

## 2023-11-03 PROCEDURE — 90471 IMMUNIZATION ADMIN: CPT | Performed by: FAMILY MEDICINE

## 2023-11-03 PROCEDURE — 81025 URINE PREGNANCY TEST: CPT | Performed by: FAMILY MEDICINE

## 2023-11-03 NOTE — PROGRESS NOTES
"  Irregular periods  UPT results pending at the time of the visit.  - HCG qualitative urine  - Ob/Gyn  Referral; Future    Attempting to conceive  - Ob/Gyn  Referral; Future    Type 2 diabetes mellitus with hyperglycemia, with long-term current use of insulin (H)  Managed by endocrinology.    Chance Mehta is a 28 year old female with history of diabetes, chronic kidney disease here requesting evaluation for fertility.  She has a 5-year-old son.  She used Depo after her son was born but stopped using it 2 years ago.  Her menstrual cycles are irregular, having light spotting every 2 to 3 months only.  Wants to have another child.  She sees endocrinology for diabetes.  She also see [facility] pharmacist.        11/3/2023    11:45 AM   Additional Questions   Roomed by Sussy REDDING Ma   Accompanied by SELF       History of Present Illness       Reason for visit:  Talk about having another child          Review of Systems   CONSTITUTIONAL: NEGATIVE for fever, chills, change in weight  RESP: NEGATIVE for significant cough or SOB  CV: NEGATIVE for chest pain/chest pressure      Objective    Vitals:    11/03/23 1147   BP: 138/85   Pulse: 103   Resp: 16   Temp: 97.2  F (36.2  C)   TempSrc: Temporal   SpO2: 98%   Weight: 74.9 kg (165 lb 1.6 oz)   Height: 1.499 m (4' 11\")      Physical Exam   GENERAL: healthy, alert and no distress  NECK: Supple.  RESP: lungs clear to auscultation - no rales, rhonchi or wheezes  CV: regular rates and rhythm and no murmur, click or rub  PSYCH: mentation appears normal, affect normal/bright      This transcription uses voice recognition software, which may contain typographical errors.                "

## 2023-11-15 ENCOUNTER — OFFICE VISIT (OUTPATIENT)
Dept: MIDWIFE SERVICES | Facility: CLINIC | Age: 28
End: 2023-11-15
Attending: FAMILY MEDICINE
Payer: COMMERCIAL

## 2023-11-15 VITALS
WEIGHT: 164 LBS | DIASTOLIC BLOOD PRESSURE: 72 MMHG | HEIGHT: 59 IN | SYSTOLIC BLOOD PRESSURE: 104 MMHG | HEART RATE: 106 BPM | OXYGEN SATURATION: 97 % | BODY MASS INDEX: 33.06 KG/M2

## 2023-11-15 DIAGNOSIS — N92.6 IRREGULAR PERIODS: ICD-10-CM

## 2023-11-15 DIAGNOSIS — Z78.9 ATTEMPTING TO CONCEIVE: ICD-10-CM

## 2023-11-15 DIAGNOSIS — N91.2 AMENORRHEA: Primary | ICD-10-CM

## 2023-11-15 LAB
FSH SERPL IRP2-ACNC: 6.8 MIU/ML
HCG INTACT+B SERPL-ACNC: <1 MIU/ML
T4 FREE SERPL-MCNC: 1 NG/DL (ref 0.9–1.7)
TSH SERPL DL<=0.005 MIU/L-ACNC: 4.86 UIU/ML (ref 0.3–4.2)

## 2023-11-15 PROCEDURE — 83498 ASY HYDROXYPROGESTERONE 17-D: CPT | Performed by: ADVANCED PRACTICE MIDWIFE

## 2023-11-15 PROCEDURE — 82627 DEHYDROEPIANDROSTERONE: CPT | Performed by: ADVANCED PRACTICE MIDWIFE

## 2023-11-15 PROCEDURE — 36415 COLL VENOUS BLD VENIPUNCTURE: CPT | Performed by: ADVANCED PRACTICE MIDWIFE

## 2023-11-15 PROCEDURE — 99204 OFFICE O/P NEW MOD 45 MIN: CPT | Performed by: ADVANCED PRACTICE MIDWIFE

## 2023-11-15 PROCEDURE — 83001 ASSAY OF GONADOTROPIN (FSH): CPT | Performed by: ADVANCED PRACTICE MIDWIFE

## 2023-11-15 PROCEDURE — 84270 ASSAY OF SEX HORMONE GLOBUL: CPT | Performed by: ADVANCED PRACTICE MIDWIFE

## 2023-11-15 PROCEDURE — 84403 ASSAY OF TOTAL TESTOSTERONE: CPT | Performed by: ADVANCED PRACTICE MIDWIFE

## 2023-11-15 PROCEDURE — 84439 ASSAY OF FREE THYROXINE: CPT | Performed by: ADVANCED PRACTICE MIDWIFE

## 2023-11-15 PROCEDURE — 84702 CHORIONIC GONADOTROPIN TEST: CPT | Performed by: ADVANCED PRACTICE MIDWIFE

## 2023-11-15 PROCEDURE — 84443 ASSAY THYROID STIM HORMONE: CPT | Performed by: ADVANCED PRACTICE MIDWIFE

## 2023-11-15 NOTE — PROGRESS NOTES
"Problem Visit    November 15, 2023    Subjective:    Tyson Skelton is a 28 year old female who presents for evaluation of amenorrhea. Patient states she stopped Depo Provera about 6 months ago and has noticed her menses has been irregular some months and absent some months with only a very small spot of blood noted on 1 day of her cycle. Patient denies using any form of contraception at this time and is planning for pregnancy. Reports some increase weight gain and changes in hair loss over the last few months. Denies any other concerns at this time.     ROS:   General: Denies fevers, chills, night sweats.  Skin: Denies new rashes or lesions.  HEENT: Denies headache, visual disturbance, throat swelling or difficulty swallowing.  CV: Denies chest pain, palpitations or shortness of breath.  GI: Denies N/V/D, blood in stool or constipation.  : Denies dysuria or polyuria.  Genital: Denies discharge.  MSK: Denies joint pain, muscles aches or difficulty ambulating.  Neurologic: Denies difficulty   Endocrine: Denies hot/cold intolerance, sweating, polyuria.  Psychiatric: Denies depression or anxiety.      Objective:    /72 (BP Location: Right arm, Patient Position: Sitting, Cuff Size: Adult Regular)   Pulse 106   Ht 1.499 m (4' 11\")   Wt 74.4 kg (164 lb)   SpO2 97%   BMI 33.12 kg/m      Physical Exam:  General Appearance: Alert, cooperative, no distress, appears stated age  Skin: Skin color, texture, turgor normal, no rashes or lesions.  Throat: Lips, mucosa, and tongue normal; teeth and gums normal  HEENT: grossly normal; otoscopic and opthalmic exam not performed.   Respiratory: Normal respiratory effort  Cardiovascular: No peripheral edema.  Musculoskeletal: No digital cyanosis. Normal gait and station. Moves all limbs freely.  Neuro: Cranial nerves II-XII grossly intact.  Psych: Intact judgment and insight. OX3 and conversational.      Assessment:  -History Irregular menses  -Amenorrhea  -Diabetes, insulin " dependant        Plan:  -Labs today:TSH, Free T4, Beta HCG, Testosterone, 17 OH Progesterone, DHEA Sulfate, FSH  -Consider Pelvic ultrasound if normal labs  -Consider referral to OB/GYN if normal labs and ultrasound  -Discussed option for oral contraceptive pill, patient desires pregnancy and declined  -Return to Clinic as needed      Time spent:  Chart review/Pre-charting on 11/15/23: 10 minutes  Face-to-face visit: 25 minutes   Documentation:  10 minutes  Total time spent on day of service:  40-54      SYLVESTER Mulligan, ALIYA  [facility]  Midwifery

## 2023-11-16 LAB
DHEA-S SERPL-MCNC: 107 UG/DL (ref 35–430)
SHBG SERPL-SCNC: 47 NMOL/L (ref 30–135)

## 2023-11-17 LAB
TESTOST FREE SERPL-MCNC: 0.66 NG/DL
TESTOST SERPL-MCNC: 46 NG/DL (ref 8–60)

## 2023-11-21 ENCOUNTER — TELEPHONE (OUTPATIENT)
Dept: FAMILY MEDICINE | Facility: CLINIC | Age: 28
End: 2023-11-21
Payer: COMMERCIAL

## 2023-11-21 DIAGNOSIS — E03.9 HYPOTHYROIDISM, UNSPECIFIED TYPE: Primary | ICD-10-CM

## 2023-11-21 RX ORDER — LEVOTHYROXINE SODIUM 112 UG/1
112 TABLET ORAL DAILY
Qty: 30 TABLET | Refills: 2 | Status: SHIPPED | OUTPATIENT
Start: 2023-11-21 | End: 2023-12-21

## 2023-11-21 NOTE — TELEPHONE ENCOUNTER
----- Message from Parish Daniels MD sent at 11/21/2023  9:41 AM CST -----  Can you please call the patient to schedule appt in 4-6 weeks ? Ok to use same day/approval required.    Dr. Parish Daniels  11/21/2023 9:41 AM    ----- Message -----  From: Reanna Selby CNM  Sent: 11/21/2023   9:26 AM CST  To: Parish Daniels MD    Good morning, -    I hope this message finds you well!    This patient was diagnosed with hypothyroidism and started on levothyroxine today.  I encouraged her to follow-up with you in 8 weeks.  Thank you!      SYLVESTER Khan CNM (midwife)

## 2023-11-24 LAB — 17OHP SERPL-MCNC: 42 NG/DL

## 2023-11-30 ENCOUNTER — ALLIED HEALTH/NURSE VISIT (OUTPATIENT)
Dept: EDUCATION SERVICES | Facility: CLINIC | Age: 28
End: 2023-11-30
Payer: COMMERCIAL

## 2023-11-30 DIAGNOSIS — E11.65 TYPE 2 DIABETES MELLITUS WITH HYPERGLYCEMIA, UNSPECIFIED WHETHER LONG TERM INSULIN USE (H): Primary | ICD-10-CM

## 2023-11-30 DIAGNOSIS — E11.65 TYPE 2 DIABETES MELLITUS WITH HYPERGLYCEMIA, WITH LONG-TERM CURRENT USE OF INSULIN (H): ICD-10-CM

## 2023-11-30 DIAGNOSIS — Z79.4 TYPE 2 DIABETES MELLITUS WITH HYPERGLYCEMIA, WITH LONG-TERM CURRENT USE OF INSULIN (H): ICD-10-CM

## 2023-11-30 LAB — HBA1C MFR BLD: 9.9 % (ref 0–5.6)

## 2023-11-30 PROCEDURE — G0108 DIAB MANAGE TRN  PER INDIV: HCPCS | Performed by: DIETITIAN, REGISTERED

## 2023-11-30 PROCEDURE — 83036 HEMOGLOBIN GLYCOSYLATED A1C: CPT | Performed by: DIETITIAN, REGISTERED

## 2023-11-30 PROCEDURE — 36415 COLL VENOUS BLD VENIPUNCTURE: CPT | Performed by: DIETITIAN, REGISTERED

## 2023-11-30 RX ORDER — INSULIN ASPART 100 [IU]/ML
22 INJECTION, SOLUTION INTRAVENOUS; SUBCUTANEOUS
Qty: 45 ML | Refills: 1 | Status: SHIPPED | OUTPATIENT
Start: 2023-11-30 | End: 2024-02-07

## 2023-11-30 RX ORDER — LANCETS
100 EACH MISCELLANEOUS 2 TIMES DAILY
Qty: 100 EACH | Refills: 4 | Status: SHIPPED | OUTPATIENT
Start: 2023-11-30

## 2023-11-30 RX ORDER — INSULIN GLARGINE 100 [IU]/ML
104 INJECTION, SOLUTION SUBCUTANEOUS EVERY MORNING
Qty: 30 ML | Refills: 3 | Status: SHIPPED | OUTPATIENT
Start: 2023-11-30 | End: 2024-07-31

## 2023-11-30 NOTE — PROGRESS NOTES
Diabetes Self-Management Education & Support/ 45minutes    Presents for:      Type of Service: In Person Visit      ASSESSMENT:  Tyson is currently using the Andrews 2 CGM system with the . She reported that she was not able to get the Andrews 2 philomena to work successfully. We discussed her upcoming visit where we will place a new sensor and work on connecting to her andrews 2 philomena. CGM data indicates poor control of Diabetes with 40% of BG collected: average  mg/dl and 11% of BG in the target range of . Paw c/o of polyuria, polydipsia, blurry vision and fatigue. She is prescribed Lantus 100 units, Novolog 20 units with meals and Metformin 1000 mg.  She misses doses of Lantus and Novolog each week, she did not specify how many doses are missed.  She tried increasing Metformin to 1500 mg but had diarrhea, so she decreased back to 1000 mg.  Tyson expressed interest in family planning, we discussed the importance of having well managed diabetes prior to conception. Tyson does not participate in a regular exercise routine. She is consuming two meals/day: large rice or noodle portions with meat and vegetables. She is hydrating with water and diet soda.  Tyson is not up to date on her eye or dental exams.       Patient's most recent   Lab Results   Component Value Date    A1C  10/05/2022      Comment:      Greater than analytical measurement  Normal <5.7%   Prediabetes 5.7-6.4%    Diabetes 6.5% or higher     Note: Adopted from ADA consensus guidelines.    HEMOGLOBINA1 13.1 01/13/2020     is not meeting goal of <8.0    Diabetes knowledge and skills assessment:   Patient is knowledgeable in diabetes management concepts related to: topics reviewed    Continue education with the following diabetes management concepts: Healthy Eating, Being Active, Monitoring, Taking Medication, Problem Solving, and Reducing Risks    Based on learning assessment above, most appropriate setting for further diabetes education would be:  Individual setting.      PLAN  Increase Lantus to 104 units and take daily at 9am.  Increase Novolog to 22 units: take 10-15 minutes before your morning meal and 10-15 minutes before your evening meal.  If you eat a meal at noon, take Novolog before this meal also.  Continue to take two Metformin tablets daily.   Limit your rice and noodles to one small bowl per meal: add more vegetables and meat or fish.  Avoid all sugary beverages.   Add in 10-15 minutes of walking after meals.  If your blood sugar goes below 70, have a small glass of juice.  Add in daily flossing.  Schedule a diabetic eye exam.   Scan sensor when you wake up, at noon, at 6 pm and bedtime.     Topics to cover at upcoming visits: Healthy Eating, Being Active, Monitoring, Taking Medication, and Reducing Risks    Follow-up: 12/12/23    See Care Plan for co-developed, patient-state behavior change goals.  AVS provided for patient today.    Education Materials Provided:  No new materials provided today      SUBJECTIVE/OBJECTIVE:  Diabetes education in the past 24mo: Yes  Diabetes type: Type 2  Disease course: Improving  How confident are you filling out medical forms by yourself:: Quite a bit  Diabetes management related comments/concerns: no  Cultural Influences/Ethnic Background:  Not  or       Diabetes Symptoms & Complications:  Fatigue: Sometimes  Neuropathy: Sometimes  Polydipsia: Sometimes  Polyphagia: Sometimes  Polyuria: No  Visual change: Sometimes  Slow healing wounds: Sometimes  Autonomic neuropathy: No  CVA: No  Heart disease: No  Nephropathy: No  Peripheral neuropathy: No  Peripheral Vascular Disease: No  Retinopathy: No  Sexual dysfunction: Yes    Patient Problem List and Family Medical History reviewed for relevant medical history, current medical status, and diabetes risk factors.    Vitals:  There were no vitals taken for this visit.  Estimated body mass index is 33.12 kg/m  as calculated from the following:    Height as  "of 11/15/23: 1.499 m (4' 11\").    Weight as of 11/15/23: 74.4 kg (164 lb).   Last 3 BP:   BP Readings from Last 3 Encounters:   11/15/23 104/72   11/03/23 138/85   10/11/23 116/88       History   Smoking Status    Never   Smokeless Tobacco    Never       Labs:  Lab Results   Component Value Date    A1C  10/05/2022      Comment:      Greater than analytical measurement  Normal <5.7%   Prediabetes 5.7-6.4%    Diabetes 6.5% or higher     Note: Adopted from ADA consensus guidelines.    HEMOGLOBINA1 13.1 01/13/2020     Lab Results   Component Value Date     05/09/2023     10/08/2022    GLC 73 04/29/2021     Lab Results   Component Value Date     04/29/2021     11/24/2020     Direct Measure HDL   Date Value Ref Range Status   04/29/2021 39 (L) >=50 mg/dL Final   ]  GFR Estimate   Date Value Ref Range Status   05/09/2023 >90 >60 mL/min/1.73m2 Final     Comment:     eGFR calculated using 2021 CKD-EPI equation.   04/29/2021 >60 >60 mL/min/1.73m2 Final     GFR Estimate If Black   Date Value Ref Range Status   04/29/2021 >60 >60 mL/min/1.73m2 Final     Lab Results   Component Value Date    CR 0.65 05/09/2023     No results found for: \"MICROALBUMIN\"    Healthy Eating:  Cultural/Zoroastrian diet restrictions?: No  Meal planning/habits: None  How many times a week on average do you eat food made away from home (restaurant/take-out)?: 0  Meals include: Breakfast, Lunch, Dinner, Afternoon Snack, Evening Snack  Breakfast: rice, meat, vegetables. soup, vegetable soup  Dinner: similiar to am meal  Snacks: potato chips  Beverages: Water, Tea, Diet soda    Being Active:  Being Active Assessed Today: Yes  Exercise:: Currently not exercising  Barrier to exercise: None    Monitoring:  Monitoring Assessed Today: Yes  Did patient bring glucose meter to appointment? : Yes  Blood Glucose Meter: FreeStyle, CGM  Times checking blood sugar at home (number): 2  Times checking blood sugar at home (per): Day  Blood glucose " trend: Increasing    CGM data:              Taking Medications:  Diabetes Medication(s)       Biguanides       metFORMIN (GLUCOPHAGE XR) 500 MG 24 hr tablet    TAKE 1 TABLET (500 MG) BY MOUTH 2 TIMES DAILY (WITH MEALS)      Insulin       insulin aspart (NOVOLOG FLEXPEN) 100 UNIT/ML pen    Inject 22 Units Subcutaneous 3 times daily (with meals) Inject 22 units of Novolog, 10-15 minutes before breakfast, lunch and dinner. .     insulin glargine (LANTUS SOLOSTAR) 100 UNIT/ML pen    Inject 104 Units Subcutaneous every morning            Taking Medication Assessed Today: Yes  Current Treatments: Insulin Injections, Oral Medication (taken by mouth)  Problems taking diabetes medications regularly?: Yes (patient admits to missing doses of Lantus and Novolog during the week: amount of missed doses was not specified)    Problem Solving:  Is the patient at risk for hypoglycemia?: Yes  Hypoglycemia Frequency: Rarely              Reducing Risks:  Diabetes Risks: Ethnicity, Sedentary Lifestyle  CAD Risks: Diabetes Mellitus, Sedentary lifestyle  Has dilated eye exam at least once a year?: No  Sees dentist every 6 months?: No    Healthy Coping:  Emotional response to diabetes: Uncertain  Informal Support system:: Family  Stage of change: CONTEMPLATION (Considering change and yet undecided)  Patient Activation Measure Survey Score:       No data to display                  Care Plan and Education Provided:  Care Plan: Diabetes   Updates made by Dominique Davila RD since 11/30/2023 12:00 AM        Problem: HbA1C Not In Goal         Goal: Establish Regular Follow-Ups with PCP         Task: Discuss with PCP the recommended timing for patient's next follow up visit(s)    Responsible User: Dominique Davila RD        Task: Discuss schedule for PCP visits with patient    Responsible User: Dominique Davila RD        Goal: Get HbA1C Level in Goal         Task: Educate patient on diabetes education self-management topics    Responsible User:  Dominique Davila RD        Task: Educate patient on benefits of regular glucose monitoring    Responsible User: Dominique Davila RD        Task: Refer patient to appropriate extended care team member, as needed (Medication Therapy Management, Behavioral Health, Physical Therapy, etc.)    Responsible User: Dominique Davila RD        Task: Discuss diabetes treatment plan with patient    Responsible User: Dominique Davila RD        Problem: Diabetes Self-Management Education Needed to Optimize Self-Care Behaviors         Goal: Understand diabetes pathophysiology and disease progression         Task: Provide education on diabetes pathophysiology and disease progression specfic to patient's diabetes type    Responsible User: Dominique Davila RD        Goal: Healthy Eating - follow a healthy eating pattern for diabetes    This Visit's Progress: 0%   Note:    Limit rice or noodles to one small bowl per meal.       Task: Provide education on portion control and consistency in amount, composition and timing of food intake Completed 11/30/2023   Responsible User: Dominique Davila RD        Task: Provide education on managing carbohydrate intake (carbohydrate counting, plate planning method, etc.)    Responsible User: Dominique Davila RD        Task: Provide education on weight management    Responsible User: Dominique Davila RD        Task: Provide education on heart healthy eating    Responsible User: Dominique Davila RD        Task: Provide education on eating out    Responsible User: Dominique Davila RD        Task: Develop individualized healthy eating plan with patient    Responsible User: Dominique Davila RD        Goal: Being Active - get regular physical activity, working up to at least 150 minutes per week         Task: Provide education on relationship of activity to glucose and precautions to take if at risk for low glucose Completed 11/30/2023   Responsible User: Dominique Davila RD        Task: Discuss barriers to physical  activity with patient    Responsible User: Dominique Davila RD        Task: Develop physical activity plan with patient    Responsible User: Dominique Davila RD        Task: Explore community resources including walking groups, assistance programs, and home videos    Responsible User: Dominique Davila RD        Goal: Monitoring - monitor glucose and ketones as directed    This Visit's Progress: 40%   Note:    Scan sensor a minimum of every 8 hours       Task: Provide education on blood glucose monitoring (purpose, proper technique, frequency, glucose targets, interpreting results, when to use glucose control solution, sharps disposal)    Responsible User: Dominique Davila RD        Task: Provide education on continuous glucose monitoring (sensor placement, use of philomena or /reader, understanding glucose trends, alerts and alarms, differences between sensor glucose and blood glucose)    Responsible User: Dominique Davila RD        Task: Provide education on ketone monitoring (when to monitor, frequency, etc.)    Responsible User: Dominique Davila RD        Goal: Taking Medication - patient is consistently taking medications as directed         Task: Provide education on action of prescribed medication, including when to take and possible side effects Completed 11/30/2023   Responsible User: Dominique Davila RD        Task: Provide education on insulin and injectable diabetes medications, including administration, storage, site selection and rotation for injection sites    Responsible User: Dominique Davila RD        Task: Discuss barriers to medication adherence with patient and provide management technique ideas as appropriate    Responsible User: Dominique Davila RD        Task: Provide education on frequency and refill details of medications    Responsible User: Dominique Davila RD        Goal: Problem Solving - know how to prevent and manage short-term diabetes complications         Task: Provide education on high  blood glucose - causes, signs/symptoms, prevention and treatment    Responsible User: Dominique Davila RD        Task: Provide education on low blood glucose - causes, signs/symptoms, prevention, treatment, carrying a carbohydrate source at all times, and medical identification    Responsible User: Dominique Davila RD        Task: Provide education on safe travel with diabetes    Responsible User: Dominique Davila RD        Task: Provide education on how to care for diabetes on sick days    Responsible User: Dominique Davila RD        Task: Provide education on when to call a health care provider    Responsible User: Dominique Davila RD        Goal: Reducing Risks - know how to prevent and treat long-term diabetes complications         Task: Provide education on major complications of diabetes, prevention, early diagnostic measures and treatment of complications    Responsible User: Dominique Davlia RD        Task: Provide education on recommended care for dental, eye and foot health Completed 11/30/2023   Responsible User: Dominique Davila RD        Task: Provide education on Hemoglobin A1c - goals and relationship to blood glucose levels Completed 11/30/2023   Responsible User: Dominique Davila RD        Task: Provide education on recommendations for heart health - lipid levels and goals, blood pressure and goals, and aspirin therapy, if indicated    Responsible User: Dominique Davila RD        Task: Provide education on tobacco cessation    Responsible User: Dominique Davila RD        Goal: Healthy Coping - use available resources to cope with the challenges of managing diabetes         Task: Discuss recognizing feelings about having diabetes    Responsible User: Dominique Davila RD        Task: Provide education on the benefits of making appropriate lifestyle changes    Responsible User: Dominique Davila RD        Task: Provide education on benefits of utilizing support systems    Responsible User: Dominique Davila RD         Task: Discuss methods for coping with stress    Responsible User: Dominique Davila RD        Task: Provide education on when to seek professional counseling    Responsible User: Dominique Davila RD              Time Spent: 45 minutes  Encounter Type: Individual    Any diabetes medication dose changes were made via the CDE Protocol per the patient's primary care provider. A copy of this encounter was shared with the provider.

## 2023-11-30 NOTE — LETTER
11/30/2023         RE: Tyson Skelton  [address] [place] [address]        Dear Colleague,    Thank you for referring your patient, Tyson Skelton, to the [facility]. Please see a copy of my visit note below.    Diabetes Self-Management Education & Support/ 45minutes    Presents for:      Type of Service: In Person Visit      ASSESSMENT:  Tyson is currently using the Andrews 2 CGM system with the . She reported that she was not able to get the [employer] philomena to work successfully. We discussed her upcoming visit where we will place a new sensor and work on connecting to her andrews 2 philomena. CGM data indicates poor control of Diabetes with 40% of BG collected: average  mg/dl and 11% of BG in the target range of . Paw c/o of polyuria, polydipsia, blurry vision and fatigue. She is prescribed Lantus 100 units, Novolog 20 units with meals and Metformin 1000 mg.  She misses doses of Lantus and Novolog each week, she did not specify how many doses are missed.  She tried increasing Metformin to 1500 mg but had diarrhea, so she decreased back to 1000 mg.  Tyson expressed interest in family planning, we discussed the importance of having well managed diabetes prior to conception. Tyson does not participate in a regular exercise routine. She is consuming two meals/day: large rice or noodle portions with meat and vegetables. She is hydrating with water and diet soda.  Tyson is not up to date on her eye or dental exams.       Patient's most recent   Lab Results   Component Value Date    A1C  10/05/2022      Comment:      Greater than analytical measurement  Normal <5.7%   Prediabetes 5.7-6.4%    Diabetes 6.5% or higher     Note: Adopted from ADA consensus guidelines.    HEMOGLOBINA1 13.1 01/13/2020     is not meeting goal of <8.0    Diabetes knowledge and skills assessment:   Patient is knowledgeable in diabetes management concepts related to: topics reviewed    Continue education with the  following diabetes management concepts: Healthy Eating, Being Active, Monitoring, Taking Medication, Problem Solving, and Reducing Risks    Based on learning assessment above, most appropriate setting for further diabetes education would be: Individual setting.      PLAN  Increase Lantus to 104 units and take daily at 9am.  Increase Novolog to 22 units: take 10-15 minutes before your morning meal and 10-15 minutes before your evening meal.  If you eat a meal at noon, take Novolog before this meal also.  Continue to take two Metformin tablets daily.   Limit your rice and noodles to one small bowl per meal: add more vegetables and meat or fish.  Avoid all sugary beverages.   Add in 10-15 minutes of walking after meals.  If your blood sugar goes below 70, have a small glass of juice.  Add in daily flossing.  Schedule a diabetic eye exam.   Scan sensor when you wake up, at noon, at 6 pm and bedtime.     Topics to cover at upcoming visits: Healthy Eating, Being Active, Monitoring, Taking Medication, and Reducing Risks    Follow-up: 12/12/23    See Care Plan for co-developed, patient-state behavior change goals.  AVS provided for patient today.    Education Materials Provided:  No new materials provided today      SUBJECTIVE/OBJECTIVE:  Diabetes education in the past 24mo: Yes  Diabetes type: Type 2  Disease course: Improving  How confident are you filling out medical forms by yourself:: Quite a bit  Diabetes management related comments/concerns: no  Cultural Influences/Ethnic Background:  Not  or       Diabetes Symptoms & Complications:  Fatigue: Sometimes  Neuropathy: Sometimes  Polydipsia: Sometimes  Polyphagia: Sometimes  Polyuria: No  Visual change: Sometimes  Slow healing wounds: Sometimes  Autonomic neuropathy: No  CVA: No  Heart disease: No  Nephropathy: No  Peripheral neuropathy: No  Peripheral Vascular Disease: No  Retinopathy: No  Sexual dysfunction: Yes    Patient Problem List and Family Medical  "History reviewed for relevant medical history, current medical status, and diabetes risk factors.    Vitals:  There were no vitals taken for this visit.  Estimated body mass index is 33.12 kg/m  as calculated from the following:    Height as of 11/15/23: 1.499 m (4' 11\").    Weight as of 11/15/23: 74.4 kg (164 lb).   Last 3 BP:   BP Readings from Last 3 Encounters:   11/15/23 104/72   11/03/23 138/85   10/11/23 116/88       History   Smoking Status     Never   Smokeless Tobacco     Never       Labs:  Lab Results   Component Value Date    A1C  10/05/2022      Comment:      Greater than analytical measurement  Normal <5.7%   Prediabetes 5.7-6.4%    Diabetes 6.5% or higher     Note: Adopted from ADA consensus guidelines.    HEMOGLOBINA1 13.1 01/13/2020     Lab Results   Component Value Date     05/09/2023     10/08/2022    GLC 73 04/29/2021     Lab Results   Component Value Date     04/29/2021     11/24/2020     Direct Measure HDL   Date Value Ref Range Status   04/29/2021 39 (L) >=50 mg/dL Final   ]  GFR Estimate   Date Value Ref Range Status   05/09/2023 >90 >60 mL/min/1.73m2 Final     Comment:     eGFR calculated using 2021 CKD-EPI equation.   04/29/2021 >60 >60 mL/min/1.73m2 Final     GFR Estimate If Black   Date Value Ref Range Status   04/29/2021 >60 >60 mL/min/1.73m2 Final     Lab Results   Component Value Date    CR 0.65 05/09/2023     No results found for: \"MICROALBUMIN\"    Healthy Eating:  Cultural/Sikhism diet restrictions?: No  Meal planning/habits: None  How many times a week on average do you eat food made away from home (restaurant/take-out)?: 0  Meals include: Breakfast, Lunch, Dinner, Afternoon Snack, Evening Snack  Breakfast: rice, meat, vegetables. soup, vegetable soup  Dinner: similiar to am meal  Snacks: potato chips  Beverages: Water, Tea, Diet soda    Being Active:  Being Active Assessed Today: Yes  Exercise:: Currently not exercising  Barrier to exercise: " None    Monitoring:  Monitoring Assessed Today: Yes  Did patient bring glucose meter to appointment? : Yes  Blood Glucose Meter: FreeStyle, CGM  Times checking blood sugar at home (number): 2  Times checking blood sugar at home (per): Day  Blood glucose trend: Increasing    CGM data:              Taking Medications:  Diabetes Medication(s)       Biguanides       metFORMIN (GLUCOPHAGE XR) 500 MG 24 hr tablet    TAKE 1 TABLET (500 MG) BY MOUTH 2 TIMES DAILY (WITH MEALS)      Insulin       insulin aspart (NOVOLOG FLEXPEN) 100 UNIT/ML pen    Inject 22 Units Subcutaneous 3 times daily (with meals) Inject 22 units of Novolog, 10-15 minutes before breakfast, lunch and dinner. .     insulin glargine (LANTUS SOLOSTAR) 100 UNIT/ML pen    Inject 104 Units Subcutaneous every morning            Taking Medication Assessed Today: Yes  Current Treatments: Insulin Injections, Oral Medication (taken by mouth)  Problems taking diabetes medications regularly?: Yes (patient admits to missing doses of Lantus and Novolog during the week: amount of missed doses was not specified)    Problem Solving:  Is the patient at risk for hypoglycemia?: Yes  Hypoglycemia Frequency: Rarely              Reducing Risks:  Diabetes Risks: Ethnicity, Sedentary Lifestyle  CAD Risks: Diabetes Mellitus, Sedentary lifestyle  Has dilated eye exam at least once a year?: No  Sees dentist every 6 months?: No    Healthy Coping:  Emotional response to diabetes: Uncertain  Informal Support system:: Family  Stage of change: CONTEMPLATION (Considering change and yet undecided)  Patient Activation Measure Survey Score:       No data to display                  Care Plan and Education Provided:  Care Plan: Diabetes   Updates made by Dominique Davila RD since 11/30/2023 12:00 AM        Problem: HbA1C Not In Goal         Goal: Establish Regular Follow-Ups with PCP         Task: Discuss with PCP the recommended timing for patient's next follow up visit(s)    Responsible User:  Dominique Davila RD        Task: Discuss schedule for PCP visits with patient    Responsible User: Dominqiue Davila RD        Goal: Get HbA1C Level in Goal         Task: Educate patient on diabetes education self-management topics    Responsible User: Dominique Davila RD        Task: Educate patient on benefits of regular glucose monitoring    Responsible User: Dominique Davila RD        Task: Refer patient to appropriate extended care team member, as needed (Medication Therapy Management, Behavioral Health, Physical Therapy, etc.)    Responsible User: Dominique Davila RD        Task: Discuss diabetes treatment plan with patient    Responsible User: Dominique Davila RD        Problem: Diabetes Self-Management Education Needed to Optimize Self-Care Behaviors         Goal: Understand diabetes pathophysiology and disease progression         Task: Provide education on diabetes pathophysiology and disease progression specfic to patient's diabetes type    Responsible User: Dominique Davila RD        Goal: Healthy Eating - follow a healthy eating pattern for diabetes    This Visit's Progress: 0%   Note:    Limit rice or noodles to one small bowl per meal.       Task: Provide education on portion control and consistency in amount, composition and timing of food intake Completed 11/30/2023   Responsible User: Dominique Davila RD        Task: Provide education on managing carbohydrate intake (carbohydrate counting, plate planning method, etc.)    Responsible User: Dominique Davila RD        Task: Provide education on weight management    Responsible User: Dominique Davila RD        Task: Provide education on heart healthy eating    Responsible User: Dominique Davila RD        Task: Provide education on eating out    Responsible User: Dominique Davila RD        Task: Develop individualized healthy eating plan with patient    Responsible User: Dominique Davila RD        Goal: Being Active - get regular physical activity, working up to at  least 150 minutes per week         Task: Provide education on relationship of activity to glucose and precautions to take if at risk for low glucose Completed 11/30/2023   Responsible User: Dominique Davila RD        Task: Discuss barriers to physical activity with patient    Responsible User: Dominique Davila RD        Task: Develop physical activity plan with patient    Responsible User: Dominique Davila RD        Task: Explore community resources including walking groups, assistance programs, and home videos    Responsible User: Dominique Davila RD        Goal: Monitoring - monitor glucose and ketones as directed    This Visit's Progress: 40%   Note:    Scan sensor a minimum of every 8 hours       Task: Provide education on blood glucose monitoring (purpose, proper technique, frequency, glucose targets, interpreting results, when to use glucose control solution, sharps disposal)    Responsible User: Dominique Davila RD        Task: Provide education on continuous glucose monitoring (sensor placement, use of philomena or /reader, understanding glucose trends, alerts and alarms, differences between sensor glucose and blood glucose)    Responsible User: Dominique Davila RD        Task: Provide education on ketone monitoring (when to monitor, frequency, etc.)    Responsible User: Dominique Davila RD        Goal: Taking Medication - patient is consistently taking medications as directed         Task: Provide education on action of prescribed medication, including when to take and possible side effects Completed 11/30/2023   Responsible User: Dominique Davila RD        Task: Provide education on insulin and injectable diabetes medications, including administration, storage, site selection and rotation for injection sites    Responsible User: Dominique Davila RD        Task: Discuss barriers to medication adherence with patient and provide management technique ideas as appropriate    Responsible User: Dominique Davila RD         Task: Provide education on frequency and refill details of medications    Responsible User: Dominique Davila RD        Goal: Problem Solving - know how to prevent and manage short-term diabetes complications         Task: Provide education on high blood glucose - causes, signs/symptoms, prevention and treatment    Responsible User: Dominique Davila RD        Task: Provide education on low blood glucose - causes, signs/symptoms, prevention, treatment, carrying a carbohydrate source at all times, and medical identification    Responsible User: Dominique Davila RD        Task: Provide education on safe travel with diabetes    Responsible User: Dominique Davila RD        Task: Provide education on how to care for diabetes on sick days    Responsible User: Dominique Davila RD        Task: Provide education on when to call a health care provider    Responsible User: Dominique Davila RD        Goal: Reducing Risks - know how to prevent and treat long-term diabetes complications         Task: Provide education on major complications of diabetes, prevention, early diagnostic measures and treatment of complications    Responsible User: Dominique Davila RD        Task: Provide education on recommended care for dental, eye and foot health Completed 11/30/2023   Responsible User: Dominique Davila RD        Task: Provide education on Hemoglobin A1c - goals and relationship to blood glucose levels Completed 11/30/2023   Responsible User: Dominique Davila RD        Task: Provide education on recommendations for heart health - lipid levels and goals, blood pressure and goals, and aspirin therapy, if indicated    Responsible User: Dominique Davila RD        Task: Provide education on tobacco cessation    Responsible User: Dominique Davila RD        Goal: Healthy Coping - use available resources to cope with the challenges of managing diabetes         Task: Discuss recognizing feelings about having diabetes    Responsible User: Giovanni  SAIRA Fox        Task: Provide education on the benefits of making appropriate lifestyle changes    Responsible User: Dominique Davila RD        Task: Provide education on benefits of utilizing support systems    Responsible User: Dominique Davila RD        Task: Discuss methods for coping with stress    Responsible User: Dominique Davila RD        Task: Provide education on when to seek professional counseling    Responsible User: Dominique Davila RD              Time Spent: 45 minutes  Encounter Type: Individual    Any diabetes medication dose changes were made via the CDE Protocol per the patient's primary care provider. A copy of this encounter was shared with the provider.

## 2023-11-30 NOTE — PATIENT INSTRUCTIONS
Increase Lantus to 104 units and take daily at 9am.  Increase Novolog to 22 units: take 10-15 before your morning meal and 10-15 minutes before your evening meal.  If you eat a meal at noon, take Novolog before this meal also.  Continue to take two Metformin tablets daily.   Limit your rice and noodles to one small bowl per meal: add more vegetables and meat or fish.  Avoid all sugary beverages.   Add in 10-15 minutes of walking after meals.  If your blood sugar goes below 70, have a small glass of juice.  Add in daily flossing.  Schedule a diabetic eye exam.

## 2023-12-12 ENCOUNTER — ALLIED HEALTH/NURSE VISIT (OUTPATIENT)
Dept: EDUCATION SERVICES | Facility: CLINIC | Age: 28
End: 2023-12-12
Payer: COMMERCIAL

## 2023-12-12 DIAGNOSIS — E11.65 TYPE 2 DIABETES MELLITUS WITH HYPERGLYCEMIA, UNSPECIFIED WHETHER LONG TERM INSULIN USE (H): Primary | ICD-10-CM

## 2023-12-12 PROCEDURE — G0108 DIAB MANAGE TRN  PER INDIV: HCPCS | Mod: AE | Performed by: DIETITIAN, REGISTERED

## 2023-12-12 NOTE — LETTER
12/12/2023         RE: Tyson Skelton  [address] [place] [address]        Dear Colleague,    Thank you for referring your patient, Tyson Skelton, to the [facility]. Please see a copy of my visit note below.    Diabetes Education Follow-up/ 70 minutes through professional  Way Reed    Subjective/Objective:    Discussion on [employer] philomena set up, sensor application and CGM review with Tyson Skelton. Last date of communication was: 11/30/23.    Diabetes is being managed with Lifestyle (diet/activity), Diabetes Medications   Diabetes Medication(s)       Biguanides       metFORMIN (GLUCOPHAGE XR) 500 MG 24 hr tablet    TAKE 1 TABLET (500 MG) BY MOUTH 2 TIMES DAILY (WITH MEALS)      Insulin       insulin aspart (NOVOLOG FLEXPEN) 100 UNIT/ML pen    Inject 22 Units Subcutaneous 3 times daily (with meals) Inject 22 units of Novolog, 10-15 minutes before breakfast, lunch and dinner. .     insulin glargine (LANTUS SOLOSTAR) 100 UNIT/ML pen    Inject 104 Units Subcutaneous every morning            CGM report:                    Assessment:  Recent CGM data shows a significant improvement in BG management from our visit two weeks ago. Average BG has decreased to 201 mg/dl with time in target range at 49%. Adjustments were made with insulin on 11/30: lantus to 104 units which she takes in the morning and Novolog 22 units prior to meals. Tyson has been more consistent with her daily lantus dose, at least for Monday-Friday. She forgets to take lantus at times on the weekend.  Tyson is taking her Novolog prior to meals, although she does forget to take her meal time insulin, sometimes daily. She was not specific on how many times per week she misses doses. We discussed the importance of taking the insulin, as prescribed for the best management of her diabetes. We were not able to get the [employer] philomena to work on Tyson's phone today, even after a call to the [employer], the philomena was still not  working. Paris will plan on reaching out to Tyson, with an  to continue to work on this situation.  Tyson will use her reader until the [employer] philomena is in working order.     Tyson is avoiding sugary beverages and she has been working on smaller portions of rice and noodles with her meals. She has an eye exam scheduled for 12/15/23.       Plan/Response:  See Patient Instructions for co-developed, patient-stated behavior change goals.  No medication changes  Continue to take two Metformin tablets daily.   Limit your rice and noodles to one small bowl per meal: add more vegetables and meat or fish.  Avoid all sugary beverages.   Add in 10-15 minutes of walking after meals.  If your blood sugar goes below 70, have a small glass of juice.  Add in daily flossing.  Scan sensor when you wake up, at noon, at 6 pm and bedtime.     MTM visit on 1/9/24      Any diabetes medication dose changes were made via the CDE Protocol and Collaborative Practice Agreement with the patient's primary care provider. A copy of this encounter was shared with the provider.

## 2023-12-12 NOTE — PATIENT INSTRUCTIONS
No medication changes  Continue to take two Metformin tablets daily.   Limit your rice and noodles to one small bowl per meal: add more vegetables and meat or fish.  Avoid all sugary beverages.   Add in 10-15 minutes of walking after meals.  If your blood sugar goes below 70, have a small glass of juice.  Add in daily flossing.  Scan sensor when you wake up, at noon, at 6 pm and bedtime.

## 2023-12-12 NOTE — PROGRESS NOTES
Diabetes Education Follow-up/ 70 minutes through professional  Way Reed    Subjective/Objective:    Discussion on [employer] philomena set up, sensor application and CGM review with Tyson Skelton. Last date of communication was: 11/30/23.    Diabetes is being managed with Lifestyle (diet/activity), Diabetes Medications   Diabetes Medication(s)       Biguanides       metFORMIN (GLUCOPHAGE XR) 500 MG 24 hr tablet    TAKE 1 TABLET (500 MG) BY MOUTH 2 TIMES DAILY (WITH MEALS)      Insulin       insulin aspart (NOVOLOG FLEXPEN) 100 UNIT/ML pen    Inject 22 Units Subcutaneous 3 times daily (with meals) Inject 22 units of Novolog, 10-15 minutes before breakfast, lunch and dinner. .     insulin glargine (LANTUS SOLOSTAR) 100 UNIT/ML pen    Inject 104 Units Subcutaneous every morning            CGM report:                    Assessment:  Recent CGM data shows a significant improvement in BG management from our visit two weeks ago. Average BG has decreased to 201 mg/dl with time in target range at 49%. Adjustments were made with insulin on 11/30: lantus to 104 units which she takes in the morning and Novolog 22 units prior to meals. Tyson has been more consistent with her daily lantus dose, at least for Monday-Friday. She forgets to take lantus at times on the weekend.  Tyson is taking her Novolog prior to meals, although she does forget to take her meal time insulin, sometimes daily. She was not specific on how many times per week she misses doses. We discussed the importance of taking the insulin, as prescribed for the best management of her diabetes. We were not able to get the [employer] philomena to work on Tyson's phone today, even after a call to the [employer] customer care, the philomena was still not working. Scytl will plan on reaching out to Tyson, with an  to continue to work on this situation.  Tyson will use her reader until the [employer] philomena is in working order.     Tyson is avoiding sugary beverages and she has been working on  smaller portions of rice and noodles with her meals. She has an eye exam scheduled for 12/15/23.       Plan/Response:  See Patient Instructions for co-developed, patient-stated behavior change goals.  No medication changes  Continue to take two Metformin tablets daily.   Limit your rice and noodles to one small bowl per meal: add more vegetables and meat or fish.  Avoid all sugary beverages.   Add in 10-15 minutes of walking after meals.  If your blood sugar goes below 70, have a small glass of juice.  Add in daily flossing.  Scan sensor when you wake up, at noon, at 6 pm and bedtime.     MTM visit on 1/9/24      Any diabetes medication dose changes were made via the CDE Protocol and Collaborative Practice Agreement with the patient's primary care provider. A copy of this encounter was shared with the provider.

## 2023-12-19 ENCOUNTER — OFFICE VISIT (OUTPATIENT)
Dept: FAMILY MEDICINE | Facility: CLINIC | Age: 28
End: 2023-12-19
Payer: COMMERCIAL

## 2023-12-19 VITALS
SYSTOLIC BLOOD PRESSURE: 116 MMHG | TEMPERATURE: 98.8 F | OXYGEN SATURATION: 100 % | HEIGHT: 59 IN | RESPIRATION RATE: 20 BRPM | DIASTOLIC BLOOD PRESSURE: 74 MMHG | HEART RATE: 115 BPM | WEIGHT: 171 LBS | BODY MASS INDEX: 34.47 KG/M2

## 2023-12-19 DIAGNOSIS — N92.6 IRREGULAR PERIODS: ICD-10-CM

## 2023-12-19 DIAGNOSIS — E03.9 HYPOTHYROIDISM, UNSPECIFIED TYPE: Primary | ICD-10-CM

## 2023-12-19 DIAGNOSIS — Z23 IMMUNIZATION DUE: ICD-10-CM

## 2023-12-19 DIAGNOSIS — E11.65 TYPE 2 DIABETES MELLITUS WITH HYPERGLYCEMIA, UNSPECIFIED WHETHER LONG TERM INSULIN USE (H): ICD-10-CM

## 2023-12-19 DIAGNOSIS — Z78.9 ATTEMPTING TO CONCEIVE: ICD-10-CM

## 2023-12-19 LAB — TSH SERPL DL<=0.005 MIU/L-ACNC: 2.76 UIU/ML (ref 0.3–4.2)

## 2023-12-19 PROCEDURE — 36415 COLL VENOUS BLD VENIPUNCTURE: CPT | Performed by: FAMILY MEDICINE

## 2023-12-19 PROCEDURE — 90471 IMMUNIZATION ADMIN: CPT | Performed by: FAMILY MEDICINE

## 2023-12-19 PROCEDURE — 90746 HEPB VACCINE 3 DOSE ADULT IM: CPT | Performed by: FAMILY MEDICINE

## 2023-12-19 PROCEDURE — 84443 ASSAY THYROID STIM HORMONE: CPT | Performed by: FAMILY MEDICINE

## 2023-12-19 PROCEDURE — 99214 OFFICE O/P EST MOD 30 MIN: CPT | Mod: 25 | Performed by: FAMILY MEDICINE

## 2023-12-19 NOTE — PROGRESS NOTES
"   Hypothyroidism, unspecified type  Currently on Synthroid 1 1 2 mcg daily.  Recheck TSH today dose adjustment if needed.  - TSH with free T4 reflex    Type 2 diabetes mellitus with hyperglycemia, unspecified whether long term insulin use (H)  Managed by endocrinology.    Irregular periods  Sees GYN.    Attempting to conceive    Immunization due  Hepatitis B second dose today.    Chance Mehta is a 28 year old female here to follow-up on hypothyroidism.  She was referred to GYN due to travel conceiving.  Basic labs were done by GYN and was found to have hypothyroidism.  She was started on levothyroxine 1 1 2 mcg daily, she started this about 4 weeks ago.  She reports trouble losing weight, cold intolerance and hair loss.  No medication side effects reported from levothyroxine.  Diabetes is managed by endocrinology.  She is due for Pap but declined today.        12/19/2023    10:06 AM   Additional Questions   Roomed by Irais MERAZ       History of Present Illness       Reason for visit:  Follow-up with doctor    She eats 2-3 servings of fruits and vegetables daily.She consumes 1 sweetened beverage(s) daily.She exercises with enough effort to increase her heart rate 9 or less minutes per day.       Review of Systems   CONSTITUTIONAL: NEGATIVE for fever, chills, change in weight  RESP: NEGATIVE for significant cough or SOB  CV: NEGATIVE for chest pain/chest pressure      Objective    Vitals:    12/19/23 1008   BP: 116/74   Pulse: 115   Resp: 20   Temp: 98.8  F (37.1  C)   TempSrc: Oral   SpO2: 100%   Weight: 77.6 kg (171 lb)   Height: 1.499 m (4' 11\")      Physical Exam   GENERAL: healthy, alert and no distress  NECK: thyroid normal to palpation  RESP: lungs clear to auscultation - no rales, rhonchi or wheezes  CV: regular rates and rhythm and no murmur, click or rub  ABDOMEN: soft, nontender, no hepatosplenomegaly, no masses and bowel sounds normal  PSYCH: mentation appears normal, affect normal/bright    This " transcription uses voice recognition software, which may contain typographical errors.          Prior to immunization administration, verified patients identity using patient s name and date of birth. Please see Immunization Activity for additional information.     Screening Questionnaire for Adult Immunization    Are you sick today?   No   Do you have allergies to medications, food, a vaccine component or latex?   No   Have you ever had a serious reaction after receiving a vaccination?   No   Do you have a long-term health problem with heart, lung, kidney, or metabolic disease (e.g., diabetes), asthma, a blood disorder, no spleen, complement component deficiency, a cochlear implant, or a spinal fluid leak?  Are you on long-term aspirin therapy?   No   Do you have cancer, leukemia, HIV/AIDS, or any other immune system problem?   No   Do you have a parent, brother, or sister with an immune system problem?   No   In the past 3 months, have you taken medications that affect  your immune system, such as prednisone, other steroids, or anticancer drugs; drugs for the treatment of rheumatoid arthritis, Crohn s disease, or psoriasis; or have you had radiation treatments?   No   Have you had a seizure, or a brain or other nervous system problem?   No   During the past year, have you received a transfusion of blood or blood    products, or been given immune (gamma) globulin or antiviral drug?   No   For women: Are you pregnant or is there a chance you could become       pregnant during the next month?   No   Have you received any vaccinations in the past 4 weeks?   No     Immunization questionnaire answers were all negative.      Patient instructed to remain in clinic for 15 minutes afterwards, and to report any adverse reactions.     Screening performed by Tyson Arora MA on 12/19/2023 at 10:29 AM.

## 2023-12-19 NOTE — COMMUNITY RESOURCES LIST (ENGLISH)
12/19/2023   [facility]  N/A  For additional resource needs, please contact your health insurance member services or your primary care team.  Phone: [phone]   Email: N/A   Address: [address] [place] [id number]   Hours: N/A        Food and Nutrition       Food pantry  1  [facility] [address] Food Shelf Distance: 1.2 miles      In-Person   [address] [facility] [address] [place] [address]  Language: English  Hours: Mon - Fri 10:00 AM - 12:30 PM , Mon - Tue 1:30 PM - 3:30 PM , Thu - Fri 1:30 PM - 3:30 PM  Fees: Free   Phone: (767) 829-7191 Email: info@[facility] Website: http://[facility].[employer]/food-shelves/     2  [facility] Distance: 1.4 miles      Delivery, [facility]   [address] [place] [address]  Language: English, Belizean  Hours: Mon 9:00 AM - 6:00 PM Appt. Only, Tue - Fri 9:00 AM - 5:00 PM Appt. Only  Fees: Free   Phone: (325) 129-8961 Email: info@[employer].org Website: http://www.[employer].org/site     SNAP application assistance  3  [employer] [place] Distance: 0.75 miles      Phone/Virtual   [address] [facility] [address] [place] [address]  Language: English, Hmong, Liberian, Venezuelan, Belizean  Hours: Mon - Fri 8:30 AM - 4:30 PM  Fees: Free   Phone: (989) 926-2655 Email: helpline@[employer]ers[employer].org Website: https://www.hungersolutions.org/programs/[place]-food-helpline/     4  [place] Department of Human [employer] - [place] (SNAP) Distance: 1.43 miles      Phone/Virtual   [address] [address] [place] [address]  Language: English, Hmong, Liberian, Venezuelan, Belizean, Tamazight  Hours: Mon - Fri 9:00 AM - 5:00 PM  Fees: Free   Phone: (767) 779-3709 Website: https://[place].gov/dhs/people-we-serve/adults/economic-assistance/food-nutrition/programs-and-services/supplemental-nutrition-assistance-program.jsp     Soup kitchen or free meals  5  [facility] Distance: 1.85 miles       Pickup   [address] [place] [address]  Language: English  Hours: Mon - Fri 11:45 AM - 12:45 PM  Fees: Free   Phone: (966) 683-8503 Email: neva@[facility].[employer] Healthcare[facility].imy.org Website: http://[facility]/Replaced by [facility]/     6  [facility] Distance: 1.98 miles      Pickup   [address] [place] [address]  Language: English  Hours: Wed 2:00 PM - 4:00 PM , Fri 2:00 PM - 4:00 PM  Fees: Free   Phone: (891) 942-1581 Email: Ignacio@.[facility]. Website: https://www.[facility]/facilities/jpahiamoe-vnjsj-behvruozs-[place]          Important Numbers & Websites       [address]  Poison Control   (161) 987-4693 [place]po[place]  Suicide and Crisis Lifeline   [address] [place], [place] [address]line.org  Childhelp [place] Child Abuse Hotline   248.790.7788 Childhelphotline.org  [place] Sexual Assault Hotline   (414) 877-5837 (HOPE) Rainn.org  National Runaway Safeline   (511) 696-4315 (RUNAWAY) [facility].org  Pregnancy & Postpartum Support [place]   Call/text 258-368-3954 Ppsupport[place].org  Substance Abuse National Helpline ([facility]   964-838-HELP (5527) Findtreatment.gov  Emergency Services   911

## 2023-12-21 DIAGNOSIS — E03.9 HYPOTHYROIDISM, UNSPECIFIED TYPE: ICD-10-CM

## 2023-12-21 RX ORDER — LEVOTHYROXINE SODIUM 112 UG/1
112 TABLET ORAL DAILY
Qty: 90 TABLET | Refills: 0 | Status: SHIPPED | OUTPATIENT
Start: 2023-12-21 | End: 2024-03-22

## 2024-01-09 DIAGNOSIS — Z79.4 TYPE 2 DIABETES MELLITUS WITH HYPERGLYCEMIA, WITH LONG-TERM CURRENT USE OF INSULIN (H): ICD-10-CM

## 2024-01-09 DIAGNOSIS — E11.65 TYPE 2 DIABETES MELLITUS WITH HYPERGLYCEMIA, WITH LONG-TERM CURRENT USE OF INSULIN (H): ICD-10-CM

## 2024-02-06 ENCOUNTER — OFFICE VISIT (OUTPATIENT)
Dept: FAMILY MEDICINE | Facility: CLINIC | Age: 29
End: 2024-02-06
Payer: COMMERCIAL

## 2024-02-06 VITALS
SYSTOLIC BLOOD PRESSURE: 120 MMHG | TEMPERATURE: 98.1 F | HEIGHT: 60 IN | WEIGHT: 172.13 LBS | RESPIRATION RATE: 16 BRPM | HEART RATE: 100 BPM | OXYGEN SATURATION: 96 % | BODY MASS INDEX: 33.8 KG/M2 | DIASTOLIC BLOOD PRESSURE: 82 MMHG

## 2024-02-06 DIAGNOSIS — E03.9 HYPOTHYROIDISM, UNSPECIFIED TYPE: ICD-10-CM

## 2024-02-06 DIAGNOSIS — E11.65 TYPE 2 DIABETES MELLITUS WITH HYPERGLYCEMIA, UNSPECIFIED WHETHER LONG TERM INSULIN USE (H): ICD-10-CM

## 2024-02-06 DIAGNOSIS — Z00.00 ANNUAL PHYSICAL EXAM: Primary | ICD-10-CM

## 2024-02-06 DIAGNOSIS — Z12.4 CERVICAL CANCER SCREENING: ICD-10-CM

## 2024-02-06 DIAGNOSIS — N92.6 IRREGULAR MENSTRUAL CYCLE: ICD-10-CM

## 2024-02-06 LAB — HCG UR QL: NEGATIVE

## 2024-02-06 PROCEDURE — 90746 HEPB VACCINE 3 DOSE ADULT IM: CPT | Performed by: FAMILY MEDICINE

## 2024-02-06 PROCEDURE — 81025 URINE PREGNANCY TEST: CPT | Performed by: FAMILY MEDICINE

## 2024-02-06 PROCEDURE — G0145 SCR C/V CYTO,THINLAYER,RESCR: HCPCS | Performed by: FAMILY MEDICINE

## 2024-02-06 PROCEDURE — 90471 IMMUNIZATION ADMIN: CPT | Performed by: FAMILY MEDICINE

## 2024-02-06 PROCEDURE — 99395 PREV VISIT EST AGE 18-39: CPT | Mod: 25 | Performed by: FAMILY MEDICINE

## 2024-02-06 SDOH — HEALTH STABILITY: PHYSICAL HEALTH: ON AVERAGE, HOW MANY MINUTES DO YOU ENGAGE IN EXERCISE AT THIS LEVEL?: 0 MIN

## 2024-02-06 SDOH — HEALTH STABILITY: PHYSICAL HEALTH: ON AVERAGE, HOW MANY DAYS PER WEEK DO YOU ENGAGE IN MODERATE TO STRENUOUS EXERCISE (LIKE A BRISK WALK)?: 0 DAYS

## 2024-02-06 ASSESSMENT — SOCIAL DETERMINANTS OF HEALTH (SDOH): HOW OFTEN DO YOU GET TOGETHER WITH FRIENDS OR RELATIVES?: MORE THAN THREE TIMES A WEEK

## 2024-02-06 NOTE — PROGRESS NOTES
Medication Therapy Management (MTM) Encounter    ASSESSMENT:                            Medication Adherence/Access: CGM readings indicate missed doses of Novolog and Lantus. Previously discussed phone alarms, but patient didn't have her phone and didn't set alarms after the visit. Set alarms today. 9 am for Lantus.        Type 2 Diabetes:  A1C above goal <7%. Averages above goal <180. Not at time in target goal >70%. Continues to struggle with remembering insulin injections. When consistently using throughout the day, current doses are keeping blood glucose in the target range (e.g. 1/29, 1/30, 1/31) Therefore, no change in insulin doses. Discussed adding in 10 minutes of exercise per day to also help with reducing blood glucose.     Blood pressure at goal <140/90.      Hyperlipidemia: Appropriately using statin due to history of LDL >190. Last LDL at goal <100. Discontinue in the event that the patient becomes pregnant.      Hypothyroidism: Last TSH WNL.             PLAN:                              Aim for 10 minutes of exercise each day. Increase as able.      Follow-up: Return in about 8 weeks (around 4/3/2024) for Medication Management Pharmacist, in person.    3/21 with PCP     SUBJECTIVE/OBJECTIVE:                          Tyson Skelton is a 28 year old female coming in for a follow-up visit. She was referred to me from Rolanda Smith MD. Declined . Today's visit is a follow-up MTM visit from 10/11/2023.     Reason for visit: Diabetes management.     Allergies/ADRs: Reviewed in chart  Past Medical History: Reviewed in chart  Tobacco: She reports that she has never smoked. She has been exposed to tobacco smoke. She has never used smokeless tobacco.  Alcohol:  reports no history of alcohol use.        Medication Adherence/Access:       Did not bring medicines. Brought her meter.   Forgets insulin 1-2 times per week.            Type 2 Diabetes:  Prescribed NovoLog 30 units 3 times daily  "with meals + 1 to 7 units at bedtime, Lantus 105 units daily (provided instructions to titrate at last visit), metformin 500 mg ER twice daily (switched from IR at last visit).     Was able to increase metformin to twice daily after meals. Still having some diarrhea, but not always. Typically 1-2 times per week.      States she's using 105 units of Lantus daily.    Continues to miss insulin doses on the weekends. When she's spending time with family, forgets to use Novolog. Usually goes somewhere else to visit.       Is trying to have another child, but not having regular menstrual cycles. Period coming every 2-3 months.      Blood sugar monitoring: CGM. Ranges (based on glucometer readings):             Diet/Exercise: Eating more meat and veggies. Eating less rice- probably about 1.5 cups. Not getting any exercise during the day- no particular reason \"lazy\"     Aspirin: No  Statin: Yes: Atorvastatin 20 mg daily  ACEi/ARB: No.     Hemoglobin A1C   Date Value Ref Range Status   11/30/2023 9.9 (H) 0.0 - 5.6 % Final   10/05/2022   Final     Comment:     Greater than analytical measurement  Normal <5.7%   Prediabetes 5.7-6.4%    Diabetes 6.5% or higher     Note: Adopted from ADA consensus guidelines.   11/24/2020 12.9 (H) <=5.6 % Final     Comment:     Normal <5.7% Prediabete 5.7-6.4% Diabletes 6.5% or higher - adopted from ADA consensus guidelines     Hemoglobin A1C POCT   Date Value Ref Range Status   01/13/2020 13.1 (A) 4.3 - 6 % Final   10/16/2019 10.9 (A) 4.3 - 6 % Final       Hypothyroidism: Prescribed Levothyroxine 112 mcg daily.       TSH   Date Value Ref Range Status   12/19/2023 2.76 0.30 - 4.20 uIU/mL Final   11/15/2023 4.86 (H) 0.30 - 4.20 uIU/mL Final           Hyperlipidemia: Prescribed atorvastatin 20 mg daily      Recent Labs   Lab Test 04/29/21  1122 11/24/20  1332   CHOL 196 225*   HDL 39* 36*    139*   TRIG 283* 402*           Today's Vitals: /88   Pulse 91   LMP 12/31/2023 (Exact Date) "   ----------------      I spent 30 minutes with this patient today. All changes were made via collaborative practice agreement with Parish Daniels MD. A copy of the visit note was provided to the patient's provider(s).    A summary of these recommendations was given to the patient.    Lamonte Wright, [place]  Medication Therapy Management (MTM) Pharmacist  [facility]           Medication Therapy Recommendations  No medication therapy recommendations to display

## 2024-02-06 NOTE — PROGRESS NOTES
Preventive Care Visit  [facility] DHARMESH aDniels MD, Family Medicine  Feb 6, 2024    Chance Mehta is a 28 year old, presenting for the following:  Physical  Currently taking Synthroid 112mcg daily.  Last TSH was 2.76 in 12/23.  LMP 12/32/23.  Diabetes is managed by endocrinology.        2/6/2024    11:00 AM   Additional Questions   Roomed by Felipa Jolly   Accompanied by Spouse        Health Care Directive  Patient does not have a Health Care Directive or Living Will: Discussed advance care planning with patient; however, patient declined at this time.          2/6/2024   General Health   How would you rate your overall physical health? Good   Feel stress (tense, anxious, or unable to sleep) Not at all         2/6/2024   Nutrition   Three or more servings of calcium each day? Yes   Diet: Vegetarian/vegan   How many servings of fruit and vegetables per day? (!) 2-3   How many sweetened beverages each day? 0-1         2/6/2024   Exercise   Days per week of moderate/strenous exercise 0 days   Average minutes spent exercising at this level 0 min   (!) EXERCISE CONCERN      2/6/2024   Social Factors   Frequency of gathering with friends or relatives More than three times a week   Worry food won't last until get money to buy more No   Food not last or not have enough money for food? No   Do you have housing?  Yes   Are you worried about losing your housing? No   Lack of transportation? No   Unable to get utilities (heat,electricity)? No         2/6/2024   Dental   Dentist two times every year? (!) NO         2/6/2024   TB Screening   Were you born outside of [place]?  (!) YES          Today's PHQ-2 Score:       2/6/2024    10:56 AM   PHQ-2 ( 1999 Pf[employer])   Q1: Little interest or pleasure in doing things 0   Q2: Feeling down, depressed or hopeless 0   PHQ-2 Score 0   Q1: Little interest or pleasure in doing things Not at all   Q2: Feeling down, depressed or hopeless Not at all   PHQ-2 Score 0            "2/6/2024   Substance Use   Alcohol more than 3/day or more than 7/wk No   Do you use any other substances recreationally? No     Social History     Tobacco Use    Smoking status: Never     Passive exposure: Yes    Smokeless tobacco: Never   Vaping Use    Vaping Use: Never used   Substance Use Topics    Alcohol use: No    Drug use: No           2/6/2024   Breast Cancer Screening   Family history of breast, colon, or ovarian cancer? No / Unknown      Mammogram Screening - Patient under 40 years of age: Routine Mammogram Screening not recommended.           2/6/2024   One time HIV Screening   Previous HIV test? No         2/6/2024   STI Screening   New sexual partner(s) since last STI/HIV test? No     History of abnormal Pap smear: NO - age 21-29 PAP every 3 years recommended        Latest Ref Rng & Units 5/7/2019    11:49 AM   PAP / HPV   PAP Negative for squamous intraepithelial lesion or malignancy. Negative for squamous intraepithelial lesion or malignancy  Electronically signed by Pia Morataya CT (ASCP) on 5/15/2019 at  9:37 AM      HPV 16 DNA NEG Negative    HPV 18 DNA NEG Negative    Other HR HPV NEG Negative            2/6/2024   Contraception/Family Planning   Questions about contraception or family planning No        Reviewed and updated as needed this visit by Provider                    Review of Systems    Review of Systems  CONSTITUTIONAL: NEGATIVE for fever, chills, change in weight  RESP: NEGATIVE for significant cough or SOB  CV: NEGATIVE for chest pain/chest pressure     Objective    Exam  LMP 12/31/2023 (Exact Date)    Estimated body mass index is 34.54 kg/m  as calculated from the following:    Height as of 12/19/23: 1.499 m (4' 11\").    Weight as of 12/19/23: 77.6 kg (171 lb).  Vitals:    02/06/24 1102   BP: 120/82   BP Location: Left arm   Patient Position: Sitting   Cuff Size: Adult Regular   Pulse: 100   Resp: 16   Temp: 98.1  F (36.7  C)   TempSrc: Oral   SpO2: 96%   Weight: 78.1 kg " (172 lb 2 oz)   Height: 1.524 m (5')      Physical Exam  Gen - alert, orientated, NAD  Eyes - fundascopic exam limited by the undialated pupil but looks symmetric  ENT - oropharynx clear, TMs clear  Neck - supple, no palpable mass or lymphadenopathy  CV - RRR, no murmur  Breast - no dominant mass on either side, no axillary mass.  Resp - lungs CTA  Ab - soft, nontender, no palpable mass or organomegaly   - normal appearance to the external genetalia, vaginal mucosa normal, physiologic discharge, no palpable adenexal mass, cervix appears normal  Extrem - warm, no edema  Neuro - CN II-XII intact, strength, sensation, reflexes intact and symmetric  Skin - no rash.  No atypical appearing lesions seen.      Prior to immunization administration, verified patients identity using patient s name and date of birth. Please see Immunization Activity for additional information.     Screening Questionnaire for Adult Immunization    Are you sick today?   No   Do you have allergies to medications, food, a vaccine component or latex?   No   Have you ever had a serious reaction after receiving a vaccination?   No   Do you have a long-term health problem with heart, lung, kidney, or metabolic disease (e.g., diabetes), asthma, a blood disorder, no spleen, complement component deficiency, a cochlear implant, or a spinal fluid leak?  Are you on long-term aspirin therapy?   No   Do you have cancer, leukemia, HIV/AIDS, or any other immune system problem?   No   Do you have a parent, brother, or sister with an immune system problem?   No   In the past 3 months, have you taken medications that affect  your immune system, such as prednisone, other steroids, or anticancer drugs; drugs for the treatment of rheumatoid arthritis, Crohn s disease, or psoriasis; or have you had radiation treatments?   No   Have you had a seizure, or a brain or other nervous system problem?   No   During the past year, have you received a transfusion of blood or  blood    products, or been given immune (gamma) globulin or antiviral drug?   No   For women: Are you pregnant or is there a chance you could become       pregnant during the next month?   No   Have you received any vaccinations in the past 4 weeks?   No     Immunization questionnaire answers were all negative.      Patient instructed to remain in clinic for 15 minutes afterwards, and to report any adverse reactions.     Screening performed by Tyson Arora MA on 2/6/2024 at 11:28 AM.       Signed Electronically by: Parish Daniels MD

## 2024-02-07 ENCOUNTER — OFFICE VISIT (OUTPATIENT)
Dept: PHARMACY | Facility: CLINIC | Age: 29
End: 2024-02-07
Payer: COMMERCIAL

## 2024-02-07 VITALS — DIASTOLIC BLOOD PRESSURE: 88 MMHG | SYSTOLIC BLOOD PRESSURE: 128 MMHG | HEART RATE: 91 BPM

## 2024-02-07 DIAGNOSIS — R60.9 DEPENDENT EDEMA: ICD-10-CM

## 2024-02-07 DIAGNOSIS — E78.5 HYPERLIPIDEMIA, UNSPECIFIED HYPERLIPIDEMIA TYPE: ICD-10-CM

## 2024-02-07 DIAGNOSIS — E03.9 HYPOTHYROIDISM, UNSPECIFIED TYPE: ICD-10-CM

## 2024-02-07 DIAGNOSIS — E11.65 TYPE 2 DIABETES MELLITUS WITH HYPERGLYCEMIA, WITH LONG-TERM CURRENT USE OF INSULIN (H): Primary | ICD-10-CM

## 2024-02-07 DIAGNOSIS — Z79.4 TYPE 2 DIABETES MELLITUS WITH HYPERGLYCEMIA, WITH LONG-TERM CURRENT USE OF INSULIN (H): Primary | ICD-10-CM

## 2024-02-07 PROCEDURE — 99605 MTMS BY PHARM NP 15 MIN: CPT | Performed by: PHARMACIST

## 2024-02-07 RX ORDER — INSULIN ASPART 100 [IU]/ML
30 INJECTION, SOLUTION INTRAVENOUS; SUBCUTANEOUS
Qty: 60 ML | Refills: 3 | Status: SHIPPED | OUTPATIENT
Start: 2024-02-07

## 2024-02-07 NOTE — PATIENT INSTRUCTIONS
"Recommendations from today's MTM visit:                                                         Aim for 10 minutes of exercise each day. Increase as able.      Follow-up: Return in about 8 weeks (around 4/3/2024) for Medication Management Pharmacist, in person.    3/21 with PCP     It was great speaking with you today.  I value your experience and would be very thankful for your time in providing feedback in our clinic survey. In the next few days, you may receive an email or text message from [email or web address] with a link to a survey related to your  clinical pharmacist.\"     To schedule another MTM appointment, please call the clinic directly or you may call the MTM scheduling line at [phone].    My Clinical Pharmacist's contact information:                                                      Please feel free to contact me with any questions or concerns you have.      Lamonte Wright, PharmD  Medication Therapy Management (MTM) Pharmacist  [facility]      "

## 2024-02-07 NOTE — Clinical Note
FYI - patient hasn't seen Endo in over 6 months and does not want to go back. Continues to miss insulin doses. When she's getting them in, blood glucose for that day do tend to be in range.

## 2024-02-08 LAB
BKR LAB AP GYN ADEQUACY: NORMAL
BKR LAB AP GYN INTERPRETATION: NORMAL
BKR LAB AP HPV REFLEX: NORMAL
BKR LAB AP LMP: NORMAL
BKR LAB AP PREVIOUS ABNORMAL: NORMAL
PATH REPORT.COMMENTS IMP SPEC: NORMAL
PATH REPORT.COMMENTS IMP SPEC: NORMAL
PATH REPORT.RELEVANT HX SPEC: NORMAL

## 2024-03-11 ENCOUNTER — OFFICE VISIT (OUTPATIENT)
Dept: FAMILY MEDICINE | Facility: CLINIC | Age: 29
End: 2024-03-11
Payer: COMMERCIAL

## 2024-03-11 VITALS
RESPIRATION RATE: 16 BRPM | DIASTOLIC BLOOD PRESSURE: 73 MMHG | HEART RATE: 103 BPM | SYSTOLIC BLOOD PRESSURE: 108 MMHG | OXYGEN SATURATION: 98 % | TEMPERATURE: 99.6 F

## 2024-03-11 DIAGNOSIS — J06.9 VIRAL URI: Primary | ICD-10-CM

## 2024-03-11 DIAGNOSIS — R05.1 ACUTE COUGH: ICD-10-CM

## 2024-03-11 DIAGNOSIS — J02.9 SORE THROAT: ICD-10-CM

## 2024-03-11 DIAGNOSIS — J02.0 STREP PHARYNGITIS: ICD-10-CM

## 2024-03-11 LAB
DEPRECATED S PYO AG THROAT QL EIA: NEGATIVE
FLUAV AG SPEC QL IA: NEGATIVE
FLUBV AG SPEC QL IA: NEGATIVE
GROUP A STREP BY PCR: DETECTED

## 2024-03-11 PROCEDURE — 87804 INFLUENZA ASSAY W/OPTIC: CPT | Performed by: PHYSICIAN ASSISTANT

## 2024-03-11 PROCEDURE — 99213 OFFICE O/P EST LOW 20 MIN: CPT | Performed by: PHYSICIAN ASSISTANT

## 2024-03-11 PROCEDURE — 87651 STREP A DNA AMP PROBE: CPT | Performed by: PHYSICIAN ASSISTANT

## 2024-03-11 RX ORDER — BENZONATATE 100 MG/1
CAPSULE ORAL
Qty: 45 CAPSULE | Refills: 0 | Status: SHIPPED | OUTPATIENT
Start: 2024-03-11

## 2024-03-11 RX ORDER — PENICILLIN V POTASSIUM 500 MG/1
500 TABLET, FILM COATED ORAL 2 TIMES DAILY
Qty: 20 TABLET | Refills: 0 | Status: SHIPPED | OUTPATIENT
Start: 2024-03-11 | End: 2024-03-21

## 2024-03-11 RX ORDER — GUAIFENESIN 600 MG/1
TABLET, EXTENDED RELEASE ORAL
Qty: 30 TABLET | Refills: 0 | Status: SHIPPED | OUTPATIENT
Start: 2024-03-11

## 2024-03-11 NOTE — PROGRESS NOTES
Chief Complaint   Patient presents with    Cough     Has had cough  sore throat for 2 days        ASSESSMENT/PLAN:  Tyson was seen today for cough.    Diagnoses and all orders for this visit:    Viral URI  -     benzonatate (TESSALON) 100 MG capsule; Take 1-2 capsules by mouth up to 3 x a day as needed with food  -     guaiFENesin (MUCINEX) 600 MG 12 hr tablet; Take 1-2 tablets as needed up to twice a day    Sore throat  -     Streptococcus A Rapid Screen w/Reflex to PCR - Clinic Collect  -     Group A Streptococcus PCR Throat Swab    Acute cough  -     Influenza A & B Antigen - Clinic Collect    Strep negative.  Flu negative.  Initially tachycardic but did improve upon exam.  Overall patient appears well with reassuring exam.  Likely viral.  No evidence of pneumonia  Symptomatic cares and expected length of symptoms discussed at length and outlined in AVS  Return precautions also discussed    Valentín Randle PA-C      SUBJECTIVE:  Tyson is a 28 year old female who presents to urgent care with 2 days of cough, sore throat, fatigue.  Son has similar symptoms.    ROS: Pertinent ROS neg other than the symptoms noted above in the HPI.     OBJECTIVE:  /73   Pulse 103   Temp 99.6  F (37.6  C) (Oral)   Resp 16   LMP 12/31/2023 (Exact Date)   SpO2 98%    GENERAL: alert and no distress  EYES: Eyes grossly normal to inspection, PERRL and conjunctivae and sclerae normal  HENT: ear canals and TM's normal, nose and mouth without ulcers or lesions, no significant oropharynx erythema  RESP: lungs clear to auscultation - no rales, rhonchi or wheezes  CV: regular rate and rhythm, normal S1 S2, no S3 or S4, no murmur, click or rub    DIAGNOSTICS    Results for orders placed or performed in visit on 03/11/24   Streptococcus A Rapid Screen w/Reflex to PCR - Clinic Collect     Status: Normal    Specimen: Throat; Swab   Result Value Ref Range    Group A Strep antigen Negative Negative   Influenza A & B Antigen - Clinic Collect      Status: Normal    Specimen: Nose; Swab   Result Value Ref Range    Influenza A antigen Negative Negative    Influenza B antigen Negative Negative    Narrative    Test results must be correlated with clinical data. If necessary, results should be confirmed by a molecular assay or viral culture.        Current Outpatient Medications   Medication    alcohol swab prep pads    atorvastatin (LIPITOR) 20 MG tablet    blood glucose (ACCU-CHEK SOFTCLIX) lancing device    blood glucose (NO BRAND SPECIFIED) test strip    blood glucose monitoring (SOFTCLIX) lancets    Continuous Blood Gluc Sensor (FREESTYLE DUC 2 SENSOR) MISC    insulin aspart (NOVOLOG FLEXPEN) 100 UNIT/ML pen    insulin glargine (LANTUS SOLOSTAR) 100 UNIT/ML pen    insulin pen needle (32G X 4 MM) 32G X 4 MM miscellaneous    levothyroxine (SYNTHROID/LEVOTHROID) 112 MCG tablet    metFORMIN (GLUCOPHAGE XR) 500 MG 24 hr tablet    Microlet Lancets MISC     No current facility-administered medications for this visit.      Patient Active Problem List   Diagnosis    Type 2 diabetes mellitus with hyperglycemia, unspecified whether long term insulin use (H)    Noncompliance with medication regimen    Diabetic ketoacidosis without coma associated with type 2 diabetes mellitus (H)    Hyperlipidemia    Hyponatremia    Dependent edema    Chronic kidney disease, unspecified    Goiter    Hyperglycemia    Kidney disease in pregnancy, second trimester    Preeclampsia    S/P  section    Supervision of high-risk pregnancy    Vitamin D deficiency    Hypothyroidism, unspecified type      Past Medical History:   Diagnosis Date    Chronic kidney disease     Diabetes mellitus, type II (H)     Hyperglycemia without ketosis     hospitalized at [facility]    Hyperlipidemia      No past surgical history on file.  Family History   Problem Relation Age of Onset    Diabetes Mother     No Known Problems Father     Diabetes Sister      Social History     Tobacco Use     Smoking status: Never     Passive exposure: Yes    Smokeless tobacco: Never   Substance Use Topics    Alcohol use: No              The plan of care was discussed with the patient. They understand and agree with the course of treatment prescribed. A printed summary was given including instructions and medications.  The use of Dragon/Cellcrypt dictation services may have been used to construct the content in this note; any grammatical or spelling errors are non-intentional. Please contact the author of this note directly if you are in need of any clarification.

## 2024-03-11 NOTE — PATIENT INSTRUCTIONS
Some things that you can do to help with your symptoms include:    Pain, malaise and inflammation:  Ibuprofen and Tylenol for pain and inflammation.  I prefer ibuprofen  Ibuprofen 400-600 mg (2-3 of the 200 mg OTC tablets or 400-600 mg of the children's liquid) up to 4 times daily with food or milk  Tylenol 500-1000 mg every 8 hours as needed    For nasal congestion and drainage  Flonase/fluticasone nasal spray 2 sprays in each nostril once a day for 1 - 4 weeks.  This may take several days to become effective.  Consider saline nasal rinses  Vicks VapoRub  Humidified air or steam    Cough:  Mucinex or guaifenesin can help get mucus out of your body and help with cough.  Dextromethorphan is a cough suppressant that may be helpful  Tessalon Perles may be prescribed  Vicks VapoRub  Humidified air or steam  Teaspoon of honey    Ear fullness or pain:  Flonase as above  Ibuprofen as above  Otovent, which is a balloon you blow up with your nose and helps pop the ears and regulate the pressure can be bought off of [employer] and works quite well    Supplements that may also be helpful:  Cold snap  Zicam  Zinc  Warm beverages, teas    Be sure to eat nutrient dense foods with a good mixture of fats, carbohydrates and proteins.  Your body burns more calories while sick.

## 2024-03-15 NOTE — TELEPHONE ENCOUNTER
[facility] Call Center    Phone Message    May a detailed message be left on voicemail: yes     Reason for Call: Medication Question or concern regarding medication   Prescription Clarification  Name of Medication: insulin glargine (LANTUS SOLOSTAR) 100 UNIT/ML pen and NOVOLOG FLEXPEN 100 UNIT/ML soln   Prescribing Provider: Dr. Peralta and Rosangela Palacios PADustinC   Pharmacy: [facility] - [place], [place] - [address]   What on the order needs clarification? Rachael RN with  [facility] calling with questions on these meds. She states pt had been under the impression she was to be taking 80 units of lantus rather than 70-what is the correct dose?  Second, pt is running out of her novolog supply faster than intended; they wanted to clarify the max units pt could take so they can bill her insurance correctly. Please advise.    Action Taken: Message routed to:  [facility] ([facility]): Endocrinology    Travel Screening: Not Applicable  
New updated scripts sent. After the last  Visit the medication list was not updated. She has a visit coming up in April and if any changes are needed it can be discussed then. Lisa Bueno RN on 4/1/2020 at 10:43 AM    
RC
[Post Op: _________] : a [unfilled] post op visit

## 2024-03-21 ENCOUNTER — OFFICE VISIT (OUTPATIENT)
Dept: FAMILY MEDICINE | Facility: CLINIC | Age: 29
End: 2024-03-21
Payer: COMMERCIAL

## 2024-03-21 VITALS
DIASTOLIC BLOOD PRESSURE: 84 MMHG | HEART RATE: 97 BPM | TEMPERATURE: 98.7 F | OXYGEN SATURATION: 98 % | RESPIRATION RATE: 20 BRPM | SYSTOLIC BLOOD PRESSURE: 114 MMHG | BODY MASS INDEX: 33 KG/M2 | WEIGHT: 163.7 LBS | HEIGHT: 59 IN

## 2024-03-21 DIAGNOSIS — E03.9 HYPOTHYROIDISM, UNSPECIFIED TYPE: Primary | ICD-10-CM

## 2024-03-21 DIAGNOSIS — E78.5 HYPERLIPIDEMIA, UNSPECIFIED HYPERLIPIDEMIA TYPE: ICD-10-CM

## 2024-03-21 DIAGNOSIS — N92.6 IRREGULAR MENSTRUAL CYCLE: ICD-10-CM

## 2024-03-21 DIAGNOSIS — E11.65 TYPE 2 DIABETES MELLITUS WITH HYPERGLYCEMIA, UNSPECIFIED WHETHER LONG TERM INSULIN USE (H): ICD-10-CM

## 2024-03-21 LAB
HBA1C MFR BLD: 9.4 % (ref 0–5.6)
HCG UR QL: NEGATIVE

## 2024-03-21 PROCEDURE — 80061 LIPID PANEL: CPT | Performed by: FAMILY MEDICINE

## 2024-03-21 PROCEDURE — 84443 ASSAY THYROID STIM HORMONE: CPT | Performed by: FAMILY MEDICINE

## 2024-03-21 PROCEDURE — 80053 COMPREHEN METABOLIC PANEL: CPT | Performed by: FAMILY MEDICINE

## 2024-03-21 PROCEDURE — 36415 COLL VENOUS BLD VENIPUNCTURE: CPT | Performed by: FAMILY MEDICINE

## 2024-03-21 PROCEDURE — 81025 URINE PREGNANCY TEST: CPT | Performed by: FAMILY MEDICINE

## 2024-03-21 PROCEDURE — 83036 HEMOGLOBIN GLYCOSYLATED A1C: CPT | Performed by: FAMILY MEDICINE

## 2024-03-21 PROCEDURE — 99214 OFFICE O/P EST MOD 30 MIN: CPT | Performed by: FAMILY MEDICINE

## 2024-03-21 NOTE — PROGRESS NOTES
"  Assessment & Plan     Hypothyroidism, unspecified type  Med dose adjustment if needed after test results.  - TSH with free T4 reflex    Irregular menstrual cycle  Negative UPT today.  - HCG qualitative urine    Type 2 diabetes mellitus with hyperglycemia, unspecified whether long term insulin use (H)  A1c 9.4 today.  Currently on Lantus 105 units once a day in the morning and mealtime insulin 30 units 3 times a day.???  Split dose of Lantus, will discuss with Dr. Wright.  No med changes today.    - Hemoglobin A1c  - Comprehensive metabolic panel (BMP + Alb, Alk Phos, ALT, AST, Total. Bili, TP)    Hyperlipidemia, unspecified hyperlipidemia type  Tolerating   - Lipid panel         BMI  Estimated body mass index is 32.57 kg/m  as calculated from the following:    Height as of this encounter: 1.51 m (4' 11.45\").    Weight as of this encounter: 74.3 kg (163 lb 11.2 oz).       Chance Mehta is a 28 year old female with history of hypothyroidism, history of irregular menstrual cycles, uncontrolled diabetes and hyperlipidemia here for follow-up.    She did not bring her sensor to check her blood sugar today.  She is currently taking Lantus 105 units daily in the morning and NovoLog 30 units before meals.  States she forgot only few times since last visit with [facility].  She had her last visit with [facility] pharmacist on 2/7/2024, future appointment scheduled on 4/3/2024.    She is currently taking Synthroid 1 1 2 mcg daily, last TSH was 2.76, 3 months ago.  Denied significant weight changes denied mood swings.  Denied cold intolerance.    Still trying to conceive.  LMP was 3/6/2024 but only 2 days and very light flow.  No menstrual period last month.    She did not bring her pill bottles today.  She takes cholesterol medication, metformin and thyroid medication.  Reports she did not tolerate higher dose of metformin, currently on 500 mg twice a day.        3/21/2024    10:38 AM   Additional Questions   Roomed by tammy garcia " "  Accompanied by Self       Review of Systems  CONSTITUTIONAL: NEGATIVE for fever, chills, change in weight  RESP: NEGATIVE for significant cough or SOB  CV: NEGATIVE for chest pain/chest pressure      Objective    /84 (BP Location: Left arm, Patient Position: Sitting, Cuff Size: Adult Regular)   Pulse 97   Temp 98.7  F (37.1  C) (Oral)   Resp 20   Ht 1.51 m (4' 11.45\")   Wt 74.3 kg (163 lb 11.2 oz)   LMP 03/07/2024 (Approximate)   SpO2 98%   BMI 32.57 kg/m    Body mass index is 32.57 kg/m .    Physical Exam   GENERAL: alert and no distress  NECK: Supple.  RESP: lungs clear to auscultation - no rales, rhonchi or wheezes  CV: regular rates and rhythm and no murmur, click or rub  PSYCH: mentation appears normal, affect normal/bright  Diabetic foot exam: no trophic changes or ulcerative lesions and normal sensory exam    This transcription uses voice recognition software, which may contain typographical errors.          Signed Electronically by: Parish Daniels MD    "

## 2024-03-22 ENCOUNTER — TELEPHONE (OUTPATIENT)
Dept: FAMILY MEDICINE | Facility: CLINIC | Age: 29
End: 2024-03-22
Payer: COMMERCIAL

## 2024-03-22 DIAGNOSIS — Z76.0 ENCOUNTER FOR MEDICATION REFILL: ICD-10-CM

## 2024-03-22 DIAGNOSIS — E03.9 HYPOTHYROIDISM, UNSPECIFIED TYPE: ICD-10-CM

## 2024-03-22 LAB
ALBUMIN SERPL BCG-MCNC: 3.4 G/DL (ref 3.5–5.2)
ALP SERPL-CCNC: 63 U/L (ref 40–150)
ALT SERPL W P-5'-P-CCNC: 17 U/L (ref 0–50)
ANION GAP SERPL CALCULATED.3IONS-SCNC: 11 MMOL/L (ref 7–15)
AST SERPL W P-5'-P-CCNC: 39 U/L (ref 0–45)
BILIRUB SERPL-MCNC: 0.2 MG/DL
BUN SERPL-MCNC: 13.8 MG/DL (ref 6–20)
CALCIUM SERPL-MCNC: 9.6 MG/DL (ref 8.6–10)
CHLORIDE SERPL-SCNC: 101 MMOL/L (ref 98–107)
CHOLEST SERPL-MCNC: 310 MG/DL
CREAT SERPL-MCNC: 1.04 MG/DL (ref 0.51–0.95)
DEPRECATED HCO3 PLAS-SCNC: 27 MMOL/L (ref 22–29)
EGFRCR SERPLBLD CKD-EPI 2021: 75 ML/MIN/1.73M2
FASTING STATUS PATIENT QL REPORTED: NO
GLUCOSE SERPL-MCNC: 139 MG/DL (ref 70–99)
HDLC SERPL-MCNC: 32 MG/DL
LDLC SERPL CALC-MCNC: ABNORMAL MG/DL
NONHDLC SERPL-MCNC: 278 MG/DL
POTASSIUM SERPL-SCNC: 4.5 MMOL/L (ref 3.4–5.3)
PROT SERPL-MCNC: 7.5 G/DL (ref 6.4–8.3)
SODIUM SERPL-SCNC: 139 MMOL/L (ref 135–145)
TRIGL SERPL-MCNC: 1040 MG/DL
TSH SERPL DL<=0.005 MIU/L-ACNC: 2.49 UIU/ML (ref 0.3–4.2)

## 2024-03-22 RX ORDER — ATORVASTATIN CALCIUM 40 MG/1
40 TABLET, FILM COATED ORAL DAILY
Qty: 90 TABLET | Refills: 1 | Status: SHIPPED | OUTPATIENT
Start: 2024-03-22 | End: 2024-09-17

## 2024-03-22 RX ORDER — LEVOTHYROXINE SODIUM 112 UG/1
112 TABLET ORAL DAILY
Qty: 90 TABLET | Refills: 2 | Status: SHIPPED | OUTPATIENT
Start: 2024-03-22

## 2024-03-22 NOTE — TELEPHONE ENCOUNTER
Called pt and relayed lab result. Pt verbalized understanding.      Nicanor Mercer, BSN RN  [facility]        ----- Message from Parish Daniels MD sent at 3/22/2024  1:06 PM CDT -----  Cholesterol numbers are worse than before.  Increased Lipitor.  New prescription for Lipitor to take 40 mg daily sent to the patient's pharmacy.  Please advise the patient to take all medications daily as prescribed to avoid complications including heart disease.    Dr. Parish Daniels  3/22/2024 1:05 PM

## 2024-04-29 DIAGNOSIS — E11.65 TYPE 2 DIABETES MELLITUS WITH HYPERGLYCEMIA, WITH LONG-TERM CURRENT USE OF INSULIN (H): ICD-10-CM

## 2024-04-29 DIAGNOSIS — Z79.4 TYPE 2 DIABETES MELLITUS WITH HYPERGLYCEMIA, WITH LONG-TERM CURRENT USE OF INSULIN (H): ICD-10-CM

## 2024-07-31 DIAGNOSIS — Z79.4 TYPE 2 DIABETES MELLITUS WITH HYPERGLYCEMIA, WITH LONG-TERM CURRENT USE OF INSULIN (H): ICD-10-CM

## 2024-07-31 DIAGNOSIS — E11.65 TYPE 2 DIABETES MELLITUS WITH HYPERGLYCEMIA, WITH LONG-TERM CURRENT USE OF INSULIN (H): ICD-10-CM

## 2024-07-31 RX ORDER — INSULIN GLARGINE 100 [IU]/ML
104 INJECTION, SOLUTION SUBCUTANEOUS EVERY MORNING
Qty: 30 ML | Refills: 3 | Status: SHIPPED | OUTPATIENT
Start: 2024-07-31

## 2024-09-17 DIAGNOSIS — Z76.0 ENCOUNTER FOR MEDICATION REFILL: ICD-10-CM

## 2024-09-17 DIAGNOSIS — E11.65 TYPE 2 DIABETES MELLITUS WITH HYPERGLYCEMIA, WITH LONG-TERM CURRENT USE OF INSULIN (H): ICD-10-CM

## 2024-09-17 DIAGNOSIS — Z79.4 TYPE 2 DIABETES MELLITUS WITH HYPERGLYCEMIA, WITH LONG-TERM CURRENT USE OF INSULIN (H): ICD-10-CM

## 2024-09-17 RX ORDER — METFORMIN HCL 500 MG
500 TABLET, EXTENDED RELEASE 24 HR ORAL 2 TIMES DAILY WITH MEALS
Qty: 180 TABLET | Refills: 3 | Status: SHIPPED | OUTPATIENT
Start: 2024-09-17

## 2024-09-17 RX ORDER — ATORVASTATIN CALCIUM 40 MG/1
TABLET, FILM COATED ORAL
Qty: 90 TABLET | Refills: 1 | Status: SHIPPED | OUTPATIENT
Start: 2024-09-17

## 2024-10-28 ENCOUNTER — TELEPHONE (OUTPATIENT)
Dept: FAMILY MEDICINE | Facility: CLINIC | Age: 29
End: 2024-10-28
Payer: COMMERCIAL

## 2024-10-28 NOTE — TELEPHONE ENCOUNTER
Patient Quality Outreach    Patient is due for the following:   Diabetes -  A1C, Eye Exam, and Diabetic Follow-Up Visit    Next Steps:   Schedule a office visit for DM    Type of outreach:    Phone, left message for patient/parent to call back.    Next Steps:  Reach out within 90 days via Phone.    Max number of attempts reached: No. Will try again in 90 days if patient still on fail list.    Questions for provider review:    None           Shawna Hwang  Chart routed to Care Team.

## 2025-01-07 ENCOUNTER — PATIENT OUTREACH (OUTPATIENT)
Dept: CARE COORDINATION | Facility: CLINIC | Age: 30
End: 2025-01-07
Payer: COMMERCIAL

## 2025-01-08 ENCOUNTER — TELEPHONE (OUTPATIENT)
Dept: FAMILY MEDICINE | Facility: CLINIC | Age: 30
End: 2025-01-08
Payer: COMMERCIAL

## 2025-01-08 NOTE — TELEPHONE ENCOUNTER
Patient Quality Outreach    Patient is due for the following:   Diabetes -  A1C, Eye Exam, Microalbumin, and Diabetic Follow-Up Visit  Physical Preventive Adult Physical      Topic Date Due    Pneumococcal Vaccine (2 of 2 - PCV) 08/29/2013    Diptheria Tetanus Pertussis (DTAP/TDAP/TD) Vaccine (2 - Td or Tdap) 08/29/2022    Hepatitis B Vaccine (3 of 3 - 19+ 3-dose series) 04/02/2024    COVID-19 Vaccine (4 - 2024-25 season) 09/01/2024       Action(s) Taken:   Patient declined follow up at this time.   Type of outreach:    Patient will call back once insurance is active pt has apply already just waiting for response from [facility].      Questions for provider review:    None           Felipa Jolly MA

## 2025-01-09 DIAGNOSIS — E03.9 HYPOTHYROIDISM, UNSPECIFIED TYPE: ICD-10-CM

## 2025-01-09 RX ORDER — LEVOTHYROXINE SODIUM 112 UG/1
112 TABLET ORAL DAILY
Qty: 90 TABLET | Refills: 0 | Status: SHIPPED | OUTPATIENT
Start: 2025-01-09

## 2025-01-21 ENCOUNTER — PATIENT OUTREACH (OUTPATIENT)
Dept: CARE COORDINATION | Facility: CLINIC | Age: 30
End: 2025-01-21
Payer: COMMERCIAL

## 2025-05-01 ENCOUNTER — TRANSFERRED RECORDS (OUTPATIENT)
Dept: HEALTH INFORMATION MANAGEMENT | Facility: CLINIC | Age: 30
End: 2025-05-01

## 2025-05-01 LAB — RETINOPATHY: POSITIVE

## 2025-05-11 ENCOUNTER — TRANSFERRED RECORDS (OUTPATIENT)
Dept: MULTI SPECIALTY CLINIC | Facility: CLINIC | Age: 30
End: 2025-05-11

## 2025-05-11 LAB — RETINOPATHY: NORMAL

## 2025-05-25 ENCOUNTER — ANCILLARY PROCEDURE (OUTPATIENT)
Dept: ULTRASOUND IMAGING | Facility: HOSPITAL | Age: 30
End: 2025-05-25
Attending: EMERGENCY MEDICINE
Payer: COMMERCIAL

## 2025-05-25 ENCOUNTER — APPOINTMENT (OUTPATIENT)
Dept: ULTRASOUND IMAGING | Facility: HOSPITAL | Age: 30
End: 2025-05-25
Attending: INTERNAL MEDICINE
Payer: COMMERCIAL

## 2025-05-25 ENCOUNTER — APPOINTMENT (OUTPATIENT)
Dept: RADIOLOGY | Facility: HOSPITAL | Age: 30
End: 2025-05-25
Attending: INTERNAL MEDICINE
Payer: COMMERCIAL

## 2025-05-25 ENCOUNTER — HOSPITAL ENCOUNTER (INPATIENT)
Facility: HOSPITAL | Age: 30
LOS: 3 days | Discharge: HOME | End: 2025-05-28
Attending: EMERGENCY MEDICINE | Admitting: INTERNAL MEDICINE
Payer: COMMERCIAL

## 2025-05-25 DIAGNOSIS — Z79.4 TYPE 2 DIABETES MELLITUS WITH HYPERGLYCEMIA, WITH LONG-TERM CURRENT USE OF INSULIN (H): ICD-10-CM

## 2025-05-25 DIAGNOSIS — E11.65 TYPE 2 DIABETES MELLITUS WITH HYPERGLYCEMIA, WITH LONG-TERM CURRENT USE OF INSULIN (H): ICD-10-CM

## 2025-05-25 DIAGNOSIS — L02.821: ICD-10-CM

## 2025-05-25 DIAGNOSIS — E11.65 TYPE 2 DIABETES MELLITUS WITH HYPERGLYCEMIA, UNSPECIFIED WHETHER LONG TERM INSULIN USE (H): ICD-10-CM

## 2025-05-25 DIAGNOSIS — E13.10 DIABETIC KETOACIDOSIS WITHOUT COMA ASSOCIATED WITH OTHER SPECIFIED DIABETES MELLITUS (H): ICD-10-CM

## 2025-05-25 DIAGNOSIS — Z76.0 ENCOUNTER FOR MEDICATION REFILL: ICD-10-CM

## 2025-05-25 LAB
ALBUMIN SERPL BCG-MCNC: 2.8 G/DL (ref 3.5–5.2)
ALBUMIN UR-MCNC: 100 MG/DL
ALP SERPL-CCNC: 100 U/L (ref 40–150)
ALT SERPL W P-5'-P-CCNC: 7 U/L (ref 0–50)
ANION GAP SERPL CALCULATED.3IONS-SCNC: 12 MMOL/L (ref 7–15)
ANION GAP SERPL CALCULATED.3IONS-SCNC: 16 MMOL/L (ref 7–15)
ANION GAP SERPL CALCULATED.3IONS-SCNC: 17 MMOL/L (ref 7–15)
ANION GAP SERPL CALCULATED.3IONS-SCNC: 7 MMOL/L (ref 7–15)
APPEARANCE UR: CLEAR
AST SERPL W P-5'-P-CCNC: 20 U/L (ref 0–45)
B-OH-BUTYR SERPL-SCNC: 0.73 MMOL/L
B-OH-BUTYR SERPL-SCNC: 1.94 MMOL/L
B-OH-BUTYR SERPL-SCNC: 3.13 MMOL/L
B-OH-BUTYR SERPL-SCNC: 4.42 MMOL/L
BASE EXCESS BLDV CALC-SCNC: -0.1 MMOL/L (ref -3–3)
BASE EXCESS BLDV CALC-SCNC: -1.4 MMOL/L (ref -3–3)
BASE EXCESS BLDV CALC-SCNC: -2.7 MMOL/L (ref -3–3)
BASOPHILS # BLD AUTO: 0 10E3/UL (ref 0–0.2)
BASOPHILS # BLD AUTO: 0 10E3/UL (ref 0–0.2)
BASOPHILS NFR BLD AUTO: 0 %
BASOPHILS NFR BLD AUTO: 0 %
BILIRUB DIRECT SERPL-MCNC: <0.08 MG/DL (ref 0–0.3)
BILIRUB SERPL-MCNC: 0.4 MG/DL
BILIRUB UR QL STRIP: NEGATIVE
BUN SERPL-MCNC: 12.1 MG/DL (ref 6–20)
BUN SERPL-MCNC: 12.8 MG/DL (ref 6–20)
BUN SERPL-MCNC: 14.7 MG/DL (ref 6–20)
BUN SERPL-MCNC: 17.1 MG/DL (ref 6–20)
CALCIUM SERPL-MCNC: 7.9 MG/DL (ref 8.8–10.4)
CALCIUM SERPL-MCNC: 8.2 MG/DL (ref 8.8–10.4)
CALCIUM SERPL-MCNC: 8.6 MG/DL (ref 8.8–10.4)
CALCIUM SERPL-MCNC: 9.1 MG/DL (ref 8.8–10.4)
CHLORIDE SERPL-SCNC: 101 MMOL/L (ref 98–107)
CHLORIDE SERPL-SCNC: 103 MMOL/L (ref 98–107)
CHLORIDE SERPL-SCNC: 89 MMOL/L (ref 98–107)
CHLORIDE SERPL-SCNC: 99 MMOL/L (ref 98–107)
COLOR UR AUTO: COLORLESS
CREAT SERPL-MCNC: 0.95 MG/DL (ref 0.51–0.95)
CREAT SERPL-MCNC: 1 MG/DL (ref 0.51–0.95)
CREAT SERPL-MCNC: 1.05 MG/DL (ref 0.51–0.95)
CREAT SERPL-MCNC: 1.06 MG/DL (ref 0.51–0.95)
CRP SERPL-MCNC: 86.1 MG/L
EGFRCR SERPLBLD CKD-EPI 2021: 72 ML/MIN/1.73M2
EGFRCR SERPLBLD CKD-EPI 2021: 73 ML/MIN/1.73M2
EGFRCR SERPLBLD CKD-EPI 2021: 77 ML/MIN/1.73M2
EGFRCR SERPLBLD CKD-EPI 2021: 82 ML/MIN/1.73M2
EOSINOPHIL # BLD AUTO: 0.1 10E3/UL (ref 0–0.7)
EOSINOPHIL # BLD AUTO: 0.1 10E3/UL (ref 0–0.7)
EOSINOPHIL NFR BLD AUTO: 1 %
EOSINOPHIL NFR BLD AUTO: 1 %
ERYTHROCYTE [DISTWIDTH] IN BLOOD BY AUTOMATED COUNT: 12 % (ref 10–15)
ERYTHROCYTE [DISTWIDTH] IN BLOOD BY AUTOMATED COUNT: 12.1 % (ref 10–15)
EST. AVERAGE GLUCOSE BLD GHB EST-MCNC: >530 MG/DL
GLUCOSE BLDC GLUCOMTR-MCNC: 126 MG/DL (ref 70–99)
GLUCOSE BLDC GLUCOMTR-MCNC: 161 MG/DL (ref 70–99)
GLUCOSE BLDC GLUCOMTR-MCNC: 186 MG/DL (ref 70–99)
GLUCOSE BLDC GLUCOMTR-MCNC: 187 MG/DL (ref 70–99)
GLUCOSE BLDC GLUCOMTR-MCNC: 198 MG/DL (ref 70–99)
GLUCOSE BLDC GLUCOMTR-MCNC: 207 MG/DL (ref 70–99)
GLUCOSE BLDC GLUCOMTR-MCNC: 216 MG/DL (ref 70–99)
GLUCOSE BLDC GLUCOMTR-MCNC: 252 MG/DL (ref 70–99)
GLUCOSE BLDC GLUCOMTR-MCNC: 407 MG/DL (ref 70–99)
GLUCOSE BLDC GLUCOMTR-MCNC: 491 MG/DL (ref 70–99)
GLUCOSE SERPL-MCNC: 177 MG/DL (ref 70–99)
GLUCOSE SERPL-MCNC: 200 MG/DL (ref 70–99)
GLUCOSE SERPL-MCNC: 344 MG/DL (ref 70–99)
GLUCOSE SERPL-MCNC: 622 MG/DL (ref 70–99)
GLUCOSE UR STRIP-MCNC: >1000 MG/DL
HBA1C MFR BLD: >20.1 %
HCG UR QL: NEGATIVE
HCO3 BLDV-SCNC: 22 MMOL/L (ref 21–28)
HCO3 BLDV-SCNC: 23 MMOL/L (ref 21–28)
HCO3 BLDV-SCNC: 25 MMOL/L (ref 21–28)
HCO3 SERPL-SCNC: 18 MMOL/L (ref 22–29)
HCO3 SERPL-SCNC: 19 MMOL/L (ref 22–29)
HCO3 SERPL-SCNC: 21 MMOL/L (ref 22–29)
HCO3 SERPL-SCNC: 24 MMOL/L (ref 22–29)
HCT VFR BLD AUTO: 35.6 % (ref 35–47)
HCT VFR BLD AUTO: 35.8 % (ref 35–47)
HGB BLD-MCNC: 11.7 G/DL (ref 11.7–15.7)
HGB BLD-MCNC: 11.8 G/DL (ref 11.7–15.7)
HGB UR QL STRIP: ABNORMAL
IMM GRANULOCYTES # BLD: 0.1 10E3/UL
IMM GRANULOCYTES # BLD: 0.1 10E3/UL
IMM GRANULOCYTES NFR BLD: 1 %
IMM GRANULOCYTES NFR BLD: 1 %
KETONES UR STRIP-MCNC: 40 MG/DL
LACTATE SERPL-SCNC: 0.9 MMOL/L (ref 0.7–2)
LEUKOCYTE ESTERASE UR QL STRIP: NEGATIVE
LIPASE SERPL-CCNC: 29 U/L (ref 13–60)
LYMPHOCYTES # BLD AUTO: 1.7 10E3/UL (ref 0.8–5.3)
LYMPHOCYTES # BLD AUTO: 2.1 10E3/UL (ref 0.8–5.3)
LYMPHOCYTES NFR BLD AUTO: 13 %
LYMPHOCYTES NFR BLD AUTO: 16 %
MAGNESIUM SERPL-MCNC: 1.7 MG/DL (ref 1.7–2.3)
MAGNESIUM SERPL-MCNC: 1.7 MG/DL (ref 1.7–2.3)
MAGNESIUM SERPL-MCNC: 1.8 MG/DL (ref 1.7–2.3)
MAGNESIUM SERPL-MCNC: 2.1 MG/DL (ref 1.7–2.3)
MCH RBC QN AUTO: 27.1 PG (ref 26.5–33)
MCH RBC QN AUTO: 27.4 PG (ref 26.5–33)
MCHC RBC AUTO-ENTMCNC: 32.9 G/DL (ref 31.5–36.5)
MCHC RBC AUTO-ENTMCNC: 33 G/DL (ref 31.5–36.5)
MCV RBC AUTO: 82 FL (ref 78–100)
MCV RBC AUTO: 83 FL (ref 78–100)
MONOCYTES # BLD AUTO: 0.5 10E3/UL (ref 0–1.3)
MONOCYTES # BLD AUTO: 0.9 10E3/UL (ref 0–1.3)
MONOCYTES NFR BLD AUTO: 4 %
MONOCYTES NFR BLD AUTO: 7 %
NEUTROPHILS # BLD AUTO: 10 10E3/UL (ref 1.6–8.3)
NEUTROPHILS # BLD AUTO: 11 10E3/UL (ref 1.6–8.3)
NEUTROPHILS NFR BLD AUTO: 78 %
NEUTROPHILS NFR BLD AUTO: 79 %
NITRATE UR QL: NEGATIVE
NRBC # BLD AUTO: 0 10E3/UL
NRBC # BLD AUTO: 0 10E3/UL
NRBC BLD AUTO-RTO: 0 /100
NRBC BLD AUTO-RTO: 0 /100
O2/TOTAL GAS SETTING VFR VENT: 21 %
OSMOLALITY SERPL: 307 MMOL/KG (ref 275–295)
OXYHGB MFR BLDV: 76 % (ref 70–75)
OXYHGB MFR BLDV: 87 % (ref 70–75)
OXYHGB MFR BLDV: 90 % (ref 70–75)
PCO2 BLDV: 38 MM HG (ref 40–50)
PCO2 BLDV: 38 MM HG (ref 40–50)
PCO2 BLDV: 40 MM HG (ref 40–50)
PH BLDV: 7.38 [PH] (ref 7.32–7.43)
PH BLDV: 7.4 [PH] (ref 7.32–7.43)
PH BLDV: 7.4 [PH] (ref 7.32–7.43)
PH UR STRIP: 6.5 [PH] (ref 5–7)
PHOSPHATE SERPL-MCNC: 1.5 MG/DL (ref 2.5–4.5)
PHOSPHATE SERPL-MCNC: 1.5 MG/DL (ref 2.5–4.5)
PHOSPHATE SERPL-MCNC: 3.4 MG/DL (ref 2.5–4.5)
PLATELET # BLD AUTO: 276 10E3/UL (ref 150–450)
PLATELET # BLD AUTO: 288 10E3/UL (ref 150–450)
PO2 BLDV: 40 MM HG (ref 25–47)
PO2 BLDV: 54 MM HG (ref 25–47)
PO2 BLDV: 63 MM HG (ref 25–47)
POTASSIUM SERPL-SCNC: 3.3 MMOL/L (ref 3.4–5.3)
POTASSIUM SERPL-SCNC: 3.5 MMOL/L (ref 3.4–5.3)
POTASSIUM SERPL-SCNC: 3.9 MMOL/L (ref 3.4–5.3)
POTASSIUM SERPL-SCNC: 4.7 MMOL/L (ref 3.4–5.3)
PROT SERPL-MCNC: 6.9 G/DL (ref 6.4–8.3)
RBC # BLD AUTO: 4.31 10E6/UL (ref 3.8–5.2)
RBC # BLD AUTO: 4.32 10E6/UL (ref 3.8–5.2)
RBC URINE: 5 /HPF
SAO2 % BLDV: 77.1 % (ref 70–75)
SAO2 % BLDV: 87.7 % (ref 70–75)
SAO2 % BLDV: 90.6 % (ref 70–75)
SODIUM SERPL-SCNC: 124 MMOL/L (ref 135–145)
SODIUM SERPL-SCNC: 134 MMOL/L (ref 135–145)
SP GR UR STRIP: 1.02 (ref 1–1.03)
SQUAMOUS EPITHELIAL: <1 /HPF
UROBILINOGEN UR STRIP-MCNC: NORMAL MG/DL
WBC # BLD AUTO: 12.9 10E3/UL (ref 4–11)
WBC # BLD AUTO: 13.9 10E3/UL (ref 4–11)
WBC URINE: 4 /HPF

## 2025-05-25 PROCEDURE — 80048 BASIC METABOLIC PNL TOTAL CA: CPT | Performed by: INTERNAL MEDICINE

## 2025-05-25 PROCEDURE — 71045 X-RAY EXAM CHEST 1 VIEW: CPT

## 2025-05-25 PROCEDURE — 36415 COLL VENOUS BLD VENIPUNCTURE: CPT | Performed by: EMERGENCY MEDICINE

## 2025-05-25 PROCEDURE — 82010 KETONE BODYS QUAN: CPT | Performed by: INTERNAL MEDICINE

## 2025-05-25 PROCEDURE — 0H90XZZ DRAINAGE OF SCALP SKIN, EXTERNAL APPROACH: ICD-10-PCS | Performed by: EMERGENCY MEDICINE

## 2025-05-25 PROCEDURE — 10060 I&D ABSCESS SIMPLE/SINGLE: CPT

## 2025-05-25 PROCEDURE — 250N000011 HC RX IP 250 OP 636: Performed by: EMERGENCY MEDICINE

## 2025-05-25 PROCEDURE — 83930 ASSAY OF BLOOD OSMOLALITY: CPT | Performed by: INTERNAL MEDICINE

## 2025-05-25 PROCEDURE — 82010 KETONE BODYS QUAN: CPT | Performed by: EMERGENCY MEDICINE

## 2025-05-25 PROCEDURE — 84100 ASSAY OF PHOSPHORUS: CPT | Performed by: INTERNAL MEDICINE

## 2025-05-25 PROCEDURE — 271N000002 HC RX 271: Performed by: EMERGENCY MEDICINE

## 2025-05-25 PROCEDURE — 85025 COMPLETE CBC W/AUTO DIFF WBC: CPT | Performed by: EMERGENCY MEDICINE

## 2025-05-25 PROCEDURE — 250N000009 HC RX 250: Performed by: INTERNAL MEDICINE

## 2025-05-25 PROCEDURE — 250N000009 HC RX 250: Performed by: EMERGENCY MEDICINE

## 2025-05-25 PROCEDURE — 99223 1ST HOSP IP/OBS HIGH 75: CPT | Performed by: INTERNAL MEDICINE

## 2025-05-25 PROCEDURE — 250N000013 HC RX MED GY IP 250 OP 250 PS 637: Performed by: INTERNAL MEDICINE

## 2025-05-25 PROCEDURE — 93005 ELECTROCARDIOGRAM TRACING: CPT | Performed by: EMERGENCY MEDICINE

## 2025-05-25 PROCEDURE — 258N000003 HC RX IP 258 OP 636: Performed by: INTERNAL MEDICINE

## 2025-05-25 PROCEDURE — 82248 BILIRUBIN DIRECT: CPT | Performed by: EMERGENCY MEDICINE

## 2025-05-25 PROCEDURE — 36415 COLL VENOUS BLD VENIPUNCTURE: CPT | Performed by: INTERNAL MEDICINE

## 2025-05-25 PROCEDURE — 80053 COMPREHEN METABOLIC PANEL: CPT | Performed by: EMERGENCY MEDICINE

## 2025-05-25 PROCEDURE — 82805 BLOOD GASES W/O2 SATURATION: CPT | Performed by: INTERNAL MEDICINE

## 2025-05-25 PROCEDURE — 87040 BLOOD CULTURE FOR BACTERIA: CPT | Performed by: EMERGENCY MEDICINE

## 2025-05-25 PROCEDURE — 81025 URINE PREGNANCY TEST: CPT | Performed by: EMERGENCY MEDICINE

## 2025-05-25 PROCEDURE — 83735 ASSAY OF MAGNESIUM: CPT | Performed by: EMERGENCY MEDICINE

## 2025-05-25 PROCEDURE — 200N000001 HC R&B ICU

## 2025-05-25 PROCEDURE — 83605 ASSAY OF LACTIC ACID: CPT | Performed by: EMERGENCY MEDICINE

## 2025-05-25 PROCEDURE — 83690 ASSAY OF LIPASE: CPT | Performed by: EMERGENCY MEDICINE

## 2025-05-25 PROCEDURE — 81003 URINALYSIS AUTO W/O SCOPE: CPT | Performed by: EMERGENCY MEDICINE

## 2025-05-25 PROCEDURE — 83036 HEMOGLOBIN GLYCOSYLATED A1C: CPT | Performed by: EMERGENCY MEDICINE

## 2025-05-25 PROCEDURE — 82805 BLOOD GASES W/O2 SATURATION: CPT | Performed by: EMERGENCY MEDICINE

## 2025-05-25 PROCEDURE — 76536 US EXAM OF HEAD AND NECK: CPT

## 2025-05-25 PROCEDURE — 86140 C-REACTIVE PROTEIN: CPT | Performed by: EMERGENCY MEDICINE

## 2025-05-25 PROCEDURE — 82962 GLUCOSE BLOOD TEST: CPT

## 2025-05-25 PROCEDURE — 99291 CRITICAL CARE FIRST HOUR: CPT | Mod: 25

## 2025-05-25 PROCEDURE — 87070 CULTURE OTHR SPECIMN AEROBIC: CPT | Performed by: EMERGENCY MEDICINE

## 2025-05-25 PROCEDURE — 258N000003 HC RX IP 258 OP 636: Performed by: EMERGENCY MEDICINE

## 2025-05-25 PROCEDURE — 250N000011 HC RX IP 250 OP 636: Performed by: INTERNAL MEDICINE

## 2025-05-25 RX ORDER — LEVOTHYROXINE SODIUM 112 UG/1
112 TABLET ORAL
Status: DISCONTINUED | OUTPATIENT
Start: 2025-05-26 | End: 2025-05-28 | Stop reason: HOSPADM

## 2025-05-25 RX ORDER — NALOXONE HYDROCHLORIDE 0.4 MG/ML
0.4 INJECTION, SOLUTION INTRAMUSCULAR; INTRAVENOUS; SUBCUTANEOUS
Status: DISCONTINUED | OUTPATIENT
Start: 2025-05-25 | End: 2025-05-28 | Stop reason: HOSPADM

## 2025-05-25 RX ORDER — OXYCODONE HYDROCHLORIDE 5 MG/1
5 TABLET ORAL EVERY 4 HOURS PRN
Refills: 0 | Status: DISCONTINUED | OUTPATIENT
Start: 2025-05-25 | End: 2025-05-28 | Stop reason: HOSPADM

## 2025-05-25 RX ORDER — NALOXONE HYDROCHLORIDE 0.4 MG/ML
0.2 INJECTION, SOLUTION INTRAMUSCULAR; INTRAVENOUS; SUBCUTANEOUS
Status: DISCONTINUED | OUTPATIENT
Start: 2025-05-25 | End: 2025-05-28 | Stop reason: HOSPADM

## 2025-05-25 RX ORDER — NICOTINE POLACRILEX 4 MG
15-30 LOZENGE BUCCAL
Status: DISCONTINUED | OUTPATIENT
Start: 2025-05-25 | End: 2025-05-28 | Stop reason: HOSPADM

## 2025-05-25 RX ORDER — ACETAMINOPHEN 650 MG/1
650 SUPPOSITORY RECTAL EVERY 4 HOURS PRN
Status: DISCONTINUED | OUTPATIENT
Start: 2025-05-25 | End: 2025-05-28 | Stop reason: HOSPADM

## 2025-05-25 RX ORDER — DEXTROSE MONOHYDRATE 25 G/50ML
25-50 INJECTION, SOLUTION INTRAVENOUS
Status: DISCONTINUED | OUTPATIENT
Start: 2025-05-25 | End: 2025-05-25

## 2025-05-25 RX ORDER — METHYLCELLULOSE 4000CPS 30 %
POWDER (GRAM) MISCELLANEOUS ONCE
Status: COMPLETED | OUTPATIENT
Start: 2025-05-25 | End: 2025-05-25

## 2025-05-25 RX ORDER — SODIUM CHLORIDE 450 MG/100ML
1000 INJECTION, SOLUTION INTRAVENOUS CONTINUOUS
Status: DISCONTINUED | OUTPATIENT
Start: 2025-05-25 | End: 2025-05-25

## 2025-05-25 RX ORDER — PIPERACILLIN SODIUM, TAZOBACTAM SODIUM 3; .375 G/15ML; G/15ML
3.38 INJECTION, POWDER, LYOPHILIZED, FOR SOLUTION INTRAVENOUS EVERY 6 HOURS
Status: DISCONTINUED | OUTPATIENT
Start: 2025-05-25 | End: 2025-05-26

## 2025-05-25 RX ORDER — POTASSIUM CHLORIDE 1500 MG/1
40 TABLET, EXTENDED RELEASE ORAL ONCE
Status: COMPLETED | OUTPATIENT
Start: 2025-05-25 | End: 2025-05-25

## 2025-05-25 RX ORDER — HYDROMORPHONE HYDROCHLORIDE 1 MG/ML
0.5 INJECTION, SOLUTION INTRAMUSCULAR; INTRAVENOUS; SUBCUTANEOUS EVERY 4 HOURS PRN
Refills: 0 | Status: DISCONTINUED | OUTPATIENT
Start: 2025-05-25 | End: 2025-05-28 | Stop reason: HOSPADM

## 2025-05-25 RX ORDER — PIPERACILLIN SODIUM, TAZOBACTAM SODIUM 3; .375 G/15ML; G/15ML
3.38 INJECTION, POWDER, LYOPHILIZED, FOR SOLUTION INTRAVENOUS ONCE
Status: COMPLETED | OUTPATIENT
Start: 2025-05-25 | End: 2025-05-25

## 2025-05-25 RX ORDER — ACETAMINOPHEN 325 MG/1
650 TABLET ORAL EVERY 4 HOURS PRN
Status: DISCONTINUED | OUTPATIENT
Start: 2025-05-25 | End: 2025-05-28 | Stop reason: HOSPADM

## 2025-05-25 RX ORDER — NICOTINE POLACRILEX 4 MG
15-30 LOZENGE BUCCAL
Status: DISCONTINUED | OUTPATIENT
Start: 2025-05-25 | End: 2025-05-25

## 2025-05-25 RX ORDER — DEXTROSE MONOHYDRATE 25 G/50ML
25-50 INJECTION, SOLUTION INTRAVENOUS
Status: DISCONTINUED | OUTPATIENT
Start: 2025-05-25 | End: 2025-05-28 | Stop reason: HOSPADM

## 2025-05-25 RX ORDER — DEXTROSE MONOHYDRATE AND SODIUM CHLORIDE 5; .45 G/100ML; G/100ML
1000 INJECTION, SOLUTION INTRAVENOUS CONTINUOUS PRN
Status: DISCONTINUED | OUTPATIENT
Start: 2025-05-25 | End: 2025-05-25

## 2025-05-25 RX ORDER — ATORVASTATIN CALCIUM 40 MG/1
40 TABLET, FILM COATED ORAL AT BEDTIME
Status: DISCONTINUED | OUTPATIENT
Start: 2025-05-25 | End: 2025-05-28 | Stop reason: HOSPADM

## 2025-05-25 RX ORDER — DEXTROSE MONOHYDRATE, SODIUM CHLORIDE, AND POTASSIUM CHLORIDE 50; 1.49; 4.5 G/1000ML; G/1000ML; G/1000ML
INJECTION, SOLUTION INTRAVENOUS CONTINUOUS
Status: DISCONTINUED | OUTPATIENT
Start: 2025-05-25 | End: 2025-05-26

## 2025-05-25 RX ORDER — SODIUM CHLORIDE AND POTASSIUM CHLORIDE 150; 450 MG/100ML; MG/100ML
INJECTION, SOLUTION INTRAVENOUS CONTINUOUS
Status: DISCONTINUED | OUTPATIENT
Start: 2025-05-25 | End: 2025-05-26

## 2025-05-25 RX ADMIN — ATORVASTATIN CALCIUM 40 MG: 40 TABLET, FILM COATED ORAL at 20:09

## 2025-05-25 RX ADMIN — PIPERACILLIN AND TAZOBACTAM 3.38 G: 3; .375 INJECTION, POWDER, FOR SOLUTION INTRAVENOUS at 14:08

## 2025-05-25 RX ADMIN — POTASSIUM CHLORIDE AND SODIUM CHLORIDE: 450; 150 INJECTION, SOLUTION INTRAVENOUS at 15:54

## 2025-05-25 RX ADMIN — INSULIN HUMAN 5.5 UNITS/HR: 1 INJECTION, SOLUTION INTRAVENOUS at 14:01

## 2025-05-25 RX ADMIN — ACETAMINOPHEN 650 MG: 325 TABLET ORAL at 18:21

## 2025-05-25 RX ADMIN — PIPERACILLIN AND TAZOBACTAM 3.38 G: 3; .375 INJECTION, POWDER, FOR SOLUTION INTRAVENOUS at 20:09

## 2025-05-25 RX ADMIN — SODIUM CHLORIDE 1750 MG: 0.9 INJECTION, SOLUTION INTRAVENOUS at 15:42

## 2025-05-25 RX ADMIN — SODIUM PHOSPHATE, MONOBASIC, MONOHYDRATE AND SODIUM PHOSPHATE, DIBASIC, ANHYDROUS 15 MMOL: 142; 276 INJECTION, SOLUTION INTRAVENOUS at 20:41

## 2025-05-25 RX ADMIN — POTASSIUM CHLORIDE 40 MEQ: 1500 TABLET, EXTENDED RELEASE ORAL at 23:38

## 2025-05-25 RX ADMIN — SODIUM CHLORIDE 1000 ML: 0.9 INJECTION, SOLUTION INTRAVENOUS at 13:59

## 2025-05-25 RX ADMIN — SODIUM CHLORIDE 1000 ML: 0.9 INJECTION, SOLUTION INTRAVENOUS at 13:17

## 2025-05-25 RX ADMIN — METHYLCELLULOSE: 2 POWDER, FOR SOLUTION ORAL at 12:29

## 2025-05-25 RX ADMIN — Medication 3 ML: at 12:29

## 2025-05-25 RX ADMIN — POTASSIUM CHLORIDE, DEXTROSE MONOHYDRATE AND SODIUM CHLORIDE: 150; 5; 450 INJECTION, SOLUTION INTRAVENOUS at 17:04

## 2025-05-25 ASSESSMENT — ACTIVITIES OF DAILY LIVING (ADL)
ADLS_ACUITY_SCORE: 59
ADLS_ACUITY_SCORE: 54
ADLS_ACUITY_SCORE: 59
ADLS_ACUITY_SCORE: 54
ADLS_ACUITY_SCORE: 59
ADLS_ACUITY_SCORE: 54
ADLS_ACUITY_SCORE: 54

## 2025-05-25 ASSESSMENT — COLUMBIA-SUICIDE SEVERITY RATING SCALE - C-SSRS
6. HAVE YOU EVER DONE ANYTHING, STARTED TO DO ANYTHING, OR PREPARED TO DO ANYTHING TO END YOUR LIFE?: NO
2. HAVE YOU ACTUALLY HAD ANY THOUGHTS OF KILLING YOURSELF IN THE PAST MONTH?: NO
1. IN THE PAST MONTH, HAVE YOU WISHED YOU WERE DEAD OR WISHED YOU COULD GO TO SLEEP AND NOT WAKE UP?: NO

## 2025-05-25 NOTE — ED TRIAGE NOTES
Pt has had an enlarging bump on the right posterior neck area for the last 1.5 weeks.  It hurts bad when she pushes on it.  The pain has been getting worse and she nw rates the pain 8/10.  Took tylenol at 1130am today.  Pt has had periods of chills as well.  Speaks larissa.

## 2025-05-25 NOTE — PHARMACY-ADMISSION MEDICATION HISTORY
Pharmacist Admission Medication History    Admission medication history is complete. The information provided in this note is only as accurate as the sources available at the time of the update.    Information Source(s): Patient and CareEverywhere/SureScripts via in-person    Pertinent Information: Patient has not taken any prescribed medications in at least 6 months.    Changes made to PTA medication list:  Added: None  Deleted: Benzonatate, Mucinex  Changed: None    Allergies reviewed with patient and updates made in EHR: unable to assess    Medication History Completed By: Cheri Wang [facility] 5/25/2025 1:43 PM    PTA Med List   Medication Sig Last Dose/Taking    atorvastatin (LIPITOR) 40 MG tablet TAKE 1 PILL(40 MG) BY MOUTH DAILY More than a month    insulin aspart (NOVOLOG FLEXPEN) 100 UNIT/ML pen Inject 30 Units Subcutaneous 3 times daily (with meals) Inject 22 units of Novolog, 10-15 minutes before breakfast, lunch and dinner. . More than a month    LANTUS SOLOSTAR 100 UNIT/ML soln INJECT 104 UNITS SUBCUTANEOUS EVERY MORNING More than a month    levothyroxine (SYNTHROID/LEVOTHROID) 112 MCG tablet TAKE 1 TABLET (112 MCG) BY MOUTH DAILY More than a month    metFORMIN (GLUCOPHAGE XR) 500 MG 24 hr tablet TAKE 1 TABLET (500 MG) BY MOUTH 2 TIMES DAILY (WITH MEALS) More than a month

## 2025-05-25 NOTE — H&P
"[facility]    History and Physical - Hospitalist Service       Date of Admission:  5/25/2025    Tyson Skelton is a 30 year old female with a history of unspecified chronic kidney disease, diabetes mellitus type II, and medication noncompliance who presents by private car for evaluation of a mass.       Assessment & Plan    DKA  Type II diabetic, no insulin for 6 months due to insurance issues  Glucose 622, anion gap acidosis, ketones 4.42, but pH currently normal  DKA order set placed admit to ICU  Hold home insulin for now, n.p.o. status, IV hydration  Hold home metformin for now    sepsis  Scalp pain and swelling, abscess/cellulitis  Noted fever and chills, subjective at home  BP stable, lactic acid normal  Post I&D in ED, follow wound/blood culture results  Continue IV Zosyn\" and vancomycin  Obtain scalp/neck ultrasound soft tissue now  Surgical consult, wound care to see    Pseudohyponatremia  Sodium 124 today, corrected 134  Continue DKA protocol as above  Follow glucose levels closely    Hypothyroid  Continue home Synthroid check TSH    Hyperlipidemia  Lipid panel in a.m., continue home statin    CKD stage II  Creatinine appears at baseline  Avoid nephrotoxins, continue IV hydration.  Recommend renal clinic follow-up.    Full code        Diet: Clear Liquid Diet    DVT Prophylaxis: Low Risk/Ambulatory with no VTE prophylaxis indicated  Mchugh Catheter: Not present  Lines: None     Cardiac Monitoring: None  Code Status:  Above    Clinically Significant Risk Factors Present on Admission         # Hyponatremia: Lowest Na = 124 mmol/L in last 2 days, will monitor as appropriate  # Hypochloremia: Lowest Cl = 89 mmol/L in last 2 days, will monitor as appropriate      # Hypoalbuminemia: Lowest albumin = 2.8 g/dL at 5/25/2025 12:35 PM, will monitor as appropriate     # Hypertension: Noted on problem list           # Overweight: Estimated body mass index is 28.4 kg/m  as calculated from the " "following:    Height as of this encounter: 1.499 m (4' 11\").    Weight as of this encounter: 63.8 kg (140 lb 9.6 oz).              Disposition Plan     Medically Ready for Discharge: Anticipated in 2-4 Days           Farzad Funk MD  Hospitalist Service  [facility]  Securely message with [employer] (more info)  Text page via [facility] Paging/Directory     ______________________________________________________________________    Chief Complaint   Pain and swelling scalp swelling    History is obtained from the patient    History of Present Illness   Paw YFN Skelton is a 30 year old female with a history of chronic kidney disease, diabetes mellitus type II, and medication noncompliance due to insurance issues who presents by private car for evaluation of swelling on the posterior aspect of her scalp for the past 1/2 weeks.  Initially it looked like a simple pimple but has increasingly becoming more painful and more swollen.  Also has subjective chills and fever last couple of days, no chest pain, SOB, abdominal pain or diarrhea, no  symptoms, no neck stiffness, no mental status changes, no nausea vomiting no headache.     Patient has a history of diabetes. She does not know how her sugars have been. She has not seen a doctor or been on medications for 6 months due to lack of insurance.  In the ED blood pressure was 114/76, heart rate 108, temperature 97.1, respiration of 12.  ED exam showed scalp posterior swelling and exquisite tenderness, please see picture in epic/ED notes.  ED performed a bedside I&D with culture sent to the lab.  Labs showed evidence for DKA with elevated glucose, ketones and a Acidosis she was given IV antibiotics, admitted to ICU for DKA protocol surgery was also informed by ED physician      Past Medical History    Past Medical History:   Diagnosis Date    Chronic kidney disease     Diabetes mellitus, type II (H)     Hyperglycemia without ketosis 2010    hospitalized at " [facility] [place]        Past Surgical History   History reviewed. No pertinent surgical history.    Prior to Admission Medications   Prior to Admission Medications   Prescriptions Last Dose Informant Patient Reported? Taking?   Continuous Glucose Sensor (FREESTYLE DUC 2 SENSOR) [facility]   No No   Sig: USE 1 EACH EVERY 14 DAYS EVERY 14 DAYS.   LANTUS SOLOSTAR 100 UNIT/ML soln More than a month  No Yes   Sig: INJECT 104 UNITS SUBCUTANEOUS EVERY MORNING   Microlet Lancets MISC   No No   Si each 2 times daily Use to test blood sugar twice daily.   alcohol swab prep pads   No No   Sig: Use to swab area of injection/teresa as directed.   atorvastatin (LIPITOR) 40 MG tablet More than a month  No Yes   Sig: TAKE 1 PILL(40 MG) BY MOUTH DAILY   blood glucose (ACCU-CHEK SOFTCLIX) lancing device   No No   Sig: Lancing device to be used with lancets.   blood glucose (NO BRAND SPECIFIED) test strip   No No   Sig: Use to test blood sugar 5 times daily or as directed.   blood glucose monitoring (SOFTCLIX) lancets   No No   Sig: Use to test blood sugar 4 times daily.   insulin aspart (NOVOLOG FLEXPEN) 100 UNIT/ML pen More than a month  No Yes   Sig: Inject 30 Units Subcutaneous 3 times daily (with meals) Inject 22 units of Novolog, 10-15 minutes before breakfast, lunch and dinner. .   insulin pen needle (32G X 4 MM) 32G X 4 MM miscellaneous   No No   Sig: Use 5 pen needles daily or as directed.   levothyroxine (SYNTHROID/LEVOTHROID) 112 MCG tablet More than a month  No Yes   Sig: TAKE 1 TABLET (112 MCG) BY MOUTH DAILY   metFORMIN (GLUCOPHAGE XR) 500 MG 24 hr tablet More than a month  No Yes   Sig: TAKE 1 TABLET (500 MG) BY MOUTH 2 TIMES DAILY (WITH MEALS)      Facility-Administered Medications: None           Physical Exam   Vital Signs: Temp: 98.2  F (36.8  C) Temp src: Oral BP: (!) 136/93 Pulse: (!) 126   Resp: (!) 32 SpO2: 99 % O2 Device: None (Room air)    Weight: 140 lbs 9.6 oz    General Appearance: In  some painful distress, AA O x 3,  Respiratory: Clear anteriorly  Cardiovascular: S1-S2 only tachycardic  GI: Nondistended, soft, nontender, BS  Skin: Noted scalp swelling on posterior aspect, tender, some areas of possible fluctuance noted, erythematous, mild neck discomfort  Other: No extremity edema  Neurology no weakness in lower or upper extremity       Medical Decision Making       7 5 MINUTES SPENT BY ME on the date of service doing chart review, history, exam, documentation & further activities per the note.      Data   Imaging results reviewed over the past 24 hrs:   No results found for this or any previous visit (from the past 24 hours).

## 2025-05-25 NOTE — PHARMACY-VANCOMYCIN DOSING SERVICE
Pharmacy Vancomycin Initial Note  Date of Service May 25, 2025  Patient's  1995  30 year old, female    Indication: Skin and Soft Tissue Infection    Current estimated CrCl = Estimated Creatinine Clearance: 78.2 mL/min (A) (based on SCr of 1.06 mg/dL (H)).    Creatinine for last 3 days  2025: 12:35 PM Creatinine 1.06 mg/dL    Recent Vancomycin Level(s) for last 3 days  No results found for requested labs within last 3 days.      Vancomycin IV Administrations (past 72 hours)        No vancomycin orders with administrations in past 72 hours.                    Nephrotoxins and other renal medications (From now, onward)      Start     Dose/Rate Route Frequency Ordered Stop    25 0900  vancomycin (VANCOCIN) 1,250 mg in 0.9% NaCl 262.5 mL intermittent infusion         1,250 mg  over 90 Minutes Intravenous EVERY 18 HOURS 25 1522      25 2000  piperacillin-tazobactam (ZOSYN) 3.375 g vial to attach to  mL bag        Note to Pharmacy: Pls adjust to renal fxn    3.375 g  over 30 Minutes Intravenous EVERY 6 HOURS 25 1515      25 1430  vancomycin (VANCOCIN) 1,750 mg in 0.9% NaCl 517.5 mL intermittent infusion         25 mg/kg × 73.9 kg  over 2 Hours Intravenous ONCE 25 1410              Contrast Orders - past 72 hours (72h ago, onward)      None            Insight[employer] Prediction of Planned Initial Vancomycin Regimen  Loading dose: 1750 mg at 16:00 2025.  Regimen: 1250 mg IV every 18 hours.  Start time: 10:00 on 2025  Exposure target: AUC24 (range) 400-600 mg/L.hr   AUC24,ss: 512 mg/L.hr  Probability of AUC24 > 400: 76 %  Ctrough,ss: 14.5 mg/L  Probability of Ctrough,ss > 20: 25 %  Probability of nephrotoxicity (Lodise LOGAN ): 10 %        Plan:  Start vancomycin  1250 mg IV q18h.   Vancomycin monitoring method: AUC  Vancomycin therapeutic monitoring goal: 400-600 mg*h/L  Pharmacy will check vancomycin levels as appropriate in 1-3 Days.    Serum creatinine levels  will be ordered for 5/26-5/28    Bertha Burnett, [facility]

## 2025-05-25 NOTE — PHARMACY-VANCOMYCIN DOSING SERVICE
Pharmacy Vancomycin Initial Note  Date of Service May 25, 2025  Patient's  1995  30 year old, female    Indication: Abscess    Current estimated CrCl = Estimated Creatinine Clearance: 90.5 mL/min (A) (based on SCr of 1.06 mg/dL (H)).    Creatinine for last 3 days  2025: 12:35 PM Creatinine 1.06 mg/dL    Recent Vancomycin Level(s) for last 3 days  No results found for requested labs within last 3 days.      Vancomycin IV Administrations (past 72 hours)        No vancomycin orders with administrations in past 72 hours.                    Nephrotoxins and other renal medications (From now, onward)      Start     Dose/Rate Route Frequency Ordered Stop    25 1400  piperacillin-tazobactam (ZOSYN) 3.375 g vial to attach to  mL bag         3.375 g  over 30 Minutes Intravenous ONCE 25 1357              Contrast Orders - past 72 hours (72h ago, onward)      None             Plan:  Start vancomycin  1750 mg IV Once.   This is a one time consult. Please consult pharmacy again if vancomycin to be continued.       Cheri Wang, [facility]

## 2025-05-25 NOTE — ED PROVIDER NOTES
EMERGENCY DEPARTMENT ENCOUNTER      NAME: Tsyon Skelton  AGE: 30 year old female  YOB: 1995  MRN: [id number]  EVALUATION DATE & TIME: No admission date for patient encounter.    PCP: Parish Daniels    ED PROVIDER: Asia Coates M.D.      CHIEF COMPLAINT     Chief Complaint   Patient presents with    Mass       FINAL IMPRESSION:     1. Diabetic ketoacidosis without coma associated with other specified diabetes mellitus (H)    2. Furuncle of occipital region of scalp        MEDICAL DECISION MAKING:     ED Course as of 05/26/25 0703   Sun May 25, 2025   1207 Ms. Skelton is a 29 yo female with PMH significant for DM hypothyroidism    Patient presents by herself  Redness on back of head  No trauma no drainage  Felt feverish     Vitals  Normotensive afebrile    Exam  Non toxic cooperative afebrile  Supple neck  Indurated area skull occiput  Central scab movable    I considered the following diagnosis based on the patient's symptoms included but no limited to abscess furuncle infected cyst osteomyelitis sinus abscess insect bite dka among others.      1254 Point-of-care ultrasound reveals no localized fluid collection.   1325 I do not actually see cobblestoning to suggest cellulitis.   1325 Lab work revealed an elevated glucose with anion gap and acidosis.  Patient is in DKA.  She has an elevated white blood cell count with a left shift.   1325 crp elevated.  Will update patient's tetanus or antibiotics.   1325 Low pH with a normal bicarb.   1326 Will start DKA protocol.   1423 EKG reveals sinus rhythm normal anterior progression normal axis   1629 After verbal consent and LET   Lidocaine without epi injected  0.5 cm incision with yellow thick drainage. Probed no more purulent drainage.  Cleaned and dressed. Culture sent for evaluation  Spoke with intensivist hospitalist and surgery.  Patient admitted to the ICU in stable condition with guarded diagnosis.   1632 Clinical impression and decision making  29 yo diabetic  no meds for 6 months  Here with redness and pain posterior skull  DKA likely secondary to not taking medications.  Furuncle drained  Broad abx  No hypotensive episodes  Admitted for abx and insulin protocol in stable condition with guarded diagnosis.       Medical Decision Making    History   Supplemental history obtained from: N/A   External EMR Record(s) reviewed: I reviewed immunization record    Complicating Factors   Care Impacted by Chronic illnesses: diabetes    Work Up   I considered additional work up, including CTA  but deferred palpable movable superficial scalp   I independently reviewed and interpreted na and note na .See full radiology report for final interpretation. Incidental findings discussed with patient for follow up    External Discussion   I discussed the care with another health care provider: Dr. Cameron, intensivist; surgery, hospitalist    Disposition Considerations   ADMIT       MIPS (CTPE, Dental pain, Mchugh, Sinusitis, Asthma/COPD, Head Trauma): Not Applicable    SEPSIS: None        Critical Care     Performed by: Dr Asia Coates  Authorized by: Dr Asia Coates  Total critical care time: 35 minutes  Critical care was necessary to treat or prevent imminent or life-threatening deterioration of the following conditions: dka  Critical care was time spent personally by me on the following activities: development of treatment plan with patient or surrogate, discussions with consultants, examination of patient, evaluation of patient's response to treatment, obtaining history from patient or surrogate, ordering and performing treatments and interventions, ordering and review of laboratory studies, ordering and review of radiographic studies, re-evaluation of patient's condition and monitoring for potential decompensation.  Critical care time was exclusive of separately billable procedures and treating other patients.       ED COURSE     12:17 PM I met with the patient for the initial interview  and physical examination. Discussed plan for treatment and workup in the ED.    12:35 PM I performed a soft tissue POCUS.  1:07 PM I rechecked and updated the patient using a [employer]  service.  1:26 PM I paged the intensivist.  1:32 PM Spoke with Dr. Cameron, intensivist.  1:39 PM I performed an incision and drainage procedure with verbal consent from the patient.  1:55 PM I paged the hospitalist for admission.  2:17 PM I spoke with Dr. Cruz, surgery.   2:24 PM I spoke with the hospitalist, Dr. Funk. We discussed the patient's case and they agree to admit the patient.     At the conclusion of the encounter I discussed the results of all of the tests and the disposition. The questions were answered. The patient acknowledged understanding and was agreeable with the care plan.         MEDICATIONS GIVEN IN THE EMERGENCY:     Medications   dextrose 50 % injection 25-50 mL (has no administration in time range)   insulin regular (MYXREDLIN) 1 unit/mL infusion (0 Units/hr Intravenous Stopped 5/26/25 0530)   glucose gel 15-30 g (has no administration in time range)     Or   dextrose 50 % injection 25-50 mL (has no administration in time range)     Or   glucagon injection 1 mg (has no administration in time range)   0.45% sodium chloride + KCl 20 mEq/L infusion (0 mL/hr Intravenous Paused 5/25/25 1705)   dextrose 5% and 0.45% NaCl + KCl 20 mEq/L infusion (0 mLs Intravenous Stopped 5/26/25 0529)   piperacillin-tazobactam (ZOSYN) 3.375 g vial to attach to  mL bag (3.375 g Intravenous $New Bag 5/26/25 0203)   HYDROmorphone (PF) (DILAUDID) injection 0.5 mg (has no administration in time range)   naloxone (NARCAN) injection 0.2 mg (has no administration in time range)     Or   naloxone (NARCAN) injection 0.4 mg (has no administration in time range)     Or   naloxone (NARCAN) injection 0.2 mg (has no administration in time range)     Or   naloxone (NARCAN) injection 0.4 mg (has no administration in time  range)   atorvastatin (LIPITOR) tablet 40 mg (40 mg Oral $Given 5/25/25 2009)   levothyroxine (SYNTHROID/LEVOTHROID) tablet 112 mcg ( Oral Canceled Entry 5/26/25 0730)   vancomycin (VANCOCIN) 1,250 mg in 0.9% NaCl 262.5 mL intermittent infusion (has no administration in time range)   oxyCODONE (ROXICODONE) tablet 5 mg (has no administration in time range)   acetaminophen (TYLENOL) tablet 650 mg (650 mg Oral $Given 5/26/25 0402)     Or   acetaminophen (TYLENOL) Suppository 650 mg ( Rectal See Alternative 5/26/25 0402)   insulin aspart (NovoLOG) injection (RAPID ACTING) (has no administration in time range)   insulin aspart (NovoLOG) injection (RAPID ACTING) ( Subcutaneous Canceled Entry 5/26/25 0328)   insulin aspart (NovoLOG) injection (RAPID ACTING) (has no administration in time range)   insulin glargine (LANTUS PEN) injection 80 Units ( Subcutaneous Canceled Entry 5/26/25 0730)   lido-EPINEPHrine-tetracaine (LET) topical gel GEL (3 mLs Topical $Given 5/25/25 1229)   methylcellulose powder ( Topical $Given 5/25/25 1229)   sodium chloride 0.9% BOLUS 1,000 mL (1,000 mLs Intravenous $New Bag 5/25/25 1317)   sodium chloride 0.9% BOLUS 1,000 mL (1,000 mLs Intravenous $New Bag 5/25/25 1359)   piperacillin-tazobactam (ZOSYN) 3.375 g vial to attach to  mL bag (3.375 g Intravenous $New Bag 5/25/25 1408)   vancomycin (VANCOCIN) 1,750 mg in 0.9% NaCl 517.5 mL intermittent infusion (1,750 mg Intravenous $New Bag 5/25/25 1542)   sodium phosphate 15 mmol in sodium chloride 0.9 % 250 mL intermittent infusion (15 mmol Intravenous $Given 5/25/25 2041)   potassium chloride raegan ER (KLOR-CON M20) CR tablet 40 mEq (40 mEq Oral $Given 5/25/25 2338)       NEW PRESCRIPTIONS STARTED AT TODAY'S ER VISIT     Current Discharge Medication List             =================================================================    HPI     Patient information was obtained from: patient     Use of : N/A         Tyson Skelton is a 30 year  old female with a history of unspecified chronic kidney disease, diabetes mellitus type II, and medication noncompliance who presents by private car for evaluation of a mass.    Patient has had a growing bump on the back of her neck for 1.5 weeks. Initially, it resembled a small pimple. She has pain to the area. She has never had this before. Denies haircuts or shaving recently. Patient also endorses subjective fever and chills. She has not taken her temperature. Denies chest pain, shortness of breath. Her eyes, nose, mouth, chest, and abdomen are OK. No chance of pregnancy. Her sister drove her here today.    Patient has a history of diabetes. She does not know how her sugars have been. She has not seen a doctor or been on medications for 6 months due to lack of insurance.     ScHx: Patient does not smoke.    Per chart review, patient's last TDAP was 8/29/2012.      REVIEW OF SYSTEMS     Review of Systems   SEE HPI    PAST MEDICAL HISTORY:     Past Medical History:   Diagnosis Date    Chronic kidney disease     Diabetes mellitus, type II (H)     Hyperglycemia without ketosis 2010    hospitalized at [facility]    Hyperlipidemia        PAST SURGICAL HISTORY:     History reviewed. No pertinent surgical history.      CURRENT MEDICATIONS:     No current outpatient medications on file.       ALLERGIES:     No Known Allergies    FAMILY HISTORY:     Family History   Problem Relation Age of Onset    Diabetes Mother     No Known Problems Father     Diabetes Sister        SOCIAL HISTORY:     Social History     Socioeconomic History    Marital status:    Tobacco Use    Smoking status: Never     Passive exposure: Yes    Smokeless tobacco: Never   Vaping Use    Vaping status: Never Used   Substance and Sexual Activity    Alcohol use: No    Drug use: No    Sexual activity: Yes     Partners: Male     Birth control/protection: None   Social History Narrative    Has one child     Social Drivers of Health      Financial Resource Strain: Low Risk  (2/6/2024)    Financial Resource Strain     Within the past 12 months, have you or your family members you live with been unable to get utilities (heat, electricity) when it was really needed?: No   Food Insecurity: Low Risk  (2/6/2024)    Food Insecurity     Within the past 12 months, did you worry that your food would run out before you got money to buy more?: No     Within the past 12 months, did the food you bought just not last and you didn t have money to get more?: No   Recent Concern: Food Insecurity - High Risk (12/19/2023)    Food Insecurity     Within the past 12 months, did you worry that your food would run out before you got money to buy more?: Yes     Within the past 12 months, did the food you bought just not last and you didn t have money to get more?: Yes   Transportation Needs: Low Risk  (2/6/2024)    Transportation Needs     Within the past 12 months, has lack of transportation kept you from medical appointments, getting your medicines, non-medical meetings or appointments, work, or from getting things that you need?: No   Physical Activity: Inactive (2/6/2024)    Exercise Vital Sign     Days of Exercise per Week: 0 days     Minutes of Exercise per Session: 0 min   Stress: No Stress Concern Present (2/6/2024)    [employer] [place] - Occupational Stress Questionnaire     Feeling of Stress : Not at all   Social Connections: Unknown (2/6/2024)    Social Connection and Isolation Panel [NHANES]     Frequency of Social Gatherings with Friends and Family: More than three times a week   Interpersonal Safety: Low Risk  (11/3/2023)    Interpersonal Safety     Do you feel physically and emotionally safe where you currently live?: Yes     Within the past 12 months, have you been hit, slapped, kicked or otherwise physically hurt by someone?: No     Within the past 12 months, have you been humiliated or emotionally abused in other ways by your  "partner or ex-partner?: No   Housing Stability: Low Risk  (2/6/2024)    Housing Stability     Do you have housing? : Yes     Are you worried about losing your housing?: No       VITALS:     /75   Pulse 105   Temp 100.4  F (38  C) (Oral)   Resp (!) 4   Ht 1.499 m (4' 11\")   Wt 63.8 kg (140 lb 9.6 oz)   SpO2 95%   BMI 28.40 kg/m      PHYSICAL EXAM     Physical Exam  Vitals and nursing note reviewed.   Constitutional:       Appearance: She is not ill-appearing or diaphoretic.         Physical Exam   Constitutional: non toxic cooperative oral temp by me 99.4    Head: Atraumatic. Erythematous area to occiput head with central scab.    Nose: Nose normal.     Mouth/Throat: Oropharynx is clear and moist.     Eyes: EOM are normal. Pupils are equal, round, and reactive to light.     Ears: Bilateral pearly white tympanic membranes.    Neck: Normal range of motion. Neck supple. No skin changes.    Cardiovascular: Normal rate, regular rhythm and normal heart sounds.  2+ femoral pulses/radial/DP pulses B    Pulmonary/Chest: Normal effort  and breath sounds normal.     Abdominal: soft nontender.    Musculoskeletal: Normal range of motion. No skin changes to the back. No midline cervical thoracic pain    Neurological: Moves upper and lower extremities equally.    Lymphatics: no edema, no calves pain, no palpable cords.    : NA    Skin: Skin is warm and dry. Erythema occiput scalp    Psychiatric: Normal mood and affect. Behavior is normal.       LAB:     All pertinent labs reviewed and interpreted.  Labs Ordered and Resulted from Time of ED Arrival to Time of ED Departure   BASIC METABOLIC PANEL - Abnormal       Result Value    Sodium 124 (*)     Potassium 4.7      Chloride 89 (*)     Carbon Dioxide (CO2) 18 (*)     Anion Gap 17 (*)     Urea Nitrogen 17.1      Creatinine 1.06 (*)     GFR Estimate 72      Calcium 9.1      Glucose 622 (*)    CBC WITH PLATELETS AND DIFFERENTIAL - Abnormal    WBC Count 13.9 (*)     RBC " Count 4.31      Hemoglobin 11.8      Hematocrit 35.8      MCV 83      MCH 27.4      MCHC 33.0      RDW 12.1      Platelet Count 288      % Neutrophils 79      % Lymphocytes 13      % Monocytes 7      % Eosinophils 1      % Basophils 0      % Immature Granulocytes 1      NRBCs per 100 WBC 0      Absolute Neutrophils 11.0 (*)     Absolute Lymphocytes 1.7      Absolute Monocytes 0.9      Absolute Eosinophils 0.1      Absolute Basophils 0.0      Absolute Immature Granulocytes 0.1      Absolute NRBCs 0.0     BLOOD GAS VENOUS - Abnormal    pH Venous 7.38      pCO2 Venous 38 (*)     pO2 Venous 63 (*)     Bicarbonate Venous 22      Base Excess/Deficit Venous -2.7      FIO2 21      Oxyhemoglobin Venous 90 (*)     O2 Sat, Venous 90.6 (*)    KETONE BETA-HYDROXYBUTYRATE QUANTITATIVE, RAPID - Abnormal    Ketone (Beta-Hydroxybutyrate) Quantitative 4.42 (*)    CRP INFLAMMATION - Abnormal    CRP Inflammation 86.10 (*)    HEPATIC FUNCTION PANEL - Abnormal    Protein Total 6.9      Albumin 2.8 (*)     Bilirubin Total 0.4      Alkaline Phosphatase 100      AST 20      ALT 7      Bilirubin Direct <0.08     GLUCOSE BY METER - Abnormal    GLUCOSE BY METER POCT 491 (*)    LACTIC ACID WHOLE BLOOD WITH 1X REPEAT IN 2 HR WHEN >2 - Normal    Lactic Acid, Initial 0.9     MAGNESIUM - Normal    Magnesium 2.1     LIPASE - Normal    Lipase 29     GLUCOSE MONITOR NURSING POCT        RADIOLOGY:     Reviewed all pertinent imaging. Please see official radiology report.  US Head Neck Soft Tissue   Final Result   IMPRESSION:   4 x 3 x 5 mm hypoechoic structure just deep to the dermis in the area of concern. This is nonspecific but may represent an epidermoid inclusion cyst. Edema within the adjacent soft tissues may indicate inflammation or infection. No other fluid    collections or abscesses.         XR Chest Port 1 View   Final Result   IMPRESSION: Negative chest.      POC US SOFT TISSUE    (Results Pending)        EKG:   Addendum [id number]  EKG  #1  Sinus rhythm normal anterior progression normal axis    Time:[id number]    Ventricular rate 98 bmp  Axis normal  ME interval 164 ms  QRS duration 70 ms  QT//449 ms    Compared to previous EKG on October 6, 2022 heart rate 114 inverted T waves in V3 no longer present  I have independently reviewed and interpreted the EKG(s) documented above.      PROCEDURES:     Procedures    PROCEDURE: Emergency Department Limited Bedside Screening Ultrasound   TYPE:    ED LIMITED BEDSIDE SCREENING US - SOFT TISSUE   INDICATIONS: Occiput scalp   PROCEDURE PROVIDER:   Asia Coates    WINDOW AND FINDINGS: Soft tissue   IMAGES SAVED AND STORED FOR ARCHIVE AND REVIEW: Yes      PROCEDURE: Incision and Drainage   INDICATIONS: Localized abscess   PROCEDURE PROVIDER: Dr Asia Coates   SITE: Occiput   MEDICATION: 5 mLs of 1% Lidocaine without epinephrine   NOTE: The area was prepped with betadine and draped off in the usual sterile fashion.  Local anesthetic was injected subcutaneously with anesthesia effects demonstrated prior to proceeding.  The area of maximal fluctuance was opened with a # 11 Blade ([place]) using a Single Straight incision to allow for drainage.  The abscess was drained.  The abscess cavity was bluntly explored to separate any loculations. No Packing was placed into the abscess cavity.  A sterile dressing was placed over the area.   COMPLEXITY: Simple    Simple = single, furuncle, paronychia, superficial  Complex = multiple or abscess requiring probing, loculations, packing placement   COMPLICATIONS: Patient tolerated procedure well, without complication        I, Jeane Medina, am serving as a scribe to document services personally performed by Dr. Coates based on my observation and the provider's statements to me. I, Asia Coates MD attest that Jeane Medina is acting in a scribe capacity, has observed my performance of the services and has documented them in accordance with my direction.    Asia  BRIGITTE Coates.  Emergency Medicine  [facility] EMERGENCY DEPARTMENT  [facility] [facility] [id number] [phone]  Dept: [phone]       Asia Coates MD  05/25/25 1635       Asia Coates MD  05/26/25 8977

## 2025-05-26 LAB
ANION GAP SERPL CALCULATED.3IONS-SCNC: 7 MMOL/L (ref 7–15)
B-OH-BUTYR SERPL-SCNC: 0.63 MMOL/L
BASE EXCESS BLDV CALC-SCNC: -0.6 MMOL/L (ref -3–3)
BUN SERPL-MCNC: 10.6 MG/DL (ref 6–20)
CALCIUM SERPL-MCNC: 7.9 MG/DL (ref 8.8–10.4)
CHLORIDE SERPL-SCNC: 104 MMOL/L (ref 98–107)
CREAT SERPL-MCNC: 1.07 MG/DL (ref 0.51–0.95)
EGFRCR SERPLBLD CKD-EPI 2021: 71 ML/MIN/1.73M2
GLUCOSE BLDC GLUCOMTR-MCNC: 105 MG/DL (ref 70–99)
GLUCOSE BLDC GLUCOMTR-MCNC: 122 MG/DL (ref 70–99)
GLUCOSE BLDC GLUCOMTR-MCNC: 125 MG/DL (ref 70–99)
GLUCOSE BLDC GLUCOMTR-MCNC: 126 MG/DL (ref 70–99)
GLUCOSE BLDC GLUCOMTR-MCNC: 129 MG/DL (ref 70–99)
GLUCOSE BLDC GLUCOMTR-MCNC: 130 MG/DL (ref 70–99)
GLUCOSE BLDC GLUCOMTR-MCNC: 145 MG/DL (ref 70–99)
GLUCOSE BLDC GLUCOMTR-MCNC: 145 MG/DL (ref 70–99)
GLUCOSE BLDC GLUCOMTR-MCNC: 162 MG/DL (ref 70–99)
GLUCOSE SERPL-MCNC: 125 MG/DL (ref 70–99)
HCO3 BLDV-SCNC: 24 MMOL/L (ref 21–28)
HCO3 SERPL-SCNC: 24 MMOL/L (ref 22–29)
HOLD SPECIMEN: NORMAL
MAGNESIUM SERPL-MCNC: 1.7 MG/DL (ref 1.7–2.3)
MAGNESIUM SERPL-MCNC: 1.8 MG/DL (ref 1.7–2.3)
O2/TOTAL GAS SETTING VFR VENT: 21 %
OXYHGB MFR BLDV: 82 % (ref 70–75)
PCO2 BLDV: 41 MM HG (ref 40–50)
PH BLDV: 7.39 [PH] (ref 7.32–7.43)
PHOSPHATE SERPL-MCNC: 1.8 MG/DL (ref 2.5–4.5)
PHOSPHATE SERPL-MCNC: 2.9 MG/DL (ref 2.5–4.5)
PHOSPHATE SERPL-MCNC: 3.5 MG/DL (ref 2.5–4.5)
PO2 BLDV: 46 MM HG (ref 25–47)
POTASSIUM SERPL-SCNC: 3.7 MMOL/L (ref 3.4–5.3)
POTASSIUM SERPL-SCNC: 4 MMOL/L (ref 3.4–5.3)
SAO2 % BLDV: 82.6 % (ref 70–75)
SODIUM SERPL-SCNC: 135 MMOL/L (ref 135–145)
TSH SERPL DL<=0.005 MIU/L-ACNC: 1.21 UIU/ML (ref 0.3–4.2)
VANCOMYCIN SERPL-MCNC: 16.7 UG/ML (ref ?–25)

## 2025-05-26 PROCEDURE — 36415 COLL VENOUS BLD VENIPUNCTURE: CPT | Performed by: HOSPITALIST

## 2025-05-26 PROCEDURE — 82010 KETONE BODYS QUAN: CPT | Performed by: INTERNAL MEDICINE

## 2025-05-26 PROCEDURE — 99222 1ST HOSP IP/OBS MODERATE 55: CPT | Mod: 57

## 2025-05-26 PROCEDURE — 80048 BASIC METABOLIC PNL TOTAL CA: CPT | Performed by: INTERNAL MEDICINE

## 2025-05-26 PROCEDURE — 83735 ASSAY OF MAGNESIUM: CPT | Performed by: EMERGENCY MEDICINE

## 2025-05-26 PROCEDURE — 83735 ASSAY OF MAGNESIUM: CPT | Performed by: FAMILY MEDICINE

## 2025-05-26 PROCEDURE — 250N000012 HC RX MED GY IP 250 OP 636 PS 637: Performed by: HOSPITALIST

## 2025-05-26 PROCEDURE — 250N000011 HC RX IP 250 OP 636: Performed by: HOSPITALIST

## 2025-05-26 PROCEDURE — 250N000012 HC RX MED GY IP 250 OP 636 PS 637: Performed by: FAMILY MEDICINE

## 2025-05-26 PROCEDURE — 250N000013 HC RX MED GY IP 250 OP 250 PS 637: Performed by: HOSPITALIST

## 2025-05-26 PROCEDURE — 84100 ASSAY OF PHOSPHORUS: CPT | Performed by: INTERNAL MEDICINE

## 2025-05-26 PROCEDURE — 250N000011 HC RX IP 250 OP 636

## 2025-05-26 PROCEDURE — 80202 ASSAY OF VANCOMYCIN: CPT | Performed by: HOSPITALIST

## 2025-05-26 PROCEDURE — 250N000011 HC RX IP 250 OP 636: Performed by: INTERNAL MEDICINE

## 2025-05-26 PROCEDURE — 84443 ASSAY THYROID STIM HORMONE: CPT | Performed by: INTERNAL MEDICINE

## 2025-05-26 PROCEDURE — 99233 SBSQ HOSP IP/OBS HIGH 50: CPT | Performed by: HOSPITALIST

## 2025-05-26 PROCEDURE — 84132 ASSAY OF SERUM POTASSIUM: CPT | Performed by: FAMILY MEDICINE

## 2025-05-26 PROCEDURE — 120N000004 HC R&B MS OVERFLOW

## 2025-05-26 PROCEDURE — 84100 ASSAY OF PHOSPHORUS: CPT | Performed by: FAMILY MEDICINE

## 2025-05-26 PROCEDURE — 10060 I&D ABSCESS SIMPLE/SINGLE: CPT | Mod: 58

## 2025-05-26 PROCEDURE — 258N000003 HC RX IP 258 OP 636: Performed by: HOSPITALIST

## 2025-05-26 PROCEDURE — 0H90XZZ DRAINAGE OF SCALP SKIN, EXTERNAL APPROACH: ICD-10-PCS

## 2025-05-26 PROCEDURE — 250N000013 HC RX MED GY IP 250 OP 250 PS 637: Performed by: INTERNAL MEDICINE

## 2025-05-26 PROCEDURE — 36415 COLL VENOUS BLD VENIPUNCTURE: CPT | Performed by: INTERNAL MEDICINE

## 2025-05-26 PROCEDURE — 36415 COLL VENOUS BLD VENIPUNCTURE: CPT | Performed by: FAMILY MEDICINE

## 2025-05-26 PROCEDURE — 84100 ASSAY OF PHOSPHORUS: CPT | Performed by: HOSPITALIST

## 2025-05-26 PROCEDURE — 258N000003 HC RX IP 258 OP 636: Performed by: INTERNAL MEDICINE

## 2025-05-26 PROCEDURE — 82805 BLOOD GASES W/O2 SATURATION: CPT | Performed by: INTERNAL MEDICINE

## 2025-05-26 RX ORDER — METFORMIN HYDROCHLORIDE 500 MG/1
500 TABLET, EXTENDED RELEASE ORAL
Status: DISCONTINUED | OUTPATIENT
Start: 2025-05-26 | End: 2025-05-28 | Stop reason: HOSPADM

## 2025-05-26 RX ORDER — LIDOCAINE HYDROCHLORIDE AND EPINEPHRINE 10; 10 MG/ML; UG/ML
5 INJECTION, SOLUTION INFILTRATION; PERINEURAL ONCE
Status: COMPLETED | OUTPATIENT
Start: 2025-05-26 | End: 2025-05-26

## 2025-05-26 RX ORDER — DEXTROSE MONOHYDRATE 25 G/50ML
25-50 INJECTION, SOLUTION INTRAVENOUS
Status: DISCONTINUED | OUTPATIENT
Start: 2025-05-26 | End: 2025-05-26

## 2025-05-26 RX ORDER — NICOTINE POLACRILEX 4 MG
15-30 LOZENGE BUCCAL
Status: DISCONTINUED | OUTPATIENT
Start: 2025-05-26 | End: 2025-05-26

## 2025-05-26 RX ADMIN — INSULIN GLARGINE 80 UNITS: 100 INJECTION, SOLUTION SUBCUTANEOUS at 03:29

## 2025-05-26 RX ADMIN — SODIUM CHLORIDE 1250 MG: 0.9 INJECTION, SOLUTION INTRAVENOUS at 08:27

## 2025-05-26 RX ADMIN — ACETAMINOPHEN 650 MG: 325 TABLET ORAL at 04:02

## 2025-05-26 RX ADMIN — ACETAMINOPHEN 650 MG: 325 TABLET ORAL at 23:43

## 2025-05-26 RX ADMIN — LIDOCAINE HYDROCHLORIDE,EPINEPHRINE BITARTRATE 5 ML: 10; .01 INJECTION, SOLUTION INFILTRATION; PERINEURAL at 11:44

## 2025-05-26 RX ADMIN — INSULIN ASPART 1 UNITS: 100 INJECTION, SOLUTION INTRAVENOUS; SUBCUTANEOUS at 17:39

## 2025-05-26 RX ADMIN — SODIUM CHLORIDE 750 MG: 0.9 INJECTION, SOLUTION INTRAVENOUS at 23:30

## 2025-05-26 RX ADMIN — ATORVASTATIN CALCIUM 40 MG: 40 TABLET, FILM COATED ORAL at 20:17

## 2025-05-26 RX ADMIN — LEVOTHYROXINE SODIUM 112 MCG: 112 TABLET ORAL at 05:44

## 2025-05-26 RX ADMIN — INSULIN ASPART 1 UNITS: 100 INJECTION, SOLUTION INTRAVENOUS; SUBCUTANEOUS at 12:21

## 2025-05-26 RX ADMIN — METFORMIN ER 500 MG 500 MG: 500 TABLET ORAL at 17:39

## 2025-05-26 RX ADMIN — POTASSIUM & SODIUM PHOSPHATES POWDER PACK 280-160-250 MG 2 PACKET: 280-160-250 PACK at 08:27

## 2025-05-26 RX ADMIN — POTASSIUM & SODIUM PHOSPHATES POWDER PACK 280-160-250 MG 2 PACKET: 280-160-250 PACK at 17:38

## 2025-05-26 RX ADMIN — PIPERACILLIN AND TAZOBACTAM 3.38 G: 3; .375 INJECTION, POWDER, FOR SOLUTION INTRAVENOUS at 02:03

## 2025-05-26 RX ADMIN — POTASSIUM & SODIUM PHOSPHATES POWDER PACK 280-160-250 MG 2 PACKET: 280-160-250 PACK at 11:44

## 2025-05-26 RX ADMIN — ACETAMINOPHEN 650 MG: 325 TABLET ORAL at 12:15

## 2025-05-26 RX ADMIN — POTASSIUM CHLORIDE, DEXTROSE MONOHYDRATE AND SODIUM CHLORIDE: 150; 5; 450 INJECTION, SOLUTION INTRAVENOUS at 02:18

## 2025-05-26 RX ADMIN — ACETAMINOPHEN 650 MG: 325 TABLET ORAL at 08:37

## 2025-05-26 ASSESSMENT — ACTIVITIES OF DAILY LIVING (ADL)
ADLS_ACUITY_SCORE: 61
ADLS_ACUITY_SCORE: 35
ADLS_ACUITY_SCORE: 61
ADLS_ACUITY_SCORE: 35
ADLS_ACUITY_SCORE: 61
ADLS_ACUITY_SCORE: 35
ADLS_ACUITY_SCORE: 35
ADLS_ACUITY_SCORE: 61
ADLS_ACUITY_SCORE: 35
ADLS_ACUITY_SCORE: 33
ADLS_ACUITY_SCORE: 61
ADLS_ACUITY_SCORE: 33
ADLS_ACUITY_SCORE: 59
ADLS_ACUITY_SCORE: 35
ADLS_ACUITY_SCORE: 61
ADLS_ACUITY_SCORE: 33

## 2025-05-26 NOTE — PROCEDURES
Incision and Drainage Procedure    Indication: Abscess with purulent drainage   Location: Posterior head occipital area head   Completed By: Nemo Benavides PA-C    Anesthesia: Lidocaine 1% with 1:100,000 epinephrine local injection with 5 mLs injected    Note: The area was prepped using betadine solution. The hair was taped away from the area. The abscess was opened using a #15 blade creating a 2.5-3 cm incision. There was approximately 5-8 mL s of purulent discharge from the wound. The area was flushed with normal saline and probed with a cotton swab to break up loculations and assess for tunneling. The wound was flushed x 4 with Vashe solution. The wound was packed with 1/4 inch nugauze and was covered with 4x4 gauze and kerlix wrap over the head.    Comments: The patient tolerated the procedure well. There were no immediate complications.     Informed Consent   Procedure: Incision and drainage of abscess on posterior scalp occipital region  Note: The patient was determined to be alert and of sound decision-making capacity. Both the risks and benefits of the procedure were explained to the patient in great detail. The patient was allowed to ask questions, which were addressed thoroughly. The patient voiced understanding of both the risks and benefits of the procedure and felt that the benefits outweighed the risks. The patient freely gave verbal consent for procedural treatment.    Nemo Benavides PA-C  [facility]  [address] [place] [id number]

## 2025-05-26 NOTE — SIGNIFICANT EVENT
Significant Event Note    Time of event: 3:15 AM May 26, 2025    Description of event:    Patient admitted with DKA.  History type 2 diabetes on insulin but per history has not taken any insulin for more than a month.  A1c greater than 25.  Has been on insulin drip with gap closed and ketosis clearing.    Plan:  Transition to subcu insulin.  Discussed with nursing staff.  Will start on lower amount of Lantus given history of medication nonadherence as well as mealtime insulin although less than what is on her med rec given history of medication nonadherence    Discussed with: bedside nurse    Kimber Angulo MD

## 2025-05-26 NOTE — PROGRESS NOTES
"[facility] Hospitalist Progress Note  [facility]  Admission date: 5/25/2025    Summary:    30F with IDDM, CKD2, med non-compliance due to insurance issues who presented due to \"mass on head\" and found to have a furuncle and DKA.  Started on IV insulin and underwent bedside I&D in ED.  Gap closed.  Residual fluid on US and will undergo further I&D with surgery.    Assessment/Plan    #DKA  #Very poorly controlled IDDM without insulin for 6 months due to insurance issues - A1c >20  -prior regimen before stopping insulin: lantus 104, novolog 30 with meals, metformin  -inpatient: lantus 80, novolog 10, sliding scale insulin (increase to high), resume metformin 500ER  -trace residual ketones without gap or acidosis.  Subcutaneous insulin should be sufficient.     #sepsis due to scalp abscess & cellulitis  -vanco.  Anticipate 5 days total abx with oral.  Likely MRSA.  -further I&D today with surgery.        #Hypothyroid - Continue home     #Hyperlipidemia - statin     # CKD stage II    SW consult for lack of insurance and access to insulin/PCP care.    Checklist:  Code Status:    Diet: Moderate Consistent Carb (60 g CHO per Meal) Diet     Cardiac Monitoring: ACTIVE order. Indication: dka    Disposition and Discharge Planning    Barriers: abx plan, I&D, insulin    Number of days selected below is from a list of system pre-approved options.   Medically Ready for Discharge: Anticipated Tomorrow-2 days.        System Identified Risk Variables  Auto-populated based on system request.  If more complex management decisions required, to be addressed above.  \"  Clinically Significant Risk Factors Present on Admission        # Hypokalemia: Lowest K = 3.3 mmol/L in last 2 days, will replace as needed  # Hyponatremia: Lowest Na = 124 mmol/L in last 2 days, will monitor as appropriate  # Hypochloremia: Lowest Cl = 89 mmol/L in last 2 days, will monitor as appropriate      # Hypoalbuminemia: Lowest albumin = 2.8 g/dL at " "5/25/2025 12:35 PM, will monitor as appropriate     # Hypertension: Noted on problem list          # DMII: A1C = >20.1 % (Ref range: <5.7 %) within past 6 months    # Overweight: Estimated body mass index is 28.4 kg/m  as calculated from the following:    Height as of this encounter: 1.499 m (4' 11\").    Weight as of this encounter: 63.8 kg (140 lb 9.6 oz).              \"    Interval Events/Subjective/Notable results:    Feeling better overall    Reviewed: BMPs, CBC, bhydroxy, US, surgery note, culture  Personally interpreted: NA        Objective    Vital signs in last 24 hours  Temp:  [97.1  F (36.2  C)-102.7  F (39.3  C)] 100.4  F (38  C)  Pulse:  [] 99  Resp:  [4-32] 20  BP: ()/() 98/53  SpO2:  [92 %-99 %] 97 % O2 Device: None (Room air)    Weight:   140 lbs 9.6 oz  Body mass index is 28.4 kg/m .    Intake/Output last 3 shifts  I/O last 3 completed shifts:  In: 3435.4 [P.O.:800; I.V.:2535.4; IV Piggyback:100]  Out: 200 [Urine:200]    Physical Exam  General:  Alert, cooperative, no distress    Neurologic:  oriented, facial symmetry preserved, fluent speech.   Psych: calm  HEENT:  Anicteric, MMM.  Posterior scalp furuncle  CV:  no edema  Lungs: easy respirations  Skin: no rashes noted on exposed skin.    Mchugh Catheter: Not present  Lines: None          Medical Decision Making               Minda Gibbs MD, [facility]  Internal Medicine Hospitalist    "

## 2025-05-26 NOTE — PLAN OF CARE
Goal Outcome Evaluation:      Plan of Care Reviewed With: patient    Overall Patient Progress: improvingOverall Patient Progress: improving    Outcome Evaluation: Off insulin infusion    [facility] - ICU    RN Progress Note:            Pertinent Assessments:      Please refer to flowsheet rows for full assessment     Minimal sleep due to hourly blood glucose checks. No void [id number]. Neuros intact. VSS. Transitioned off DKA at 0330 with lantus admin and insulin infusion stopped at 0530.  at 0530. TEMP .7. Tylenol administered and blankets removed. Temp decreased to 100.4. Occipital incision site scabbed, without drainage, erythema surrounding.            Key Events - This Shift:       See above     RN Managed Protocols Ordered:  Yes  Protocols:Potassium, Magnesium, and Phosphorus  Protocols Status: Laboratory Results Pending                Barriers to Discharge / Downgrade:     None         Point of Contact Update: YES-OR-NO: No  If No, reason: Sleep promotion

## 2025-05-26 NOTE — PLAN OF CARE
[facility] - ICU    RN Progress Note:            Pertinent Assessments:      Please refer to flowsheet rows for full assessment     Pt has stable blood sugar and good appetite. VSS. Pt stepped down to MS status           Key Events - This Shift:       Surgery PA assessed and opened sore on the back of patient's head. Wound was flushed and packed and a new dressing placed.      RN Managed Protocols Ordered:  Yes  Protocols:Potassium and Magnesium and Phosphorus  Protocols Status: In Progress                Barriers to Discharge / Downgrade:     Pt has been downgraded to MS status         Point of Contact Update: YES-OR-NO: no  If No, reason: Pt is updating family          Problem: Adult Inpatient Plan of Care  Goal: Absence of Hospital-Acquired Illness or Injury  Outcome: Progressing  Intervention: Identify and Manage Fall Risk  Recent Flowsheet Documentation  Taken 5/26/2025 1200 by Madelyn Cary RN  Safety Promotion/Fall Prevention:   activity supervised   lighting adjusted   clutter free environment maintained   nonskid shoes/slippers when out of bed   patient and family education   room near nurse's station   room organization consistent   supervised activity  Taken 5/26/2025 0800 by Madelyn Cary RN  Safety Promotion/Fall Prevention:   activity supervised   lighting adjusted   clutter free environment maintained   nonskid shoes/slippers when out of bed   patient and family education   room near nurse's station   room organization consistent   supervised activity  Intervention: Prevent Skin Injury  Recent Flowsheet Documentation  Taken 5/26/2025 1200 by Madelyn Cary RN  Body Position: position changed independently  Taken 5/26/2025 0800 by Madelyn Cary RN  Body Position: position changed independently  Goal: Optimal Comfort and Wellbeing  Outcome: Progressing  Intervention: Provide Person-Centered Care  Recent Flowsheet Documentation  Taken 5/26/2025 1200 by Raeann  TEODORO Joshi  Trust Relationship/Rapport:   care explained   emotional support provided   choices provided   empathic listening provided   questions answered   questions encouraged   reassurance provided   thoughts/feelings acknowledged  Taken 5/26/2025 0800 by Madelyn Cary RN  Trust Relationship/Rapport:   care explained   emotional support provided   choices provided   empathic listening provided   questions answered   questions encouraged   reassurance provided   thoughts/feelings acknowledged   Goal Outcome Evaluation:

## 2025-05-26 NOTE — CONSULTS
General Surgery Consultation  Tyson Skelton MRN# [id number]   Age/Sex: 30 year old female YOB: 1995     Reason for consult: 1. Diabetic ketoacidosis without coma associated with other specified diabetes mellitus (H)    2. Furuncle of occipital region of scalp            Requesting physician: Dr. Funk                  Assessment and Plan:   Assessment:  Tyson Skelton is a 30 year old PMH DM type II A1C >20 on 5/25 here with DKA requiring ICU and furuncle likely infected cyst of the occipital region of the scalp. ED performed bedside I&D 5/25 with culture staph aureus, no packing applied. Return of fluctuance today so I repeated bedside incision and drainage (see note) with purulent expression of fluid and left packing in place.    Plan:  -Plan for further incision and drainage at bedside later this morning  -Okay for diet from surgical perspective  -Will place wound care orders after I&D  -Continue antibiotics for today  -Will continue to follow and monitor          Chief Complaint:     Chief Complaint   Patient presents with    Mass        History is obtained from the patient and electronic health record; patient denies the need for an     HPI:   Tyson Skelton is a 30 year old female who presents with furuncle that has been present for about 1 week or a little more. Started almost the size of a pimple. Never drained anything. Became progressively larger in size and tender with palpation. States currently she has tenderness in her neck.   Denies fever or chills previously, had some overnight.  Does feel better this morning.            Past Medical History:     Past Medical History:   Diagnosis Date    Chronic kidney disease     Diabetes mellitus, type II (H)     Hyperglycemia without ketosis 2010    hospitalized at [facility]               Past Surgical History:   History reviewed. No pertinent surgical history.          Social History:    reports that she has never  smoked. She has been exposed to tobacco smoke. She has never used smokeless tobacco. She reports that she does not drink alcohol and does not use drugs.           Family History:     Family History   Problem Relation Age of Onset    Diabetes Mother     No Known Problems Father     Diabetes Sister               Allergies:   No Known Allergies           Medications:     Prior to Admission medications    Medication Sig Start Date End Date Taking? Authorizing Provider   atorvastatin (LIPITOR) 40 MG tablet TAKE 1 PILL(40 MG) BY MOUTH DAILY 9/17/24  Yes Humberto Yang MD   insulin aspart (NOVOLOG FLEXPEN) 100 UNIT/ML pen Inject 30 Units Subcutaneous 3 times daily (with meals) Inject 22 units of Novolog, 10-15 minutes before breakfast, lunch and dinner. . 2/7/24  Yes Parish Daniels MD   LANTUS SOLOSTAR 100 UNIT/ML soln INJECT 104 UNITS SUBCUTANEOUS EVERY MORNING 7/31/24  Yes Mariusz Lewis MD   levothyroxine (SYNTHROID/LEVOTHROID) 112 MCG tablet TAKE 1 TABLET (112 MCG) BY MOUTH DAILY 1/9/25  Yes Parish Daniels MD   metFORMIN (GLUCOPHAGE XR) 500 MG 24 hr tablet TAKE 1 TABLET (500 MG) BY MOUTH 2 TIMES DAILY (WITH MEALS) 9/17/24  Yes Humberto Yang MD   alcohol swab prep pads Use to swab area of injection/teresa as directed. 10/8/22   Brigitte Christie MD   blood glucose (ACCU-CHEK SOFTCLIX) lancing device Lancing device to be used with lancets. 10/8/22   Brigitte Christie MD   blood glucose (NO BRAND SPECIFIED) test strip Use to test blood sugar 5 times daily or as directed. 10/8/22   Brigitte Christie MD   blood glucose monitoring (SOFTCLIX) lancets Use to test blood sugar 4 times daily. 10/8/22   Brigitte Christie MD   Continuous Glucose Sensor (FREESTYLE DUC 2 SENSOR) MISC USE 1 EACH EVERY 14 DAYS EVERY 14 DAYS. 4/29/24   Ivy Davis MD   insulin pen needle (32G X 4 MM) 32G X 4 MM miscellaneous Use 5 pen needles daily or as directed. 10/8/22   Brigitte Christie MD   Microlet Lancets MISC 100 each 2 times daily Use to test blood sugar twice  daily. 11/30/23   Parish Daniels MD   lisinopril (ZESTRIL) 2.5 MG tablet TAKE 1 TABLET (2.5 MG TOTAL) BY MOUTH DAILY FOR BLOOD PRESSURE 4/21/22 10/8/22  Parish Daneils MD              Review of Systems:   The Review of Systems is negative other than noted in the HPI            Physical Exam:   Temp:  [97.1  F (36.2  C)-102.7  F (39.3  C)] 100.4  F (38  C)  Pulse:  [] 99  Resp:  [4-32] 20  BP: ()/() 98/53  SpO2:  [92 %-99 %] 97 %      Intake/Output Summary (Last 24 hours) at 5/26/2025 0903  Last data filed at 5/26/2025 0529  Gross per 24 hour   Intake 3435.4 ml   Output 200 ml   Net 3235.4 ml      Constitutional:   Awake, alert, cooperative, no apparent distress, and appears stated age       Eyes:   PERRL, conjunctiva/corneas clear, EOM's intact; no scleral edema or icterus noted        ENT:   Normocephalic, Lips, mucosa, and tongue normal   There is a moderate sized furuncle appearing area on the posterior scalp occipital region. Small incisional scab overlying and dry. Less erythema than image from ED yesterday. With palpation I do feel an area of fluctuance surrounding this previous incision; tenderness to patient around this area and around the neck.  Probing near the scab with Qtip reveals continued purulent fluid output. Due to discomfort from patient I stopped probing and ordered lidocaine.  See procedural note for incision and drainage procedure.       Lungs:   Normal respiratory effort, no accessory muscle use       Cardiovascular:   Regular rate and rhythm       Abdomen:   Generally soft nondistended       Musculoskeletal:   No obvious swelling, bruising or deformity on exposed limbs/skin       Skin:   Skin color and texture normal for patient, no rashes or lesions on exposed skin             Data:         All imaging studies reviewed by me.    Results for orders placed or performed during the hospital encounter of 05/25/25 (from the past 24 hours)   CBC with platelets + differential    Narrative     The following orders were created for panel order CBC with platelets + differential.  Procedure                               Abnormality         Status                     ---------                               -----------         ------                     CBC with platelets and ...[[id number]]  Abnormal            Final result                 Please view results for these tests on the individual orders.   Basic metabolic panel   Result Value Ref Range    Sodium 124 (L) 135 - 145 mmol/L    Potassium 4.7 3.4 - 5.3 mmol/L    Chloride 89 (L) 98 - 107 mmol/L    Carbon Dioxide (CO2) 18 (L) 22 - 29 mmol/L    Anion Gap 17 (H) 7 - 15 mmol/L    Urea Nitrogen 17.1 6.0 - 20.0 mg/dL    Creatinine 1.06 (H) 0.51 - 0.95 mg/dL    GFR Estimate 72 >60 mL/min/1.73m2    Calcium 9.1 8.8 - 10.4 mg/dL    Glucose 622 (HH) 70 - 99 mg/dL   CBC with platelets and differential   Result Value Ref Range    WBC Count 13.9 (H) 4.0 - 11.0 10e3/uL    RBC Count 4.31 3.80 - 5.20 10e6/uL    Hemoglobin 11.8 11.7 - 15.7 g/dL    Hematocrit 35.8 35.0 - 47.0 %    MCV 83 78 - 100 fL    MCH 27.4 26.5 - 33.0 pg    MCHC 33.0 31.5 - 36.5 g/dL    RDW 12.1 10.0 - 15.0 %    Platelet Count 288 150 - 450 10e3/uL    % Neutrophils 79 %    % Lymphocytes 13 %    % Monocytes 7 %    % Eosinophils 1 %    % Basophils 0 %    % Immature Granulocytes 1 %    NRBCs per 100 WBC 0 <1 /100    Absolute Neutrophils 11.0 (H) 1.6 - 8.3 10e3/uL    Absolute Lymphocytes 1.7 0.8 - 5.3 10e3/uL    Absolute Monocytes 0.9 0.0 - 1.3 10e3/uL    Absolute Eosinophils 0.1 0.0 - 0.7 10e3/uL    Absolute Basophils 0.0 0.0 - 0.2 10e3/uL    Absolute Immature Granulocytes 0.1 <=0.4 10e3/uL    Absolute NRBCs 0.0 10e3/uL   Ketone Beta-Hydroxybutyrate Quantitative   Result Value Ref Range    Ketone (Beta-Hydroxybutyrate) Quantitative 4.42 (HH) <=0.30 mmol/L   CRP inflammation   Result Value Ref Range    CRP Inflammation 86.10 (H) <5.00 mg/L   Magnesium   Result Value Ref Range    Magnesium 2.1 1.7 -  2.3 mg/dL   Hemoglobin A1c   Result Value Ref Range    Estimated Average Glucose >530 (H) <117 mg/dL    Hemoglobin A1C >20.1 (H) <5.7 %   Hepatic panel   Result Value Ref Range    Protein Total 6.9 6.4 - 8.3 g/dL    Albumin 2.8 (L) 3.5 - 5.2 g/dL    Bilirubin Total 0.4 <=1.2 mg/dL    Alkaline Phosphatase 100 40 - 150 U/L    AST 20 0 - 45 U/L    ALT 7 0 - 50 U/L    Bilirubin Direct <0.08 0.00 - 0.30 mg/dL   Lipase   Result Value Ref Range    Lipase 29 13 - 60 U/L   Osmolality   Result Value Ref Range    Osmolality Blood 307 (H) 275 - 295 mmol/kg    Narrative    Greater than 385 mmol/kg relates to stupor in hyperglycemia   Greater than 400 mmol/kg can relate to seizures   Greater than 420 mmol/kg can be lethal    Serum Osmalar Gap:   Normal <10   Larger suggest unmeasured substances present in serum (ethanol, methanol, isopropanol, mannitol, ethylene glycol).   Phosphorus   Result Value Ref Range    Phosphorus 3.4 2.5 - 4.5 mg/dL   Blood gas venous   Result Value Ref Range    pH Venous 7.38 7.32 - 7.43    pCO2 Venous 38 (L) 40 - 50 mm Hg    pO2 Venous 63 (H) 25 - 47 mm Hg    Bicarbonate Venous 22 21 - 28 mmol/L    Base Excess/Deficit Venous -2.7 -3.0 - 3.0 mmol/L    FIO2 21     Oxyhemoglobin Venous 90 (H) 70 - 75 %    O2 Sat, Venous 90.6 (H) 70.0 - 75.0 %    Narrative    In healthy individuals, oxyhemoglobin (O2Hb) and oxygen saturation (SO2) are approximately equal. In the presence of dyshemoglobins, oxyhemoglobin can be considerably lower than oxygen saturation.   Lactic Acid Whole Blood with 1X Repeat in 2 HR when >2   Result Value Ref Range    Lactic Acid, Initial 0.9 0.7 - 2.0 mmol/L   Blood Culture Peripheral blood (BC) Arm, Right    Specimen: Arm, Right; Peripheral blood (BC)   Result Value Ref Range    Culture No growth after 12 hours    Blood Culture Peripheral blood (BC) Arm, Left    Specimen: Arm, Left; Peripheral blood (BC)   Result Value Ref Range    Culture No growth after 12 hours    Wound Aerobic  Bacterial Culture Routine With Gram Stain    Specimen: Scalp; Wound   Result Value Ref Range    Gram Stain Result 3+ Gram positive cocci (A)     Gram Stain Result 3+ WBC seen (A)    Glucose by meter   Result Value Ref Range    GLUCOSE BY METER POCT 491 (H) 70 - 99 mg/dL   Glucose by meter   Result Value Ref Range    GLUCOSE BY METER POCT 407 (H) 70 - 99 mg/dL   Magnesium   Result Value Ref Range    Magnesium 1.8 1.7 - 2.3 mg/dL   Basic metabolic panel   Result Value Ref Range    Sodium 134 (L) 135 - 145 mmol/L    Potassium 3.9 3.4 - 5.3 mmol/L    Chloride 99 98 - 107 mmol/L    Carbon Dioxide (CO2) 19 (L) 22 - 29 mmol/L    Anion Gap 16 (H) 7 - 15 mmol/L    Urea Nitrogen 14.7 6.0 - 20.0 mg/dL    Creatinine 1.00 (H) 0.51 - 0.95 mg/dL    GFR Estimate 77 >60 mL/min/1.73m2    Calcium 8.6 (L) 8.8 - 10.4 mg/dL    Glucose 344 (H) 70 - 99 mg/dL   Ketone Beta-Hydroxybutyrate Quantitative   Result Value Ref Range    Ketone (Beta-Hydroxybutyrate) Quantitative 3.13 (HH) <=0.30 mmol/L   XR Chest Port 1 View    Narrative    EXAM: XR CHEST PORT 1 VIEW  LOCATION: [facility]  DATE: 5/25/2025    INDICATION: dka,weak  COMPARISON: 5/9/2023      Impression    IMPRESSION: Negative chest.   CBC with platelets differential    Narrative    The following orders were created for panel order CBC with platelets differential.  Procedure                               Abnormality         Status                     ---------                               -----------         ------                     CBC with platelets and ...[[id number]]  Abnormal            Final result                 Please view results for these tests on the individual orders.   CBC with platelets and differential   Result Value Ref Range    WBC Count 12.9 (H) 4.0 - 11.0 10e3/uL    RBC Count 4.32 3.80 - 5.20 10e6/uL    Hemoglobin 11.7 11.7 - 15.7 g/dL    Hematocrit 35.6 35.0 - 47.0 %    MCV 82 78 - 100 fL    MCH 27.1 26.5 - 33.0 pg    MCHC 32.9 31.5 -  36.5 g/dL    RDW 12.0 10.0 - 15.0 %    Platelet Count 276 150 - 450 10e3/uL    % Neutrophils 78 %    % Lymphocytes 16 %    % Monocytes 4 %    % Eosinophils 1 %    % Basophils 0 %    % Immature Granulocytes 1 %    NRBCs per 100 WBC 0 <1 /100    Absolute Neutrophils 10.0 (H) 1.6 - 8.3 10e3/uL    Absolute Lymphocytes 2.1 0.8 - 5.3 10e3/uL    Absolute Monocytes 0.5 0.0 - 1.3 10e3/uL    Absolute Eosinophils 0.1 0.0 - 0.7 10e3/uL    Absolute Basophils 0.0 0.0 - 0.2 10e3/uL    Absolute Immature Granulocytes 0.1 <=0.4 10e3/uL    Absolute NRBCs 0.0 10e3/uL   Glucose by meter   Result Value Ref Range    GLUCOSE BY METER POCT 252 (H) 70 - 99 mg/dL   Glucose by meter   Result Value Ref Range    GLUCOSE BY METER POCT 187 (H) 70 - 99 mg/dL   Glucose by meter   Result Value Ref Range    GLUCOSE BY METER POCT 186 (H) 70 - 99 mg/dL   Magnesium   Result Value Ref Range    Magnesium 1.7 1.7 - 2.3 mg/dL   Basic metabolic panel   Result Value Ref Range    Sodium 134 (L) 135 - 145 mmol/L    Potassium 3.5 3.4 - 5.3 mmol/L    Chloride 101 98 - 107 mmol/L    Carbon Dioxide (CO2) 21 (L) 22 - 29 mmol/L    Anion Gap 12 7 - 15 mmol/L    Urea Nitrogen 12.8 6.0 - 20.0 mg/dL    Creatinine 0.95 0.51 - 0.95 mg/dL    GFR Estimate 82 >60 mL/min/1.73m2    Calcium 8.2 (L) 8.8 - 10.4 mg/dL    Glucose 200 (H) 70 - 99 mg/dL   Ketone Beta-Hydroxybutyrate Quantitative   Result Value Ref Range    Ketone (Beta-Hydroxybutyrate) Quantitative 1.94 (HH) <=0.30 mmol/L   Phosphorus   Result Value Ref Range    Phosphorus 1.5 (L) 2.5 - 4.5 mg/dL   Blood gas venous   Result Value Ref Range    pH Venous 7.40 7.32 - 7.43    pCO2 Venous 38 (L) 40 - 50 mm Hg    pO2 Venous 54 (H) 25 - 47 mm Hg    Bicarbonate Venous 23 21 - 28 mmol/L    Base Excess/Deficit Venous -1.4 -3.0 - 3.0 mmol/L    FIO2 21     Oxyhemoglobin Venous 87 (H) 70 - 75 %    O2 Sat, Venous 87.7 (H) 70.0 - 75.0 %    Narrative    In healthy individuals, oxyhemoglobin (O2Hb) and oxygen saturation (SO2) are  approximately equal. In the presence of dyshemoglobins, oxyhemoglobin can be considerably lower than oxygen saturation.   UA with Microscopic reflex to Culture    Specimen: Urine, Clean Catch   Result Value Ref Range    Color Urine Colorless Colorless, Straw, Light Yellow, Yellow    Appearance Urine Clear Clear    Glucose Urine >1000 (A) Negative mg/dL    Bilirubin Urine Negative Negative    Ketones Urine 40 (A) Negative mg/dL    Specific Gravity Urine 1.022 1.001 - 1.030    Blood Urine 0.03 mg/dL (A) Negative    pH Urine 6.5 5.0 - 7.0    Protein Albumin Urine 100 (A) Negative mg/dL    Urobilinogen Urine Normal Normal mg/dL    Nitrite Urine Negative Negative    Leukocyte Esterase Urine Negative Negative    RBC Urine 5 (H) <=2 /HPF    WBC Urine 4 <=5 /HPF    Squamous Epithelials Urine <1 <=1 /HPF    Narrative    Urine Culture not indicated   HCG qualitative urine   Result Value Ref Range    hCG Urine Qualitative Negative Negative   US Head Neck Soft Tissue    Narrative    EXAM: US HEAD NECK SOFT TISSUE  LOCATION: [facility]  DATE: 5/25/2025    INDICATION: scalp swelling eval for abscess. Pain.  COMPARISON: None.  TECHNIQUE: Routine.    FINDINGS: Ultrasound was performed of the posterior scalp in the area of concern. There is a 4 x 3 x 5 mm hypoechoic structure just deep to the dermis. This is nonspecific but may represent an epidermoid inclusion cyst. Edema within the adjacent soft   tissues may indicate inflammation or infection.      Impression    IMPRESSION:  4 x 3 x 5 mm hypoechoic structure just deep to the dermis in the area of concern. This is nonspecific but may represent an epidermoid inclusion cyst. Edema within the adjacent soft tissues may indicate inflammation or infection. No other fluid   collections or abscesses.     Glucose by meter   Result Value Ref Range    GLUCOSE BY METER POCT 198 (H) 70 - 99 mg/dL   Glucose by meter   Result Value Ref Range    GLUCOSE BY METER POCT 216  (H) 70 - 99 mg/dL   Glucose by meter   Result Value Ref Range    GLUCOSE BY METER POCT 207 (H) 70 - 99 mg/dL   Magnesium   Result Value Ref Range    Magnesium 1.7 1.7 - 2.3 mg/dL   Basic metabolic panel   Result Value Ref Range    Sodium 134 (L) 135 - 145 mmol/L    Potassium 3.3 (L) 3.4 - 5.3 mmol/L    Chloride 103 98 - 107 mmol/L    Carbon Dioxide (CO2) 24 22 - 29 mmol/L    Anion Gap 7 7 - 15 mmol/L    Urea Nitrogen 12.1 6.0 - 20.0 mg/dL    Creatinine 1.05 (H) 0.51 - 0.95 mg/dL    GFR Estimate 73 >60 mL/min/1.73m2    Calcium 7.9 (L) 8.8 - 10.4 mg/dL    Glucose 177 (H) 70 - 99 mg/dL   Ketone Beta-Hydroxybutyrate Quantitative   Result Value Ref Range    Ketone (Beta-Hydroxybutyrate) Quantitative 0.73 (H) <=0.30 mmol/L   Phosphorus   Result Value Ref Range    Phosphorus 1.5 (L) 2.5 - 4.5 mg/dL   Blood gas venous   Result Value Ref Range    pH Venous 7.40 7.32 - 7.43    pCO2 Venous 40 40 - 50 mm Hg    pO2 Venous 40 25 - 47 mm Hg    Bicarbonate Venous 25 21 - 28 mmol/L    Base Excess/Deficit Venous -0.1 -3.0 - 3.0 mmol/L    FIO2 21     Oxyhemoglobin Venous 76 (H) 70 - 75 %    O2 Sat, Venous 77.1 (H) 70.0 - 75.0 %    Narrative    In healthy individuals, oxyhemoglobin (O2Hb) and oxygen saturation (SO2) are approximately equal. In the presence of dyshemoglobins, oxyhemoglobin can be considerably lower than oxygen saturation.   Glucose by meter   Result Value Ref Range    GLUCOSE BY METER POCT 161 (H) 70 - 99 mg/dL   Glucose by meter   Result Value Ref Range    GLUCOSE BY METER POCT 126 (H) 70 - 99 mg/dL   Glucose by meter   Result Value Ref Range    GLUCOSE BY METER POCT 122 (H) 70 - 99 mg/dL   Glucose by meter   Result Value Ref Range    GLUCOSE BY METER POCT 130 (H) 70 - 99 mg/dL   Glucose by meter   Result Value Ref Range    GLUCOSE BY METER POCT 125 (H) 70 - 99 mg/dL   Magnesium   Result Value Ref Range    Magnesium 1.8 1.7 - 2.3 mg/dL   Phosphorus   Result Value Ref Range    Phosphorus 2.9 2.5 - 4.5 mg/dL   Blood gas  venous   Result Value Ref Range    pH Venous 7.39 7.32 - 7.43    pCO2 Venous 41 40 - 50 mm Hg    pO2 Venous 46 25 - 47 mm Hg    Bicarbonate Venous 24 21 - 28 mmol/L    Base Excess/Deficit Venous -0.6 -3.0 - 3.0 mmol/L    FIO2 21     Oxyhemoglobin Venous 82 (H) 70 - 75 %    O2 Sat, Venous 82.6 (H) 70.0 - 75.0 %    Narrative    In healthy individuals, oxyhemoglobin (O2Hb) and oxygen saturation (SO2) are approximately equal. In the presence of dyshemoglobins, oxyhemoglobin can be considerably lower than oxygen saturation.   Basic metabolic panel   Result Value Ref Range    Sodium 135 135 - 145 mmol/L    Potassium 3.7 3.4 - 5.3 mmol/L    Chloride 104 98 - 107 mmol/L    Carbon Dioxide (CO2) 24 22 - 29 mmol/L    Anion Gap 7 7 - 15 mmol/L    Urea Nitrogen 10.6 6.0 - 20.0 mg/dL    Creatinine 1.07 (H) 0.51 - 0.95 mg/dL    GFR Estimate 71 >60 mL/min/1.73m2    Calcium 7.9 (L) 8.8 - 10.4 mg/dL    Glucose 125 (H) 70 - 99 mg/dL   Ketone Beta-Hydroxybutyrate Quantitative   Result Value Ref Range    Ketone (Beta-Hydroxybutyrate) Quantitative 0.63 (H) <=0.30 mmol/L   TSH   Result Value Ref Range    TSH 1.21 0.30 - 4.20 uIU/mL   Glucose by meter   Result Value Ref Range    GLUCOSE BY METER POCT 145 (H) 70 - 99 mg/dL   Glucose by meter   Result Value Ref Range    GLUCOSE BY METER POCT 126 (H) 70 - 99 mg/dL   Phosphorus   Result Value Ref Range    Phosphorus 1.8 (L) 2.5 - 4.5 mg/dL   Potassium   Result Value Ref Range    Potassium 4.0 3.4 - 5.3 mmol/L   Magnesium   Result Value Ref Range    Magnesium 1.7 1.7 - 2.3 mg/dL   Extra Purple Top EDTA (LAB USE ONLY)   Result Value Ref Range    Hold Specimen JI    Glucose by meter   Result Value Ref Range    GLUCOSE BY METER POCT 105 (H) 70 - 99 mg/dL           Nemo Benavides PA-C  [facility]  [address], [address]  [place] [address]

## 2025-05-27 LAB
ANION GAP SERPL CALCULATED.3IONS-SCNC: 11 MMOL/L (ref 7–15)
ATRIAL RATE - MUSE: 98 BPM
BUN SERPL-MCNC: 10.4 MG/DL (ref 6–20)
CALCIUM SERPL-MCNC: 8.4 MG/DL (ref 8.8–10.4)
CHLORIDE SERPL-SCNC: 106 MMOL/L (ref 98–107)
CREAT SERPL-MCNC: 1 MG/DL (ref 0.51–0.95)
CREAT SERPL-MCNC: 1.03 MG/DL (ref 0.51–0.95)
DIASTOLIC BLOOD PRESSURE - MUSE: 90 MMHG
EGFRCR SERPLBLD CKD-EPI 2021: 75 ML/MIN/1.73M2
EGFRCR SERPLBLD CKD-EPI 2021: 77 ML/MIN/1.73M2
GLUCOSE BLDC GLUCOMTR-MCNC: 120 MG/DL (ref 70–99)
GLUCOSE BLDC GLUCOMTR-MCNC: 158 MG/DL (ref 70–99)
GLUCOSE BLDC GLUCOMTR-MCNC: 184 MG/DL (ref 70–99)
GLUCOSE BLDC GLUCOMTR-MCNC: 254 MG/DL (ref 70–99)
GLUCOSE BLDC GLUCOMTR-MCNC: 69 MG/DL (ref 70–99)
GLUCOSE BLDC GLUCOMTR-MCNC: 84 MG/DL (ref 70–99)
GLUCOSE SERPL-MCNC: 218 MG/DL (ref 70–99)
HCO3 SERPL-SCNC: 21 MMOL/L (ref 22–29)
INTERPRETATION ECG - MUSE: NORMAL
MAGNESIUM SERPL-MCNC: 2 MG/DL (ref 1.7–2.3)
P AXIS - MUSE: 65 DEGREES
PHOSPHATE SERPL-MCNC: 3.3 MG/DL (ref 2.5–4.5)
POTASSIUM SERPL-SCNC: 3.8 MMOL/L (ref 3.4–5.3)
POTASSIUM SERPL-SCNC: 4 MMOL/L (ref 3.4–5.3)
PR INTERVAL - MUSE: 164 MS
QRS DURATION - MUSE: 70 MS
QT - MUSE: 352 MS
QTC - MUSE: 449 MS
R AXIS - MUSE: 69 DEGREES
SODIUM SERPL-SCNC: 138 MMOL/L (ref 135–145)
SYSTOLIC BLOOD PRESSURE - MUSE: 140 MMHG
T AXIS - MUSE: 60 DEGREES
VENTRICULAR RATE- MUSE: 98 BPM

## 2025-05-27 PROCEDURE — 83735 ASSAY OF MAGNESIUM: CPT | Performed by: HOSPITALIST

## 2025-05-27 PROCEDURE — 36415 COLL VENOUS BLD VENIPUNCTURE: CPT | Performed by: INTERNAL MEDICINE

## 2025-05-27 PROCEDURE — 84100 ASSAY OF PHOSPHORUS: CPT | Performed by: HOSPITALIST

## 2025-05-27 PROCEDURE — 250N000011 HC RX IP 250 OP 636: Performed by: HOSPITALIST

## 2025-05-27 PROCEDURE — 120N000001 HC R&B MED SURG/OB

## 2025-05-27 PROCEDURE — 82565 ASSAY OF CREATININE: CPT | Performed by: INTERNAL MEDICINE

## 2025-05-27 PROCEDURE — G0463 HOSPITAL OUTPT CLINIC VISIT: HCPCS

## 2025-05-27 PROCEDURE — 99233 SBSQ HOSP IP/OBS HIGH 50: CPT | Performed by: HOSPITALIST

## 2025-05-27 PROCEDURE — 82947 ASSAY GLUCOSE BLOOD QUANT: CPT | Performed by: HOSPITALIST

## 2025-05-27 PROCEDURE — 250N000013 HC RX MED GY IP 250 OP 250 PS 637: Performed by: INTERNAL MEDICINE

## 2025-05-27 PROCEDURE — 258N000003 HC RX IP 258 OP 636: Performed by: HOSPITALIST

## 2025-05-27 PROCEDURE — 84132 ASSAY OF SERUM POTASSIUM: CPT | Performed by: HOSPITALIST

## 2025-05-27 PROCEDURE — 250N000013 HC RX MED GY IP 250 OP 250 PS 637: Performed by: HOSPITALIST

## 2025-05-27 PROCEDURE — 99231 SBSQ HOSP IP/OBS SF/LOW 25: CPT | Mod: 24

## 2025-05-27 RX ADMIN — ATORVASTATIN CALCIUM 40 MG: 40 TABLET, FILM COATED ORAL at 21:22

## 2025-05-27 RX ADMIN — SODIUM CHLORIDE 750 MG: 0.9 INJECTION, SOLUTION INTRAVENOUS at 10:02

## 2025-05-27 RX ADMIN — ACETAMINOPHEN 650 MG: 325 TABLET ORAL at 17:47

## 2025-05-27 RX ADMIN — ACETAMINOPHEN 650 MG: 325 TABLET ORAL at 12:00

## 2025-05-27 RX ADMIN — SODIUM CHLORIDE 750 MG: 0.9 INJECTION, SOLUTION INTRAVENOUS at 22:34

## 2025-05-27 RX ADMIN — INSULIN GLARGINE 80 UNITS: 100 INJECTION, SOLUTION SUBCUTANEOUS at 08:46

## 2025-05-27 RX ADMIN — LEVOTHYROXINE SODIUM 112 MCG: 112 TABLET ORAL at 08:40

## 2025-05-27 RX ADMIN — INSULIN ASPART 1 UNITS: 100 INJECTION, SOLUTION INTRAVENOUS; SUBCUTANEOUS at 08:40

## 2025-05-27 RX ADMIN — METFORMIN ER 500 MG 500 MG: 500 TABLET ORAL at 17:47

## 2025-05-27 ASSESSMENT — ACTIVITIES OF DAILY LIVING (ADL)
ADLS_ACUITY_SCORE: 33
ADLS_ACUITY_SCORE: 30
ADLS_ACUITY_SCORE: 33
ADLS_ACUITY_SCORE: 30
DEPENDENT_IADLS:: INDEPENDENT
ADLS_ACUITY_SCORE: 33
ADLS_ACUITY_SCORE: 30
ADLS_ACUITY_SCORE: 30
ADLS_ACUITY_SCORE: 33
ADLS_ACUITY_SCORE: 30
ADLS_ACUITY_SCORE: 33
ADLS_ACUITY_SCORE: 30
ADLS_ACUITY_SCORE: 33
ADLS_ACUITY_SCORE: 33

## 2025-05-27 NOTE — PROGRESS NOTES
Discussed with MD at bedside about blood sugar being 84 mg/dL. Will give only 4 of the scheduled 10 units of novolog to prevent hypoglycemia. Patient ordered lunch and is tolerating oral intake.

## 2025-05-27 NOTE — PLAN OF CARE
Problem: Comorbidity Management  Goal: Blood Glucose Levels Within Targeted Range  Intervention: Monitor and Manage Glycemia  Recent Flowsheet Documentation  Taken 5/27/2025 1747 by Charla Leslie RN  Medication Review/Management: medications reviewed     Problem: Pain Acute  Goal: Optimal Pain Control and Function  Outcome: Progressing  Intervention: Develop Pain Management Plan  Recent Flowsheet Documentation  Taken 5/27/2025 1747 by Charla Leslie, RN  Pain Management Interventions: medication (see MAR)  Intervention: Prevent or Manage Pain  Recent Flowsheet Documentation  Taken 5/27/2025 1747 by Charla Leslie RN  Medication Review/Management: medications reviewed   Goal Outcome Evaluation:         Patient arrived from ICU at 17:20. A&Ox4, independent with ambulation. . Sliding scale and carb count insulin given. Wound covered to back of head, dressing CDI. Pain rated 4/10 to neck. PRN tylenol was reported as effective. PIV x2 saline locked. Recheck protocols in AM. VSS on RA.  was not needed.

## 2025-05-27 NOTE — PROGRESS NOTES
"[facility] Hospitalist Progress Note  [facility]  Admission date: 5/25/2025    Summary:    30F with IDDM, CKD2, med non-compliance due to insurance issues who presented due to \"mass on head\" and found to have a furuncle and DKA.  Started on IV insulin and underwent bedside I&D in ED.  Gap closed.  Residual fluid on US and will undergo further I&D with surgery.    Assessment/Plan    #DKA  #Very poorly controlled IDDM without insulin for 6 months due to insurance issues - A1c >20  -prior regimen before stopping insulin: lantus 104, novolog 30 with meals, metformin  -inpatient: lantus 80, novolog 10, sliding scale insulin (increase to high), resume metformin 500ER  -needs supplies.    #sepsis due to scalp abscess & cellulitis - growing S.Aureus and S. Epi  -s/p I&D 5/26.  Appreciate surgical care.    -IV vanco.  Anticipate 5 days abx after I&D.  Transition to oral based on sensitivities.  May need linezolid.       #Hypothyroid - Continue home     #Hyperlipidemia - statin     # CKD stage II    SW consult for lack of insurance and access to insulin/PCP care.    Checklist:  Code Status:    Diet: Moderate Consistent Carb (60 g CHO per Meal) Diet     Cardiac Monitoring: None    Disposition and Discharge Planning    Barriers: abx plan, I&D, insulin    Number of days selected below is from a list of system pre-approved options.   Medically Ready for Discharge: Anticipated Tomorrow-2 days.        System Identified Risk Variables  Auto-populated based on system request.  If more complex management decisions required, to be addressed above.  \"  Clinically Significant Risk Factors        # Hypokalemia: Lowest K = 3.3 mmol/L in last 2 days, will replace as needed  # Hyponatremia: Lowest Na = 134 mmol/L in last 2 days, will monitor as appropriate       # Hypoalbuminemia: Lowest albumin = 2.8 g/dL at 5/25/2025 12:35 PM, will monitor as appropriate     # Hypertension: Noted on problem list           # DMII: A1C = >20.1 % " "(Ref range: <5.7 %) within past 6 months, PRESENT ON ADMISSION  # Overweight: Estimated body mass index is 28.4 kg/m  as calculated from the following:    Height as of this encounter: 1.499 m (4' 11\").    Weight as of this encounter: 63.8 kg (140 lb 9.6 oz)., PRESENT ON ADMISSION            \"    Interval Events/Subjective/Notable results:    Feeling better overall    Reviewed: surgery note, Bgs, added on BMP, culture results.    D/w RN 4 units for carb coverage for pre-meal BG 84        Objective    Vital signs in last 24 hours  Temp:  [97.9  F (36.6  C)-99  F (37.2  C)] 98.6  F (37  C)  Pulse:  [75-92] 92  Resp:  [16-20] 20  BP: (110-149)/(68-97) 130/82  SpO2:  [95 %-99 %] 99 % O2 Device: None (Room air)    Weight:   140 lbs 9.6 oz  Body mass index is 28.4 kg/m .    Intake/Output last 3 shifts  I/O last 3 completed shifts:  In: 830 [P.O.:720; I.V.:110]  Out: -     Physical Exam  General:  Alert, cooperative, no distress    Neurologic:  oriented, facial symmetry preserved, fluent speech.   Psych: calm  HEENT:  Anicteric, MMM.  Posterior scalp furuncle as in picutre  CV:  no edema  Lungs: easy respirations  Skin: no rashes noted on exposed skin.    Mchugh Catheter: Not present  Lines: None          Medical Decision Making               Minda Gibbs MD, [facility]  Internal Medicine Hospitalist    "

## 2025-05-27 NOTE — PROGRESS NOTES
Patient A&Ox4, makes needs known. Vitally stable on room air. Posterior neck pain rated 4-6/10. Given PRN tylenol with effect. Blood sugars monitored. Up independently in room. Tolerating oral intake. Wound care done by WOC RN today. Will need it done x1 in evening. Report given to TEODORO Dunham. Patient transferred to P204. Belongings and medications sent with patient.

## 2025-05-27 NOTE — PLAN OF CARE
[facility] - ICU    RN Progress Note:          Key Events - This Shift:       None, patient remains vitally stable, blood glucose in range, receiving antibiotics as ordered, tolerating diet orders, voiding spontaneously without difficulty & up ad shane.      RN Managed Protocols Ordered:  Yes  Protocols:Potassium, Magnesium, and Phosphorus  PRN'S:  Protocols Status: Reviewed with Oncoming RN                Barriers to Discharge / Downgrade:     none       Problem: Adult Inpatient Plan of Care  Goal: Absence of Hospital-Acquired Illness or Injury  Intervention: Identify and Manage Fall Risk  Recent Flowsheet Documentation  Taken 5/27/2025 0400 by Arianna Ruffin RN  Safety Promotion/Fall Prevention:   activity supervised   clutter free environment maintained   nonskid shoes/slippers when out of bed   room door open   room near nurse's station   room organization consistent   safety round/check completed  Taken 5/27/2025 0000 by Augustin, Arianna, RN  Safety Promotion/Fall Prevention:   activity supervised   clutter free environment maintained   nonskid shoes/slippers when out of bed   room door open   room near nurse's station   room organization consistent   safety round/check completed  Taken 5/26/2025 2020 by Augustin, Arianna, RN  Safety Promotion/Fall Prevention:   activity supervised   clutter free environment maintained   nonskid shoes/slippers when out of bed   room door open   room near nurse's station   room organization consistent   safety round/check completed  Intervention: Prevent Skin Injury  Recent Flowsheet Documentation  Taken 5/27/2025 0400 by Arianna Ruffin RN  Body Position:   position changed independently   side-lying  Taken 5/27/2025 0000 by Arianna Ruffin RN  Body Position:   position changed independently   side-lying  Taken 5/26/2025 2020 by Arianna Ruffin RN  Body Position:   position changed independently   side-lying  Goal: Optimal  Comfort and Wellbeing  Intervention: Provide Person-Centered Care  Recent Flowsheet Documentation  Taken 5/27/2025 0400 by Arianna Ruffin RN  Trust Relationship/Rapport: care explained  Taken 5/27/2025 0000 by Arianna Ruffin RN  Trust Relationship/Rapport: care explained  Taken 5/26/2025 2020 by Arianna Ruffin RN  Trust Relationship/Rapport: care explained     Problem: Diabetic Ketoacidosis  Goal: Fluid and Electrolyte Balance with Absence of Ketosis  Intervention: Monitor and Manage Ketoacidosis  Recent Flowsheet Documentation  Taken 5/27/2025 0400 by Arianna Ruffin RN  Fluid/Electrolyte Management: fluids provided  Glycemic Management: blood glucose monitored  Taken 5/27/2025 0000 by Arianna Ruffin RN  Fluid/Electrolyte Management: fluids provided  Glycemic Management: blood glucose monitored  Taken 5/26/2025 2020 by Arianna Ruffin RN  Fluid/Electrolyte Management: fluids provided  Glycemic Management: blood glucose monitored     Problem: Infection  Goal: Absence of Infection Signs and Symptoms  Intervention: Prevent or Manage Infection  Recent Flowsheet Documentation  Taken 5/27/2025 0400 by Arianna Ruffin RN  Infection Management: cultures obtained and sent to lab  Taken 5/27/2025 0000 by Arianna Ruffin RN  Infection Management: cultures obtained and sent to lab  Taken 5/26/2025 2020 by Arianna Ruffin RN  Infection Management: cultures obtained and sent to lab     Problem: Comorbidity Management  Goal: Blood Glucose Levels Within Targeted Range  Intervention: Monitor and Manage Glycemia  Recent Flowsheet Documentation  Taken 5/27/2025 0400 by Arianna Ruffin RN  Medication Review/Management:   medications reviewed   high-risk medications identified  Taken 5/27/2025 0000 by Arianna Ruffin RN  Medication Review/Management:   medications reviewed   high-risk medications identified  Taken 5/26/2025 2020 by Arianna Ruffin RN  Medication  Review/Management:   medications reviewed   high-risk medications identified

## 2025-05-27 NOTE — CONSULTS
[facility]  WOC Nurse Inpatient Assessment     Consulted for: scalp swelling    Summary: Patient being seen by surgery, no need for WOC to follow    WOC nurse follow-up plan: signing off    Patient History (according to provider note(s):      See Surgery notes    Assessment:          Surgery following and orders in place - suggest covering With Mepilex rather than gauze/tape to protect skin/hair  Mepilex 3x3 with edges cut off and overlapping on wound fit the space well  WOC will sign off at this time as surgery is following.    AJAY PatelN, RN, PHN, HNB-BC, CWOCN  Pager no longer is use, please contact through [employer] group: [facility] [employer]

## 2025-05-27 NOTE — PHARMACY-VANCOMYCIN DOSING SERVICE
Pharmacy Vancomycin Note  Date of Service May 26, 2025  Patient's  1995   30 year old, female    Indication: Skin and Soft Tissue Infection  Day of Therapy: 2  Current vancomycin regimen:  1250 mg IV q18h   Current vancomycin monitoring method: AUC  Current vancomycin therapeutic monitoring goal: 400-600 mg*h/L    [employer]ight[employer] Prediction of Current Vancomycin Regimen  Regimen: 1250 mg IV every 18 hours.  Start time: 02:27 on 2025  Exposure target: AUC24 (range) 400-600 mg/L.hr   AUC24,ss: 546 mg/L.hr  Probability of AUC24 > 400: 97 %  Ctrough,ss: 15.8 mg/L  Probability of Ctrough,ss > 20: 19 %  Probability of nephrotoxicity (Lodise LOGAN ): 11 %    Current estimated CrCl = Estimated Creatinine Clearance: 77.4 mL/min (A) (based on SCr of 1.07 mg/dL (H)).    Creatinine for last 3 days  2025: 12:35 PM Creatinine 1.06 mg/dL;  3:18 PM Creatinine 1.00 mg/dL;  6:07 PM Creatinine 0.95 mg/dL;  9:49 PM Creatinine 1.05 mg/dL  2025:  2:34 AM Creatinine 1.07 mg/dL    Recent Vancomycin Levels (past 3 days)  2025:  7:50 PM Vancomycin 16.7 ug/mL    Vancomycin IV Administrations (past 72 hours)                     vancomycin (VANCOCIN) 1,250 mg in 0.9% NaCl 262.5 mL intermittent infusion (mg) 1,250 mg New Bag 25 0827    vancomycin (VANCOCIN) 1,750 mg in 0.9% NaCl 517.5 mL intermittent infusion (mg) 1,750 mg New Bag 25 1542                    Nephrotoxins and other renal medications (From now, onward)      Start     Dose/Rate Route Frequency Ordered Stop    25 0900  vancomycin (VANCOCIN) 1,250 mg in 0.9% NaCl 262.5 mL intermittent infusion         1,250 mg  over 90 Minutes Intravenous EVERY 18 HOURS 25 1522                 Contrast Orders - past 72 hours (72h ago, onward)      None            Interpretation of levels and current regimen:  Vancomycin level is reflective of therapeutic level    Has serum creatinine changed greater than 50% in last 72 hours: No    Urine output:  unable  to determine    Renal Function: Stable    InsightRX Prediction of Planned New Vancomycin Regimen  Regimen: 750 mg IV every 12 hours.  Start time: 02:27 on 05/27/2025  Exposure target: AUC24 (range) 400-600 mg/L.hr   AUC24,ss: 492 mg/L.hr  Probability of AUC24 > 400: 89 %  Ctrough,ss: 15.8 mg/L  Probability of Ctrough,ss > 20: 16 %  Probability of nephrotoxicity (Lodise LOGAN 2009): 11 %    Plan:  Change dose to vancomycin 750 mg IV Q 12 hours to change to a preferred inpatient dosing frequency strategy and maintain goal AUC with similar nephrotoxicity profile to previous regimen.  Vancomycin monitoring method: AUC  Vancomycin therapeutic monitoring goal: 400-600 mg*h/L  Pharmacy will check vancomycin levels as appropriate in 1-3 Days.  Serum creatinine levels will be ordered daily for the first week of therapy and at least twice weekly for subsequent weeks.    Nicollette McMann, RP

## 2025-05-27 NOTE — CONSULTS
Care Management Initial Consult    General Information  Assessment completed with: Patient,    Type of CM/SW Visit: Initial Assessment    Primary Care Provider verified and updated as needed: Yes   Readmission within the last 30 days: no previous admission in last 30 days         Advance Care Planning: Advance Care Planning Reviewed:  (no HCD)          Communication Assessment  Patient's communication style: spoken language (English or Bilingual)    Hearing Difficulty or Deaf: no   Wear Glasses or Blind: no    Cognitive  Cognitive/Neuro/Behavioral: WDL                      Living Environment:   People in home: other relative(s), sibling(s), spouse     Current living Arrangements: house      Able to return to prior arrangements: yes       Family/Social Support:  Care provided by: self  Provides care for: no one  Marital Status:   Support system: , Parent(s), Sibling(s)          Description of Support System: Supportive, Involved         Current Resources:   Patient receiving home care services: No        Community Resources: None  Equipment currently used at home: none  Supplies currently used at home: Diabetic Supplies    Employment/Financial:  Employment Status: employed full-time        Financial Concerns:             Does the patient's insurance plan have a 3 day qualifying hospital stay waiver?  Would need verification    Lifestyle & Psychosocial Needs:  Social Drivers of Health     Food Insecurity: Low Risk  (5/26/2025)    Food Insecurity     Within the past 12 months, did you worry that your food would run out before you got money to buy more?: No     Within the past 12 months, did the food you bought just not last and you didn t have money to get more?: No   Depression: Not at risk (3/21/2024)    PHQ-2     PHQ-2 Score: 0   Housing Stability: Low Risk  (5/26/2025)    Housing Stability     Do you have housing? : Yes     Are you worried about losing your housing?: No   Tobacco Use: Medium Risk  (5/25/2025)    Patient History     Smoking Tobacco Use: Never     Smokeless Tobacco Use: Never     Passive Exposure: Yes   Financial Resource Strain: Low Risk  (5/26/2025)    Financial Resource Strain     Within the past 12 months, have you or your family members you live with been unable to get utilities (heat, electricity) when it was really needed?: No   Alcohol Use: Not on file   Transportation Needs: Low Risk  (5/26/2025)    Transportation Needs     Within the past 12 months, has lack of transportation kept you from medical appointments, getting your medicines, non-medical meetings or appointments, work, or from getting things that you need?: No   Physical Activity: Inactive (2/6/2024)    Exercise Vital Sign     Days of Exercise per Week: 0 days     Minutes of Exercise per Session: 0 min   Interpersonal Safety: Low Risk  (5/26/2025)    Interpersonal Safety     Do you feel physically and emotionally safe where you currently live?: Yes     Within the past 12 months, have you been hit, slapped, kicked or otherwise physically hurt by someone?: No     Within the past 12 months, have you been humiliated or emotionally abused in other ways by your partner or ex-partner?: No   Stress: No Stress Concern Present (2/6/2024)    [employer] - Occupational Stress Questionnaire     Feeling of Stress : Not at all   Social Connections: Unknown (2/6/2024)    Social Connection and Isolation Panel [NHANES]     Frequency of Communication with Friends and Family: Not on file     Frequency of Social Gatherings with Friends and Family: More than three times a week     Attends Confucianist Services: Not on file     Active Member of Clubs or Organizations: Not on file     Attends Club or Organization Meetings: Not on file     Marital Status: Not on file   Health Literacy: Not on file       Functional Status:  Prior to admission patient needed assistance:   Dependent ADLs:: Independent  Dependent IADLs::  Independent       Mental Health Status:          Chemical Dependency Status:                Values/Beliefs:  Spiritual, Cultural Beliefs, Cheondoism Practices, Values that affect care:                 Discussed  Partnership in Safe Discharge Planning  document with patient/family: No    Additional Information:  Assessment completed with patient. Patient reports she lives in her house with spouse and extended family. She is independent with ADLs/IADLs, ambulates with out devices and has no services in community.  She states she works nearly full time. She has insurance now.  She uses [employer] for blood glucose monitoring.  Advised her on importance of blood glucose control and following up closely with her primary clinic.  She states agreement.    Notified Hospitalist of need for rx for glucometer supplies.     Next Steps:   Follow progression and recommendations.  Anticipating return home with no CM needs.    Amberly Castillo RN

## 2025-05-27 NOTE — PROGRESS NOTES
General Surgery Progress Note:    Hospital Day # 2    ASSESSMENT:     1. Diabetic ketoacidosis without coma associated with other specified diabetes mellitus (H)    2. Furuncle of occipital region of scalp        Tyson Skelton is a 30 year old PMH DM type II A1C >20 on 5/25 here with DKA requiring ICU and furuncle likely infected cyst of the occipital region of the scalp s/p ED I&D 5/25, repeat I&D 5/27 with packing in place. Original cultures staph aureus. Remains with some purulence on packing today, continue packing and flushing    ADDENDUM 1600: okay to cover with mepilex as suggested by WOC instead of gauze and tape; be careful if changing as this will pull out any packing    PLAN:   -continue with packing would prefer twice daily as able with 1/4 packing strip, flushing with 10cc vashe solution  -Placed new wound care orders  -Appreciate wound care recommendations if they see the patient  -Patient may eventually need follow-up in clinic for management / removal of cyst especially if continuing to have fluid within the cavity / infections    SUBJECTIVE:   Tyson Skelton was seen on rounds. She is doing well today, does still have remaining soreness in her neck mostly below the incision site. Is able to tolerate food and no other symptoms. Has had some drainage on the bandages.     VITALS RANGE:  Temp:  [97.9  F (36.6  C)-99  F (37.2  C)] 98.6  F (37  C)  Pulse:  [75-92] 92  Resp:  [16-20] 20  BP: (110-149)/(68-97) 130/82  SpO2:  [95 %-99 %] 99 %    Physical Exam:  General: patient seen resting in bed in no acute distress  Resp: no increased work of breathing, breathing comfortably on room air  Posterior scalp wound: cleaned the area which had sticky purulent fluid over the hair matting. Removed the packing which had seropurulent fluid on it. The base of the wound had some weeping watery seropurulent fluid. Probed the wound with Qtip and did not find further pockets of purulence, some fibrinous tissue.   Flushed with  vashe x 3 until clear. Replaced packing strip and covered with gauze  Abdomen: generally nondistended  Extremities: No edema, cyanosis, or obvious deformities visualized on exam    No results displayed because visit has over 200 results.           ASHLEIGH Puentes [facility] General Surgery  [facility]  [address]  [place] [address]  [facility] [phone]

## 2025-05-28 VITALS
SYSTOLIC BLOOD PRESSURE: 129 MMHG | HEIGHT: 59 IN | RESPIRATION RATE: 18 BRPM | OXYGEN SATURATION: 97 % | TEMPERATURE: 98.3 F | WEIGHT: 147.05 LBS | BODY MASS INDEX: 29.64 KG/M2 | DIASTOLIC BLOOD PRESSURE: 87 MMHG | HEART RATE: 87 BPM

## 2025-05-28 LAB
ANION GAP SERPL CALCULATED.3IONS-SCNC: 11 MMOL/L (ref 7–15)
BUN SERPL-MCNC: 6.2 MG/DL (ref 6–20)
CALCIUM SERPL-MCNC: 8.7 MG/DL (ref 8.8–10.4)
CHLORIDE SERPL-SCNC: 107 MMOL/L (ref 98–107)
CREAT SERPL-MCNC: 0.82 MG/DL (ref 0.51–0.95)
EGFRCR SERPLBLD CKD-EPI 2021: >90 ML/MIN/1.73M2
GLUCOSE BLDC GLUCOMTR-MCNC: 100 MG/DL (ref 70–99)
GLUCOSE BLDC GLUCOMTR-MCNC: 125 MG/DL (ref 70–99)
GLUCOSE BLDC GLUCOMTR-MCNC: 193 MG/DL (ref 70–99)
GLUCOSE BLDC GLUCOMTR-MCNC: 82 MG/DL (ref 70–99)
GLUCOSE SERPL-MCNC: 98 MG/DL (ref 70–99)
HCO3 SERPL-SCNC: 23 MMOL/L (ref 22–29)
MAGNESIUM SERPL-MCNC: 1.8 MG/DL (ref 1.7–2.3)
PHOSPHATE SERPL-MCNC: 4.4 MG/DL (ref 2.5–4.5)
POTASSIUM SERPL-SCNC: 3.6 MMOL/L (ref 3.4–5.3)
SODIUM SERPL-SCNC: 141 MMOL/L (ref 135–145)

## 2025-05-28 PROCEDURE — 99239 HOSP IP/OBS DSCHRG MGMT >30: CPT | Performed by: HOSPITALIST

## 2025-05-28 PROCEDURE — 82947 ASSAY GLUCOSE BLOOD QUANT: CPT | Performed by: HOSPITALIST

## 2025-05-28 PROCEDURE — 250N000013 HC RX MED GY IP 250 OP 250 PS 637: Performed by: INTERNAL MEDICINE

## 2025-05-28 PROCEDURE — 250N000011 HC RX IP 250 OP 636: Performed by: HOSPITALIST

## 2025-05-28 PROCEDURE — 258N000003 HC RX IP 258 OP 636: Performed by: HOSPITALIST

## 2025-05-28 PROCEDURE — 84100 ASSAY OF PHOSPHORUS: CPT | Performed by: HOSPITALIST

## 2025-05-28 PROCEDURE — 250N000013 HC RX MED GY IP 250 OP 250 PS 637: Performed by: HOSPITALIST

## 2025-05-28 PROCEDURE — 83735 ASSAY OF MAGNESIUM: CPT | Performed by: HOSPITALIST

## 2025-05-28 PROCEDURE — 36415 COLL VENOUS BLD VENIPUNCTURE: CPT | Performed by: HOSPITALIST

## 2025-05-28 RX ORDER — METFORMIN HYDROCHLORIDE 750 MG/1
750 TABLET, EXTENDED RELEASE ORAL
Qty: 30 TABLET | Refills: 3 | Status: SHIPPED | OUTPATIENT
Start: 2025-05-28

## 2025-05-28 RX ORDER — INSULIN GLARGINE 100 [IU]/ML
60 INJECTION, SOLUTION SUBCUTANEOUS EVERY MORNING
Qty: 30 ML | Refills: 3 | Status: SHIPPED | OUTPATIENT
Start: 2025-05-28

## 2025-05-28 RX ORDER — INSULIN ASPART 100 [IU]/ML
10 INJECTION, SOLUTION INTRAVENOUS; SUBCUTANEOUS
Qty: 9 ML | Refills: 3 | Status: SHIPPED | OUTPATIENT
Start: 2025-05-28

## 2025-05-28 RX ORDER — ATORVASTATIN CALCIUM 40 MG/1
40 TABLET, FILM COATED ORAL DAILY
Qty: 90 TABLET | Refills: 0 | Status: SHIPPED | OUTPATIENT
Start: 2025-05-28 | End: 2025-06-27

## 2025-05-28 RX ORDER — CEPHALEXIN 500 MG/1
500 CAPSULE ORAL 4 TIMES DAILY
Qty: 8 CAPSULE | Refills: 0 | Status: SHIPPED | OUTPATIENT
Start: 2025-05-29 | End: 2025-05-31

## 2025-05-28 RX ORDER — BLOOD PRESSURE TEST KIT
KIT MISCELLANEOUS
Qty: 100 EACH | Refills: 1 | Status: SHIPPED | OUTPATIENT
Start: 2025-05-28

## 2025-05-28 RX ORDER — GLUCOSAMINE HCL/CHONDROITIN SU 500-400 MG
CAPSULE ORAL
Qty: 500 EACH | Refills: 0 | Status: SHIPPED | OUTPATIENT
Start: 2025-05-28

## 2025-05-28 RX ADMIN — INSULIN GLARGINE 80 UNITS: 100 INJECTION, SOLUTION SUBCUTANEOUS at 08:44

## 2025-05-28 RX ADMIN — LEVOTHYROXINE SODIUM 112 MCG: 112 TABLET ORAL at 06:34

## 2025-05-28 RX ADMIN — METFORMIN ER 500 MG 500 MG: 500 TABLET ORAL at 17:38

## 2025-05-28 RX ADMIN — ACETAMINOPHEN 650 MG: 325 TABLET ORAL at 08:41

## 2025-05-28 RX ADMIN — SODIUM CHLORIDE 750 MG: 0.9 INJECTION, SOLUTION INTRAVENOUS at 09:14

## 2025-05-28 ASSESSMENT — ACTIVITIES OF DAILY LIVING (ADL)
ADLS_ACUITY_SCORE: 30

## 2025-05-28 NOTE — PROGRESS NOTES
Dual Skin Assessment Note:    Patient transferred from ICU from to .     Comprehensive skin inspection completed by myself and Harmony Lewis RN.     Abnormal skin assessment findings: small, scattered scars noted on back, arms, and legs, patient states these are from insect bites from when she moved to [place]; known wound on posterior scalp, surgery following     LDA Initiated for skin breakdown/non-blanchable redness: Not applicable    Provider notified: Not applicable    If yes, WOC Consult order obtained: Not applicable    Diamante Holm, TEODORO 8:31 PM

## 2025-05-28 NOTE — PLAN OF CARE
Goal Outcome Evaluation:      Plan of Care Reviewed With: patient    Overall Patient Progress: improvingOverall Patient Progress: improving    Outcome Evaluation: Patient stable this shift, independent in room, wound dressing and packing changed      Problem: Pain Acute  Goal: Optimal Pain Control and Function  Outcome: Progressing     Problem: Glycemic Control Impaired  Goal: Blood Glucose Level Within Targeted Range  Outcome: Progressing     Patient stable this evening, alert and oriented, independent in room and able to make needs known. Denying pain. Posterior scalp wound packing and dressing changed. Lost IV access, Vanco dose delayed at this time, writer unable to successfully place new PIV, awaiting assistance from additional RN for IV placement. HS BG 69, up to 120 after patient consumed snack.     Diamante Holm, RN 5/27/2025   [id number]

## 2025-05-28 NOTE — PLAN OF CARE
Goal Outcome Evaluation:    Problem: Comorbidity Management  Goal: Blood Glucose Levels Within Targeted Range  Intervention: Monitor and Manage Glycemia  Recent Flowsheet Documentation  Taken 5/28/2025 5893 by Dorothea Olvera RN  Medication Review/Management: medications reviewed  Blood [id number]=473 and 82 this shift. Scheduled lantus and carb coverage insulin given.    Mg, K, Phos protocols; no replacement needed today.  Seen by surgery, dressing to scalp done by surgery; WOCN paged to resume following patient while here in hospital.    Discharge planning orders are in; meds will be sent from our discharge pharmacy.

## 2025-05-28 NOTE — PROGRESS NOTES
"General Surgery Progress Note:    Hospital Day # 3    ASSESSMENT:   1. Diabetic ketoacidosis without coma associated with other specified diabetes mellitus (H)    2. Furuncle of occipital region of scalp        Tyson Skelton is a 30 year old female admitted with DKA and surgery consulted for infected cyst on the back of her head. This has ruptured and is draining effectively. Continue good wound care and can follow up with surgery as outpatient to evaluate. No further debridement needed and we can have WOC follow for wound management.     PLAN:   Appreciate WOC assuming wound care  Diabetes control  Follow up in a couple weeks with surgery to assess need for any surgical removal of cyst capsule; patient can call to have this arranged  Surgery will sign off    SUBJECTIVE:   Tyson Skelton is feeling better. Minimal pain in head. No fever or chills    Patient Vitals for the past 24 hrs:   BP Temp Temp src Pulse Resp SpO2 Height Weight   05/28/25 0725 (!) 148/101 98.8  F (37.1  C) Oral 86 16 98 % -- --   05/27/25 2357 (!) 132/92 98.3  F (36.8  C) Oral 89 20 98 % -- --   05/27/25 1724 (!) 137/98 98.4  F (36.9  C) Oral 90 20 99 % 1.499 m (4' 11\") 66.7 kg (147 lb 0.8 oz)   05/27/25 1615 124/83 98.5  F (36.9  C) Oral 90 20 97 % -- --   05/27/25 1200 130/82 98.6  F (37  C) Oral 92 20 99 % -- --       Physical Exam:  General: NAD, pleasant  CV:RRR  LUNGS:CTA bilaterally  EXT:no CCE  Head: wound with no signs of necrosis; wound remains purulent so repacked with dry 2x2 and head wrapped with coban    No results displayed because visit has over 200 results.           SYLVESTER Higgins CNP   "

## 2025-05-28 NOTE — PLAN OF CARE
Goal Outcome Evaluation:         Problem: Glycemic Control Impaired  Goal: Blood Glucose Level Within Targeted Range  Outcome: Progressing  Intervention: Optimize Glycemic Control  Recent Flowsheet Documentation  Taken 5/28/2025 0100 by Terri Enriquez RN  Hyperglycemia Management: blood glucose monitored     Problem: Pain Acute  Goal: Optimal Pain Control and Function  Outcome: Progressing  Intervention: Prevent or Manage Pain  Recent Flowsheet Documentation  Taken 5/28/2025 0100 by Terri Enriquez RN  Medication Review/Management: medications reviewed         Pt AxO x4, VSS on RA, denies pain, IND in room, new IV in, slept through night, no significant events

## 2025-05-28 NOTE — DISCHARGE SUMMARY
"[facility] MEDICINE  DISCHARGE SUMMARY     Primary Care Physician: Parish Daniels  Admission Date: 5/25/2025   Discharge Provider: Minda Gibbs MD Discharge Date: 5/28/2025   Diet:   Active Diet and Nourishment Order   Procedures    Moderate Consistent Carb (60 g CHO per Meal) Diet    Diet       Code Status: Full Code   Activity: DCACTIVITY: Activity as tolerated        Condition at Discharge: Stable     REASON FOR PRESENTATION(See Admission Note for Details)     Mass on head      PRINCIPAL & ACTIVE DISCHARGE DIAGNOSES     Scalp Furuncle with cellulitis  Very poorly controlled DM2      PENDING LABS     Unresulted Labs Ordered in the Past 30 Days of this Admission       Date and Time Order Name Status Description    5/25/2025  1:35 PM Wound Aerobic Bacterial Culture Routine With Gram Stain Preliminary     5/25/2025  1:25 PM Blood Culture Peripheral blood (BC) Arm, Left Preliminary     5/25/2025  1:25 PM Blood Culture Peripheral blood (BC) Arm, Right Preliminary               PROCEDURES ( this hospitalization only)          RECOMMENDATIONS TO OUTPATIENT PROVIDER FOR F/U VISIT     Follow-up Appointments       Hospital Follow-up with Existing Primary Care Provider (PCP)          Schedule Primary Care visit within: 14 Days                 DISPOSITION     Home    SUMMARY OF HOSPITAL COURSE:      30F with IDDM, CKD2, med non-compliance due to insurance issues who presented due to \"mass on head\" and found to have a furuncle and DKA.  Started on IV insulin and underwent bedside I&D in ED.  Gap closed.  Residual fluid on US and underwent further bedside  I&D with surgery.  Culture grew MSSA and discharged with DM supplies and keflex to complete 5 days abx course post I&D,     Assessment/Plan     #DKA  #Very poorly controlled IDDM without insulin for 6 months due to insurance issues - A1c >20  -prior regimen before stopping insulin: lantus 104, novolog 30 with meals, metformin  -discharge:  " lantus 60, novolog 10 with meals, metformin 750  -DM2 supplies and DM educator referral.     #sepsis due to scalp abscess & cellulitis - growing MSSA  -keflex x 5 days total abx.  D/w ID and S. Epi does not need to be targeted.       #Hypothyroid - has not been on medicatinos for 6 months and tSH normal.  Follow-up as outpatient.       #Hyperlipidemia - resume statin     # CKD stage II       Discharge Medications with Med changes:     Current Discharge Medication List        START taking these medications    Details   blood glucose (NO BRAND SPECIFIED) lancets standard SoftClix Lancets  To use to test glucose level in the blood  Use to test blood sugar  3  times daily as directed.   To accompany glucose monitor brands per insurance coverage.  Qty: 100 each, Refills: 1    Associated Diagnoses: Type 2 diabetes mellitus with hyperglycemia, unspecified whether long term insulin use (H)      !! blood glucose (NO BRAND SPECIFIED) test strip Accucheck Guide  To use to test glucose level in the blood  Use to test blood sugar 3 times daily as directed.   To accompany glucose monitor brands per insurance coverage.  Qty: 100 strip, Refills: 1    Associated Diagnoses: Type 2 diabetes mellitus with hyperglycemia, unspecified whether long term insulin use (H)      blood glucose monitoring (NO BRAND SPECIFIED) meter device kit Use as directed. Per insurance coverage  Qty: 1 kit, Refills: 0    Associated Diagnoses: Type 2 diabetes mellitus with hyperglycemia, unspecified whether long term insulin use (H)      cephALEXin (KEFLEX) 500 MG capsule Take 1 capsule (500 mg) by mouth 4 times daily for 2 days.  Qty: 8 capsule, Refills: 0    Associated Diagnoses: Type 2 diabetes mellitus with hyperglycemia, unspecified whether long term insulin use (H)      glucose (BD GLUCOSE) 4 g chewable tablet Take 4 tablets by mouth every 15 minutes as needed for low blood sugar.  Qty: 50 tablet, Refills: 1    Associated Diagnoses: Type 2 diabetes  mellitus with hyperglycemia, unspecified whether long term insulin use (H)       !! - Potential duplicate medications found. Please discuss with provider.        CONTINUE these medications which have CHANGED    Details   !! alcohol swab prep pads Use to swab area of injection/teresa as directed.  Qty: 500 each, Refills: 0    Associated Diagnoses: Type 2 diabetes mellitus with hyperglycemia, unspecified whether long term insulin use (H)      !! Alcohol Swabs PADS Use to swab the area of the injection or teresa as directed   Per insurance coverage  Qty: 100 each, Refills: 1    Associated Diagnoses: Type 2 diabetes mellitus with hyperglycemia, unspecified whether long term insulin use (H)      atorvastatin (LIPITOR) 40 MG tablet Take 1 tablet (40 mg) by mouth daily.  Qty: 90 tablet, Refills: 0    Associated Diagnoses: Encounter for medication refill      Continuous Glucose Sensor (FREESTYLE DUC 2 SENSOR) MISC Change every 14 days.  Qty: 2 each, Refills: 3    Associated Diagnoses: Type 2 diabetes mellitus with hyperglycemia, with long-term current use of insulin (H)      insulin aspart (NOVOLOG FLEXPEN) 100 UNIT/ML pen Inject 10 Units subcutaneously 3 times daily (with meals).  Qty: 9 mL, Refills: 3    Associated Diagnoses: Type 2 diabetes mellitus with hyperglycemia, with long-term current use of insulin (H)      insulin glargine (LANTUS SOLOSTAR) 100 UNIT/ML pen Inject 60 Units subcutaneously every morning.  Qty: 30 mL, Refills: 3    Comments: If Lantus is not covered by insurance, may substitute Basaglar or Semglee or other insulin glargine product per insurance preference at same dose and frequency.    Associated Diagnoses: Type 2 diabetes mellitus with hyperglycemia, with long-term current use of insulin (H)      metFORMIN (GLUCOPHAGE-XR) 750 MG 24 hr tablet Take 1 tablet (750 mg) by mouth daily (with dinner).  Qty: 30 tablet, Refills: 3    Associated Diagnoses: Type 2 diabetes mellitus with hyperglycemia, with  long-term current use of insulin (H)       !! - Potential duplicate medications found. Please discuss with provider.        CONTINUE these medications which have NOT CHANGED    Details   blood glucose (ACCU-CHEK SOFTCLIX) lancing device Lancing device to be used with lancets.  Qty: 1 each, Refills: 0    Associated Diagnoses: Type 2 diabetes mellitus with hyperglycemia, unspecified whether long term insulin use (H)      !! blood glucose (NO BRAND SPECIFIED) test strip Use to test blood sugar 5 times daily or as directed.  Qty: 500 strip, Refills: 0    Comments: Accuchek Guide strip.  Associated Diagnoses: Type 2 diabetes mellitus with hyperglycemia, unspecified whether long term insulin use (H)      !! blood glucose monitoring (SOFTCLIX) lancets Use to test blood sugar 4 times daily.  Qty: 500 each, Refills: 0    Associated Diagnoses: Type 2 diabetes mellitus with hyperglycemia, unspecified whether long term insulin use (H)      insulin pen needle (32G X 4 MM) 32G X 4 MM miscellaneous Use 5 pen needles daily or as directed.  Qty: 500 each, Refills: 0    Associated Diagnoses: Type 2 diabetes mellitus with hyperglycemia, unspecified whether long term insulin use (H)      !! Microlet Lancets MISC 100 each 2 times daily Use to test blood sugar twice daily.  Qty: 100 each, Refills: 4    Associated Diagnoses: Type 2 diabetes mellitus with hyperglycemia, unspecified whether long term insulin use (H)       !! - Potential duplicate medications found. Please discuss with provider.        STOP taking these medications       levothyroxine (SYNTHROID/LEVOTHROID) 112 MCG tablet Comments:   Reason for Stopping:                     Rationale for medication changes:              Consults     PHARMACY TO DOSE VANCO  PHARMACY TO DOSE VANCO  SURGERY GENERAL IP CONSULT  WOUND OSTOMY CONTINENCE NURSE  IP CONSULT  CARE MANAGEMENT / SOCIAL WORK IP CONSULT    Immunizations given this encounter     Most Recent Immunizations   Administered  "Date(s) Administered    COVID-19 Bivalent 12+ (Pfizer) 08/26/2023    COVID-19 Monovalent 18+ (Moderna) 01/02/2023    HPV9 (Gardasil) 04/29/2021    Hepatitis B, Adult (Energix-B/Recombivax HB) 02/06/2024    Influenza (IIV3) PF 09/23/2016    Influenza Vaccine >6 months,quad, PF 11/03/2023    Influenza Vaccine, 6+MO IM (QUADRIVALENT W/PRESERVATIVES) 11/21/2019    Pneumococcal 23 valent 08/29/2012    TDAP (Adacel,Boostrix) 08/29/2012           Anticoagulation Information          Clinically Significant Risk Factors - auto-populated list     Clinically Significant Risk Factors               # Hypoalbuminemia: Lowest albumin = 2.8 g/dL at 5/25/2025 12:35 PM, will monitor as appropriate     # Hypertension: Noted on problem list           # DMII: A1C = >20.1 % (Ref range: <5.7 %) within past 6 months, PRESENT ON ADMISSION  # Overweight: Estimated body mass index is 29.7 kg/m  as calculated from the following:    Height as of this encounter: 1.499 m (4' 11\").    Weight as of this encounter: 66.7 kg (147 lb 0.8 oz)., PRESENT ON ADMISSION                SIGNIFICANT IMAGING FINDINGS     Results for orders placed or performed during the hospital encounter of 05/25/25   XR Chest Port 1 View    Impression    IMPRESSION: Negative chest.   US Head Neck Soft Tissue    Impression    IMPRESSION:  4 x 3 x 5 mm hypoechoic structure just deep to the dermis in the area of concern. This is nonspecific but may represent an epidermoid inclusion cyst. Edema within the adjacent soft tissues may indicate inflammation or infection. No other fluid   collections or abscesses.           Discharge Orders        Adult Diabetes Education  Referral      Reason for your hospital stay    You were admitted with severe hyperglycemia and scalp abscess     Activity    Your activity upon discharge: activity as tolerated     Diet    Follow this diet upon discharge: Current Diet:Orders Placed This Encounter      Moderate Consistent Carb (60 g CHO per " Meal) Diet     Hospital Follow-up with Existing Primary Care Provider (PCP)            Examination   Physical Exam   Temp:  [98.3  F (36.8  C)-98.8  F (37.1  C)] 98.8  F (37.1  C)  Pulse:  [86-95] 95  Resp:  [16-20] 16  BP: (124-148)/() 143/108  SpO2:  [97 %-99 %] 98 %  Wt Readings from Last 1 Encounters:   05/27/25 66.7 kg (147 lb 0.8 oz)       Feeling ok.  Feels ready for discharge.      Please see EMR for more detailed significant labs, imaging, consultant notes etc.    IMinda MD, personally saw the patient today and spent greater than 30 minutes discharging this patient.    Minda Gibbs MD  [facility]    CC:Parish Daniels

## 2025-05-29 LAB
BACTERIA SPEC CULT: NORMAL
BACTERIA SPEC CULT: NORMAL
BACTERIA WND CULT: ABNORMAL
BACTERIA WND CULT: ABNORMAL
GRAM STAIN RESULT: ABNORMAL
GRAM STAIN RESULT: ABNORMAL

## 2025-05-29 NOTE — CARE PLAN
AVS reviewed with pt and agreed with the discharge plan of care.Home meds given and wheeled out on discharge.

## 2025-05-30 ENCOUNTER — RESULTS FOLLOW-UP (OUTPATIENT)
Dept: FAMILY MEDICINE | Facility: CLINIC | Age: 30
End: 2025-05-30

## 2025-05-30 LAB
BACTERIA SPEC CULT: NO GROWTH
BACTERIA SPEC CULT: NO GROWTH

## 2025-05-31 LAB
BACTERIA WND CULT: ABNORMAL
BACTERIA WND CULT: ABNORMAL
GRAM STAIN RESULT: ABNORMAL
GRAM STAIN RESULT: ABNORMAL

## 2025-06-02 ENCOUNTER — PATIENT OUTREACH (OUTPATIENT)
Dept: CARE COORDINATION | Facility: CLINIC | Age: 30
End: 2025-06-02
Payer: COMMERCIAL

## 2025-06-04 ENCOUNTER — OFFICE VISIT (OUTPATIENT)
Dept: SURGERY | Facility: CLINIC | Age: 30
End: 2025-06-04
Payer: COMMERCIAL

## 2025-06-04 ENCOUNTER — PATIENT OUTREACH (OUTPATIENT)
Dept: CARE COORDINATION | Facility: CLINIC | Age: 30
End: 2025-06-04

## 2025-06-04 DIAGNOSIS — Z48.89 POSTOPERATIVE VISIT: Primary | ICD-10-CM

## 2025-06-04 PROCEDURE — 99024 POSTOP FOLLOW-UP VISIT: CPT

## 2025-06-04 NOTE — PROGRESS NOTES
"HPI:   Patient is Reanna speaking and a phone  was utilized.    Patient is here for follow-up of an infected scalp cyst s/p I&D 5/25 in ED and repeat bedside I&D 5/26. Has been packing the wound daily with 1/4 inch Nu Gauze and cleaning the wound with Vashe during dressing changes. Reports showering daily as-well. States drainage on packing strips is usually thin bloody or yellow. Denies significant pain regarding her incision but does take Tylenol as needed. Completed a course of antibiotics as prescribed (5 days of Keflex). Denies fever, chills.     There were no vitals taken for this visit.    Exam:  General: Appears well  Posterior scalp: Wound measures ~2 cm in length, ~1 cm in depth, and tunnels ~1 cm at 11 o'clock. Seropurulent drainage noted on packing strip. Gently probed with Q-tip and no further tunneling or pockets of purulence noted. Small amount of fibrinous tissue removed from wound base which peeled off easily with forceps. Surrounding induration but no significant erythema or warmth. Re-packed with 1/4\" NuGauze and covered with gauze.     Assessment/Plan: Continue packing posterior scalp wound with 1/4\" NuGauze daily. Discussed importance of showering daily and cleansing the wound with Vashe during dressing changes. Follow-up next week for a wound check. Once wound is fully healed, will need follow-up with Dr. Cruz to discuss formal cyst removal.    Kayley Puri PA-C  [facility] General Surgery  [facility] [address] [place] [address] [phone]  "

## 2025-06-04 NOTE — LETTER
June 4, 2025      RE: Tyson Skelton  [address] [place] [address]       To whom it may concern:    Tyson Skelton was seen in our clinic today. She  may return to work  on or about 06/04/2025.    Sincerely,      [facility]  Virtual  [place]

## 2025-06-04 NOTE — LETTER
June 4, 2025      Tyson Skelton  [address] [place] [address]        To Whom It May Concern:    Tyson Skelton was seen on 6/4/24 in clinic. Please excuse her from 5/30/25 to 6/1/25 due to illness.      Sincerely,    Kayley Puri PA-C      [facility]    Electronically signed

## 2025-06-10 NOTE — PROGRESS NOTES
HPI:   Patient is Reanna speaking and a phone  was utilized.     Patient is here for follow-up of an infected scalp cyst s/p I&D 5/25 in ED and repeat bedside I&D 5/26. Was last seen in clinic on 6/4/25 with plans to continue wound packing daily. She has been packing the wound daily with 1/4 inch Nu Gauze and cleaning the wound with Vashe during dressing changes. States her in-laws help with dressing changes so she does not know what the drainage has been looking like but it is not malodorous. Denies significant pain, fever, or chills.    There were no vitals taken for this visit.    Exam:  General: Appears well  Posterior scalp: Wound measures ~1.5 cm in length, ~1 cm in depth, and tunnels ~1 cm at 11 o'clock. Seropurulent drainage noted on packing strip. Gently probed with Q-tip and no pockets of purulence noted. Granulation tissue seen along wound bed. Surrounding induration but no significant erythema or warmth. Re-packed with corner of 2 x 2 gauze.    Assessment/Plan: Interval improvement of posterior scalp wound with less induration and no significant surrounding erythema. Plan to pack wound with corner of 2 x 2 gauze and covering with more gauze and tape or a small mepilex. Provided 1 week worth of wound supplies. Follow-up next week for a wound check. Once wound is fully healed, will need follow-up with Dr. Cruz to discuss formal cyst removal.     Kayley Puri PA-C  [facility] General Surgery  [address]  [place] [address]  Clinic [phone]

## 2025-06-11 ENCOUNTER — OFFICE VISIT (OUTPATIENT)
Dept: SURGERY | Facility: CLINIC | Age: 30
End: 2025-06-11
Payer: COMMERCIAL

## 2025-06-11 DIAGNOSIS — Z48.89 POSTOPERATIVE VISIT: Primary | ICD-10-CM

## 2025-06-11 PROCEDURE — 99212 OFFICE O/P EST SF 10 MIN: CPT

## 2025-06-13 PROBLEM — Z79.4 TYPE 2 DIABETES MELLITUS TREATED WITH INSULIN (H): Status: ACTIVE | Noted: 2025-06-13

## 2025-06-13 PROBLEM — R60.9 DEPENDENT EDEMA: Status: RESOLVED | Noted: 2022-10-14 | Resolved: 2025-06-13

## 2025-06-13 PROBLEM — E11.9 TYPE 2 DIABETES MELLITUS TREATED WITH INSULIN (H): Status: ACTIVE | Noted: 2025-06-13

## 2025-06-13 PROBLEM — O14.90 PREECLAMPSIA: Status: RESOLVED | Noted: 2018-10-08 | Resolved: 2025-06-13

## 2025-06-13 PROBLEM — E87.1 HYPONATREMIA: Status: RESOLVED | Noted: 2022-10-10 | Resolved: 2025-06-13

## 2025-06-13 PROBLEM — E11.65 TYPE 2 DIABETES MELLITUS WITH HYPERGLYCEMIA, UNSPECIFIED WHETHER LONG TERM INSULIN USE (H): Status: RESOLVED | Noted: 2019-09-13 | Resolved: 2025-06-13

## 2025-06-13 PROBLEM — E13.10 DIABETIC KETOACIDOSIS WITHOUT COMA ASSOCIATED WITH OTHER SPECIFIED DIABETES MELLITUS (H): Status: RESOLVED | Noted: 2025-05-25 | Resolved: 2025-06-13

## 2025-06-13 PROBLEM — O09.90 SUPERVISION OF HIGH-RISK PREGNANCY: Status: RESOLVED | Noted: 2018-05-23 | Resolved: 2025-06-13

## 2025-06-16 ENCOUNTER — RESULTS FOLLOW-UP (OUTPATIENT)
Dept: FAMILY MEDICINE | Facility: CLINIC | Age: 30
End: 2025-06-16
Payer: COMMERCIAL

## 2025-06-16 DIAGNOSIS — R79.89 ABNORMAL TSH: Primary | ICD-10-CM

## 2025-06-16 NOTE — TELEPHONE ENCOUNTER
Called patient with assistance of an  Mi ID: [id number] to relay provider test result below. Future lab only appt scheduled with date, time and location provided to patient.    Triston Roberson RN   [facility]      Result Note  Thyroid test is abnormal but I would like to repeat in about 4 weeks before starting medication.  Future lab order placed.  Please call the patient and advised to lab only appointment in 4 weeks.        Dr. Parish Daniels  6/16/2025 3:48 PM

## 2025-06-16 NOTE — PROGRESS NOTES
"HPI: Patient is here for follow-up of an infected scalp cyst s/p I&D 5/25 in ED and repeat bedside I&D 5/26. Was last seen in clinic on 6/11/2025 with plans to continue wound packing daily. Previously packing the wound daily with 1/4 inch Nu Gauze and cleaning the wound with Vashe during dressing changes, recently transitioned to packing with 2 x 2 gauze, dressing with gauze and mepilex.     Has been continuing dressing changes with her father in law's assistance daily. She denies reported concerns from dressing changes or increased drainage. She denies fever, chills, constipation, diarrhea nausea or vomiting. She is eating and drinking well.      Patient is Reanna speaking and a phone  was utilized.     Exam:  General: Appears well  Posterior scalp: Wound measures 1.5 cm x 1 cm with 1 cm tracking superiorly. Packing removed with seropurulent drainage noted on strip but wound based with red granulated tissue without appreciable purulent drainage.There is surrounding erythema/induration. Packed with saline moistened 1/4\" NuGauze, dressed with gauze and tape.       Assessment/Plan: Stable posterior scalp wound s/p abscess I&D without e/o ongoing infection. Recommend to continue daily wound care with saline moistened 1/4 \" NuGauze, dress with dry gauze and tape. Wound supplies provided. She will follow-up in 2 weeks for a wound check.  Once wound is fully healed, will need follow-up with Dr. Cruz to discuss formal cyst removal.     Debbie Marin PA-C  [facility]  Surgery [address] [address]?  Office: [phone]    "

## 2025-06-18 ENCOUNTER — ALLIED HEALTH/NURSE VISIT (OUTPATIENT)
Dept: EDUCATION SERVICES | Facility: CLINIC | Age: 30
End: 2025-06-18
Attending: HOSPITALIST
Payer: COMMERCIAL

## 2025-06-18 ENCOUNTER — OFFICE VISIT (OUTPATIENT)
Dept: SURGERY | Facility: CLINIC | Age: 30
End: 2025-06-18
Payer: COMMERCIAL

## 2025-06-18 DIAGNOSIS — E11.65 TYPE 2 DIABETES MELLITUS WITH HYPERGLYCEMIA, UNSPECIFIED WHETHER LONG TERM INSULIN USE (H): Primary | ICD-10-CM

## 2025-06-18 DIAGNOSIS — Z51.89 WOUND CHECK, ABSCESS: Primary | ICD-10-CM

## 2025-06-18 PROCEDURE — G0108 DIAB MANAGE TRN  PER INDIV: HCPCS | Performed by: DIETITIAN, REGISTERED

## 2025-06-18 NOTE — LETTER
"    2025         RE: Tyson Skelton  [address] [place] [address]        Dear Colleague,    Thank you for referring your patient, Tyson Skelton, to the [facility]. Please see a copy of my visit note below.      Diabetes Self-Management Education & Support    Presents for:      Type of Service: In Person Visit    ++ our visit went way past the scheduled time    Assessment:  Initial visit for a previous dx  Vastly elevated A1c - pt had been off meds for > 6 months due to coverage issues  Recent ER visit - DKA    Endorses blurry vision - had her eye exam on 25    Endorses symptomatic hypoglycemia (once in the past one week) - treated with juice  We reviewed hypoglycemia management. Encouraged scheduled meals with consistent CHO  **Pt carries glucose tablets with her at all times    Per the most recent SMBG data, BG is starting to trend in the right direction. All, but 2 of patient's BS readings have been under 200 with a 14 d avg of 183 (see below)    Reports good support from family. Lives with , kid (8 y/o boy) and in-laws. Also has parents, siblings and cousins in [place]  Works 36 hours/wk.     Current meds:  Lantus, novolog, metformin  Pt denies any missed doses. Self manages her meds.    Reviewed Insulin injection technique. Discussed storage, priming, sharps,new needle each use, site selection, holding the pen in place, action of insulin and hypoglycemia signs, symptoms and treatment - patient verbalized understanding.    SMBG:  Presents with her meter. Tests 2-3x/d  14 day av (n=41)  BG  - :  FB, 172, 180, 118, 128  BG throughout the day: 82, 191, 217, 102, 106, 130, 108    2-3 meals/d, traditional meals with rice, veg and protein/meat. Endorses a \"plate full\" of rice.  Endorses drinking sugary drinks and consuming desserts/sweets occasionally.    Education/Discussion: Reviewed diabetes basics, AIC and BS goals, sx and tx of hypo and hyperglycemia, " complications of uncontrolled diabetes, general activity and diet guidelines, CHO foods, concept of budgeting and balancing, planning ahead for healthy meals, examples of quick and healthy meals/snacks, plate method for portion control, meter and testing basics: pt expressed understanding.    Discussed the need to eat healthily - mainly vegetables, some meat, limited rice/bread, noodles, exercise, drinking more water, sleep well       Patient's most recent   Lab Results   Component Value Date    A1C >20.1 05/25/2025    HEMOGLOBINA1 13.1 01/13/2020     is not meeting goal of <7.0    Care Plan and Education Provided:  Diabetes Pathophysiology  Healthy Eating: Balanced meals, Consistency in amount and timing of carbohydrate intake, Plate planning method, Portion control, and Weight Management  Being Active: Finding a physical activity routine that works for you  Monitoring: Frequency of monitoring, Individual glucose targets, Log and interpret results, and Purpose  Taking Medication: Action of prescribed medication(s), Administering and storing injectable diabetes medications, Proper site selection and rotation for injections, Side effects of prescribed medication(s), and When to take medication(s)  Problem Solving: High glucose - causes, signs/symptoms, treatment and prevention, Low glucose - causes, signs/symptoms, treatment and prevention, and Rule of 15 and carrying a carbohydrate source at all times in case of low glucose  Reducing Risks: Complications of diabetes, Eye care, and Goal for A1c, how it relates to glucose and how often to check    Patient verbalized understanding of diabetes self-management education concepts discussed, opportunities for ongoing education and support, and recommendations provided today.    Plan    Pt instructions:    Keep taking your diabetes medications as directed and call for a refill when your pills/injectables are getting low.     2.   Continue testing your blood sugars 1-2 times  "daily: in the morning fasting and 2 hours after a meal.        BG goals are: around 100 in the morning and around 150 post meal to keep it simple.        Please continue to carry glucose tablets with you at all times.    3.   Eat regular meals and NOT skip.        Add a small, healthy afternoon snack/between your meals        Limit rice to 1 cup/fist sized portions per meal - as discussed, use the plate method for portion control        Add in more vegetables and some protein/meat to your meals        Eat fruit instead of drinking juice. Drink more water.    4.   Daily physical activity as able/safe, even a few mins is better than skipping it altogether.    5.   Always bring your meter, BG log and med vials to all clinic visits.    6.   Please let us know if you have 3 high or 2 low BS (or s/s of lows) in a 7-day period.     Follow-up: 7/16  PCP: 9/25     Topics to cover at upcoming visits: Problem Solving and Reducing Risks    See Care Plan for co-developed, patient-state behavior change goals.    Education Materials Provided:  No new materials provided today      Subjective/Objective  Tyson is an 30 year old, presenting for the following diabetes education related to:    Accompanied by: Self,   Diabetes education in the past 24mo: No  Focus of Visit: Assistance w/ making life changes, Self-care behavioral goal setting, Reducing Risks, Taking Medication, Healthy Eating, Problem Solving, Being Active, Monitoring  Diabetes type: Type 2  Cultural Influences/Ethnic Background:  Not  or     Diabetes Symptoms & Complications:  Diabetes Related Symptoms: Visual change     Patient Problem List and Family Medical History reviewed for relevant medical history, current medical status, and diabetes risk factors.    Vitals:  There were no vitals taken for this visit.  Estimated body mass index is 30.94 kg/m  as calculated from the following:    Height as of 6/13/25: 1.499 m (4' 11\").    Weight as of " 6/13/25: 69.5 kg (153 lb 3 oz).   Last 3 BP:   BP Readings from Last 3 Encounters:   06/13/25 130/87   05/28/25 129/87   03/21/24 114/84       History   Smoking Status     Never   Smokeless Tobacco     Never       Labs:  Lab Results   Component Value Date    A1C >20.1 05/25/2025    HEMOGLOBINA1 13.1 01/13/2020     Lab Results   Component Value Date     06/13/2025     05/28/2025    GLC 73 04/29/2021     Lab Results   Component Value Date    LDL  06/13/2025      Comment:      Cannot estimate LDL when triglyceride exceeds 400 mg/dL     06/13/2025     11/24/2020     Direct Measure HDL   Date Value Ref Range Status   06/13/2025 36 (L) >=50 mg/dL Final     GFR Estimate   Date Value Ref Range Status   06/13/2025 66 >60 mL/min/1.73m2 Final     Comment:     eGFR calculated using 2021 CKD-EPI equation.   04/29/2021 >60 >60 mL/min/1.73m2 Final     GFR Estimate If Black   Date Value Ref Range Status   04/29/2021 >60 >60 mL/min/1.73m2 Final     Lab Results   Component Value Date    CR 1.14 06/13/2025     Lab Results   Component Value Date    MICROL 2,513.0 06/13/2025    UMALCR 6,665.78 (H) 06/13/2025    UCRR 37.7 06/13/2025 6/18/2025   Healthy Eating   Healthy Eating Assessed Today Yes         6/18/2025   Being Active   Being Active Assessed Today Yes         6/18/2025   Monitoring   Monitoring Assessed Today Yes   [facility]     Diabetes Medication(s)       Biguanides       metFORMIN (GLUCOPHAGE-XR) 750 MG 24 hr tablet Take 1 tablet (750 mg) by mouth daily (with dinner).       Diabetic Other       glucose (BD GLUCOSE) 4 g chewable tablet Take 4 tablets by mouth every 15 minutes as needed for low blood sugar.       Insulin       insulin aspart (NOVOLOG FLEXPEN) 100 UNIT/ML pen Inject 10 Units subcutaneously 3 times daily (with meals).     insulin glargine (LANTUS SOLOSTAR) 100 UNIT/ML pen Inject 60 Units subcutaneously every morning.              6/18/2025   Taking  Medications   Taking Medication Assessed Today Yes   Current Treatments Diet;Insulin Injections;Oral Medication (taken by mouth)   Given by Patient   Injection/Infusion sites Abdomen         11/30/2023   Problem Solving   Is the patient at risk for hypoglycemia? Yes   Hypoglycemia Frequency Rarely           11/30/2023   Reducing Risks   Diabetes Risks Ethnicity;Sedentary Lifestyle   CAD Risks Diabetes Mellitus;Sedentary lifestyle   Has dilated eye exam at least once a year? No   Sees dentist every 6 months? No       ANDERSON High RDN, [facility]  Diabetes Care and Education  [phone] (Triage)     Time Spent: 73 minutes  Encounter Type: Individual    Any diabetes medication dose changes were made via the [facility] Standing Orders under the patient's referring provider.

## 2025-06-18 NOTE — PROGRESS NOTES
"  Diabetes Self-Management Education & Support    Presents for:      Type of Service: In Person Visit    ++ our visit went way past the scheduled time    Assessment:  Initial visit for a previous dx  Vastly elevated A1c - pt had been off meds for > 6 months due to coverage issues  Recent ER visit - DKA    Endorses blurry vision - had her eye exam on 25    Endorses symptomatic hypoglycemia (once in the past one week) - treated with juice  We reviewed hypoglycemia management. Encouraged scheduled meals with consistent CHO  **Pt carries glucose tablets with her at all times    Per the most recent SMBG data, BG is starting to trend in the right direction. All, but 2 of patient's BS readings have been under 200 with a 14 d avg of 183 (see below)    Reports good support from family. Lives with , kid (6 y/o boy) and in-laws. Also has parents, siblings and cousins in [place]  Works 36 hours/wk.     Current meds:  Lantus, novolog, metformin  Pt denies any missed doses. Self manages her meds.    Reviewed Insulin injection technique. Discussed storage, priming, sharps,new needle each use, site selection, holding the pen in place, action of insulin and hypoglycemia signs, symptoms and treatment - patient verbalized understanding.    SMBG:  Presents with her meter. Tests 2-3x/d  14 day av (n=41)  BG  - :  FB, 172, 180, 118, 128  BG throughout the day: 82, 191, 217, 102, 106, 130, 108    2-3 meals/d, traditional meals with rice, veg and protein/meat. Endorses a \"plate full\" of rice.  Endorses drinking sugary drinks and consuming desserts/sweets occasionally.    Education/Discussion: Reviewed diabetes basics, AIC and BS goals, sx and tx of hypo and hyperglycemia, complications of uncontrolled diabetes, general activity and diet guidelines, CHO foods, concept of budgeting and balancing, planning ahead for healthy meals, examples of quick and healthy meals/snacks, plate method for portion control, meter and " testing basics: pt expressed understanding.    Discussed the need to eat healthily - mainly vegetables, some meat, limited rice/bread, noodles, exercise, drinking more water, sleep well       Patient's most recent   Lab Results   Component Value Date    A1C >20.1 05/25/2025    HEMOGLOBINA1 13.1 01/13/2020     is not meeting goal of <7.0    Care Plan and Education Provided:  Diabetes Pathophysiology  Healthy Eating: Balanced meals, Consistency in amount and timing of carbohydrate intake, Plate planning method, Portion control, and Weight Management  Being Active: Finding a physical activity routine that works for you  Monitoring: Frequency of monitoring, Individual glucose targets, Log and interpret results, and Purpose  Taking Medication: Action of prescribed medication(s), Administering and storing injectable diabetes medications, Proper site selection and rotation for injections, Side effects of prescribed medication(s), and When to take medication(s)  Problem Solving: High glucose - causes, signs/symptoms, treatment and prevention, Low glucose - causes, signs/symptoms, treatment and prevention, and Rule of 15 and carrying a carbohydrate source at all times in case of low glucose  Reducing Risks: Complications of diabetes, Eye care, and Goal for A1c, how it relates to glucose and how often to check    Patient verbalized understanding of diabetes self-management education concepts discussed, opportunities for ongoing education and support, and recommendations provided today.    Plan    Pt instructions:    Keep taking your diabetes medications as directed and call for a refill when your pills/injectables are getting low.     2.   Continue testing your blood sugars 1-2 times daily: in the morning fasting and 2 hours after a meal.        BG goals are: around 100 in the morning and around 150 post meal to keep it simple.        Please continue to carry glucose tablets with you at all times.    3.   Eat regular meals  "and NOT skip.        Add a small, healthy afternoon snack/between your meals        Limit rice to 1 cup/fist sized portions per meal - as discussed, use the plate method for portion control        Add in more vegetables and some protein/meat to your meals        Eat fruit instead of drinking juice. Drink more water.    4.   Daily physical activity as able/safe, even a few mins is better than skipping it altogether.    5.   Always bring your meter, BG log and med vials to all clinic visits.    6.   Please let us know if you have 3 high or 2 low BS (or s/s of lows) in a 7-day period.     Follow-up: 7/16  PCP: 9/25     Topics to cover at upcoming visits: Problem Solving and Reducing Risks    See Care Plan for co-developed, patient-state behavior change goals.    Education Materials Provided:  No new materials provided today      Subjective/Objective  Tyson is an 30 year old, presenting for the following diabetes education related to:    Accompanied by: Self,   Diabetes education in the past 24mo: No  Focus of Visit: Assistance w/ making life changes, Self-care behavioral goal setting, Reducing Risks, Taking Medication, Healthy Eating, Problem Solving, Being Active, Monitoring  Diabetes type: Type 2  Cultural Influences/Ethnic Background:  Not  or     Diabetes Symptoms & Complications:  Diabetes Related Symptoms: Visual change     Patient Problem List and Family Medical History reviewed for relevant medical history, current medical status, and diabetes risk factors.    Vitals:  There were no vitals taken for this visit.  Estimated body mass index is 30.94 kg/m  as calculated from the following:    Height as of 6/13/25: 1.499 m (4' 11\").    Weight as of 6/13/25: 69.5 kg (153 lb 3 oz).   Last 3 BP:   BP Readings from Last 3 Encounters:   06/13/25 130/87   05/28/25 129/87   03/21/24 114/84       History   Smoking Status    Never   Smokeless Tobacco    Never       Labs:  Lab Results   Component Value " Date    A1C >20.1 05/25/2025    HEMOGLOBINA1 13.1 01/13/2020     Lab Results   Component Value Date     06/13/2025     05/28/2025    GLC 73 04/29/2021     Lab Results   Component Value Date    LDL  06/13/2025      Comment:      Cannot estimate LDL when triglyceride exceeds 400 mg/dL     06/13/2025     11/24/2020     Direct Measure HDL   Date Value Ref Range Status   06/13/2025 36 (L) >=50 mg/dL Final     GFR Estimate   Date Value Ref Range Status   06/13/2025 66 >60 mL/min/1.73m2 Final     Comment:     eGFR calculated using 2021 CKD-EPI equation.   04/29/2021 >60 >60 mL/min/1.73m2 Final     GFR Estimate If Black   Date Value Ref Range Status   04/29/2021 >60 >60 mL/min/1.73m2 Final     Lab Results   Component Value Date    CR 1.14 06/13/2025     Lab Results   Component Value Date    MICROL 2,513.0 06/13/2025    UMALCR 6,665.78 (H) 06/13/2025    UCRR 37.7 06/13/2025 6/18/2025   Healthy Eating   Healthy Eating Assessed Today Yes         6/18/2025   Being Active   Being Active Assessed Today Yes         6/18/2025   Monitoring   Monitoring Assessed Today Yes   Blood Glucose Meter ContourNext     Diabetes Medication(s)       Biguanides       metFORMIN (GLUCOPHAGE-XR) 750 MG 24 hr tablet Take 1 tablet (750 mg) by mouth daily (with dinner).       Diabetic Other       glucose (BD GLUCOSE) 4 g chewable tablet Take 4 tablets by mouth every 15 minutes as needed for low blood sugar.       Insulin       insulin aspart (NOVOLOG FLEXPEN) 100 UNIT/ML pen Inject 10 Units subcutaneously 3 times daily (with meals).     insulin glargine (LANTUS SOLOSTAR) 100 UNIT/ML pen Inject 60 Units subcutaneously every morning.              6/18/2025   Taking Medications   Taking Medication Assessed Today Yes   Current Treatments Diet;Insulin Injections;Oral Medication (taken by mouth)   Given by Patient   Injection/Infusion sites Abdomen         11/30/2023   Problem Solving   Is the patient at risk for  hypoglycemia? Yes   Hypoglycemia Frequency Rarely           11/30/2023   Reducing Risks   Diabetes Risks Ethnicity;Sedentary Lifestyle   CAD Risks Diabetes Mellitus;Sedentary lifestyle   Has dilated eye exam at least once a year? No   Sees dentist every 6 months? No       ANDERSON High RDN, [facility]  Diabetes Care and Education  [phone] (Triage)     Time Spent: 73 minutes  Encounter Type: Individual    Any diabetes medication dose changes were made via the [facility] Standing Orders under the patient's referring provider.

## 2025-07-01 NOTE — PROGRESS NOTES
HPI: Patient is here for follow-up of an infected scalp cyst s/p I&D 5/25 in ED and repeat bedside I&D 5/26. Was last seen in clinic on 6/18/2025 with plans to continue wound packing daily with saline moistened 1/4 inch Nu Gauze. She is presenting today for a post-procedural wound check.      Reports father in law has been assisting with packing that has been occurring once daily, reporting wound has been much more shallow, has not required packing for a few days. Denies drainage, pain, fever, chills, nausea or vomiting.      Patient is Reanna speaking and a phone  was utilized.     Exam:  General: Appears well  Posterior scalp: Posterior scalp wound that is >0.5 cm in length with minimal depth, <0.25 cm. No TTP, erythema, warmth, drainage or fluctuance. No longer requiring packing. There is e/o underlying sebaceous cyst without overlying infection.      Assessment/Plan: Resolved posterior scalp sebaceous abscess no longer requiring further wound care/packing.  In the setting of resolution, discussed options for watchful waiting vs operative removal with Surgeon. She would like to elect for operative removal. Discussed with Dr. Cruz who presented in clinic electing for [place] [facility]. Plan for case to be requested and patient will be called to facilitate scheduling.     Debbie Marin PA-C  [facility]  Surgery [facility]3]  [address] [place] [address]  [address]  [place] [address]?  Office: [phone]

## 2025-07-02 ENCOUNTER — OFFICE VISIT (OUTPATIENT)
Dept: SURGERY | Facility: CLINIC | Age: 30
End: 2025-07-02
Payer: COMMERCIAL

## 2025-07-02 DIAGNOSIS — Z51.89 WOUND CHECK, ABSCESS: Primary | ICD-10-CM

## 2025-07-02 PROCEDURE — 99212 OFFICE O/P EST SF 10 MIN: CPT

## 2025-07-14 ENCOUNTER — LAB (OUTPATIENT)
Dept: LAB | Facility: CLINIC | Age: 30
End: 2025-07-14
Payer: COMMERCIAL

## 2025-07-14 DIAGNOSIS — R79.89 ABNORMAL TSH: ICD-10-CM

## 2025-07-14 PROCEDURE — 84439 ASSAY OF FREE THYROXINE: CPT

## 2025-07-14 PROCEDURE — 84443 ASSAY THYROID STIM HORMONE: CPT

## 2025-07-14 PROCEDURE — 36415 COLL VENOUS BLD VENIPUNCTURE: CPT

## 2025-07-15 LAB
T4 FREE SERPL-MCNC: 0.86 NG/DL (ref 0.9–1.7)
TSH SERPL DL<=0.005 MIU/L-ACNC: 4.81 UIU/ML (ref 0.3–4.2)

## 2025-07-16 ENCOUNTER — ALLIED HEALTH/NURSE VISIT (OUTPATIENT)
Dept: EDUCATION SERVICES | Facility: CLINIC | Age: 30
End: 2025-07-16
Payer: COMMERCIAL

## 2025-07-16 ENCOUNTER — RESULTS FOLLOW-UP (OUTPATIENT)
Dept: FAMILY MEDICINE | Facility: CLINIC | Age: 30
End: 2025-07-16

## 2025-07-16 DIAGNOSIS — E03.9 HYPOTHYROIDISM, UNSPECIFIED TYPE: Primary | ICD-10-CM

## 2025-07-16 DIAGNOSIS — E11.65 TYPE 2 DIABETES MELLITUS WITH HYPERGLYCEMIA, UNSPECIFIED WHETHER LONG TERM INSULIN USE (H): Primary | ICD-10-CM

## 2025-07-16 PROCEDURE — G0108 DIAB MANAGE TRN  PER INDIV: HCPCS | Mod: AE | Performed by: DIETITIAN, REGISTERED

## 2025-07-16 RX ORDER — HYDROCHLOROTHIAZIDE 12.5 MG/1
CAPSULE ORAL
Qty: 6 EACH | Refills: 3 | Status: SHIPPED | OUTPATIENT
Start: 2025-07-16

## 2025-07-16 RX ORDER — LEVOTHYROXINE SODIUM 25 UG/1
25 TABLET ORAL
Qty: 90 TABLET | Refills: 1 | Status: SHIPPED | OUTPATIENT
Start: 2025-07-16

## 2025-07-16 RX ORDER — KETOROLAC TROMETHAMINE 30 MG/ML
1 INJECTION, SOLUTION INTRAMUSCULAR; INTRAVENOUS ONCE
Qty: 1 EACH | Refills: 0 | Status: SHIPPED | OUTPATIENT
Start: 2025-07-16 | End: 2025-07-16

## 2025-07-16 NOTE — PROGRESS NOTES
Diabetes Self-Management Education & Support    Presents for:      Type of Service: In Person Visit     Assessment:  Follow up visit for diabetes education, conducted with the help of a professional .   Asymptomatic hypoglycemia     Per the most recent SMBG data, pt's BG has been labile with no particular pattern. She has had elevated BG but has also had asymptomatic hypoglycemia (once in the past 2 weeks) hence I am reluctant to make any changes to her insulin today. She Denies any missed doses of meds, including insulin. Endorses large portions of rice, denies any sugary drinks/sweets.   Is agreeable to work on modifying her diet, especially rice portions.     We reviewed diabetes basics, hypoglycemia management, diet and activity guidelines, encouraged scheduled meals with consistent CHO.     Pt will benefit from CGM - she has used it while she was in the hospital in May, 2025 and is agreeable to try it again. Rx was sent in   We will have her come back next week to help her get started with CGM.     Pt has good support from family.     Current meds:  Appropriately taking lantus, novolog, metformin. Denies any missed doses.   Insulin basics, including injection technique were discussed at the previous visit.   Pt reports that she is almost out of her lantus and novolog and will soon be running out of pen needles too.   Writer called [facility] at [phone] and spoke with Delroy (pharmacy technician) - they will have all her prescriptions filled and will call to inform her when they are ready (later today).   Refills for pen needles were sent in     SMBG:  Pt presents with her meter.   Tests 2-3x/d - fasting and at random times throughout the day  14 day av (n=32)  BG  - :  FB,112, 174, 233, 178, 149, 146, 207, 153, 234, 61  BG throughout the day: 190, 162, 225, 160, 295, 210, 81, 209, 146, 285, 249, 83, 133, 217, 236  2-3 meals/d, traditional meals with rice, veg and  "protein/meat. Endorses a \"plate full\" of rice.  Has quit drinking sugary drinks and consuming desserts/sweets.    Discussed the need to eat healthily - mainly vegetables, some meat, limited rice/bread, noodles, exercise, drinking more water, sleep well        Patient's most recent   Lab Results   Component Value Date    A1C >20.1 05/25/2025    HEMOGLOBINA1 13.1 01/13/2020     is not meeting goal of <7.0    Care Plan and Education Provided:  Healthy Eating: Consistency in amount and timing of carbohydrate intake, Plate planning method, and Portion control  Monitoring: Blood glucose versus Continuous Glucose Monitoring, Frequency of monitoring, Individual glucose targets, Log and interpret results, Purpose, and Proper technique  Taking Medication: Action of prescribed medication(s), Side effects of prescribed medication(s), and When to take medication(s)  Problem Solving: High glucose - causes, signs/symptoms, treatment and prevention, Low glucose - causes, signs/symptoms, treatment and prevention, and Rule of 15 and carrying a carbohydrate source at all times in case of low glucose  Reducing Risks: Complications of diabetes and Goal for A1c, how it relates to glucose and how often to check    Patient verbalized understanding of diabetes self-management education concepts discussed, opportunities for ongoing education and support, and recommendations provided today.    Plan    ++ Pt to  all her refills at [facility], including CGM and bring both boxes to our visit next week, 7/22    Pt instructions:    Keep taking your diabetes medications as directed and call for a refill when your pills/injectables are getting low.     2.   Continue testing your blood sugars 1-2 times daily: in the morning fasting and 2 hours after a meal.        BG goals are: around 100 in the morning and around 150 post meal to keep it simple.        Please continue to carry glucose tablets with you at all times.     3.   Eat regular meals and " "NOT skip.        Add a small, healthy afternoon snack/between your meals        Limit rice to 1 cup/fist sized portions per meal - as discussed, use the plate method for portion control        Add in more vegetables and some protein/meat to your meals        Eat fruit instead of drinking juice. Drink more water.     4.   Daily physical activity as able/safe, even a few mins is better than skipping it altogether.     5.   Always bring your meter, BG log and med vials to all clinic visits.     6.   Please let us know if you have 3 high or 2 low BS (or s/s of lows) in a 7-day period.     Topics to cover at upcoming visits: Problem Solving and Reducing Risks    See Care Plan for co-developed, patient-state behavior change goals.    Education Materials Provided:  No new materials provided today      Subjective/Objective  Tyson is an 30 year old, presenting for the following diabetes education related to:       Cultural Influences/Ethnic Background:  Not  or     Diabetes Symptoms & Complications:     Patient Problem List and Family Medical History reviewed for relevant medical history, current medical status, and diabetes risk factors.    Vitals:  There were no vitals taken for this visit.  Estimated body mass index is 30.94 kg/m  as calculated from the following:    Height as of 6/13/25: 1.499 m (4' 11\").    Weight as of 6/13/25: 69.5 kg (153 lb 3 oz).   Last 3 BP:   BP Readings from Last 3 Encounters:   06/13/25 130/87   05/28/25 129/87   03/21/24 114/84       History   Smoking Status    Never   Smokeless Tobacco    Never       Labs:  Lab Results   Component Value Date    A1C >20.1 05/25/2025    HEMOGLOBINA1 13.1 01/13/2020     Lab Results   Component Value Date     06/13/2025     05/28/2025    GLC 73 04/29/2021     Lab Results   Component Value Date    LDL  06/13/2025      Comment:      Cannot estimate LDL when triglyceride exceeds 400 mg/dL     06/13/2025     11/24/2020 "     Direct Measure HDL   Date Value Ref Range Status   06/13/2025 36 (L) >=50 mg/dL Final     GFR Estimate   Date Value Ref Range Status   06/13/2025 66 >60 mL/min/1.73m2 Final     Comment:     eGFR calculated using 2021 CKD-EPI equation.   04/29/2021 >60 >60 mL/min/1.73m2 Final     GFR Estimate If Black   Date Value Ref Range Status   04/29/2021 >60 >60 mL/min/1.73m2 Final     Lab Results   Component Value Date    CR 1.14 06/13/2025     Lab Results   Component Value Date    MICROL 2,513.0 06/13/2025    UMALCR 6,665.78 (H) 06/13/2025    UCRR 37.7 06/13/2025 6/18/2025   Monitoring   Monitoring Assessed Today Yes   Blood Glucose Meter ContourNext     Diabetes Medication(s)       Biguanides       metFORMIN (GLUCOPHAGE-XR) 750 MG 24 hr tablet Take 1 tablet (750 mg) by mouth daily (with dinner).       Diabetic Other       glucose (BD GLUCOSE) 4 g chewable tablet Take 4 tablets by mouth every 15 minutes as needed for low blood sugar.       Insulin       insulin aspart (NOVOLOG FLEXPEN) 100 UNIT/ML pen Inject 10 Units subcutaneously 3 times daily (with meals).     insulin glargine (LANTUS SOLOSTAR) 100 UNIT/ML pen Inject 60 Units subcutaneously every morning.              6/18/2025   Taking Medications   Taking Medication Assessed Today Yes   Current Treatments Diet;Insulin Injections;Oral Medication (taken by mouth)   Given by Patient   Injection/Infusion sites Abdomen         11/30/2023   Problem Solving   Is the patient at risk for hypoglycemia? Yes   Hypoglycemia Frequency Rarely           11/30/2023   Reducing Risks   Diabetes Risks Ethnicity;Sedentary Lifestyle   CAD Risks Diabetes Mellitus;Sedentary lifestyle   Has dilated eye exam at least once a year? No   Sees dentist every 6 months? No       Brenda Wolf RDN, [place], [facility]  Diabetes Care and Education  [phone] (Triage)     Time Spent: 65 minutes  Encounter Type: Individual    Any diabetes medication dose changes were made via the [facility] Standing Orders  under the patient's referring provider.

## 2025-07-16 NOTE — LETTER
2025         RE: Tyson Skelton  [address] [place] [address]        Dear Colleague,    Thank you for referring your patient, Tyson Skelton, to the [facility]. Please see a copy of my visit note below.    Diabetes Self-Management Education & Support    Presents for:      Type of Service: In Person Visit     Assessment:  Follow up visit for diabetes education, conducted with the help of a professional .   Asymptomatic hypoglycemia     Per the most recent SMBG data, pt's BG has been labile with no particular pattern. She has had elevated BG but has also had asymptomatic hypoglycemia (once in the past 2 weeks) hence I am reluctant to make any changes to her insulin today. She Denies any missed doses of meds, including insulin. Endorses large portions of rice, denies any sugary drinks/sweets.   Is agreeable to work on modifying her diet, especially rice portions.     We reviewed diabetes basics, hypoglycemia management, diet and activity guidelines, encouraged scheduled meals with consistent CHO.     Pt will benefit from CGM - she has used it while she was in the hospital in May, 2025 and is agreeable to try it again. Rx was sent in   We will have her come back next week to help her get started with CGM.     Pt has good support from family.     Current meds:  Appropriately taking lantus, novolog, metformin. Denies any missed doses.   Insulin basics, including injection technique were discussed at the previous visit.   Pt reports that she is almost out of her lantus and novolog and will soon be running out of pen needles too.   Writer called [facility] at [phone] and spoke with Delroy (pharmacy technician) - they will have all her prescriptions filled and will call to inform her when they are ready (later today).   Refills for pen needles were sent in     SMBG:  Pt presents with her meter.   Tests 2-3x/d - fasting and at random times throughout the day  14 day av  "(n=32)  BG  - :  FB,112, 174, 233, 178, 149, 146, 207, 153, 234, 61  BG throughout the day: 190, 162, 225, 160, 295, 210, 81, 209, 146, 285, 249, 83, 133, 217, 236  2-3 meals/d, traditional meals with rice, veg and protein/meat. Endorses a \"plate full\" of rice.  Has quit drinking sugary drinks and consuming desserts/sweets.    Discussed the need to eat healthily - mainly vegetables, some meat, limited rice/bread, noodles, exercise, drinking more water, sleep well        Patient's most recent   Lab Results   Component Value Date    A1C >20.1 2025    HEMOGLOBINA1 13.1 2020     is not meeting goal of <7.0    Care Plan and Education Provided:  Healthy Eating: Consistency in amount and timing of carbohydrate intake, Plate planning method, and Portion control  Monitoring: Blood glucose versus Continuous Glucose Monitoring, Frequency of monitoring, Individual glucose targets, Log and interpret results, Purpose, and Proper technique  Taking Medication: Action of prescribed medication(s), Side effects of prescribed medication(s), and When to take medication(s)  Problem Solving: High glucose - causes, signs/symptoms, treatment and prevention, Low glucose - causes, signs/symptoms, treatment and prevention, and Rule of 15 and carrying a carbohydrate source at all times in case of low glucose  Reducing Risks: Complications of diabetes and Goal for A1c, how it relates to glucose and how often to check    Patient verbalized understanding of diabetes self-management education concepts discussed, opportunities for ongoing education and support, and recommendations provided today.    Plan    ++ Pt to  all her refills at [facility], including CGM and bring both boxes to our visit next week,     Pt instructions:    Keep taking your diabetes medications as directed and call for a refill when your pills/injectables are getting low.     2.   Continue testing your blood sugars 1-2 times daily: in the morning " "fasting and 2 hours after a meal.        BG goals are: around 100 in the morning and around 150 post meal to keep it simple.        Please continue to carry glucose tablets with you at all times.     3.   Eat regular meals and NOT skip.        Add a small, healthy afternoon snack/between your meals        Limit rice to 1 cup/fist sized portions per meal - as discussed, use the plate method for portion control        Add in more vegetables and some protein/meat to your meals        Eat fruit instead of drinking juice. Drink more water.     4.   Daily physical activity as able/safe, even a few mins is better than skipping it altogether.     5.   Always bring your meter, BG log and med vials to all clinic visits.     6.   Please let us know if you have 3 high or 2 low BS (or s/s of lows) in a 7-day period.     Topics to cover at upcoming visits: Problem Solving and Reducing Risks    See Care Plan for co-developed, patient-state behavior change goals.    Education Materials Provided:  No new materials provided today      Subjective/Objective  Tyson is an 30 year old, presenting for the following diabetes education related to:       Cultural Influences/Ethnic Background:  Not  or     Diabetes Symptoms & Complications:     Patient Problem List and Family Medical History reviewed for relevant medical history, current medical status, and diabetes risk factors.    Vitals:  There were no vitals taken for this visit.  Estimated body mass index is 30.94 kg/m  as calculated from the following:    Height as of 6/13/25: 1.499 m (4' 11\").    Weight as of 6/13/25: 69.5 kg (153 lb 3 oz).   Last 3 BP:   BP Readings from Last 3 Encounters:   06/13/25 130/87   05/28/25 129/87   03/21/24 114/84       History   Smoking Status     Never   Smokeless Tobacco     Never       Labs:  Lab Results   Component Value Date    A1C >20.1 05/25/2025    HEMOGLOBINA1 13.1 01/13/2020     Lab Results   Component Value Date     06/13/2025 "     05/28/2025    GLC 73 04/29/2021     Lab Results   Component Value Date    LDL  06/13/2025      Comment:      Cannot estimate LDL when triglyceride exceeds 400 mg/dL     06/13/2025     11/24/2020     Direct Measure HDL   Date Value Ref Range Status   06/13/2025 36 (L) >=50 mg/dL Final     GFR Estimate   Date Value Ref Range Status   06/13/2025 66 >60 mL/min/1.73m2 Final     Comment:     eGFR calculated using 2021 CKD-EPI equation.   04/29/2021 >60 >60 mL/min/1.73m2 Final     GFR Estimate If Black   Date Value Ref Range Status   04/29/2021 >60 >60 mL/min/1.73m2 Final     Lab Results   Component Value Date    CR 1.14 06/13/2025     Lab Results   Component Value Date    MICROL 2,513.0 06/13/2025    UMALCR 6,665.78 (H) 06/13/2025    UCRR 37.7 06/13/2025 6/18/2025   Monitoring   Monitoring Assessed Today Yes   Blood Glucose Meter ContourNext     Diabetes Medication(s)       Biguanides       metFORMIN (GLUCOPHAGE-XR) 750 MG 24 hr tablet Take 1 tablet (750 mg) by mouth daily (with dinner).       Diabetic Other       glucose (BD GLUCOSE) 4 g chewable tablet Take 4 tablets by mouth every 15 minutes as needed for low blood sugar.       Insulin       insulin aspart (NOVOLOG FLEXPEN) 100 UNIT/ML pen Inject 10 Units subcutaneously 3 times daily (with meals).     insulin glargine (LANTUS SOLOSTAR) 100 UNIT/ML pen Inject 60 Units subcutaneously every morning.              6/18/2025   Taking Medications   Taking Medication Assessed Today Yes   Current Treatments Diet;Insulin Injections;Oral Medication (taken by mouth)   Given by Patient   Injection/Infusion sites Abdomen         11/30/2023   Problem Solving   Is the patient at risk for hypoglycemia? Yes   Hypoglycemia Frequency Rarely           11/30/2023   Reducing Risks   Diabetes Risks Ethnicity;Sedentary Lifestyle   CAD Risks Diabetes Mellitus;Sedentary lifestyle   Has dilated eye exam at least once a year? No   Sees dentist every 6 months? No        ANDERSON High RDN, [facility]  Diabetes Care and Education  [phone] (Triage)     Time Spent: 65 minutes  Encounter Type: Individual    Any diabetes medication dose changes were made via the [facility] Standing Orders under the patient's referring provider.

## 2025-07-17 NOTE — TELEPHONE ENCOUNTER
"Writer attempt #1 to call patient with the help of an [facility] \"Reanna\"   regarding clinician's message below. Clinician's message relayed to patient.    Patient verbalizes understanding, agrees with plan and has no further questions.    Anthony Linder, AJAYN, RN, PHN   [facility]    "

## 2025-07-21 ENCOUNTER — TELEPHONE (OUTPATIENT)
Dept: SURGERY | Facility: CLINIC | Age: 30
End: 2025-07-21
Payer: COMMERCIAL

## 2025-07-21 PROBLEM — L72.3 SEBACEOUS CYST: Status: ACTIVE | Noted: 2025-07-11

## 2025-07-21 NOTE — TELEPHONE ENCOUNTER
With the help of a Reanna , I called patient to schedule surgery w/ Dr. Cruz. Patient does not want to schedule right now but will call me back if she changes her mind and wants to move forward. I gave her my direct phone number.

## 2025-07-22 ENCOUNTER — ALLIED HEALTH/NURSE VISIT (OUTPATIENT)
Dept: EDUCATION SERVICES | Facility: CLINIC | Age: 30
End: 2025-07-22
Payer: COMMERCIAL

## 2025-07-22 DIAGNOSIS — E11.9 DIABETES MELLITUS (H): Primary | ICD-10-CM

## 2025-07-22 PROCEDURE — G0108 DIAB MANAGE TRN  PER INDIV: HCPCS | Mod: AE | Performed by: DIETITIAN, REGISTERED

## 2025-07-22 NOTE — LETTER
7/22/2025         RE: Tyson Skelton  [address] [place] [address]        Dear Colleague,    Thank you for referring your patient, Tyson Skelton, to the [facility]. Please see a copy of my visit note below.    Diabetes Self-Management Education & Support    Presents for:      Type of Service: In Person Visit    Assessment     Pt presented with andrews 3 plus sensor and reader, however, she wanted to use her I-phone to monitor her BG  Sensor was inserted with no resistance or bleeding at insertion site (pt's left arm).    Had picked up all her prescriptions, including both insulins and pen needles.  Shares that she has been eating less rice than before. Endorses drinking soda occasionally. Has been going out/takes walks  Denies any s/s of hypoglycemia. Keeps the glucose tablets available.     Education/Discussion: Reviewed hypoglycemia management, general activity and diet guidelines, CGM and testing basics: pt expressed understanding. Discussed the need to eat healthily - mainly vegetables, some meat, limited rice/bread, noodles, exercise, drinking more water    CGM-specific education provided on:      Freestyle Andrews sensor: insertion technique, sensor site location and rotation, sensor wear, LibreView software, and also helped pt connect to our practice    Care Plan and Education Provided:  CGM insertion, instruction and review of basics     Patient verbalized understanding of diabetes self-management education concepts discussed, opportunities for ongoing education and support, and recommendations provided today.    Plan    Follow up in 2 weeks (8/5) to review CGM data and possible med adjustments   PCP:9/25    Topics to cover at upcoming visits: Healthy Eating, Monitoring, Taking Medication, Problem Solving, and Reducing Risks    See Care Plan for co-developed, patient-state behavior change goals.    Education Materials Provided:  No new materials provided  "today      Subjective/Objective  Tyson is an 30 year old, presenting for the following diabetes education related to:    Diabetes education in the past 24mo: (Patient-Rptd) No  Diabetes type: (Patient-Rptd) Other (see Comments)  Disease course: (Patient-Rptd) Stable  Cultural Influences/Ethnic Background:  Not  or     Diabetes Symptoms & Complications:  Diabetes Related Symptoms: (Patient-Rptd) None  Weight trend: (Patient-Rptd) Stable  Symptom course: (Patient-Rptd) Stable  Disease course: (Patient-Rptd) Stable       Patient Problem List and Family Medical History reviewed for relevant medical history, current medical status, and diabetes risk factors.    Vitals:  There were no vitals taken for this visit.  Estimated body mass index is 30.94 kg/m  as calculated from the following:    Height as of 6/13/25: 1.499 m (4' 11\").    Weight as of 6/13/25: 69.5 kg (153 lb 3 oz).   Last 3 BP:   BP Readings from Last 3 Encounters:   06/13/25 130/87   05/28/25 129/87   03/21/24 114/84       History   Smoking Status     Never   Smokeless Tobacco     Never       Labs:  Lab Results   Component Value Date    A1C >20.1 05/25/2025    HEMOGLOBINA1 13.1 01/13/2020     Lab Results   Component Value Date     06/13/2025     05/28/2025    GLC 73 04/29/2021     Lab Results   Component Value Date    LDL  06/13/2025      Comment:      Cannot estimate LDL when triglyceride exceeds 400 mg/dL     06/13/2025     11/24/2020     Direct Measure HDL   Date Value Ref Range Status   06/13/2025 36 (L) >=50 mg/dL Final     GFR Estimate   Date Value Ref Range Status   06/13/2025 66 >60 mL/min/1.73m2 Final     Comment:     eGFR calculated using 2021 CKD-EPI equation.   04/29/2021 >60 >60 mL/min/1.73m2 Final     GFR Estimate If Black   Date Value Ref Range Status   04/29/2021 >60 >60 mL/min/1.73m2 Final     Lab Results   Component Value Date    CR 1.14 06/13/2025     Lab Results   Component Value Date    MICROL " 2,513.0 06/13/2025    UMALCR 6,665.78 (H) 06/13/2025    UCRR 37.7 06/13/2025 7/22/2025   Monitoring   Blood Glucose Meter Unknown   Times checking blood sugar at home (number) Never           11/30/2023   Problem Solving   Is the patient at risk for hypoglycemia? Yes   Hypoglycemia Frequency Rarely           7/22/2025   Reducing Risks   Has dilated eye exam at least once a year? No   Sees dentist every 6 months? No   Feet checked by healthcare provider in the last year? No       Brenda Wolf RDN, [place], [facility]  Diabetes Care and Education  [phone] (Triage)     Time Spent: 65 minutes  Encounter Type: Individual    Any diabetes medication dose changes were made via the [facility] Standing Orders under the patient's referring provider.   Answers submitted by the patient for this visit:  Questionnaire about: Diabetes problem (Submitted on 7/22/2025)  Chief Complaint: Diabetes problem

## 2025-07-22 NOTE — PROGRESS NOTES
Diabetes Self-Management Education & Support    Presents for:      Type of Service: In Person Visit    Assessment     Pt presented with andrews 3 plus sensor and reader, however, she wanted to use her I-phone to monitor her BG  Sensor was inserted with no resistance or bleeding at insertion site (pt's left arm).    Had picked up all her prescriptions, including both insulins and pen needles.  Shares that she has been eating less rice than before. Endorses drinking soda occasionally. Has been going out/takes walks  Denies any s/s of hypoglycemia. Keeps the glucose tablets available.     Education/Discussion: Reviewed hypoglycemia management, general activity and diet guidelines, CGM and testing basics: pt expressed understanding. Discussed the need to eat healthily - mainly vegetables, some meat, limited rice/bread, noodles, exercise, drinking more water    CGM-specific education provided on:      Freestyle Andrews sensor: insertion technique, sensor site location and rotation, sensor wear, LibreView software, and also helped pt connect to our practice    Care Plan and Education Provided:  CGM insertion, instruction and review of basics     Patient verbalized understanding of diabetes self-management education concepts discussed, opportunities for ongoing education and support, and recommendations provided today.    Plan    Follow up in 2 weeks (8/5) to review CGM data and possible med adjustments   PCP:9/25    Topics to cover at upcoming visits: Healthy Eating, Monitoring, Taking Medication, Problem Solving, and Reducing Risks    See Care Plan for co-developed, patient-state behavior change goals.    Education Materials Provided:  No new materials provided today      Subjective/Objective  Tyson is an 30 year old, presenting for the following diabetes education related to:    Diabetes education in the past 24mo: (Patient-Rptd) No  Diabetes type: (Patient-Rptd) Other (see Comments)  Disease course: (Patient-Rptd)  "Stable  Cultural Influences/Ethnic Background:  Not  or     Diabetes Symptoms & Complications:  Diabetes Related Symptoms: (Patient-Rptd) None  Weight trend: (Patient-Rptd) Stable  Symptom course: (Patient-Rptd) Stable  Disease course: (Patient-Rptd) Stable       Patient Problem List and Family Medical History reviewed for relevant medical history, current medical status, and diabetes risk factors.    Vitals:  There were no vitals taken for this visit.  Estimated body mass index is 30.94 kg/m  as calculated from the following:    Height as of 6/13/25: 1.499 m (4' 11\").    Weight as of 6/13/25: 69.5 kg (153 lb 3 oz).   Last 3 BP:   BP Readings from Last 3 Encounters:   06/13/25 130/87   05/28/25 129/87   03/21/24 114/84       History   Smoking Status    Never   Smokeless Tobacco    Never       Labs:  Lab Results   Component Value Date    A1C >20.1 05/25/2025    HEMOGLOBINA1 13.1 01/13/2020     Lab Results   Component Value Date     06/13/2025     05/28/2025    GLC 73 04/29/2021     Lab Results   Component Value Date    LDL  06/13/2025      Comment:      Cannot estimate LDL when triglyceride exceeds 400 mg/dL     06/13/2025     11/24/2020     Direct Measure HDL   Date Value Ref Range Status   06/13/2025 36 (L) >=50 mg/dL Final     GFR Estimate   Date Value Ref Range Status   06/13/2025 66 >60 mL/min/1.73m2 Final     Comment:     eGFR calculated using 2021 CKD-EPI equation.   04/29/2021 >60 >60 mL/min/1.73m2 Final     GFR Estimate If Black   Date Value Ref Range Status   04/29/2021 >60 >60 mL/min/1.73m2 Final     Lab Results   Component Value Date    CR 1.14 06/13/2025     Lab Results   Component Value Date    MICROL 2,513.0 06/13/2025    UMALCR 6,665.78 (H) 06/13/2025    UCRR 37.7 06/13/2025 7/22/2025   Monitoring   Blood Glucose Meter Unknown   Times checking blood sugar at home (number) Never           11/30/2023   Problem Solving   Is the patient at risk for " hypoglycemia? Yes   Hypoglycemia Frequency Rarely           7/22/2025   Reducing Risks   Has dilated eye exam at least once a year? No   Sees dentist every 6 months? No   Feet checked by healthcare provider in the last year? No       ANDERSON High RDN, [facility] [place][note 2]  Diabetes Care and Education  [phone] (Triage)     Time Spent: 65 minutes  Encounter Type: Individual    Any diabetes medication dose changes were made via the [facility] [place][note 2] Standing Orders under the patient's referring provider.   Answers submitted by the patient for this visit:  Questionnaire about: Diabetes problem (Submitted on 7/22/2025)  Chief Complaint: Diabetes problem

## 2025-08-05 ENCOUNTER — ALLIED HEALTH/NURSE VISIT (OUTPATIENT)
Dept: EDUCATION SERVICES | Facility: CLINIC | Age: 30
End: 2025-08-05
Payer: COMMERCIAL

## 2025-08-05 DIAGNOSIS — E11.9 DIABETES MELLITUS (H): Primary | ICD-10-CM

## 2025-08-05 PROCEDURE — G0108 DIAB MANAGE TRN  PER INDIV: HCPCS | Performed by: DIETITIAN, REGISTERED
